# Patient Record
Sex: MALE | Race: WHITE | NOT HISPANIC OR LATINO | Employment: OTHER | URBAN - METROPOLITAN AREA
[De-identification: names, ages, dates, MRNs, and addresses within clinical notes are randomized per-mention and may not be internally consistent; named-entity substitution may affect disease eponyms.]

---

## 2017-01-02 ENCOUNTER — HOSPITAL ENCOUNTER (OUTPATIENT)
Dept: SPEECH THERAPY | Facility: HOSPITAL | Age: 77
Setting detail: THERAPIES SERIES
Discharge: HOME | End: 2017-01-02

## 2017-01-02 DIAGNOSIS — R48.8 OTHER SYMBOLIC DYSFUNCTION: Primary | ICD-10-CM

## 2017-01-02 DIAGNOSIS — G31.84 MCI (MILD COGNITIVE IMPAIRMENT): ICD-10-CM

## 2017-01-02 PROCEDURE — G9168 MEMORY CURRENT STATUS: HCPCS

## 2017-01-02 PROCEDURE — 96125 COGNITIVE TEST BY HC PRO: CPT

## 2017-01-02 PROCEDURE — G9169 MEMORY GOAL STATUS: HCPCS

## 2017-01-02 NOTE — PROGRESS NOTES
Outpatient Speech Language Pathology   Adult Speech Language Cognitive Initial Evaluation  [facility]     Patient Name: Uche Polanco  : 1940  MRN: [id number]  Today's Date: 2017        Visit Date: 2017   Patient Active Problem List   Diagnosis   • Valvular heart disease   • TIA (transient ischemic attack)   • Severe obstructive sleep apnea   • Hypertension   • Hyperlipidemia   • Gout   • CAD (coronary artery disease)   • AV block   • REM sleep behavior disorder   • REM  disorder        Past Medical History   Diagnosis Date   • AV block    • CAD (coronary artery disease)    • Family history of hypertension    • Gout    • Hyperlipidemia    • Hypertension    • TOM (obstructive sleep apnea)    • REM  disorder    • TIA (transient ischemic attack)    • Valvular heart disease         Past Surgical History   Procedure Laterality Date   • Skin surgery  Month ago     Cell removed from head    • Hernia repair     • Tonsillectomy     • Mitral valve repair     • Pacemaker implantation     • Other surgical history  2014     Medtronic link loop recorder   • Hemorrhoidectomy           Visit Dx:    ICD-10-CM ICD-9-CM   1. Other symbolic dysfunction R48.8 784.69   2. MCI (mild cognitive impairment) G31.84 331.83                 Adult Speech Language - 17 1400     Background and History    Reason for Referral Patient is a 76 year old male, diagnosed with MCI, referred per Neurology PA for cognitive-linguistic evaluation and treatment secondary to worsening cognitive impairment.  -JW    Stated Goals Patient's wife reported interest in patient improving his calendar planning and orientation skills.  -JW    Description of Complaint Patient presents with somewhat lack of awareness of specific problems he is having, although he is able to state trouble multitasking. Wife reports patient has difficulty with calendar management, orientation, and planning his day. While patient states he never misses  "appointments, his wife reportedly has to remind him of them and she manages his calendar as well. Patient reportedly asks the same questions more than once and has trouble focusing. Patient reports \"I am not a good listener\".   -JW    Previous Functional Status Functional in all spheres   some college education; prior manager at [employer]  -JW    Current Baseline Abilities Patient is able to manage his own medications and continues to drive. Patient reports no difficulties with ADLs.  -    Pertinent Medications Clonazepam   See chart for additional medications not listed here.  -    Primary Language in the Home English  -    Primary Caregiver Spouse   self  -JW    Informant for the Evaluation Self;Spouse  -    Cognitive Communication and Memory    Attention Other (comment)   Impaired concentration; alternating and divided attention  -    Executive Function --   impaired  -    RBANS- Repeatable Battery for the Assessment of Neuropsychological Status    Immediate Memory Index Score 57  -    Immediate Memory Percentile --   0.2.  -    Immediate Memory Qualitative Description extremely low   2 SD below mean  -    Visuospatial Index Score 96  -    Visuospatial Percentile 39 %  -    Visuospatial Qualitative Description average  -    Language Index Score 82  -    Language Percentile 12 %  -    Language Qualitative Description low average   1 SD below mean  -    Attention Index Score 91  -    Attention Percentile 27 %  -JW    Attention Qualitative Description average  -    Delayed Memory Index Score 78  -    Delayed Memory Percentile 7 %  -    Delayed Memory Qualitative Description borderline   1 SD below mean  -    Total Index Score 404  -    Total Percentile 5 %  -    Total Qualitative Description borderline  -    RBANS Comments Patient performed one SD below the mean. Primary area of deficit noted in this exam includes STM. Patient demonstrated deficits in retrieval, as he " recognized words from recall list on 18/20 opportunities.  -JW      User Key  (r) = Recorded By, (t) = Taken By, (c) = Cosigned By    Initials Name Provider Type    VALERIA Simons MS CCC-SLP Speech and Language Pathologist                               OP SLP Education       01/02/17 1400          Education    Barriers to Learning No barriers identified  -JW      Education Provided Described results of evaluation;Patient expressed understanding of evaluation;Family/caregivers expressed understanding of evaluation;Patient participated in establishing goals and treatment plan;Family/caregivers participated in establishing goals and treatment plan  -JW      Assessed Learning needs;Learning preferences;Learning motivation;Learning readiness  -JW      Learning Motivation Strong  -JW      Learning Method Explanation;Demonstration;Teach back  -JW      Teaching Response Reinforcement needed  -JW        User Key  (r) = Recorded By, (t) = Taken By, (c) = Cosigned By    Initials Name Effective Dates    VALERIA Simons MS CCC-SLP 08/11/15 -                 SLP OP Goals       01/02/17 1500          Other Goals    Other Adult Goal- 1 Patient will verbalize 3 internal and 3 external memory strategies independently across two sessions in order to ensure competency of skilled techniques.  -JW      Status: Other Adult Goal- 1 New  -JW      Other Adult Goal- 2 Patient will perform STM tasks with 80% accuracy across two sessions, 3 trials per session, in order to enhance recall.  -JW      Status: Other Adult Goal- 2 New  -JW      Other Adult Goal- 3 Patient will perform divergent naming tasks, generating 10 items per category within 60 seconds, across two trials in order to improve semantic fluency.  -JW      Status: Other Adult Goal- 3 New  -JW      Other Adult Goal- 4 Patient will perform executive functioning task with 70% accuracy when provided prompts only in order to improve strategic thinking.  -JW       Status: Other Adult Goal- 4 New  -JW      Other Adult Goal- 5 Patient will improve cognition as evidenced by STGs met. (LTG)  -JW      Status: Other Adult Goal- 5 New  -JW      Other Adult Goal- 6 Patient will perform alternating attention task with 80% accuracy across two trials in order to enhance mental flexibility and attention.  -JW      Status: Other Adult Goal- 6 New  -JW      Other Adult Goal- 7 Patient will perform divided attention task with 70% accuracy across two trials independently in order to improve multitasking abilities.  -JW      Status: Other Adult Goal- 7 New  -JW        User Key  (r) = Recorded By, (t) = Taken By, (c) = Cosigned By    Initials Name Provider Type    JW Georgina Simons MS CCC-SLP Speech and Language Pathologist                OP SLP Assessment/Plan - 01/02/17 1400     SLP Assessment    Functional Problems Speech Language- Adult/Cognition  -JW    Impact on Function: Adult Speech Language/Cognition Decreased recall of personal information and medical history;Trouble learning or remembering new information;Poor attention to task  -JW    Clinical Impression: Speech Language-Adult/Congnition Cognitive Communication Impairment  -JW    Clinical Impression Comments Patient presents with impaired cognition, primarily characterized by deficits in STM and attention. Executive functioning and semantic fluency also impaired. Patient requires skilled ST intervention at this time in order to enhance cognitive functioning and promote independence. Without said intervention, patient is at risk for further decline in function, increased dependency on others and decreased QOL.  -JW    Please refer to paper survey for additional self-reported information Yes  -JW    Please refer to items scanned into chart for additional diagnostic informaiton and handouts as provided by clinician Yes  -JW    Prognosis Good (comment)  -JW    Patient/caregiver participated in establishment of treatment plan  and goals Yes  -    Patient would benefit from skilled therapy intervention Yes  -JW    SLP Plan    Frequency twice per week  -JW    Duration 8 weeks  -JW    Planned CPT's? SLP DEVEL COG SKILLS (PER 15 MINUTES): 03830;SLP COG TEST (PER HOUR): 06591  -    Expected Duration Therapy Session (min) 45-60 minutes  -      User Key  (r) = Recorded By, (t) = Taken By, (c) = Cosigned By    Initials Name Provider Type    VALERIA Simons MS CCC-SLP Speech and Language Pathologist                 Time Calculation:   SLP Start Time: 1400 (ttc: 115)    Therapy Charges for Today     Code Description Service Date Service Provider Modifiers Qty    02081110378 HC ST MEMORY CURRENT 1/2/2017 Georgina Simons MS CCC-SLP GN,  1    32657052438 HC ST MEMORY PROJECTED 1/2/2017 Georgina Simons MS CCC-SLP GN, CJ 1    35818875637 HC ST STD COG PERF TEST PER HOUR 1/2/2017 Georgina Simons MS CCC-SLP GN 2          SLP G-Codes  SLP NOMS Used?: Yes  Functional Limitations: Memory  Memory Current Status (): At least 20 percent but less than 40 percent impaired, limited or restricted  Memory Goal Status (): At least 20 percent but less than 40 percent impaired, limited or restricted        Georgina Simons MS CCC-JOSSE  1/2/2017

## 2017-01-06 ENCOUNTER — HOSPITAL ENCOUNTER (OUTPATIENT)
Dept: SPEECH THERAPY | Facility: HOSPITAL | Age: 77
Setting detail: THERAPIES SERIES
Discharge: HOME | End: 2017-01-06

## 2017-01-06 DIAGNOSIS — R48.8 OTHER SYMBOLIC DYSFUNCTION: Primary | ICD-10-CM

## 2017-01-06 DIAGNOSIS — G31.84 MCI (MILD COGNITIVE IMPAIRMENT): ICD-10-CM

## 2017-01-06 DIAGNOSIS — G45.9 TRANSIENT CEREBRAL ISCHEMIA, UNSPECIFIED TYPE: ICD-10-CM

## 2017-01-06 PROCEDURE — 97532: CPT

## 2017-01-06 NOTE — PROGRESS NOTES
"Outpatient Speech Language Pathology   Adult Speech Language Cognitive Treatment Note  [facility]     Patient Name: Uche Polanco  : 1940  MRN: [id number]  Today's Date: 2017         Visit Date: 2017   Patient Active Problem List   Diagnosis   • Valvular heart disease   • TIA (transient ischemic attack)   • Severe obstructive sleep apnea   • Hypertension   • Hyperlipidemia   • Gout   • CAD (coronary artery disease)   • AV block   • REM sleep behavior disorder   • REM  disorder          Visit Dx:    ICD-10-CM ICD-9-CM   1. Other symbolic dysfunction R48.8 784.69   2. MCI (mild cognitive impairment) G31.84 331.83             Adult Speech Language - 17 1400     Background and History    Reason for Referral Patient is a 76 year old male, diagnosed with MCI, referred per Neurology PA for cognitive-linguistic evaluation and treatment secondary to worsening cognitive impairment.  -JW    Stated Goals Patient's wife reported interest in patient improving his calendar planning and orientation skills.  -JW    Description of Complaint Patient presents with somewhat lack of awareness of specific problems he is having, although he is able to state trouble multitasking. Wife reports patient has difficulty with calendar management, orientation, and planning his day. While patient states he never misses appointments, his wife reportedly has to remind him of them and she manages his calendar as well. Patient reportedly asks the same questions more than once and has trouble focusing. Patient reports \"I am not a good listener\".   -JW    Previous Functional Status Functional in all spheres   some college education; prior manager at [employer]  -JW    Current Baseline Abilities Patient is able to manage his own medications and continues to drive. Patient reports no difficulties with ADLs.  -JW    Pertinent Medications Clonazepam   See chart for additional medications not listed here.  -JW    Primary Language in the " Home English  -JW    Primary Caregiver Spouse   self  -JW    Informant for the Evaluation Self;Spouse  -JW    Cognitive Communication and Memory    Attention Other (comment)   Impaired concentration; alternating and divided attention  -JW    Executive Function --   impaired  -JW    RBANS- Repeatable Battery for the Assessment of Neuropsychological Status    Immediate Memory Index Score 57  -JW    Immediate Memory Percentile --   0.2.  -JW    Immediate Memory Qualitative Description extremely low   2 SD below mean  -JW    Visuospatial Index Score 96  -JW    Visuospatial Percentile 39 %  -JW    Visuospatial Qualitative Description average  -JW    Language Index Score 82  -JW    Language Percentile 12 %  -JW    Language Qualitative Description low average   1 SD below mean  -JW    Attention Index Score 91  -JW    Attention Percentile 27 %  -JW    Attention Qualitative Description average  -JW    Delayed Memory Index Score 78  -JW    Delayed Memory Percentile 7 %  -JW    Delayed Memory Qualitative Description borderline   1 SD below mean  -JW    Total Index Score 404  -JW    Total Percentile 5 %  -JW    Total Qualitative Description borderline  -JW    RBANS Comments Patient performed one SD below the mean. Primary area of deficit noted in this exam includes STM. Patient demonstrated deficits in retrieval, as he recognized words from recall list on 18/20 opportunities.  -JW      User Key  (r) = Recorded By, (t) = Taken By, (c) = Cosigned By    Initials Name Provider Type     Georgina Simons MS CCC-SLP Speech and Language Pathologist                              SLP OP Goals       01/06/17 1000 01/02/17 1500       Subjective Comments    Subjective Comments Patient seen for treatment, alert and pleasant. Patient reported concerns about light-headedness.   -JW      Subjective Pain    Able to rate subjective pain? yes  -JW      Pre-Treatment Pain Level 0  -JW      Post-Treatment Pain Level 0  -      Other Goals     Other Adult Goal- 1 Patient will verbalize 3 internal and 3 external memory strategies independently across two sessions in order to ensure competency of skilled techniques.  -JW Patient will verbalize 3 internal and 3 external memory strategies independently across two sessions in order to ensure competency of skilled techniques.  -JW     Status: Other Adult Goal- 1 Progressing as expected  -JW New  -JW     Comments: Other Adult Goal- 1 SLP edu re: memory strategies, gave handout. Patient verbalized understanding. 1/6/17  -JW      Other Adult Goal- 2 Patient will perform STM tasks with 80% accuracy across two sessions, 3 trials per session, in order to enhance recall.  -JW Patient will perform STM tasks with 80% accuracy across two sessions, 3 trials per session, in order to enhance recall.  -JW     Status: Other Adult Goal- 2 New  -JW New  -JW     Other Adult Goal- 3 Patient will perform divergent naming tasks, generating 10 items per category within 60 seconds, across two trials in order to improve semantic fluency.  -JW Patient will perform divergent naming tasks, generating 10 items per category within 60 seconds, across two trials in order to improve semantic fluency.  -JW     Status: Other Adult Goal- 3 New  -JW New  -JW     Other Adult Goal- 4 Patient will perform executive functioning task with 70% accuracy when provided prompts only in order to improve strategic thinking.  -JW Patient will perform executive functioning task with 70% accuracy when provided prompts only in order to improve strategic thinking.  -JW     Status: Other Adult Goal- 4 New  -JW New  -JW     Other Adult Goal- 5 Patient will improve cognition as evidenced by STGs met. (LTG)  -JW Patient will improve cognition as evidenced by STGs met. (LTG)  -JW     Status: Other Adult Goal- 5 New  -JW New  -JW     Other Adult Goal- 6 Patient will perform alternating attention task with 80% accuracy across two trials in order to enhance mental  flexibility and attention.  -JW Patient will perform alternating attention task with 80% accuracy across two trials in order to enhance mental flexibility and attention.  -JW     Status: Other Adult Goal- 6 New  -JW New  -JW     Other Adult Goal- 7 Patient will perform divided attention task with 70% accuracy across two trials independently in order to improve multitasking abilities.  -JW Patient will perform divided attention task with 70% accuracy across two trials independently in order to improve multitasking abilities.  -JW     Status: Other Adult Goal- 7 New  -JW New  -JW       User Key  (r) = Recorded By, (t) = Taken By, (c) = Cosigned By    Initials Name Provider Type    VALERIA Simons MS CCC-SLP Speech and Language Pathologist                    OP SLP Assessment/Plan - 01/06/17 1100     SLP Assessment    Clinical Impression Comments SLP edu re: assessment results and POC. Patient agreed with all. SLP edu re: memory strategies; gave handout for strategies, tips to remember what you read, and note-taking tips. SLP educated pt re: use of voice sherri tramaine.  -JW    SLP Plan    Plan Comments Continue per POC  -JW      User Key  (r) = Recorded By, (t) = Taken By, (c) = Cosigned By    Initials Name Provider Type    VALERIA Simons MS CCC-SLP Speech and Language Pathologist                 Time Calculation:   SLP Start Time: 1100 (tts: 53)    Therapy Charges for Today     Code Description Service Date Service Provider Modifiers Qty    60995800818  ST DEV OF COGN SKILLS EACH 15 MIN 1/6/2017 Georgina Simons MS CCC-SLP GN 4                   Georgina Simons MS CCC-SLP  1/6/2017

## 2017-01-09 ENCOUNTER — HOSPITAL ENCOUNTER (OUTPATIENT)
Dept: SPEECH THERAPY | Facility: HOSPITAL | Age: 77
Setting detail: THERAPIES SERIES
Discharge: HOME | End: 2017-01-09

## 2017-01-09 DIAGNOSIS — G31.84 MCI (MILD COGNITIVE IMPAIRMENT): Primary | ICD-10-CM

## 2017-01-09 PROCEDURE — 92507 TX SP LANG VOICE COMM INDIV: CPT

## 2017-01-09 NOTE — PROGRESS NOTES
"Outpatient Speech Language Pathology   Adult Speech Language Cognitive Treatment Note  [facility]     Patient Name: Uche Polanco  : 1940  MRN: [id number]  Today's Date: 2017         Visit Date: 2017   Patient Active Problem List   Diagnosis   • Valvular heart disease   • TIA (transient ischemic attack)   • Severe obstructive sleep apnea   • Hypertension   • Hyperlipidemia   • Gout   • CAD (coronary artery disease)   • AV block   • REM sleep behavior disorder   • REM  disorder          Visit Dx:    ICD-10-CM ICD-9-CM   1. MCI (mild cognitive impairment) G31.84 331.83             Adult Speech Language - 17 1400     Background and History    Reason for Referral Patient is a 76 year old male, diagnosed with MCI, referred per Neurology PA for cognitive-linguistic evaluation and treatment secondary to worsening cognitive impairment.  -    Stated Goals Patient's wife reported interest in patient improving his calendar planning and orientation skills.  -    Description of Complaint Patient presents with somewhat lack of awareness of specific problems he is having, although he is able to state trouble multitasking. Wife reports patient has difficulty with calendar management, orientation, and planning his day. While patient states he never misses appointments, his wife reportedly has to remind him of them and she manages his calendar as well. Patient reportedly asks the same questions more than once and has trouble focusing. Patient reports \"I am not a good listener\".   -    Previous Functional Status Functional in all spheres   some college education; prior manager at [employer]  -    Current Baseline Abilities Patient is able to manage his own medications and continues to drive. Patient reports no difficulties with ADLs.  -    Pertinent Medications Clonazepam   See chart for additional medications not listed here.  -    Primary Language in the Home English  -    Primary Caregiver " Spouse   self  -JW    Informant for the Evaluation Self;Spouse  -JW    Cognitive Communication and Memory    Attention Other (comment)   Impaired concentration; alternating and divided attention  -    Executive Function --   impaired  -JW    RBANS- Repeatable Battery for the Assessment of Neuropsychological Status    Immediate Memory Index Score 57  -JW    Immediate Memory Percentile --   0.2.  -JW    Immediate Memory Qualitative Description extremely low   2 SD below mean  -JW    Visuospatial Index Score 96  -JW    Visuospatial Percentile 39 %  -JW    Visuospatial Qualitative Description average  -JW    Language Index Score 82  -JW    Language Percentile 12 %  -JW    Language Qualitative Description low average   1 SD below mean  -JW    Attention Index Score 91  -JW    Attention Percentile 27 %  -JW    Attention Qualitative Description average  -JW    Delayed Memory Index Score 78  -JW    Delayed Memory Percentile 7 %  -JW    Delayed Memory Qualitative Description borderline   1 SD below mean  -JW    Total Index Score 404  -JW    Total Percentile 5 %  -JW    Total Qualitative Description borderline  -JW    RBANS Comments Patient performed one SD below the mean. Primary area of deficit noted in this exam includes STM. Patient demonstrated deficits in retrieval, as he recognized words from recall list on 18/20 opportunities.  -      User Key  (r) = Recorded By, (t) = Taken By, (c) = Cosigned By    Initials Name Provider Type     Georgina Simons MS CCC-SLP Speech and Language Pathologist                              SLP OP Goals       01/09/17 1000 01/06/17 1000 01/02/17 1500    Subjective Comments    Subjective Comments Pt alert, cooperative, pleasant  -HG Patient seen for treatment, alert and pleasant. Patient reported concerns about light-headedness.   -     Subjective Pain    Able to rate subjective pain? yes  -HG yes  -JW     Pre-Treatment Pain Level  0  -JW     Post-Treatment Pain Level  0  -JW      Other Goals    Other Adult Goal- 1 Patient will verbalize 3 internal and 3 external memory strategies independently across two sessions in order to ensure competency of skilled techniques.  -HG Patient will verbalize 3 internal and 3 external memory strategies independently across two sessions in order to ensure competency of skilled techniques.  -JW Patient will verbalize 3 internal and 3 external memory strategies independently across two sessions in order to ensure competency of skilled techniques.  -JW    Status: Other Adult Goal- 1 Progressing as expected  -HG Progressing as expected  -JW New  -JW    Comments: Other Adult Goal- 1 Pt was able to recite and explain in detail in a professional manner with use of the handout provided all of the strategies.   -HG SLP edu re: memory strategies, gave handout. Patient verbalized understanding. 1/6/17  -JW     Other Adult Goal- 2 Patient will perform STM tasks with 80% accuracy across two sessions, 3 trials per session, in order to enhance recall.  -HG Patient will perform STM tasks with 80% accuracy across two sessions, 3 trials per session, in order to enhance recall.  -JW Patient will perform STM tasks with 80% accuracy across two sessions, 3 trials per session, in order to enhance recall.  -JW    Status: Other Adult Goal- 2 Progressing as expected  -HG New  -JW New  -JW    Comments: Other Adult Goal- 2 Pt was able to recall 5 un-related items with use of a storytelling strategy along with a written cue and this was over 3 trials.  -HG      Other Adult Goal- 3 Patient will perform divergent naming tasks, generating 10 items per category within 60 seconds, across two trials in order to improve semantic fluency.  -HG Patient will perform divergent naming tasks, generating 10 items per category within 60 seconds, across two trials in order to improve semantic fluency.  -JW Patient will perform divergent naming tasks, generating 10 items per category within 60  "seconds, across two trials in order to improve semantic fluency.  -JW    Status: Other Adult Goal- 3 Progressing as expected  -HG New  -JW New  -JW    Comments: Other Adult Goal- 3 Pt was 7-10 for concrete items given over three trials on 1/9/16.  -HG      Other Adult Goal- 4 Patient will perform executive functioning task with 70% accuracy when provided prompts only in order to improve strategic thinking.  -HG Patient will perform executive functioning task with 70% accuracy when provided prompts only in order to improve strategic thinking.  -JW Patient will perform executive functioning task with 70% accuracy when provided prompts only in order to improve strategic thinking.  -JW    Status: Other Adult Goal- 4 New  -HG New  -JW New  -JW    Other Adult Goal- 5 Patient will improve cognition as evidenced by STGs met. (LTG)  -HG Patient will improve cognition as evidenced by STGs met. (LTG)  -JW Patient will improve cognition as evidenced by STGs met. (LTG)  -JW    Status: Other Adult Goal- 5 Progressing as expected  -HG New  -JW New  -JW    Comments: Other Adult Goal- 5 Pt continuing to work toward improved cogntion and stated, \"If I could flush the stuff out of brain at the bottom and have room for new stuff.\"   -HG      Other Adult Goal- 6 Patient will perform alternating attention task with 80% accuracy across two trials in order to enhance mental flexibility and attention.  -HG Patient will perform alternating attention task with 80% accuracy across two trials in order to enhance mental flexibility and attention.  -JW Patient will perform alternating attention task with 80% accuracy across two trials in order to enhance mental flexibility and attention.  -JW    Status: Other Adult Goal- 6 Progressing as expected  -HG New  -JW New  -JW    Comments: Other Adult Goal- 6 Pt was 100% accurate for marking out designated letters.  To increase difficulty, while marking out targeted letters pt was asked to name specific " items given a category and pt was 80% accurate.   -HG      Other Adult Goal- 7 Patient will perform divided attention task with 70% accuracy across two trials independently in order to improve multitasking abilities.  -HG Patient will perform divided attention task with 70% accuracy across two trials independently in order to improve multitasking abilities.  -JW Patient will perform divided attention task with 70% accuracy across two trials independently in order to improve multitasking abilities.  -JW    Status: Other Adult Goal- 7 Progressing as expected  -HG New  -JW New  -JW    Comments: Other Adult Goal- 7 Pt was 60% accurate initially and improve to 80% accuracy while sorting cards into suits and recognizing words with a key letter in it.    -        User Key  (r) = Recorded By, (t) = Taken By, (c) = Cosigned By    Initials Name Provider Type    VALERIA Simons MS CCC-SLP Speech and Language Pathologist    DALJIT Serrano MS CCC-SLP Speech and Language Pathologist                OP SLP Education       01/09/17 1000          Education    Barriers to Learning No barriers identified  -      Education Provided Patient demonstrated recommended strategies;Patient requires further education on strategies, risks  -      Assessed Learning needs;Learning motivation;Learning preferences;Learning readiness  -      Learning Motivation Strong  -      Learning Method Explanation;Demonstration  -      Teaching Response Reinforcement needed  -        User Key  (r) = Recorded By, (t) = Taken By, (c) = Cosigned By    Initials Name Effective Dates    DALJIT Serrano MS CCC-SLP 06/22/15 -                 OP SLP Assessment/Plan - 01/09/17 1000     SLP Assessment    Functional Problems Speech Language- Adult/Cognition  -    Impact on Function: Adult Speech Language/Cognition Decreased recall of personal information and medical history;Trouble learning or remembering new information;Poor attention  to task  -HG    Clinical Impression: Speech Language-Adult/Congnition Cognitive Communication Impairment  -HG    Prognosis Good (comment)  -HG    Patient/caregiver participated in establishment of treatment plan and goals Yes  -HG    Patient would benefit from skilled therapy intervention Yes  -HG    SLP Plan    Frequency 2x/week  -HG    Duration 8 weeks  -HG    Planned CPT's? SLP INDIVIDUAL SPEECH THERAPY: 09302  -HG    Expected Duration Therapy Session (min) 45-60 minutes  -HG    Plan Comments Continue per POC  -HG      User Key  (r) = Recorded By, (t) = Taken By, (c) = Cosigned By    Initials Name Provider Type     Anjali Serrano MS CCC-SLP Speech and Language Pathologist                 Time Calculation:   SLP Start Time: 1000    Therapy Charges for Today     Code Description Service Date Service Provider Modifiers Qty    91123260590  ST TREATMENT SPEECH 4 1/9/2017 Anjali Serrano MS CCC-SLP GN 1                   Anjali Serrano MS CCC-SLP  1/9/2017

## 2017-01-13 ENCOUNTER — APPOINTMENT (OUTPATIENT)
Dept: SPEECH THERAPY | Facility: HOSPITAL | Age: 77
End: 2017-01-13

## 2017-01-13 ENCOUNTER — HOSPITAL ENCOUNTER (OUTPATIENT)
Dept: SPEECH THERAPY | Facility: HOSPITAL | Age: 77
Setting detail: THERAPIES SERIES
Discharge: HOME | End: 2017-01-13

## 2017-01-13 DIAGNOSIS — R48.8 OTHER SYMBOLIC DYSFUNCTION: Primary | ICD-10-CM

## 2017-01-13 PROCEDURE — 92507 TX SP LANG VOICE COMM INDIV: CPT

## 2017-01-13 NOTE — PROGRESS NOTES
Outpatient Speech Language Pathology   Adult Speech Language Cognitive Treatment Note  [facility]     Patient Name: Uche Polanco  : 1940  MRN: [id number]  Today's Date: 2017         Visit Date: 2017   Patient Active Problem List   Diagnosis   • Valvular heart disease   • TIA (transient ischemic attack)   • Severe obstructive sleep apnea   • Hypertension   • Hyperlipidemia   • Gout   • CAD (coronary artery disease)   • AV block   • REM sleep behavior disorder   • REM  disorder          Visit Dx:    ICD-10-CM ICD-9-CM   1. Other symbolic dysfunction R48.8 784.69                               SLP OP Goals       17 1100 17 1000 17 1000    Subjective Comments    Subjective Comments Pt alert, cooperative, eager to continue with cog tx.   -HG Pt alert, cooperative, pleasant  -HG Patient seen for treatment, alert and pleasant. Patient reported concerns about light-headedness.   -JW    Subjective Pain    Able to rate subjective pain? yes  -HG yes  -HG yes  -JW    Pre-Treatment Pain Level   0  -JW    Post-Treatment Pain Level   0  -JW    Other Goals    Other Adult Goal- 1 Patient will verbalize 3 internal and 3 external memory strategies independently across two sessions in order to ensure competency of skilled techniques.  -HG Patient will verbalize 3 internal and 3 external memory strategies independently across two sessions in order to ensure competency of skilled techniques.  -HG Patient will verbalize 3 internal and 3 external memory strategies independently across two sessions in order to ensure competency of skilled techniques.  -JW    Status: Other Adult Goal- 1 Progressing as expected  -HG Progressing as expected  -HG Progressing as expected  -JW    Comments: Other Adult Goal- 1 With prompting, pt was able to name 3 of each strategy.  -HG Pt was able to recite and explain in detail in a professional manner with use of the handout provided all of the strategies.   -HG SLP edu  re: memory strategies, gave handout. Patient verbalized understanding. 1/6/17  -JW    Other Adult Goal- 2 Patient will perform STM tasks with 80% accuracy across two sessions, 3 trials per session, in order to enhance recall.  -HG Patient will perform STM tasks with 80% accuracy across two sessions, 3 trials per session, in order to enhance recall.  -HG Patient will perform STM tasks with 80% accuracy across two sessions, 3 trials per session, in order to enhance recall.  -JW    Status: Other Adult Goal- 2 Progressing as expected  -HG Progressing as expected  -HG New  -JW    Comments: Other Adult Goal- 2 Pt was able to recall 5 un-related items with use of a storytelling strategy along with a written cue and this was over 3 trials.  -HG Pt was able to recall 5 un-related items with use of a storytelling strategy along with a written cue and this was over 3 trials.  -HG     Other Adult Goal- 3 Patient will perform divergent naming tasks, generating 10 items per category within 60 seconds, across two trials in order to improve semantic fluency.  -HG Patient will perform divergent naming tasks, generating 10 items per category within 60 seconds, across two trials in order to improve semantic fluency.  -HG Patient will perform divergent naming tasks, generating 10 items per category within 60 seconds, across two trials in order to improve semantic fluency.  -JW    Status: Other Adult Goal- 3 Progressing as expected  -HG Progressing as expected  -HG New  -JW    Comments: Other Adult Goal- 3 Pt was 10 items for concrete category, abstract 7-8.  -HG Pt was 7-10 for concrete items given over three trials on 1/9/16.  -HG     Other Adult Goal- 4 Patient will perform executive functioning task with 70% accuracy when provided prompts only in order to improve strategic thinking.  -HG Patient will perform executive functioning task with 70% accuracy when provided prompts only in order to improve strategic thinking.  -HG Patient  "will perform executive functioning task with 70% accuracy when provided prompts only in order to improve strategic thinking.  -JW    Status: Other Adult Goal- 4 Progressing as expected  -HG New  -HG New  -JW    Other Adult Goal- 5 Patient will improve cognition as evidenced by STGs met. (LTG)  -HG Patient will improve cognition as evidenced by STGs met. (LTG)  -HG Patient will improve cognition as evidenced by STGs met. (LTG)  -JW    Status: Other Adult Goal- 5 Progressing as expected  -HG Progressing as expected  -HG New  -JW    Comments: Other Adult Goal- 5 Pt continues to work on strategies to improve working memory such as chaining, listing, list making.  -HG Pt continuing to work toward improved cogntion and stated, \"If I could flush the stuff out of brain at the bottom and have room for new stuff.\"   -HG     Other Adult Goal- 6 Patient will perform alternating attention task with 80% accuracy across two trials in order to enhance mental flexibility and attention.  -HG Patient will perform alternating attention task with 80% accuracy across two trials in order to enhance mental flexibility and attention.  -HG Patient will perform alternating attention task with 80% accuracy across two trials in order to enhance mental flexibility and attention.  -JW    Status: Other Adult Goal- 6 Progressing as expected  -HG Progressing as expected  -HG New  -JW    Comments: Other Adult Goal- 6 Pt was 80% accurate with alphabetizing and reversing the order to words provided with given a series of three words.  -HG Pt was 100% accurate for marking out designated letters.  To increase difficulty, while marking out targeted letters pt was asked to name specific items given a category and pt was 80% accurate.   -HG     Other Adult Goal- 7 Patient will perform divided attention task with 70% accuracy across two trials independently in order to improve multitasking abilities.  -HG Patient will perform divided attention task with 70% " accuracy across two trials independently in order to improve multitasking abilities.  -HG Patient will perform divided attention task with 70% accuracy across two trials independently in order to improve multitasking abilities.  -JW    Status: Other Adult Goal- 7 Progressing as expected  -HG Progressing as expected  -HG New  -JW    Comments: Other Adult Goal- 7 Pt able to complete a visual tasks while naming objects with 75% accuracy.  -HG Pt was 60% accurate initially and improve to 80% accuracy while sorting cards into suits and recognizing words with a key letter in it.    -HG     SLP Time Calculation    SLP Goal Re-Cert Due Date 01/25/17  -HG        01/02/17 1500          Other Goals    Other Adult Goal- 1 Patient will verbalize 3 internal and 3 external memory strategies independently across two sessions in order to ensure competency of skilled techniques.  -JW      Status: Other Adult Goal- 1 New  -JW      Other Adult Goal- 2 Patient will perform STM tasks with 80% accuracy across two sessions, 3 trials per session, in order to enhance recall.  -JW      Status: Other Adult Goal- 2 New  -JW      Other Adult Goal- 3 Patient will perform divergent naming tasks, generating 10 items per category within 60 seconds, across two trials in order to improve semantic fluency.  -JW      Status: Other Adult Goal- 3 New  -JW      Other Adult Goal- 4 Patient will perform executive functioning task with 70% accuracy when provided prompts only in order to improve strategic thinking.  -JW      Status: Other Adult Goal- 4 New  -JW      Other Adult Goal- 5 Patient will improve cognition as evidenced by STGs met. (LTG)  -JW      Status: Other Adult Goal- 5 New  -JW      Other Adult Goal- 6 Patient will perform alternating attention task with 80% accuracy across two trials in order to enhance mental flexibility and attention.  -JW      Status: Other Adult Goal- 6 New  -JW      Other Adult Goal- 7 Patient will perform divided  attention task with 70% accuracy across two trials independently in order to improve multitasking abilities.  -JW      Status: Other Adult Goal- 7 New  -JW        User Key  (r) = Recorded By, (t) = Taken By, (c) = Cosigned By    Initials Name Provider Type    VALERIA Simons, MS CCC-SLP Speech and Language Pathologist     Anjali Serrano, MS [facility]-SLP Speech and Language Pathologist                OP SLP Education       01/13/17 1100          Education    Barriers to Learning No barriers identified  -      Education Provided Patient demonstrated recommended strategies;Patient requires further education on strategies, risks  -      Assessed Learning needs;Learning motivation;Learning preferences;Learning readiness  -      Learning Motivation Strong  -      Learning Method Explanation;Demonstration;Written materials  -      Teaching Response Verbalized understanding;Demonstrated understanding;Reinforcement needed  -        User Key  (r) = Recorded By, (t) = Taken By, (c) = Cosigned By    Initials Name Effective Dates    HG Anjali Serrano, MS [facility]-SLP 06/22/15 -                 OP SLP Assessment/Plan - 01/13/17 1200     SLP Assessment    Functional Problems Speech Language- Adult/Cognition  -    Impact on Function: Adult Speech Language/Cognition Decreased recall of personal information and medical history;Trouble learning or remembering new information  -    Clinical Impression: Speech Language-Adult/Congnition Cognitive Communication Impairment  -    Prognosis Good (comment)  -    Patient/caregiver participated in establishment of treatment plan and goals Yes  -HG    Patient would benefit from skilled therapy intervention Yes  -HG    SLP Plan    Frequency 2x/week  -HG    Duration 8 weeks  -HG    Planned CPT's? SLP INDIVIDUAL SPEECH THERAPY: 24746  -    Expected Duration Therapy Session (min) 45-60 minutes  -HG    Plan Comments Cont per POC  -HG      User Key  (r) = Recorded By, (t) =  Taken By, (c) = Cosigned By    Initials Name Provider Type     Anjali Serrano, MS CCC-SLP Speech and Language Pathologist                 Time Calculation:   SLP Start Time: 1100    Therapy Charges for Today     Code Description Service Date Service Provider Modifiers Qty    86455531330 HC ST TREATMENT SPEECH 5 1/13/2017 Anjali Serrano MS CCC-SLP GN 1    [id number] HC ST TREATMENT SPEECH 6 1/13/2017 Anjali Serrano MS CCC-SLP GN 1                   Anjali Serrano MS CCC-SLP  1/13/2017

## 2017-01-16 ENCOUNTER — OFFICE VISIT (OUTPATIENT)
Dept: CARDIOLOGY | Facility: CLINIC | Age: 77
End: 2017-01-16

## 2017-01-16 VITALS
BODY MASS INDEX: 28.36 KG/M2 | DIASTOLIC BLOOD PRESSURE: 68 MMHG | HEART RATE: 77 BPM | WEIGHT: 214 LBS | SYSTOLIC BLOOD PRESSURE: 116 MMHG | HEIGHT: 73 IN

## 2017-01-16 DIAGNOSIS — I10 ESSENTIAL HYPERTENSION: Primary | ICD-10-CM

## 2017-01-16 DIAGNOSIS — I38 VALVULAR HEART DISEASE: ICD-10-CM

## 2017-01-16 DIAGNOSIS — E78.2 MIXED HYPERLIPIDEMIA: ICD-10-CM

## 2017-01-16 DIAGNOSIS — R42 DIZZY SPELLS: ICD-10-CM

## 2017-01-16 DIAGNOSIS — I44.30 AV BLOCK: ICD-10-CM

## 2017-01-16 PROCEDURE — 93288 INTERROG EVL PM/LDLS PM IP: CPT | Performed by: PHYSICIAN ASSISTANT

## 2017-01-16 PROCEDURE — 99214 OFFICE O/P EST MOD 30 MIN: CPT | Performed by: PHYSICIAN ASSISTANT

## 2017-01-16 NOTE — LETTER
January 16, 2017     Jd Tapia MD  [address]  [address]  [facility] [address]    Patient: Uche Polanco   YOB: 1940   Date of Visit: 1/16/2017       Dear Dr. Willie MD:    Thank you for referring Uche Polanco to me for evaluation. Below are the relevant portions of my assessment and plan of care.    If you have questions, please do not hesitate to call me. I look forward to following Uche along with you.         Sincerely,        JACKIE Park        CC: No Recipients  JACKEI Park  1/16/2017  9:29 AM  Signed       [facility] [place] [facility] - Office Note  Uche Polanco         [address] [place] [address]  1940   [phone] (home) [phone] (work)     LOCATION:  [place] office.  Visit Type: Follow Up.    PCP:  Jd Tapia MD    01/16/17   Uche Polanco is a 76 y.o.  male  retired.      Chief Complaint:  Follow-up on Valvular Heart Disease and Hypertension.  PROBLEM LIST:  1. Valvular heart disease:  a. History of mitral valve repair at [facility] 2005, records pending.  b. Echocardiogram, August 2014: EF normal at 50% to 55%, moderate AR, mild MR, left atrium at 3.8 cm.   2. Recent TIA:  a. Status post Medtronic link loop recorder implantation, November 2014.  b. Recent interrogation revealing AV block, pauses, symptomatic with dizziness and lightheadedness.  3. Obstructive sleep apnea requiring CPAP.   4. Benign hypertension.   5. Gout.   6. Coronary artery disease by report, data insufficient.   7. REM disorder.   8. Dyslipidemia.   9. Family history of hypertension.   10. AV block, symptomatic with lightheadedness, status post pacemaker implantation, 04/17/2015, Dr. Robert Gerber: Medtronic Advisa DR BENNETTA2 DR01.   11. Surgical history:   a. Hemorrhoidectomy.   b. Basal cell carcinoma removed.   c. Valvular repair (mitral valve).          Allergies   Allergen Reactions   • Sulfa Antibiotics          Current Outpatient  "Prescriptions:   •  allopurinol (ZYLOPRIM) 100 MG tablet, Take 100 mg by mouth Daily., Disp: , Rfl:   •  atorvastatin (LIPITOR) 20 MG tablet, Take 1 tablet by mouth Daily., Disp: 90 tablet, Rfl: 3  •  clonazePAM (KlonoPIN) 1 MG tablet, Take 1 tablet by mouth Every Night. (Patient taking differently: Take 1.5 mg by mouth Every Night.), Disp: 90 tablet, Rfl: 0  •  clopidogrel (PLAVIX) 75 MG tablet, Take 1 tablet by mouth daily., Disp: 90 tablet, Rfl: 3    HPI  Mr. Polanco is here for routine follow-up on his hypertension, valvular heart disease, and pacemaker interrogation.  From a cardiac standpoint he's done quite well since his last visit with us.  He denies chest pain or exertional angina, denies dyspnea or weakness.  A few months ago he had a dizzy spell requiring ER evaluation.  He was in the car 10 minutes driving to the gym.  He got up and walked into the gym and when he turned the corner became suddenly lightheaded/dizzy.  There was no syncope, no precursor to this event.  It lasted approximate 10-12 minutes.  He was alert and oriented during this time, no speech deficits, no facial drooping, no visual changes.  Evaluation by the ER physician showed normal EKG, normal blood work.  They felt this was secondary to cerumen impaction.  However, he was evaluated by the ENT who did not feel this caused any problems.  He also follow-up with neurology who felt that he was stable.  He's had no further recurrences and continues to remain active with the gym at least 4-5 times a week.      The following portions of the patient's history were reviewed in the chart and updated as appropriate: allergies, current medications, past family history, past medical history, past social history, past surgical history and problem list.    Review of Systems   Neurological: Positive for dizziness.   All other systems reviewed and are negative.        height is 73\" (185.4 cm) and weight is 214 lb (97.1 kg). His blood pressure is 116/68 " and his pulse is 77.   Physical Exam   Constitutional: Vital signs are normal. He appears well-developed and well-nourished.   Cardiovascular: Normal rate, regular rhythm, S1 normal, S2 normal, normal heart sounds, intact distal pulses and normal pulses.    Pulmonary/Chest: Effort normal and breath sounds normal. He has no wheezes. He has no rhonchi. He has no rales.   Abdominal: Soft. Normal appearance and bowel sounds are normal. There is no hepatosplenomegaly.   Neurological: He is alert.     Procedures   Medtronic pacemaker:  Atrial pacing 47.4%, P-wave 2.3, threshold 0.875 at 0.4 ms, impedance 532 ohms.  Ventricular pacing 99.6%, paced at 50, threshold 0.625 at 0.4 ms, impedance 741 ohms.  Battery voltage 8-1/2 years.  3 nonsustained tachycardic events, longest being 5 seconds.  Assessment/ Plan   Essential hypertension:  Well-controlled.  I'd continue on the current exercise regimen.  I advised him that if this occurs again to check his blood pressure to rule out orthostasis.    Mixed hyperlipidemia:  Continue on the atorvastatin, get annual lipid panel and CMP with primary care.    Valvular heart disease:  Stable.  It has been 2-1/2 years since his last echocardiogram so we'll proceed with echo in the next few weeks.  I will call patient with the results.    AV block:  Normal device interrogation.  Return to clinic 6 months with repeat Medtronic check or sooner when necessary.    Ira Masters PA-C

## 2017-01-16 NOTE — PROGRESS NOTES
[place] [facility] - Office Note  Uche Polanco         [address] [address]  1940   [phone] (home) [phone] (work)     LOCATION:  [place] office.  Visit Type: Follow Up.    PCP:  Jd Tapia MD    01/16/17   Uche Polanco is a 76 y.o.  male  retired.      Chief Complaint:  Follow-up on Valvular Heart Disease and Hypertension.  PROBLEM LIST:  1. Valvular heart disease:  a. History of mitral valve repair at [facility] 2005, records pending.  b. Echocardiogram, August 2014: EF normal at 50% to 55%, moderate AR, mild MR, left atrium at 3.8 cm.   2. Recent TIA:  a. Status post Medtronic link loop recorder implantation, November 2014.  b. Recent interrogation revealing AV block, pauses, symptomatic with dizziness and lightheadedness.  3. Obstructive sleep apnea requiring CPAP.   4. Benign hypertension.   5. Gout.   6. Coronary artery disease by report, data insufficient.   7. REM disorder.   8. Dyslipidemia.   9. Family history of hypertension.   10. AV block, symptomatic with lightheadedness, status post pacemaker implantation, 04/17/2015, Dr. Robert Gerber: Medtronic Advisa DR BENNETTA2 DR01.   11. Surgical history:   a. Hemorrhoidectomy.   b. Basal cell carcinoma removed.   c. Valvular repair (mitral valve).          Allergies   Allergen Reactions   • Sulfa Antibiotics          Current Outpatient Prescriptions:   •  allopurinol (ZYLOPRIM) 100 MG tablet, Take 100 mg by mouth Daily., Disp: , Rfl:   •  atorvastatin (LIPITOR) 20 MG tablet, Take 1 tablet by mouth Daily., Disp: 90 tablet, Rfl: 3  •  clonazePAM (KlonoPIN) 1 MG tablet, Take 1 tablet by mouth Every Night. (Patient taking differently: Take 1.5 mg by mouth Every Night.), Disp: 90 tablet, Rfl: 0  •  clopidogrel (PLAVIX) 75 MG tablet, Take 1 tablet by mouth daily., Disp: 90 tablet, Rfl: 3    HPI  Mr. Polanco is here for routine follow-up on his hypertension, valvular heart disease, and  "pacemaker interrogation.  From a cardiac standpoint he's done quite well since his last visit with us.  He denies chest pain or exertional angina, denies dyspnea or weakness.  A few months ago he had a dizzy spell requiring ER evaluation.  He was in the car 10 minutes driving to the gym.  He got up and walked into the gym and when he turned the corner became suddenly lightheaded/dizzy.  There was no syncope, no precursor to this event.  It lasted approximate 10-12 minutes.  He was alert and oriented during this time, no speech deficits, no facial drooping, no visual changes.  Evaluation by the ER physician showed normal EKG, normal blood work.  They felt this was secondary to cerumen impaction.  However, he was evaluated by the ENT who did not feel this caused any problems.  He also follow-up with neurology who felt that he was stable.  He's had no further recurrences and continues to remain active with the gym at least 4-5 times a week.      The following portions of the patient's history were reviewed in the chart and updated as appropriate: allergies, current medications, past family history, past medical history, past social history, past surgical history and problem list.    Review of Systems   Neurological: Positive for dizziness.   All other systems reviewed and are negative.        height is 73\" (185.4 cm) and weight is 214 lb (97.1 kg). His blood pressure is 116/68 and his pulse is 77.   Physical Exam   Constitutional: Vital signs are normal. He appears well-developed and well-nourished.   Cardiovascular: Normal rate, regular rhythm, S1 normal, S2 normal, normal heart sounds, intact distal pulses and normal pulses.    Pulmonary/Chest: Effort normal and breath sounds normal. He has no wheezes. He has no rhonchi. He has no rales.   Abdominal: Soft. Normal appearance and bowel sounds are normal. There is no hepatosplenomegaly.   Neurological: He is alert.     Procedures   Medtronic pacemaker:  Atrial pacing " 47.4%, P-wave 2.3, threshold 0.875 at 0.4 ms, impedance 532 ohms.  Ventricular pacing 99.6%, paced at 50, threshold 0.625 at 0.4 ms, impedance 741 ohms.  Battery voltage 8-1/2 years.  3 nonsustained tachycardic events, longest being 5 seconds.  Assessment/ Plan   Essential hypertension:  Well-controlled.  I'd continue on the current exercise regimen.  I advised him that if this occurs again to check his blood pressure to rule out orthostasis.    Mixed hyperlipidemia:  Continue on the atorvastatin, get annual lipid panel and CMP with primary care.    Valvular heart disease:  Stable.  It has been 2-1/2 years since his last echocardiogram so we'll proceed with echo in the next few weeks.  I will call patient with the results.    AV block:  Normal device interrogation.  Return to clinic 6 months with repeat Medtronic check or sooner when necessary.    Ira Masters PA-C

## 2017-01-16 NOTE — MR AVS SNAPSHOT
Uche Polanco   1/16/2017 8:45 AM   Office Visit    Dept Phone:  [phone]   Encounter #:  [id number]    Provider:  JACKIE Hannah   Department:  [facility]                Your Full Care Plan              Today's Medication Changes          These changes are accurate as of: 1/16/17  9:26 AM.  If you have any questions, ask your nurse or doctor.               Stop taking medication(s)listed here:     amoxicillin 500 MG capsule   Commonly known as:  AMOXIL   Stopped by:  JACKIE Hannah           meclizine 25 MG tablet   Commonly known as:  ANTIVERT   Stopped by:  JACKIE Hannah                      Your Updated Medication List          This list is accurate as of: 1/16/17  9:26 AM.  Always use your most recent med list.                allopurinol 100 MG tablet   Commonly known as:  ZYLOPRIM       atorvastatin 20 MG tablet   Commonly known as:  LIPITOR   Take 1 tablet by mouth Daily.       clonazePAM 1 MG tablet   Commonly known as:  KlonoPIN   Take 1 tablet by mouth Every Night.       clopidogrel 75 MG tablet   Commonly known as:  PLAVIX   Take 1 tablet by mouth daily.               You Were Diagnosed With        Codes Comments    Essential hypertension    -  Primary ICD-10-CM: I10  ICD-9-CM: 401.9     Mixed hyperlipidemia     ICD-10-CM: E78.2  ICD-9-CM: 272.2     Valvular heart disease     ICD-10-CM: I38  ICD-9-CM: 424.90     AV block     ICD-10-CM: I44.30  ICD-9-CM: 426.10       Instructions     None    Patient Instructions History      Upcoming Appointments     Visit Type Date Time Department    FOLLOW UP 1/16/2017  8:45 AM [facility]    TREATMENT 1/17/2017  9:00 AM  [facility] [address]    FOLLOW UP 6/23/2017  1:30 PM [facility]    FOLLOW UP 7/31/2017  3:30 PM [facility]      MyChart Signup     Our records indicate that you have declined [facility] MyChart signup. If you would like to sign up for MyChart,  "please email [email or web address] or call [phone] to obtain an activation code.             Other Info from Your Visit           Your Appointments     Jan 17, 2017  9:00 AM EST   Therapy Treatment with Anjali Serrano, MS CCC-SLP   [facility] SPEECH LANGUAGE AT [address] [facility] (--)    [address] [place] [id number] [phone]            Jun 23, 2017  1:30 PM EDT   Follow Up with Chiki Newton MD   [facility] NEUROLOGY (--)    [address]  [place] [id number] [phone]           Arrive 15 minutes prior to appointment.            Jul 31, 2017  3:30 PM EDT   Follow Up with Robert Gerber MD   [facility] CARDIOLOGY (--)    [address]  [facility] [id number] [phone]           Arrive 15 minutes prior to appointment.              Allergies     Sulfa Antibiotics        Reason for Visit     Follow-up     Valvular Heart Disease     Hypertension           Vital Signs     Blood Pressure Pulse Height Weight Body Mass Index Smoking Status    116/68 (BP Location: Left arm, Patient Position: Sitting) 77 73\" (185.4 cm) 214 lb (97.1 kg) 28.23 kg/m2 Never Smoker      Problems and Diagnoses Noted     AV block    High cholesterol or triglycerides    High blood pressure    Valvular heart disease        "

## 2017-01-17 ENCOUNTER — HOSPITAL ENCOUNTER (OUTPATIENT)
Dept: SPEECH THERAPY | Facility: HOSPITAL | Age: 77
Setting detail: THERAPIES SERIES
Discharge: HOME | End: 2017-01-17

## 2017-01-17 DIAGNOSIS — G31.84 MCI (MILD COGNITIVE IMPAIRMENT): Primary | ICD-10-CM

## 2017-01-17 PROCEDURE — 92507 TX SP LANG VOICE COMM INDIV: CPT

## 2017-01-17 NOTE — PROGRESS NOTES
Outpatient Speech Language Pathology   Adult Speech Language Cognitive Treatment Note  [facility]     Patient Name: Uche Polanco  : 1940  MRN: [id number]  Today's Date: 2017         Visit Date: 2017   Patient Active Problem List   Diagnosis   • Valvular heart disease   • TIA (transient ischemic attack)   • Severe obstructive sleep apnea   • Hypertension   • Hyperlipidemia   • Gout   • CAD (coronary artery disease)   • AV block   • REM sleep behavior disorder   • REM  disorder          Visit Dx:    ICD-10-CM ICD-9-CM   1. MCI (mild cognitive impairment) G31.84 331.83                               SLP OP Goals       17 0900 17 1100 17 1000    Subjective Comments    Subjective Comments Pt alert, cooperative, homework completed.  -HG Pt alert, cooperative, eager to continue with cog tx.   -HG Pt alert, cooperative, pleasant  -HG    Subjective Pain    Able to rate subjective pain? yes  -HG yes  -HG yes  -HG    Other Goals    Other Adult Goal- 1 Patient will verbalize 3 internal and 3 external memory strategies independently across two sessions in order to ensure competency of skilled techniques.  -HG Patient will verbalize 3 internal and 3 external memory strategies independently across two sessions in order to ensure competency of skilled techniques.  -HG Patient will verbalize 3 internal and 3 external memory strategies independently across two sessions in order to ensure competency of skilled techniques.  -HG    Status: Other Adult Goal- 1 Progressing as expected  -HG Progressing as expected  -HG Progressing as expected  -HG    Comments: Other Adult Goal- 1 Pt able to recall 2/3 external with no cues and for internal strategies, pt was 1/3 independently.  -HG With prompting, pt was able to name 3 of each strategy.  -HG Pt was able to recite and explain in detail in a professional manner with use of the handout provided all of the strategies.   -HG    Other Adult Goal- 2 Patient  will perform STM tasks with 80% accuracy across two sessions, 3 trials per session, in order to enhance recall.  -HG Patient will perform STM tasks with 80% accuracy across two sessions, 3 trials per session, in order to enhance recall.  -HG Patient will perform STM tasks with 80% accuracy across two sessions, 3 trials per session, in order to enhance recall.  -HG    Status: Other Adult Goal- 2 Progressing as expected  -HG Progressing as expected  -HG Progressing as expected  -HG    Comments: Other Adult Goal- 2 Pt able to recall 4 un-related words with compensatory strategies.  -HG Pt was able to recall 5 un-related items with use of a storytelling strategy along with a written cue and this was over 3 trials.  -HG Pt was able to recall 5 un-related items with use of a storytelling strategy along with a written cue and this was over 3 trials.  -HG    Other Adult Goal- 3 Patient will perform divergent naming tasks, generating 10 items per category within 60 seconds, across two trials in order to improve semantic fluency.  -HG Patient will perform divergent naming tasks, generating 10 items per category within 60 seconds, across two trials in order to improve semantic fluency.  -HG Patient will perform divergent naming tasks, generating 10 items per category within 60 seconds, across two trials in order to improve semantic fluency.  -HG    Status: Other Adult Goal- 3 Progressing as expected  -HG Progressing as expected  -HG Progressing as expected  -HG    Comments: Other Adult Goal- 3 Pt was 6-9 in abstract category naming.  -HG Pt was 10 items for concrete category, abstract 7-8.  -HG Pt was 7-10 for concrete items given over three trials on 1/9/16.  -HG    Other Adult Goal- 4 Patient will perform executive functioning task with 70% accuracy when provided prompts only in order to improve strategic thinking.  -HG Patient will perform executive functioning task with 70% accuracy when provided prompts only in order to  "improve strategic thinking.  -HG Patient will perform executive functioning task with 70% accuracy when provided prompts only in order to improve strategic thinking.  -HG    Status: Other Adult Goal- 4 Progressing as expected  -HG Progressing as expected  -HG New  -HG    Comments: Other Adult Goal- 4 Pt was able to complete a card sorting game with music playing and answering questions.  -HG      Other Adult Goal- 5 Patient will improve cognition as evidenced by STGs met. (LTG)  -HG Patient will improve cognition as evidenced by STGs met. (LTG)  -HG Patient will improve cognition as evidenced by STGs met. (LTG)  -HG    Status: Other Adult Goal- 5 Progressing as expected  -HG Progressing as expected  -HG Progressing as expected  -HG    Comments: Other Adult Goal- 5 Pt continues to work on strategies to improve working memory such as chaining, listing, list making.  -HG Pt continues to work on strategies to improve working memory such as chaining, listing, list making.  -HG Pt continuing to work toward improved cogntion and stated, \"If I could flush the stuff out of brain at the bottom and have room for new stuff.\"   -HG    Other Adult Goal- 6 Patient will perform alternating attention task with 80% accuracy across two trials in order to enhance mental flexibility and attention.  -HG Patient will perform alternating attention task with 80% accuracy across two trials in order to enhance mental flexibility and attention.  -HG Patient will perform alternating attention task with 80% accuracy across two trials in order to enhance mental flexibility and attention.  -HG    Status: Other Adult Goal- 6 Progressing as expected  -HG Progressing as expected  -HG Progressing as expected  -HG    Comments: Other Adult Goal- 6 Pt was 90% accurate with completing alternating attn tasks with marking out words and completing alt subtraction and addition math problems.  -HG Pt was 80% accurate with alphabetizing and reversing the order " to words provided with given a series of three words.  -HG Pt was 100% accurate for marking out designated letters.  To increase difficulty, while marking out targeted letters pt was asked to name specific items given a category and pt was 80% accurate.   -HG    Other Adult Goal- 7 Patient will perform divided attention task with 70% accuracy across two trials independently in order to improve multitasking abilities.  -HG Patient will perform divided attention task with 70% accuracy across two trials independently in order to improve multitasking abilities.  -HG Patient will perform divided attention task with 70% accuracy across two trials independently in order to improve multitasking abilities.  -HG    Status: Other Adult Goal- 7 Progressing as expected  -HG Progressing as expected  -HG Progressing as expected  -HG    Comments: Other Adult Goal- 7 Pt was 80% accurate with completing a card sorting tasks given 2 different instructions.   -HG Pt able to complete a visual tasks while naming objects with 75% accuracy.  -HG Pt was 60% accurate initially and improve to 80% accuracy while sorting cards into suits and recognizing words with a key letter in it.    -HG    SLP Time Calculation    SLP Goal Re-Cert Due Date 01/25/17  -HG 01/25/17  -HG       01/06/17 1000          Subjective Comments    Subjective Comments Patient seen for treatment, alert and pleasant. Patient reported concerns about light-headedness.   -JW      Subjective Pain    Able to rate subjective pain? yes  -JW      Pre-Treatment Pain Level 0  -JW      Post-Treatment Pain Level 0  -JW      Other Goals    Other Adult Goal- 1 Patient will verbalize 3 internal and 3 external memory strategies independently across two sessions in order to ensure competency of skilled techniques.  -JW      Status: Other Adult Goal- 1 Progressing as expected  -JW      Comments: Other Adult Goal- 1 SLP edu re: memory strategies, gave handout. Patient verbalized  understanding. 1/6/17  -JW      Other Adult Goal- 2 Patient will perform STM tasks with 80% accuracy across two sessions, 3 trials per session, in order to enhance recall.  -JW      Status: Other Adult Goal- 2 New  -JW      Other Adult Goal- 3 Patient will perform divergent naming tasks, generating 10 items per category within 60 seconds, across two trials in order to improve semantic fluency.  -JW      Status: Other Adult Goal- 3 New  -JW      Other Adult Goal- 4 Patient will perform executive functioning task with 70% accuracy when provided prompts only in order to improve strategic thinking.  -JW      Status: Other Adult Goal- 4 New  -JW      Other Adult Goal- 5 Patient will improve cognition as evidenced by STGs met. (LTG)  -JW      Status: Other Adult Goal- 5 New  -JW      Other Adult Goal- 6 Patient will perform alternating attention task with 80% accuracy across two trials in order to enhance mental flexibility and attention.  -JW      Status: Other Adult Goal- 6 New  -JW      Other Adult Goal- 7 Patient will perform divided attention task with 70% accuracy across two trials independently in order to improve multitasking abilities.  -JW      Status: Other Adult Goal- 7 New  -JW        User Key  (r) = Recorded By, (t) = Taken By, (c) = Cosigned By    Initials Name Provider Type    VALERIA Simons MS CCC-SLP Speech and Language Pathologist     Anjali Serrano, MS [facility]-SLP Speech and Language Pathologist                OP SLP Education       01/17/17 0900          Education    Barriers to Learning No barriers identified  -      Education Provided Patient demonstrated recommended strategies;Patient requires further education on strategies, risks  -      Assessed Learning needs;Learning motivation;Learning preferences;Learning readiness  -      Learning Motivation Strong  -      Learning Method Explanation;Demonstration;Teach back;Written materials  -      Teaching Response Verbalized  understanding;Reinforcement needed  -        User Key  (r) = Recorded By, (t) = Taken By, (c) = Cosigned By    Initials Name Effective Dates    HG Anjali Serrano MS CCC-SLP 06/22/15 -                 OP SLP Assessment/Plan - 01/17/17 0900     SLP Assessment    Functional Problems Speech Language- Adult/Cognition  -    Impact on Function: Adult Speech Language/Cognition Decreased recall of personal information and medical history;Trouble learning or remembering new information  -    Clinical Impression: Speech Language-Adult/Congnition Cognitive Communication Impairment  -HG    Prognosis Good (comment)  -    Patient/caregiver participated in establishment of treatment plan and goals Yes  -HG    Patient would benefit from skilled therapy intervention Yes  -HG    SLP Plan    Frequency 2x/week  -HG    Duration 7 weeks  -HG    Planned CPT's? SLP INDIVIDUAL SPEECH THERAPY: 21327  -    Expected Duration Therapy Session (min) 45-60 minutes  -HG    Plan Comments Cont per POC  -HG      User Key  (r) = Recorded By, (t) = Taken By, (c) = Cosigned By    Initials Name Provider Type     Anjali Serrano MS CCC-SLP Speech and Language Pathologist                 Time Calculation:   SLP Start Time: 0900    Therapy Charges for Today     Code Description Service Date Service Provider Modifiers Qty    [id number] HC ST TREATMENT SPEECH 5 1/17/2017 Anjali Serrano MS CCC-SLP GN 1                   Anjali Serrano MS CCC-SLP  1/17/2017

## 2017-01-18 ENCOUNTER — DOCUMENTATION (OUTPATIENT)
Dept: CARDIOLOGY | Facility: CLINIC | Age: 77
End: 2017-01-18

## 2017-01-18 NOTE — PROGRESS NOTES
Patients wife left a message wanting to ask some questions about his visit on 1/16/17 since she did not get to come with him.  I attempted to call her back and it was Busy x2 at 9:06am

## 2017-01-19 ENCOUNTER — HOSPITAL ENCOUNTER (OUTPATIENT)
Dept: SPEECH THERAPY | Facility: HOSPITAL | Age: 77
Setting detail: THERAPIES SERIES
Discharge: HOME | End: 2017-01-19

## 2017-01-19 DIAGNOSIS — G31.84 MCI (MILD COGNITIVE IMPAIRMENT): Primary | ICD-10-CM

## 2017-01-19 PROCEDURE — 92507 TX SP LANG VOICE COMM INDIV: CPT

## 2017-01-19 NOTE — PROGRESS NOTES
"Outpatient Speech Language Pathology   Adult Speech Language Cognitive Treatment Note  [facility]     Patient Name: Uche Polanco  : 1940  MRN: [id number]  Today's Date: 2017         Visit Date: 2017   Patient Active Problem List   Diagnosis   • Valvular heart disease   • TIA (transient ischemic attack)   • Severe obstructive sleep apnea   • Hypertension   • Hyperlipidemia   • Gout   • CAD (coronary artery disease)   • AV block   • REM sleep behavior disorder   • REM  disorder          Visit Dx:    ICD-10-CM ICD-9-CM   1. MCI (mild cognitive impairment) G31.84 331.83                               SLP OP Goals       17 1000 17 0900 17 1100    Goal Type Needed    Goal Type Needed Memory  -HG      Subjective Comments    Subjective Comments Pt alert, cooperative, \"I really have to work on this.\"   -HG Pt alert, cooperative, homework completed.  -HG Pt alert, cooperative, eager to continue with cog tx.   -HG    Subjective Pain    Able to rate subjective pain? yes  -HG yes  -HG yes  -HG    Other Goals    Other Adult Goal- 1 Patient will verbalize 3 internal and 3 external memory strategies independently across two sessions in order to ensure competency of skilled techniques.  -HG Patient will verbalize 3 internal and 3 external memory strategies independently across two sessions in order to ensure competency of skilled techniques.  -HG Patient will verbalize 3 internal and 3 external memory strategies independently across two sessions in order to ensure competency of skilled techniques.  -HG    Status: Other Adult Goal- 1 Progressing as expected  -HG Progressing as expected  -HG Progressing as expected  -HG    Comments: Other Adult Goal- 1 3/3 with min-mod verbal cues.  -HG Pt able to recall 2/3 external with no cues and for internal strategies, pt was 1/3 independently.  -HG With prompting, pt was able to name 3 of each strategy.  -HG    Other Adult Goal- 2 Patient will perform STM " tasks with 80% accuracy across two sessions, 3 trials per session, in order to enhance recall.  -HG Patient will perform STM tasks with 80% accuracy across two sessions, 3 trials per session, in order to enhance recall.  -HG Patient will perform STM tasks with 80% accuracy across two sessions, 3 trials per session, in order to enhance recall.  -HG    Status: Other Adult Goal- 2 Progressing as expected  -HG Progressing as expected  -HG Progressing as expected  -HG    Comments: Other Adult Goal- 2 Pt struggling this date with chaining 4 un-related words and therefore switched to chunking.  -HG Pt able to recall 4 un-related words with compensatory strategies.  -HG Pt was able to recall 5 un-related items with use of a storytelling strategy along with a written cue and this was over 3 trials.  -HG    Other Adult Goal- 3 Patient will perform divergent naming tasks, generating 10 items per category within 60 seconds, across two trials in order to improve semantic fluency.  -HG Patient will perform divergent naming tasks, generating 10 items per category within 60 seconds, across two trials in order to improve semantic fluency.  -HG Patient will perform divergent naming tasks, generating 10 items per category within 60 seconds, across two trials in order to improve semantic fluency.  -HG    Status: Other Adult Goal- 3 Progressing as expected  -HG Progressing as expected  -HG Progressing as expected  -HG    Comments: Other Adult Goal- 3  Pt was 6-9 in abstract category naming.  -HG Pt was 10 items for concrete category, abstract 7-8.  -HG    Other Adult Goal- 4 Patient will perform executive functioning task with 70% accuracy when provided prompts only in order to improve strategic thinking.  -HG Patient will perform executive functioning task with 70% accuracy when provided prompts only in order to improve strategic thinking.  -HG Patient will perform executive functioning task with 70% accuracy when provided prompts only  in order to improve strategic thinking.  -HG    Status: Other Adult Goal- 4 Progressing as expected  -HG Progressing as expected  -HG Progressing as expected  -HG    Comments: Other Adult Goal- 4  Pt was able to complete a card sorting game with music playing and answering questions.  -HG     Other Adult Goal- 5 Patient will improve cognition as evidenced by STGs met. (LTG)  -HG Patient will improve cognition as evidenced by STGs met. (LTG)  -HG Patient will improve cognition as evidenced by STGs met. (LTG)  -HG    Status: Other Adult Goal- 5 Progressing as expected  -HG Progressing as expected  -HG Progressing as expected  -HG    Comments: Other Adult Goal- 5 Pt given homework in order to improve sustained attention which will result in improved STM.   -HG Pt continues to work on strategies to improve working memory such as chaining, listing, list making.  -HG Pt continues to work on strategies to improve working memory such as chaining, listing, list making.  -HG    Other Adult Goal- 6 Patient will perform alternating attention task with 80% accuracy across two trials in order to enhance mental flexibility and attention.  -HG Patient will perform alternating attention task with 80% accuracy across two trials in order to enhance mental flexibility and attention.  -HG Patient will perform alternating attention task with 80% accuracy across two trials in order to enhance mental flexibility and attention.  -HG    Status: Other Adult Goal- 6 Progressing as expected  -HG Progressing as expected  -HG Progressing as expected  -HG    Comments: Other Adult Goal- 6  Pt was 90% accurate with completing alternating attn tasks with marking out words and completing alt subtraction and addition math problems.  -HG Pt was 80% accurate with alphabetizing and reversing the order to words provided with given a series of three words.  -HG    Other Adult Goal- 7 Patient will perform divided attention task with 70% accuracy across two  trials independently in order to improve multitasking abilities.  -HG Patient will perform divided attention task with 70% accuracy across two trials independently in order to improve multitasking abilities.  -HG Patient will perform divided attention task with 70% accuracy across two trials independently in order to improve multitasking abilities.  -HG    Status: Other Adult Goal- 7 Progressing as expected  -HG Progressing as expected  -HG Progressing as expected  -HG    Comments: Other Adult Goal- 7  Pt was 80% accurate with completing a card sorting tasks given 2 different instructions.   -HG Pt able to complete a visual tasks while naming objects with 75% accuracy.  -HG    SLP Time Calculation    SLP Goal Re-Cert Due Date 01/25/17  -HG 01/25/17  -HG 01/25/17  -HG      01/09/17 1000 01/06/17 1000       Subjective Comments    Subjective Comments Pt alert, cooperative, pleasant  -HG Patient seen for treatment, alert and pleasant. Patient reported concerns about light-headedness.   -JW     Subjective Pain    Able to rate subjective pain? yes  -HG yes  -JW     Pre-Treatment Pain Level  0  -JW     Post-Treatment Pain Level  0  -JW     Other Goals    Other Adult Goal- 1 Patient will verbalize 3 internal and 3 external memory strategies independently across two sessions in order to ensure competency of skilled techniques.  -HG Patient will verbalize 3 internal and 3 external memory strategies independently across two sessions in order to ensure competency of skilled techniques.  -JW     Status: Other Adult Goal- 1 Progressing as expected  -HG Progressing as expected  -JW     Comments: Other Adult Goal- 1 Pt was able to recite and explain in detail in a professional manner with use of the handout provided all of the strategies.   -HG SLP edu re: memory strategies, gave handout. Patient verbalized understanding. 1/6/17  -JW     Other Adult Goal- 2 Patient will perform STM tasks with 80% accuracy across two sessions, 3  "trials per session, in order to enhance recall.  -HG Patient will perform STM tasks with 80% accuracy across two sessions, 3 trials per session, in order to enhance recall.  -JW     Status: Other Adult Goal- 2 Progressing as expected  -HG New  -JW     Comments: Other Adult Goal- 2 Pt was able to recall 5 un-related items with use of a storytelling strategy along with a written cue and this was over 3 trials.  -HG      Other Adult Goal- 3 Patient will perform divergent naming tasks, generating 10 items per category within 60 seconds, across two trials in order to improve semantic fluency.  -HG Patient will perform divergent naming tasks, generating 10 items per category within 60 seconds, across two trials in order to improve semantic fluency.  -JW     Status: Other Adult Goal- 3 Progressing as expected  -HG New  -JW     Comments: Other Adult Goal- 3 Pt was 7-10 for concrete items given over three trials on 1/9/16.  -HG      Other Adult Goal- 4 Patient will perform executive functioning task with 70% accuracy when provided prompts only in order to improve strategic thinking.  -HG Patient will perform executive functioning task with 70% accuracy when provided prompts only in order to improve strategic thinking.  -JW     Status: Other Adult Goal- 4 New  -HG New  -JW     Other Adult Goal- 5 Patient will improve cognition as evidenced by STGs met. (LTG)  -HG Patient will improve cognition as evidenced by STGs met. (LTG)  -JW     Status: Other Adult Goal- 5 Progressing as expected  -HG New  -JW     Comments: Other Adult Goal- 5 Pt continuing to work toward improved cogntion and stated, \"If I could flush the stuff out of brain at the bottom and have room for new stuff.\"   -HG      Other Adult Goal- 6 Patient will perform alternating attention task with 80% accuracy across two trials in order to enhance mental flexibility and attention.  -HG Patient will perform alternating attention task with 80% accuracy across two trials " in order to enhance mental flexibility and attention.  -JW     Status: Other Adult Goal- 6 Progressing as expected  -HG New  -JW     Comments: Other Adult Goal- 6 Pt was 100% accurate for marking out designated letters.  To increase difficulty, while marking out targeted letters pt was asked to name specific items given a category and pt was 80% accurate.   -HG      Other Adult Goal- 7 Patient will perform divided attention task with 70% accuracy across two trials independently in order to improve multitasking abilities.  -HG Patient will perform divided attention task with 70% accuracy across two trials independently in order to improve multitasking abilities.  -JW     Status: Other Adult Goal- 7 Progressing as expected  -HG New  -JW     Comments: Other Adult Goal- 7 Pt was 60% accurate initially and improve to 80% accuracy while sorting cards into suits and recognizing words with a key letter in it.    -HG        User Key  (r) = Recorded By, (t) = Taken By, (c) = Cosigned By    Initials Name Provider Type     Georgina Simons MS CCC-SLP Speech and Language Pathologist     Anjali Serrano MS [facility]-SLP Speech and Language Pathologist                OP SLP Education       01/19/17 1000 01/17/17 0900       Education    Barriers to Learning No barriers identified  -HG No barriers identified  -     Education Provided Patient demonstrated recommended strategies;Patient requires further education on strategies, risks  - Patient demonstrated recommended strategies;Patient requires further education on strategies, risks  -     Assessed Learning needs;Learning motivation;Learning preferences;Learning readiness  - Learning needs;Learning motivation;Learning preferences;Learning readiness  -     Learning Motivation Strong  -HG Strong  -HG     Learning Method Explanation;Demonstration;Teach back;Written materials  - Explanation;Demonstration;Teach back;Written materials  -     Teaching Response  Verbalized understanding;Demonstrated understanding;Reinforcement needed  -HG Verbalized understanding;Reinforcement needed  -HG       User Key  (r) = Recorded By, (t) = Taken By, (c) = Cosigned By    Initials Name Effective Dates    HG Anjali Serrano MS CCC-SLP 06/22/15 -                 OP SLP Assessment/Plan - 01/19/17 1000     SLP Assessment    Functional Problems Speech Language- Adult/Cognition  -HG    Impact on Function: Adult Speech Language/Cognition Decreased recall of personal information and medical history;Trouble learning or remembering new information;Poor attention to task  -HG    Clinical Impression: Speech Language-Adult/Congnition Cognitive Communication Impairment  -HG    Clinical Impression Comments Pt struggles with immediate and delayed recall to a level of not being able to concentrate of tasks at hand- attn. to be addressed.  -HG    Prognosis Good (comment)  -    Patient/caregiver participated in establishment of treatment plan and goals Yes  -HG    Patient would benefit from skilled therapy intervention Yes  -HG    SLP Plan    Frequency 2x/week  -HG    Duration 7 weeks  -HG    Planned CPT's? SLP INDIVIDUAL SPEECH THERAPY: 57137  -    Expected Duration Therapy Session (min) 45-60 minutes  -HG    Plan Comments Cont per POC with emphasis on attn.  -HG      User Key  (r) = Recorded By, (t) = Taken By, (c) = Cosigned By    Initials Name Provider Type     Anjali Serrano MS CCC-SLP Speech and Language Pathologist                 Time Calculation:   SLP Start Time: 1000    Therapy Charges for Today     Code Description Service Date Service Provider Modifiers Qty    [id number]  ST TREATMENT SPEECH 6 1/19/2017 Anjali Serrano MS CCC-SLP GN 1                   Anjali Serrano MS CCC-SLP  1/19/2017

## 2017-01-23 ENCOUNTER — APPOINTMENT (OUTPATIENT)
Dept: SPEECH THERAPY | Facility: HOSPITAL | Age: 77
End: 2017-01-23

## 2017-01-23 ENCOUNTER — HOSPITAL ENCOUNTER (OUTPATIENT)
Dept: SPEECH THERAPY | Facility: HOSPITAL | Age: 77
Setting detail: THERAPIES SERIES
Discharge: HOME | End: 2017-01-23

## 2017-01-23 DIAGNOSIS — R48.8 OTHER SYMBOLIC DYSFUNCTION: Primary | ICD-10-CM

## 2017-01-23 DIAGNOSIS — G31.84 MCI (MILD COGNITIVE IMPAIRMENT): ICD-10-CM

## 2017-01-23 PROCEDURE — 92507 TX SP LANG VOICE COMM INDIV: CPT

## 2017-01-23 NOTE — PROGRESS NOTES
"Outpatient Speech Language Pathology   Adult Speech Language Cognitive Treatment Note  [facility]     Patient Name: Uche Polanco  : 1940  MRN: [id number]  Today's Date: 2017         Visit Date: 2017   Patient Active Problem List   Diagnosis   • Valvular heart disease   • TIA (transient ischemic attack)   • Severe obstructive sleep apnea   • Hypertension   • Hyperlipidemia   • Gout   • CAD (coronary artery disease)   • AV block   • REM sleep behavior disorder   • REM  disorder          Visit Dx:    ICD-10-CM ICD-9-CM   1. Other symbolic dysfunction R48.8 784.69   2. MCI (mild cognitive impairment) G31.84 331.83                               SLP OP Goals       17 1100 17 1000 17 0900    Goal Type Needed    Goal Type Needed Memory  -HG Memory  -HG     Subjective Comments    Subjective Comments Pt alert, cooperative, \"I think this is really helping.\"  -HG Pt alert, cooperative, \"I really have to work on this.\"   -HG Pt alert, cooperative, homework completed.  -HG    Subjective Pain    Able to rate subjective pain? yes  -HG yes  -HG yes  -HG    Other Goals    Other Adult Goal- 1 Patient will verbalize 3 internal and 3 external memory strategies independently across two sessions in order to ensure competency of skilled techniques.  -HG Patient will verbalize 3 internal and 3 external memory strategies independently across two sessions in order to ensure competency of skilled techniques.  -HG Patient will verbalize 3 internal and 3 external memory strategies independently across two sessions in order to ensure competency of skilled techniques.  -HG    Status: Other Adult Goal- 1 Progressing as expected  -HG Progressing as expected  -HG Progressing as expected  -HG    Comments: Other Adult Goal- 1  3/3 with min-mod verbal cues.  -HG Pt able to recall 2/3 external with no cues and for internal strategies, pt was 1/3 independently.  -HG    Other Adult Goal- 2 Patient will perform STM " tasks with 80% accuracy across two sessions, 3 trials per session, in order to enhance recall.  -HG Patient will perform STM tasks with 80% accuracy across two sessions, 3 trials per session, in order to enhance recall.  -HG Patient will perform STM tasks with 80% accuracy across two sessions, 3 trials per session, in order to enhance recall.  -HG    Status: Other Adult Goal- 2 Progressing as expected  -HG Progressing as expected  -HG Progressing as expected  -HG    Comments: Other Adult Goal- 2 Pt using chunking vs chaining and pt able to recall up to 6 words at a time.  -HG Pt struggling this date with chaining 4 un-related words and therefore switched to chunking.  -HG Pt able to recall 4 un-related words with compensatory strategies.  -HG    Other Adult Goal- 3 Patient will perform divergent naming tasks, generating 10 items per category within 60 seconds, across two trials in order to improve semantic fluency.  -HG Patient will perform divergent naming tasks, generating 10 items per category within 60 seconds, across two trials in order to improve semantic fluency.  -HG Patient will perform divergent naming tasks, generating 10 items per category within 60 seconds, across two trials in order to improve semantic fluency.  -HG    Status: Other Adult Goal- 3 Progressing as expected  -HG Progressing as expected  -HG Progressing as expected  -HG    Comments: Other Adult Goal- 3 Pt able to name 10+ items in concrete category and 8-9 items in abstract category in a one minute time span.  -HG  Pt was 6-9 in abstract category naming.  -HG    Other Adult Goal- 4 Patient will perform executive functioning task with 70% accuracy when provided prompts only in order to improve strategic thinking.  -HG Patient will perform executive functioning task with 70% accuracy when provided prompts only in order to improve strategic thinking.  -HG Patient will perform executive functioning task with 70% accuracy when provided prompts  "only in order to improve strategic thinking.  -HG    Status: Other Adult Goal- 4 Progressing as expected  -HG Progressing as expected  -HG Progressing as expected  -HG    Comments: Other Adult Goal- 4 Pt able to complete an attention and recall task with 70% accuracy.  -HG  Pt was able to complete a card sorting game with music playing and answering questions.  -HG    Other Adult Goal- 5 Patient will improve cognition as evidenced by STGs met. (LTG)  -HG Patient will improve cognition as evidenced by STGs met. (LTG)  -HG Patient will improve cognition as evidenced by STGs met. (LTG)  -HG    Status: Other Adult Goal- 5 Progressing as expected  -HG Progressing as expected  -HG Progressing as expected  -HG    Comments: Other Adult Goal- 5 Pt is completing homework with less ease and stated, \"I feel that there is less pressure in my head and I can focus.\"  -HG Pt given homework in order to improve sustained attention which will result in improved STM.   -HG Pt continues to work on strategies to improve working memory such as chaining, listing, list making.  -HG    Other Adult Goal- 6 Patient will perform alternating attention task with 80% accuracy across two trials in order to enhance mental flexibility and attention.  -HG Patient will perform alternating attention task with 80% accuracy across two trials in order to enhance mental flexibility and attention.  -HG Patient will perform alternating attention task with 80% accuracy across two trials in order to enhance mental flexibility and attention.  -HG    Status: Other Adult Goal- 6 Progressing as expected  -HG Progressing as expected  -HG Progressing as expected  -HG    Comments: Other Adult Goal- 6   Pt was 90% accurate with completing alternating attn tasks with marking out words and completing alt subtraction and addition math problems.  -HG    Other Adult Goal- 7 Patient will perform divided attention task with 70% accuracy across two trials independently in " order to improve multitasking abilities.  -HG Patient will perform divided attention task with 70% accuracy across two trials independently in order to improve multitasking abilities.  -HG Patient will perform divided attention task with 70% accuracy across two trials independently in order to improve multitasking abilities.  -HG    Status: Other Adult Goal- 7 Progressing as expected  -HG Progressing as expected  -HG Progressing as expected  -HG    Comments: Other Adult Goal- 7 Pt completed a divided attn computer game task and was 70% accurate with min cues.   -HG  Pt was 80% accurate with completing a card sorting tasks given 2 different instructions.   -HG    SLP Time Calculation    SLP Goal Re-Cert Due Date 01/25/17  -HG 01/25/17  -HG 01/25/17  -HG      01/13/17 1100          Subjective Comments    Subjective Comments Pt alert, cooperative, eager to continue with cog tx.   -HG      Subjective Pain    Able to rate subjective pain? yes  -HG      Other Goals    Other Adult Goal- 1 Patient will verbalize 3 internal and 3 external memory strategies independently across two sessions in order to ensure competency of skilled techniques.  -HG      Status: Other Adult Goal- 1 Progressing as expected  -HG      Comments: Other Adult Goal- 1 With prompting, pt was able to name 3 of each strategy.  -HG      Other Adult Goal- 2 Patient will perform STM tasks with 80% accuracy across two sessions, 3 trials per session, in order to enhance recall.  -HG      Status: Other Adult Goal- 2 Progressing as expected  -HG      Comments: Other Adult Goal- 2 Pt was able to recall 5 un-related items with use of a storytelling strategy along with a written cue and this was over 3 trials.  -HG      Other Adult Goal- 3 Patient will perform divergent naming tasks, generating 10 items per category within 60 seconds, across two trials in order to improve semantic fluency.  -HG      Status: Other Adult Goal- 3 Progressing as expected  -HG       Comments: Other Adult Goal- 3 Pt was 10 items for concrete category, abstract 7-8.  -HG      Other Adult Goal- 4 Patient will perform executive functioning task with 70% accuracy when provided prompts only in order to improve strategic thinking.  -HG      Status: Other Adult Goal- 4 Progressing as expected  -HG      Other Adult Goal- 5 Patient will improve cognition as evidenced by STGs met. (LTG)  -HG      Status: Other Adult Goal- 5 Progressing as expected  -HG      Comments: Other Adult Goal- 5 Pt continues to work on strategies to improve working memory such as chaining, listing, list making.  -HG      Other Adult Goal- 6 Patient will perform alternating attention task with 80% accuracy across two trials in order to enhance mental flexibility and attention.  -HG      Status: Other Adult Goal- 6 Progressing as expected  -HG      Comments: Other Adult Goal- 6 Pt was 80% accurate with alphabetizing and reversing the order to words provided with given a series of three words.  -HG      Other Adult Goal- 7 Patient will perform divided attention task with 70% accuracy across two trials independently in order to improve multitasking abilities.  -HG      Status: Other Adult Goal- 7 Progressing as expected  -HG      Comments: Other Adult Goal- 7 Pt able to complete a visual tasks while naming objects with 75% accuracy.  -HG      SLP Time Calculation    SLP Goal Re-Cert Due Date 01/25/17  -HG        User Key  (r) = Recorded By, (t) = Taken By, (c) = Cosigned By    Initials Name Provider Type    DALJIT Serrano MS [facility]-SLP Speech and Language Pathologist                OP SLP Education       01/23/17 1100          Education    Barriers to Learning No barriers identified  -HG      Education Provided Patient participated in establishing goals and treatment plan;Patient demonstrated recommended strategies;Patient requires further education on strategies, risks  -      Assessed Learning needs;Learning motivation;Learning  preferences;Learning readiness  -HG      Learning Motivation Strong  -HG      Learning Method Explanation;Demonstration;Teach back;Written materials  -HG      Teaching Response Verbalized understanding  -HG        User Key  (r) = Recorded By, (t) = Taken By, (c) = Cosigned By    Initials Name Effective Dates    HG Anjali Serrano MS CCC-SLP 06/22/15 -                 OP SLP Assessment/Plan - 01/23/17 1100     SLP Assessment    Functional Problems Speech Language- Adult/Cognition  -HG    Impact on Function: Adult Speech Language/Cognition Decreased recall of personal information and medical history;Trouble learning or remembering new information;Poor attention to task  -HG    Clinical Impression: Speech Language-Adult/Congnition Cognitive Communication Impairment  -HG    Clinical Impression Comments Pt improving with use of strategies at home and building confidence and decreasing anxiety.  -HG    Please refer to paper survey for additional self-reported information Yes  -HG    Please refer to items scanned into chart for additional diagnostic informaiton and handouts as provided by clinician Yes  -HG    Prognosis Good (comment)  -HG    Patient/caregiver participated in establishment of treatment plan and goals Yes  -HG    Patient would benefit from skilled therapy intervention Yes  -HG    SLP Plan    Frequency 2x/week  -HG    Duration 6 weeks  -HG    Planned CPT's? SLP INDIVIDUAL SPEECH THERAPY: 00196  -HG    Expected Duration Therapy Session (min) 45-60 minutes  -HG    Plan Comments Cont per POC  -HG      User Key  (r) = Recorded By, (t) = Taken By, (c) = Cosigned By    Initials Name Provider Type    HG Anjali Serrano MS CCC-SLP Speech and Language Pathologist                 Time Calculation:   SLP Start Time: 1100    Therapy Charges for Today     Code Description Service Date Service Provider Modifiers Qty    [id number] [facility] TREATMENT SPEECH 5 1/23/2017 Anjali Serrano MS CCC-SLP GN 1                    Anjali Serrano, MS CCC-SLP  1/23/2017

## 2017-01-26 ENCOUNTER — HOSPITAL ENCOUNTER (OUTPATIENT)
Dept: SPEECH THERAPY | Facility: HOSPITAL | Age: 77
Setting detail: THERAPIES SERIES
Discharge: HOME | End: 2017-01-26

## 2017-01-26 DIAGNOSIS — R48.8 OTHER SYMBOLIC DYSFUNCTION: Primary | ICD-10-CM

## 2017-01-26 PROCEDURE — 92507 TX SP LANG VOICE COMM INDIV: CPT

## 2017-01-26 NOTE — PROGRESS NOTES
"Outpatient Speech Language Pathology   Adult Speech Language Cognitive Treatment Note  [facility]     Patient Name: Uche Polanco  : 1940  MRN: [id number]  Today's Date: 2017         Visit Date: 2017   Patient Active Problem List   Diagnosis   • Valvular heart disease   • TIA (transient ischemic attack)   • Severe obstructive sleep apnea   • Hypertension   • Hyperlipidemia   • Gout   • CAD (coronary artery disease)   • AV block   • REM sleep behavior disorder   • REM  disorder          Visit Dx:    ICD-10-CM ICD-9-CM   1. Other symbolic dysfunction R48.8 784.69                               SLP OP Goals       17 1100 17 1100 17 1000    Goal Type Needed    Goal Type Needed Memory  -HG Memory  -HG Memory  -HG    Subjective Comments    Subjective Comments Pt alert, cooperative, eagerness for improvement and pt continues to practice at homework.  -HG Pt alert, cooperative, \"I think this is really helping.\"  -HG Pt alert, cooperative, \"I really have to work on this.\"   -HG    Subjective Pain    Able to rate subjective pain? yes  -HG yes  -HG yes  -HG    Other Goals    Other Adult Goal- 1 Patient will verbalize 3 internal and 3 external memory strategies independently across two sessions in order to ensure competency of skilled techniques.  -HG Patient will verbalize 3 internal and 3 external memory strategies independently across two sessions in order to ensure competency of skilled techniques.  -HG Patient will verbalize 3 internal and 3 external memory strategies independently across two sessions in order to ensure competency of skilled techniques.  -HG    Status: Other Adult Goal- 1 Progressing as expected  -HG Progressing as expected  -HG Progressing as expected  -HG    Comments: Other Adult Goal- 1 Pt able to name 3/3 with min cues.  -HG  3/3 with min-mod verbal cues.  -HG    Other Adult Goal- 2 Patient will perform STM tasks with 80% accuracy across two sessions, 3 trials per " session, in order to enhance recall.  -HG Patient will perform STM tasks with 80% accuracy across two sessions, 3 trials per session, in order to enhance recall.  -HG Patient will perform STM tasks with 80% accuracy across two sessions, 3 trials per session, in order to enhance recall.  -HG    Status: Other Adult Goal- 2 Progressing as expected  -HG Progressing as expected  -HG Progressing as expected  -HG    Comments: Other Adult Goal- 2 Pt able to recall up to 4 messages with mod cues- 70-80% accuracy.  -HG Pt using chunking vs chaining and pt able to recall up to 6 words at a time.  -HG Pt struggling this date with chaining 4 un-related words and therefore switched to chunking.  -HG    Other Adult Goal- 3 Patient will perform divergent naming tasks, generating 10 items per category within 60 seconds, across two trials in order to improve semantic fluency.  -HG Patient will perform divergent naming tasks, generating 10 items per category within 60 seconds, across two trials in order to improve semantic fluency.  -HG Patient will perform divergent naming tasks, generating 10 items per category within 60 seconds, across two trials in order to improve semantic fluency.  -HG    Status: Other Adult Goal- 3 Progressing as expected  -HG Progressing as expected  -HG Progressing as expected  -HG    Comments: Other Adult Goal- 3 Pt able to name up to 10 items in concrete and abstract categories.  -HG Pt able to name 10+ items in concrete category and 8-9 items in abstract category in a one minute time span.  -HG     Other Adult Goal- 4 Patient will perform executive functioning task with 70% accuracy when provided prompts only in order to improve strategic thinking.  -HG Patient will perform executive functioning task with 70% accuracy when provided prompts only in order to improve strategic thinking.  -HG Patient will perform executive functioning task with 70% accuracy when provided prompts only in order to improve  "strategic thinking.  -HG    Status: Other Adult Goal- 4 Progressing as expected  -HG Progressing as expected  -HG Progressing as expected  -HG    Comments: Other Adult Goal- 4  Pt able to complete an attention and recall task with 70% accuracy.  -HG     Other Adult Goal- 5 Patient will improve cognition as evidenced by STGs met. (LTG)  -HG Patient will improve cognition as evidenced by STGs met. (LTG)  -HG Patient will improve cognition as evidenced by STGs met. (LTG)  -HG    Status: Other Adult Goal- 5 Progressing as expected  -HG Progressing as expected  -HG Progressing as expected  -HG    Comments: Other Adult Goal- 5 Pt continues to complete homework with ease and more challenging homework provided.  -HG Pt is completing homework with less ease and stated, \"I feel that there is less pressure in my head and I can focus.\"  -HG Pt given homework in order to improve sustained attention which will result in improved STM.   -HG    Other Adult Goal- 6 Patient will perform alternating attention task with 80% accuracy across two trials in order to enhance mental flexibility and attention.  -HG Patient will perform alternating attention task with 80% accuracy across two trials in order to enhance mental flexibility and attention.  -HG Patient will perform alternating attention task with 80% accuracy across two trials in order to enhance mental flexibility and attention.  -HG    Status: Other Adult Goal- 6 Progressing as expected  -HG Progressing as expected  -HG Progressing as expected  -HG    Other Adult Goal- 7 Patient will perform divided attention task with 70% accuracy across two trials independently in order to improve multitasking abilities.  -HG Patient will perform divided attention task with 70% accuracy across two trials independently in order to improve multitasking abilities.  -HG Patient will perform divided attention task with 70% accuracy across two trials independently in order to improve multitasking " abilities.  -HG    Status: Other Adult Goal- 7 Progressing as expected  -HG Progressing as expected  -HG Progressing as expected  -HG    Comments: Other Adult Goal- 7 Pt having to recall un-related messages along with a naming task and was 70% accurate independently.   -HG Pt completed a divided attn computer game task and was 70% accurate with min cues.   -HG     SLP Time Calculation    SLP Goal Re-Cert Due Date 02/24/17  -HG 01/25/17  -HG 01/25/17  -HG      01/17/17 0900 01/13/17 1100       Subjective Comments    Subjective Comments Pt alert, cooperative, homework completed.  -HG Pt alert, cooperative, eager to continue with cog tx.   -HG     Subjective Pain    Able to rate subjective pain? yes  -HG yes  -HG     Other Goals    Other Adult Goal- 1 Patient will verbalize 3 internal and 3 external memory strategies independently across two sessions in order to ensure competency of skilled techniques.  -HG Patient will verbalize 3 internal and 3 external memory strategies independently across two sessions in order to ensure competency of skilled techniques.  -HG     Status: Other Adult Goal- 1 Progressing as expected  -HG Progressing as expected  -HG     Comments: Other Adult Goal- 1 Pt able to recall 2/3 external with no cues and for internal strategies, pt was 1/3 independently.  -HG With prompting, pt was able to name 3 of each strategy.  -HG     Other Adult Goal- 2 Patient will perform STM tasks with 80% accuracy across two sessions, 3 trials per session, in order to enhance recall.  -HG Patient will perform STM tasks with 80% accuracy across two sessions, 3 trials per session, in order to enhance recall.  -HG     Status: Other Adult Goal- 2 Progressing as expected  -HG Progressing as expected  -HG     Comments: Other Adult Goal- 2 Pt able to recall 4 un-related words with compensatory strategies.  -HG Pt was able to recall 5 un-related items with use of a storytelling strategy along with a written cue and this  was over 3 trials.  -HG     Other Adult Goal- 3 Patient will perform divergent naming tasks, generating 10 items per category within 60 seconds, across two trials in order to improve semantic fluency.  -HG Patient will perform divergent naming tasks, generating 10 items per category within 60 seconds, across two trials in order to improve semantic fluency.  -HG     Status: Other Adult Goal- 3 Progressing as expected  -HG Progressing as expected  -HG     Comments: Other Adult Goal- 3 Pt was 6-9 in abstract category naming.  -HG Pt was 10 items for concrete category, abstract 7-8.  -HG     Other Adult Goal- 4 Patient will perform executive functioning task with 70% accuracy when provided prompts only in order to improve strategic thinking.  -HG Patient will perform executive functioning task with 70% accuracy when provided prompts only in order to improve strategic thinking.  -HG     Status: Other Adult Goal- 4 Progressing as expected  -HG Progressing as expected  -HG     Comments: Other Adult Goal- 4 Pt was able to complete a card sorting game with music playing and answering questions.  -HG      Other Adult Goal- 5 Patient will improve cognition as evidenced by STGs met. (LTG)  -HG Patient will improve cognition as evidenced by STGs met. (LTG)  -HG     Status: Other Adult Goal- 5 Progressing as expected  -HG Progressing as expected  -HG     Comments: Other Adult Goal- 5 Pt continues to work on strategies to improve working memory such as chaining, listing, list making.  -HG Pt continues to work on strategies to improve working memory such as chaining, listing, list making.  -HG     Other Adult Goal- 6 Patient will perform alternating attention task with 80% accuracy across two trials in order to enhance mental flexibility and attention.  -HG Patient will perform alternating attention task with 80% accuracy across two trials in order to enhance mental flexibility and attention.  -HG     Status: Other Adult Goal- 6  Progressing as expected  -HG Progressing as expected  -HG     Comments: Other Adult Goal- 6 Pt was 90% accurate with completing alternating attn tasks with marking out words and completing alt subtraction and addition math problems.  -HG Pt was 80% accurate with alphabetizing and reversing the order to words provided with given a series of three words.  -HG     Other Adult Goal- 7 Patient will perform divided attention task with 70% accuracy across two trials independently in order to improve multitasking abilities.  -HG Patient will perform divided attention task with 70% accuracy across two trials independently in order to improve multitasking abilities.  -HG     Status: Other Adult Goal- 7 Progressing as expected  -HG Progressing as expected  -HG     Comments: Other Adult Goal- 7 Pt was 80% accurate with completing a card sorting tasks given 2 different instructions.   -HG Pt able to complete a visual tasks while naming objects with 75% accuracy.  -     SLP Time Calculation    SLP Goal Re-Cert Due Date 01/25/17  -HG 01/25/17  -HG       User Key  (r) = Recorded By, (t) = Taken By, (c) = Cosigned By    Initials Name Provider Type     Anjali Serrano MS [facility]-SLP Speech and Language Pathologist                OP SLP Education       01/26/17 1100          Education    Barriers to Learning No barriers identified  -      Education Provided Patient demonstrated recommended strategies;Patient requires further education on strategies, risks  -      Assessed Learning needs;Learning motivation;Learning preferences;Learning readiness  -      Learning Motivation Strong  -      Learning Method Explanation;Written materials  -      Teaching Response Verbalized understanding;Demonstrated understanding;Reinforcement needed  -        User Key  (r) = Recorded By, (t) = Taken By, (c) = Cosigned By    Initials Name Effective Dates     Anjali Serrano MS CCC-SLP 06/22/15 -                 OP SLP Assessment/Plan -  01/26/17 1100     SLP Assessment    Functional Problems Speech Language- Adult/Cognition  -HG    Impact on Function: Adult Speech Language/Cognition Decreased recall of personal information and medical history;Trouble learning or remembering new information  -HG    Clinical Impression: Speech Language-Adult/Congnition Cognitive Communication Impairment  -HG    Please refer to paper survey for additional self-reported information Yes  -HG    Please refer to items scanned into chart for additional diagnostic informaiton and handouts as provided by clinician Yes  -HG    Prognosis Good (comment)  -HG    Patient/caregiver participated in establishment of treatment plan and goals Yes  -HG    Patient would benefit from skilled therapy intervention Yes  -HG    SLP Plan    Frequency 2x/week  -HG    Duration 6 weeks  -HG    Planned CPT's? SLP INDIVIDUAL SPEECH THERAPY: 91630  -    Expected Duration Therapy Session (min) 45-60 minutes  -HG    Plan Comments Cont per POC  -HG      User Key  (r) = Recorded By, (t) = Taken By, (c) = Cosigned By    Initials Name Provider Type     Anjali Serrano MS CCC-SLP Speech and Language Pathologist                 Time Calculation:   SLP Start Time: 1100    Therapy Charges for Today     Code Description Service Date Service Provider Modifiers Qty    [id number]  ST TREATMENT SPEECH 4 1/26/2017 Anjali Serrano MS CCC-SLP GN 1                   Anjali Serrano MS MARGO-SLP  1/26/2017

## 2017-01-27 ENCOUNTER — APPOINTMENT (OUTPATIENT)
Dept: SPEECH THERAPY | Facility: HOSPITAL | Age: 77
End: 2017-01-27

## 2017-01-30 ENCOUNTER — APPOINTMENT (OUTPATIENT)
Dept: SPEECH THERAPY | Facility: HOSPITAL | Age: 77
End: 2017-01-30

## 2017-01-30 ENCOUNTER — HOSPITAL ENCOUNTER (OUTPATIENT)
Dept: SPEECH THERAPY | Facility: HOSPITAL | Age: 77
Setting detail: THERAPIES SERIES
Discharge: HOME | End: 2017-01-30

## 2017-01-30 DIAGNOSIS — G31.84 MCI (MILD COGNITIVE IMPAIRMENT): Primary | ICD-10-CM

## 2017-01-30 PROCEDURE — 92507 TX SP LANG VOICE COMM INDIV: CPT

## 2017-01-30 PROCEDURE — G9169 MEMORY GOAL STATUS: HCPCS

## 2017-01-30 PROCEDURE — G9168 MEMORY CURRENT STATUS: HCPCS

## 2017-01-30 NOTE — PROGRESS NOTES
"Outpatient Speech Language Pathology   Adult Speech Language Cognitive Re-Evaluation  [facility]     Patient Name: Uche Polanco  : 1940  MRN: [id number]  Today's Date: 2017        Visit Date: 2017   Patient Active Problem List   Diagnosis   • Valvular heart disease   • TIA (transient ischemic attack)   • Severe obstructive sleep apnea   • Hypertension   • Hyperlipidemia   • Gout   • CAD (coronary artery disease)   • AV block   • REM sleep behavior disorder   • REM  disorder        Past Medical History   Diagnosis Date   • AV block    • CAD (coronary artery disease)    • Family history of hypertension    • Gout    • Hyperlipidemia    • Hypertension    • TOM (obstructive sleep apnea)    • REM  disorder    • TIA (transient ischemic attack)    • Valvular heart disease         Past Surgical History   Procedure Laterality Date   • Skin surgery  Month ago     Cell removed from head    • Hernia repair     • Tonsillectomy     • Mitral valve repair     • Pacemaker implantation     • Other surgical history  2014     [employer] link loop recorder   • Hemorrhoidectomy           Visit Dx:    ICD-10-CM ICD-9-CM   1. MCI (mild cognitive impairment) G31.84 331.83                 Adult Speech Language - 17 1100     Background and History    Reason for Referral Pt is a 76 year old male who has been receiving skilled SLP services since 17 and is showing progress across STM and thought organization.  -HG    Stated Goals Pt wishes to not get \"locked up\" in the middle of a sentence, conversation or thought. \"Would love to be a much better listener and do a lot less talking\"  -HG    Description of Complaint Name finding, word finding, \"I lose information at times\"   -HG    Previous Functional Status Functional in all spheres  -HG    Current Baseline Abilities Pt now able to complete activities/errands without forgetting details.   -HG    Pertinent Medications Clonazepam  -HG    Primary Language in the " "Home English  -HG    Primary Caregiver Spouse  -HG    Informant for the Evaluation Self  -HG    RBANS- Repeatable Battery for the Assessment of Neuropsychological Status    Immediate Memory Index Score --   2 portions of exam yet to be completed- overall improvement  -HG      User Key  (r) = Recorded By, (t) = Taken By, (c) = Cosigned By    Initials Name Provider Type    HG Anjali Serrano MS [facility]-SLP Speech and Language Pathologist                               OP SLP Education       01/30/17 1100          Education    Barriers to Learning No barriers identified  -HG      Education Provided Described results of evaluation;Patient expressed understanding of evaluation;Patient participated in establishing goals and treatment plan;Patient demonstrated recommended strategies;Patient requires further education on strategies, risks  -      Assessed Learning needs;Learning motivation;Learning preferences;Learning readiness  -      Learning Motivation Strong  -      Learning Method Explanation;Demonstration;Teach back;Written materials  -      Teaching Response Verbalized understanding;Demonstrated understanding;Reinforcement needed  -HG        User Key  (r) = Recorded By, (t) = Taken By, (c) = Cosigned By    Initials Name Effective Dates    HG Anjali Serrano MS [facility]-SLP 06/22/15 -                 SLP OP Goals       01/30/17 1100 01/26/17 1100 01/23/17 1100    Goal Type Needed    Goal Type Needed Memory  -HG Memory  -HG Memory  -HG    Subjective Comments    Subjective Comments Pt alert, cooperative, eager to continue to make improvement.  -HG Pt alert, cooperative, eagerness for improvement and pt continues to practice at homework.  -HG Pt alert, cooperative, \"I think this is really helping.\"  -HG    Subjective Pain    Able to rate subjective pain? yes  -HG yes  -HG yes  -HG    Other Goals    Other Adult Goal- 1 Patient will verbalize 3 internal and 3 external memory strategies independently across two sessions " in order to ensure competency of skilled techniques.  -HG Patient will verbalize 3 internal and 3 external memory strategies independently across two sessions in order to ensure competency of skilled techniques.  -HG Patient will verbalize 3 internal and 3 external memory strategies independently across two sessions in order to ensure competency of skilled techniques.  -HG    Status: Other Adult Goal- 1 Achieved  -HG Progressing as expected  -HG Progressing as expected  -HG    Comments: Other Adult Goal- 1 Pt able to state 3 + internal and external memory strategies independently.  -HG Pt able to name 3/3 with min cues.  -HG     Other Adult Goal- 2 Patient will perform STM tasks with 80% accuracy across two sessions, 3 trials per session, in order to enhance recall.  -HG Patient will perform STM tasks with 80% accuracy across two sessions, 3 trials per session, in order to enhance recall.  -HG Patient will perform STM tasks with 80% accuracy across two sessions, 3 trials per session, in order to enhance recall.  -HG    Status: Other Adult Goal- 2 Progressing as expected  -HG Progressing as expected  -HG Progressing as expected  -HG    Comments: Other Adult Goal- 2 Pt continues to recall up to 4 messages independently with use of writtent compensatory strategy.  -HG Pt able to recall up to 4 messages with mod cues- 70-80% accuracy.  -HG Pt using chunking vs chaining and pt able to recall up to 6 words at a time.  -HG    Other Adult Goal- 3 Patient will perform divergent naming tasks, generating 15 items per category within 60 seconds, across two trials in order to improve semantic fluency.  -HG Patient will perform divergent naming tasks, generating 10 items per category within 60 seconds, across two trials in order to improve semantic fluency.  -HG Patient will perform divergent naming tasks, generating 10 items per category within 60 seconds, across two trials in order to improve semantic fluency.  -HG    Status:  "Other Adult Goal- 3 Progressing as expected;Revised  -HG Progressing as expected  -HG Progressing as expected  -HG    Comments: Other Adult Goal- 3 Progressed goal to 15 items from 10 due to improved status.  -HG Pt able to name up to 10 items in concrete and abstract categories.  -HG Pt able to name 10+ items in concrete category and 8-9 items in abstract category in a one minute time span.  -HG    Other Adult Goal- 4 Patient will perform executive functioning task with 70% accuracy when provided prompts only in order to improve strategic thinking.  -HG Patient will perform executive functioning task with 70% accuracy when provided prompts only in order to improve strategic thinking.  -HG Patient will perform executive functioning task with 70% accuracy when provided prompts only in order to improve strategic thinking.  -HG    Status: Other Adult Goal- 4 Progressing as expected  -HG Progressing as expected  -HG Progressing as expected  -HG    Comments: Other Adult Goal- 4 Pt asked to balance checkbook while completing a verbal sequncing task and pt was 80% accurate.  -HG  Pt able to complete an attention and recall task with 70% accuracy.  -HG    Other Adult Goal- 5 Patient will improve cognition as evidenced by STGs met. (LTG)  -HG Patient will improve cognition as evidenced by STGs met. (LTG)  -HG Patient will improve cognition as evidenced by STGs met. (LTG)  -HG    Status: Other Adult Goal- 5 Progressing as expected  -HG Progressing as expected  -HG Progressing as expected  -HG    Comments: Other Adult Goal- 5 Pt continues to complete homework with ease and more challenging homework provided.  -HG Pt continues to complete homework with ease and more challenging homework provided.  -HG Pt is completing homework with less ease and stated, \"I feel that there is less pressure in my head and I can focus.\"  -HG    Other Adult Goal- 6 Patient will perform alternating attention task with 80% accuracy across two trials " "in order to enhance mental flexibility and attention.  -HG Patient will perform alternating attention task with 80% accuracy across two trials in order to enhance mental flexibility and attention.  -HG Patient will perform alternating attention task with 80% accuracy across two trials in order to enhance mental flexibility and attention.  -HG    Status: Other Adult Goal- 6 Progressing as expected  -HG Progressing as expected  -HG Progressing as expected  -HG    Other Adult Goal- 7 Patient will perform divided attention task with 70% accuracy across two trials independently in order to improve multitasking abilities.  -HG Patient will perform divided attention task with 70% accuracy across two trials independently in order to improve multitasking abilities.  -HG Patient will perform divided attention task with 70% accuracy across two trials independently in order to improve multitasking abilities.  -HG    Status: Other Adult Goal- 7 Progressing as expected  -HG Progressing as expected  -HG Progressing as expected  -HG    Comments: Other Adult Goal- 7 Balancing checkbook while sequencing with 80% accuracy.  -HG Pt having to recall un-related messages along with a naming task and was 70% accurate independently.   -HG Pt completed a divided attn computer game task and was 70% accurate with min cues.   -HG    SLP Time Calculation    SLP Goal Re-Cert Due Date 03/01/17  -HG 02/24/17  -HG 01/25/17  -HG      01/19/17 1000 01/17/17 0900       Goal Type Needed    Goal Type Needed Memory  -HG      Subjective Comments    Subjective Comments Pt alert, cooperative, \"I really have to work on this.\"   -HG Pt alert, cooperative, homework completed.  -HG     Subjective Pain    Able to rate subjective pain? yes  -HG yes  -HG     Other Goals    Other Adult Goal- 1 Patient will verbalize 3 internal and 3 external memory strategies independently across two sessions in order to ensure competency of skilled techniques.  -HG Patient will " verbalize 3 internal and 3 external memory strategies independently across two sessions in order to ensure competency of skilled techniques.  -HG     Status: Other Adult Goal- 1 Progressing as expected  -HG Progressing as expected  -HG     Comments: Other Adult Goal- 1 3/3 with min-mod verbal cues.  -HG Pt able to recall 2/3 external with no cues and for internal strategies, pt was 1/3 independently.  -HG     Other Adult Goal- 2 Patient will perform STM tasks with 80% accuracy across two sessions, 3 trials per session, in order to enhance recall.  -HG Patient will perform STM tasks with 80% accuracy across two sessions, 3 trials per session, in order to enhance recall.  -HG     Status: Other Adult Goal- 2 Progressing as expected  -HG Progressing as expected  -HG     Comments: Other Adult Goal- 2 Pt struggling this date with chaining 4 un-related words and therefore switched to chunking.  -HG Pt able to recall 4 un-related words with compensatory strategies.  -HG     Other Adult Goal- 3 Patient will perform divergent naming tasks, generating 10 items per category within 60 seconds, across two trials in order to improve semantic fluency.  -HG Patient will perform divergent naming tasks, generating 10 items per category within 60 seconds, across two trials in order to improve semantic fluency.  -HG     Status: Other Adult Goal- 3 Progressing as expected  -HG Progressing as expected  -HG     Comments: Other Adult Goal- 3  Pt was 6-9 in abstract category naming.  -HG     Other Adult Goal- 4 Patient will perform executive functioning task with 70% accuracy when provided prompts only in order to improve strategic thinking.  -HG Patient will perform executive functioning task with 70% accuracy when provided prompts only in order to improve strategic thinking.  -HG     Status: Other Adult Goal- 4 Progressing as expected  -HG Progressing as expected  -HG     Comments: Other Adult Goal- 4  Pt was able to complete a card  sorting game with music playing and answering questions.  -HG     Other Adult Goal- 5 Patient will improve cognition as evidenced by STGs met. (LTG)  -HG Patient will improve cognition as evidenced by STGs met. (LTG)  -HG     Status: Other Adult Goal- 5 Progressing as expected  -HG Progressing as expected  -HG     Comments: Other Adult Goal- 5 Pt given homework in order to improve sustained attention which will result in improved STM.   -HG Pt continues to work on strategies to improve working memory such as chaining, listing, list making.  -HG     Other Adult Goal- 6 Patient will perform alternating attention task with 80% accuracy across two trials in order to enhance mental flexibility and attention.  -HG Patient will perform alternating attention task with 80% accuracy across two trials in order to enhance mental flexibility and attention.  -HG     Status: Other Adult Goal- 6 Progressing as expected  -HG Progressing as expected  -HG     Comments: Other Adult Goal- 6  Pt was 90% accurate with completing alternating attn tasks with marking out words and completing alt subtraction and addition math problems.  -HG     Other Adult Goal- 7 Patient will perform divided attention task with 70% accuracy across two trials independently in order to improve multitasking abilities.  -HG Patient will perform divided attention task with 70% accuracy across two trials independently in order to improve multitasking abilities.  -HG     Status: Other Adult Goal- 7 Progressing as expected  -HG Progressing as expected  -HG     Comments: Other Adult Goal- 7  Pt was 80% accurate with completing a card sorting tasks given 2 different instructions.   -HG     SLP Time Calculation    SLP Goal Re-Cert Due Date 01/25/17  -HG 01/25/17  -HG       User Key  (r) = Recorded By, (t) = Taken By, (c) = Cosigned By    Initials Name Provider Type    DALJIT Serrano MS [facility]-SLP Speech and Language Pathologist                OP SLP Assessment/Plan  - 01/30/17 1100     SLP Assessment    Functional Problems Speech Language- Adult/Cognition  -    Impact on Function: Adult Speech Language/Cognition Decreased recall of personal information and medical history;Trouble learning or remembering new information  -    Clinical Impression: Speech Language-Adult/Congnition Cognitive Communication Impairment  -HG    Clinical Impression Comments Pt improved to a level of not forgetting details when he runs errands with use of reminders on his cell phone.  -HG    Please refer to paper survey for additional self-reported information Yes  -HG    Please refer to items scanned into chart for additional diagnostic informaiton and handouts as provided by clinician Yes  -HG    Prognosis Excellent (comment)  -HG    Patient/caregiver participated in establishment of treatment plan and goals Yes  -HG    Patient would benefit from skilled therapy intervention Yes  -HG    SLP Plan    Frequency 2x/week  -HG    Duration 5 weeks  -HG    Planned CPT's? SLP INDIVIDUAL SPEECH THERAPY: 74584  -    Expected Duration Therapy Session (min) 45-60 minutes  -HG    Plan Comments Cont per POC  -      User Key  (r) = Recorded By, (t) = Taken By, (c) = Cosigned By    Initials Name Provider Type     Anjali Serrano MS CCC-SLP Speech and Language Pathologist                 Time Calculation:   SLP Start Time: 1100    Therapy Charges for Today     Code Description Service Date Service Provider Modifiers Qty    86291513602 HC ST MEMORY CURRENT 1/30/2017 Anjali Serrano MS CCC-SLP MELANI, CJ 1    68736368151 HC ST MEMORY PROJECTED 1/30/2017 Anjali Serrano MS CCC-SLP MELANI, CJ 1    [id number] HC ST TREATMENT SPEECH 5 1/30/2017 Anjali Serrano MS CCC-SLP GN 1          SLP G-Codes  Functional Limitations: Memory  Memory Current Status (): At least 20 percent but less than 40 percent impaired, limited or restricted  Memory Goal Status (): At least 20 percent but less than 40 percent  impaired, limited or restricted        Anjali Serrano, MS CCC-SLP  1/30/2017

## 2017-01-31 ENCOUNTER — APPOINTMENT (OUTPATIENT)
Dept: CARDIOLOGY | Facility: HOSPITAL | Age: 77
End: 2017-01-31

## 2017-01-31 ENCOUNTER — HOSPITAL ENCOUNTER (OUTPATIENT)
Dept: CARDIOLOGY | Facility: HOSPITAL | Age: 77
Discharge: HOME | End: 2017-01-31
Admitting: PHYSICIAN ASSISTANT

## 2017-01-31 VITALS
SYSTOLIC BLOOD PRESSURE: 133 MMHG | HEIGHT: 73 IN | WEIGHT: 214 LBS | BODY MASS INDEX: 28.36 KG/M2 | DIASTOLIC BLOOD PRESSURE: 78 MMHG

## 2017-01-31 DIAGNOSIS — R42 DIZZY SPELLS: ICD-10-CM

## 2017-01-31 DIAGNOSIS — I38 VALVULAR HEART DISEASE: ICD-10-CM

## 2017-01-31 LAB
BH CV ECHO MEAS - AI DEC SLOPE: 221.5 CM/SEC^2
BH CV ECHO MEAS - AI MAX PG: 62.8 MMHG
BH CV ECHO MEAS - AI MAX VEL: 395.3 CM/SEC
BH CV ECHO MEAS - AI P1/2T: 522.7 MSEC
BH CV ECHO MEAS - AO MAX PG (FULL): 1.2 MMHG
BH CV ECHO MEAS - AO MAX PG: 5.2 MMHG
BH CV ECHO MEAS - AO MEAN PG (FULL): 0.95 MMHG
BH CV ECHO MEAS - AO MEAN PG: 3.1 MMHG
BH CV ECHO MEAS - AO ROOT AREA (BSA CORRECTED): 1.7
BH CV ECHO MEAS - AO ROOT AREA: 11.7 CM^2
BH CV ECHO MEAS - AO ROOT DIAM: 3.9 CM
BH CV ECHO MEAS - AO V2 MAX: 114.2 CM/SEC
BH CV ECHO MEAS - AO V2 MEAN: 81.5 CM/SEC
BH CV ECHO MEAS - AO V2 VTI: 28 CM
BH CV ECHO MEAS - AVA(I,A): 3.7 CM^2
BH CV ECHO MEAS - AVA(I,D): 3.7 CM^2
BH CV ECHO MEAS - AVA(V,A): 4.1 CM^2
BH CV ECHO MEAS - AVA(V,D): 4.1 CM^2
BH CV ECHO MEAS - BSA(HAYCOCK): 2.3 M^2
BH CV ECHO MEAS - BSA: 2.2 M^2
BH CV ECHO MEAS - BZI_BMI: 28.2 KILOGRAMS/M^2
BH CV ECHO MEAS - BZI_METRIC_HEIGHT: 185.4 CM
BH CV ECHO MEAS - BZI_METRIC_WEIGHT: 97.1 KG
BH CV ECHO MEAS - CONTRAST EF (2CH): 58.2 ML/M^2
BH CV ECHO MEAS - CONTRAST EF 4CH: 48.6 ML/M^2
BH CV ECHO MEAS - EDV(CUBED): 185.4 ML
BH CV ECHO MEAS - EDV(MOD-SP2): 79 ML
BH CV ECHO MEAS - EDV(MOD-SP4): 142 ML
BH CV ECHO MEAS - EDV(TEICH): 160.2 ML
BH CV ECHO MEAS - EF(CUBED): 68.1 %
BH CV ECHO MEAS - EF(MOD-SP2): 58.2 %
BH CV ECHO MEAS - EF(MOD-SP4): 48.6 %
BH CV ECHO MEAS - EF(TEICH): 59 %
BH CV ECHO MEAS - ESV(CUBED): 59.1 ML
BH CV ECHO MEAS - ESV(MOD-SP2): 33 ML
BH CV ECHO MEAS - ESV(MOD-SP4): 73 ML
BH CV ECHO MEAS - ESV(TEICH): 65.7 ML
BH CV ECHO MEAS - FS: 31.7 %
BH CV ECHO MEAS - IVS/LVPW: 1
BH CV ECHO MEAS - IVSD: 1 CM
BH CV ECHO MEAS - LA DIMENSION: 4.5 CM
BH CV ECHO MEAS - LA/AO: 1.2
BH CV ECHO MEAS - LAT PEAK E' VEL: 5.8 CM/SEC
BH CV ECHO MEAS - LV DIASTOLIC VOL/BSA (35-75): 64.1 ML/M^2
BH CV ECHO MEAS - LV MASS(C)D: 231.2 GRAMS
BH CV ECHO MEAS - LV MASS(C)DI: 104.4 GRAMS/M^2
BH CV ECHO MEAS - LV MAX PG: 4 MMHG
BH CV ECHO MEAS - LV MEAN PG: 2.1 MMHG
BH CV ECHO MEAS - LV SYSTOLIC VOL/BSA (12-30): 33 ML/M^2
BH CV ECHO MEAS - LV V1 MAX: 100 CM/SEC
BH CV ECHO MEAS - LV V1 MEAN: 67.1 CM/SEC
BH CV ECHO MEAS - LV V1 VTI: 22.2 CM
BH CV ECHO MEAS - LVIDD: 5.7 CM
BH CV ECHO MEAS - LVIDS: 3.9 CM
BH CV ECHO MEAS - LVLD AP2: 8.8 CM
BH CV ECHO MEAS - LVLD AP4: 9.1 CM
BH CV ECHO MEAS - LVLS AP2: 7.5 CM
BH CV ECHO MEAS - LVLS AP4: 7.9 CM
BH CV ECHO MEAS - LVOT AREA (M): 4.5 CM^2
BH CV ECHO MEAS - LVOT AREA: 4.7 CM^2
BH CV ECHO MEAS - LVOT DIAM: 2.4 CM
BH CV ECHO MEAS - LVPWD: 0.99 CM
BH CV ECHO MEAS - MED PEAK E' VEL: 5.01 CM/SEC
BH CV ECHO MEAS - MV A MAX VEL: 114.3 CM/SEC
BH CV ECHO MEAS - MV DEC SLOPE: 240.5 CM/SEC^2
BH CV ECHO MEAS - MV DEC TIME: 0.46 SEC
BH CV ECHO MEAS - MV E MAX VEL: 99.5 CM/SEC
BH CV ECHO MEAS - MV E/A: 0.87
BH CV ECHO MEAS - MV MAX PG: 7.3 MMHG
BH CV ECHO MEAS - MV MEAN PG: 3 MMHG
BH CV ECHO MEAS - MV P1/2T MAX VEL: 114.2 CM/SEC
BH CV ECHO MEAS - MV P1/2T: 139.1 MSEC
BH CV ECHO MEAS - MV V2 MAX: 135.1 CM/SEC
BH CV ECHO MEAS - MV V2 MEAN: 83.4 CM/SEC
BH CV ECHO MEAS - MV V2 VTI: 57.7 CM
BH CV ECHO MEAS - MVA P1/2T LCG: 1.9 CM^2
BH CV ECHO MEAS - MVA(P1/2T): 1.6 CM^2
BH CV ECHO MEAS - MVA(VTI): 1.8 CM^2
BH CV ECHO MEAS - PA ACC SLOPE: 372.1 CM/SEC^2
BH CV ECHO MEAS - PA ACC TIME: 0.13 SEC
BH CV ECHO MEAS - PA MAX PG: 0.82 MMHG
BH CV ECHO MEAS - PA PR(ACCEL): 22 MMHG
BH CV ECHO MEAS - PA V2 MAX: 45.2 CM/SEC
BH CV ECHO MEAS - PULM DIAS VEL: 38.2 CM/SEC
BH CV ECHO MEAS - PULM S/D: 1.6
BH CV ECHO MEAS - PULM SYS VEL: 60.5 CM/SEC
BH CV ECHO MEAS - RVDD: 3.4 CM
BH CV ECHO MEAS - SI(AO): 148.2 ML/M^2
BH CV ECHO MEAS - SI(CUBED): 57 ML/M^2
BH CV ECHO MEAS - SI(LVOT): 47.2 ML/M^2
BH CV ECHO MEAS - SI(MOD-SP2): 20.8 ML/M^2
BH CV ECHO MEAS - SI(MOD-SP4): 31.2 ML/M^2
BH CV ECHO MEAS - SI(TEICH): 42.7 ML/M^2
BH CV ECHO MEAS - SV(AO): 328.3 ML
BH CV ECHO MEAS - SV(CUBED): 126.3 ML
BH CV ECHO MEAS - SV(LVOT): 104.4 ML
BH CV ECHO MEAS - SV(MOD-SP2): 46 ML
BH CV ECHO MEAS - SV(MOD-SP4): 69 ML
BH CV ECHO MEAS - SV(TEICH): 94.5 ML
BH CV ECHO MEAS - TAPSE (>1.6): 1.8 CM2
BH CV ECHO MEAS - TR MAX VEL: 174.8 CM/SEC
BH CV XLRA - RV BASE: 3.6 CM
BH CV XLRA - RV LENGTH: 7.9 CM
BH CV XLRA - RV MID: 3.2 CM
BH CV XLRA - TDI S': 10.6 CM/SEC
E/E' RATIO: 19.4
LEFT ATRIUM VOLUME INDEX: 40.7 ML/M2
LV EF 2D ECHO EST: 50 %

## 2017-01-31 PROCEDURE — 93306 TTE W/DOPPLER COMPLETE: CPT

## 2017-01-31 PROCEDURE — 93306 TTE W/DOPPLER COMPLETE: CPT | Performed by: INTERNAL MEDICINE

## 2017-02-02 ENCOUNTER — HOSPITAL ENCOUNTER (OUTPATIENT)
Dept: SPEECH THERAPY | Facility: HOSPITAL | Age: 77
Discharge: HOME | End: 2017-02-02
Admitting: COUNSELOR

## 2017-02-02 DIAGNOSIS — G31.84 MCI (MILD COGNITIVE IMPAIRMENT): Primary | ICD-10-CM

## 2017-02-02 PROCEDURE — 92507 TX SP LANG VOICE COMM INDIV: CPT

## 2017-02-02 NOTE — PROGRESS NOTES
Outpatient Speech Language Pathology   Adult Speech Language Cognitive Treatment Note  [facility]     Patient Name: Uche Polanco  : 1940  MRN: [id number]  Today's Date: 2017         Visit Date: 2017   Patient Active Problem List   Diagnosis   • Valvular heart disease   • TIA (transient ischemic attack)   • Severe obstructive sleep apnea   • Hypertension   • Hyperlipidemia   • Gout   • CAD (coronary artery disease)   • AV block   • REM sleep behavior disorder   • REM  disorder          Visit Dx:    ICD-10-CM ICD-9-CM   1. MCI (mild cognitive impairment) G31.84 331.83             Adult Speech Language - 17 1100     RBANS- Repeatable Battery for the Assessment of Neuropsychological Status    Immediate Memory Index Score 78  -HG    Immediate Memory Percentile 7 %  -HG    Immediate Memory Qualitative Description borderline  -HG    Visuospatial Index Score 126  -HG    Visuospatial Percentile 96 %  -HG    Visuospatial Qualitative Description superior  -HG    Language Index Score 96  -HG    Language Percentile 39 %  -HG    Language Qualitative Description average  -HG    Attention Index Score 103  -HG    Attention Percentile 58 %  -HG    Attention Qualitative Description average  -HG    Delayed Memory Index Score 110  -HG    Delayed Memory Percentile 75 %  -HG    Delayed Memory Qualitative Description high average  -HG    Total Index Score 513  -HG    Total Percentile 55 %  -HG    Total Qualitative Description average  -HG    RBANS Comments Pt performed at the mean which is a range from   -HG      User Key  (r) = Recorded By, (t) = Taken By, (c) = Cosigned By    Initials Name Provider Type    DALJIT Serrano MS [facility]-SLP Speech and Language Pathologist                              SLP OP Goals       17 1100 17 1100 17 1100    Goal Type Needed    Goal Type Needed Memory  -HG Memory  -HG Memory  -HG    Subjective Comments    Subjective Comments Pt alert, cooperative,  concerned for his grandson being deployed.   -HG Pt alert, cooperative, eager to continue to make improvement.  -HG Pt alert, cooperative, eagerness for improvement and pt continues to practice at homework.  -HG    Subjective Pain    Able to rate subjective pain? yes  -HG yes  -HG yes  -HG    Other Goals    Other Adult Goal- 1 Patient will verbalize 3 internal and 3 external memory strategies independently across two sessions in order to ensure competency of skilled techniques.  -HG Patient will verbalize 3 internal and 3 external memory strategies independently across two sessions in order to ensure competency of skilled techniques.  -HG Patient will verbalize 3 internal and 3 external memory strategies independently across two sessions in order to ensure competency of skilled techniques.  -HG    Status: Other Adult Goal- 1 Achieved  -HG Achieved  -HG Progressing as expected  -HG    Comments: Other Adult Goal- 1 Pt able to state 3 + internal and external memory strategies independently.  -HG Pt able to state 3 + internal and external memory strategies independently.  -HG Pt able to name 3/3 with min cues.  -HG    Other Adult Goal- 2 Patient will perform STM tasks with 80% accuracy across two sessions, 3 trials per session, in order to enhance recall.  -HG Patient will perform STM tasks with 80% accuracy across two sessions, 3 trials per session, in order to enhance recall.  -HG Patient will perform STM tasks with 80% accuracy across two sessions, 3 trials per session, in order to enhance recall.  -HG    Status: Other Adult Goal- 2 Progressing as expected  -HG Progressing as expected  -HG Progressing as expected  -HG    Comments: Other Adult Goal- 2 Pt able to recall 4 messages with min cues after a week delay, 5 messages to be targeted next session.   -HG Pt continues to recall up to 4 messages independently with use of writtent compensatory strategy.  -HG Pt able to recall up to 4 messages with mod cues- 70-80%  accuracy.  -HG    Other Adult Goal- 3 Patient will perform divergent naming tasks, generating 15 items per category within 60 seconds, across two trials in order to improve semantic fluency.  -HG Patient will perform divergent naming tasks, generating 15 items per category within 60 seconds, across two trials in order to improve semantic fluency.  -HG Patient will perform divergent naming tasks, generating 10 items per category within 60 seconds, across two trials in order to improve semantic fluency.  -HG    Status: Other Adult Goal- 3 Progressing as expected  -HG Progressing as expected;Revised  -HG Progressing as expected  -HG    Comments: Other Adult Goal- 3 Pt was 7-8 items this date.   -HG Progressed goal to 15 items from 10 due to improved status.  -HG Pt able to name up to 10 items in concrete and abstract categories.  -HG    Other Adult Goal- 4 Patient will perform executive functioning task with 70% accuracy when provided prompts only in order to improve strategic thinking.  -HG Patient will perform executive functioning task with 70% accuracy when provided prompts only in order to improve strategic thinking.  -HG Patient will perform executive functioning task with 70% accuracy when provided prompts only in order to improve strategic thinking.  -HG    Status: Other Adult Goal- 4 Progressing as expected  -HG Progressing as expected  -HG Progressing as expected  -HG    Comments: Other Adult Goal- 4  Pt asked to balance checkbook while completing a verbal sequncing task and pt was 80% accurate.  -HG     Other Adult Goal- 5 Patient will improve cognition as evidenced by STGs met. (LTG)  -HG Patient will improve cognition as evidenced by STGs met. (LTG)  -HG Patient will improve cognition as evidenced by STGs met. (LTG)  -HG    Status: Other Adult Goal- 5 Progressing as expected  -HG Progressing as expected  -HG Progressing as expected  -HG    Comments: Other Adult Goal- 5 Pt purchased a program for brain  "games and is contiously challenging himself.  -HG Pt continues to complete homework with ease and more challenging homework provided.  -HG Pt continues to complete homework with ease and more challenging homework provided.  -HG    Other Adult Goal- 6 Patient will perform alternating attention task with 80% accuracy across two trials in order to enhance mental flexibility and attention.  -HG Patient will perform alternating attention task with 80% accuracy across two trials in order to enhance mental flexibility and attention.  -HG Patient will perform alternating attention task with 80% accuracy across two trials in order to enhance mental flexibility and attention.  -HG    Status: Other Adult Goal- 6 Progressing as expected  -HG Progressing as expected  -HG Progressing as expected  -HG    Other Adult Goal- 7 Patient will perform divided attention task with 70% accuracy across two trials independently in order to improve multitasking abilities.  -HG Patient will perform divided attention task with 70% accuracy across two trials independently in order to improve multitasking abilities.  -HG Patient will perform divided attention task with 70% accuracy across two trials independently in order to improve multitasking abilities.  -HG    Status: Other Adult Goal- 7 Progressing as expected  -HG Progressing as expected  -HG Progressing as expected  -HG    Comments: Other Adult Goal- 7  Balancing checkbook while sequencing with 80% accuracy.  -HG Pt having to recall un-related messages along with a naming task and was 70% accurate independently.   -HG    SLP Time Calculation    SLP Goal Re-Cert Due Date 03/01/17  -HG 03/01/17  -HG 02/24/17  -HG      01/23/17 1100          Goal Type Needed    Goal Type Needed Memory  -HG      Subjective Comments    Subjective Comments Pt alert, cooperative, \"I think this is really helping.\"  -HG      Subjective Pain    Able to rate subjective pain? yes  -HG      Other Goals    Other Adult " "Goal- 1 Patient will verbalize 3 internal and 3 external memory strategies independently across two sessions in order to ensure competency of skilled techniques.  -HG      Status: Other Adult Goal- 1 Progressing as expected  -HG      Other Adult Goal- 2 Patient will perform STM tasks with 80% accuracy across two sessions, 3 trials per session, in order to enhance recall.  -HG      Status: Other Adult Goal- 2 Progressing as expected  -HG      Comments: Other Adult Goal- 2 Pt using chunking vs chaining and pt able to recall up to 6 words at a time.  -HG      Other Adult Goal- 3 Patient will perform divergent naming tasks, generating 10 items per category within 60 seconds, across two trials in order to improve semantic fluency.  -HG      Status: Other Adult Goal- 3 Progressing as expected  -HG      Comments: Other Adult Goal- 3 Pt able to name 10+ items in concrete category and 8-9 items in abstract category in a one minute time span.  -HG      Other Adult Goal- 4 Patient will perform executive functioning task with 70% accuracy when provided prompts only in order to improve strategic thinking.  -HG      Status: Other Adult Goal- 4 Progressing as expected  -HG      Comments: Other Adult Goal- 4 Pt able to complete an attention and recall task with 70% accuracy.  -HG      Other Adult Goal- 5 Patient will improve cognition as evidenced by STGs met. (LTG)  -HG      Status: Other Adult Goal- 5 Progressing as expected  -HG      Comments: Other Adult Goal- 5 Pt is completing homework with less ease and stated, \"I feel that there is less pressure in my head and I can focus.\"  -HG      Other Adult Goal- 6 Patient will perform alternating attention task with 80% accuracy across two trials in order to enhance mental flexibility and attention.  -HG      Status: Other Adult Goal- 6 Progressing as expected  -HG      Other Adult Goal- 7 Patient will perform divided attention task with 70% accuracy across two trials independently in " order to improve multitasking abilities.  -HG      Status: Other Adult Goal- 7 Progressing as expected  -HG      Comments: Other Adult Goal- 7 Pt completed a divided attn computer game task and was 70% accurate with min cues.   -HG      SLP Time Calculation    SLP Goal Re-Cert Due Date 01/25/17  -HG        User Key  (r) = Recorded By, (t) = Taken By, (c) = Cosigned By    Initials Name Provider Type     Anjali Serrano MS [facility]-SLP Speech and Language Pathologist                OP SLP Education       02/02/17 1100          Education    Barriers to Learning No barriers identified  -HG      Education Provided Described results of evaluation;Patient expressed understanding of evaluation;Patient demonstrated recommended strategies;Patient requires further education on strategies, risks  -      Assessed Learning needs;Learning motivation;Learning preferences;Learning readiness  -      Learning Motivation Strong  -      Learning Method Explanation;Demonstration;Teach back  -      Teaching Response Verbalized understanding;Demonstrated understanding  -HG        User Key  (r) = Recorded By, (t) = Taken By, (c) = Cosigned By    Initials Name Effective Dates     Anjali Serrano MS CCC-SLP 06/22/15 -                 OP SLP Assessment/Plan - 02/02/17 1200     SLP Assessment    Functional Problems Speech Language- Adult/Cognition  -HG    Impact on Function: Adult Speech Language/Cognition Decreased recall of personal information and medical history;Trouble learning or remembering new information  -    Clinical Impression: Speech Language-Adult/Congnition Cognitive Communication Impairment  -HG    Please refer to paper survey for additional self-reported information Yes  -HG    Please refer to items scanned into chart for additional diagnostic informaiton and handouts as provided by clinician Yes  -HG    Prognosis Excellent (comment)  -HG    Patient/caregiver participated in establishment of treatment plan and  goals Yes  -HG    Patient would benefit from skilled therapy intervention Yes  -HG    SLP Plan    Frequency 2x/week  -HG    Duration 5 weeks  -HG    Planned CPT's? SLP INDIVIDUAL SPEECH THERAPY: 45799  -HG    Expected Duration Therapy Session (min) 45-60 minutes  -HG    Plan Comments Cont per POC  -HG      User Key  (r) = Recorded By, (t) = Taken By, (c) = Cosigned By    Initials Name Provider Type     Anjali Serrano MS CCC-SLP Speech and Language Pathologist                 Time Calculation:   SLP Start Time: 1100    Therapy Charges for Today     Code Description Service Date Service Provider Modifiers Qty    [id number]  ST TREATMENT SPEECH 6 2/2/2017 Anjali Serrano MS CCC-SLP GN 1                   Anjali Serrano MS CCC-SLP  2/2/2017

## 2017-02-03 ENCOUNTER — APPOINTMENT (OUTPATIENT)
Dept: SPEECH THERAPY | Facility: HOSPITAL | Age: 77
End: 2017-02-03

## 2017-02-06 ENCOUNTER — HOSPITAL ENCOUNTER (OUTPATIENT)
Dept: SPEECH THERAPY | Facility: HOSPITAL | Age: 77
Setting detail: THERAPIES SERIES
Discharge: HOME | End: 2017-02-06

## 2017-02-06 DIAGNOSIS — G31.84 MCI (MILD COGNITIVE IMPAIRMENT): Primary | ICD-10-CM

## 2017-02-06 PROCEDURE — 92507 TX SP LANG VOICE COMM INDIV: CPT

## 2017-02-06 NOTE — PROGRESS NOTES
Outpatient Speech Language Pathology   Adult Speech Language Cognitive Treatment Note  [facility]     Patient Name: Uche Polanco  : 1940  MRN: [id number]  Today's Date: 2017         Visit Date: 2017   Patient Active Problem List   Diagnosis   • Valvular heart disease   • TIA (transient ischemic attack)   • Severe obstructive sleep apnea   • Hypertension   • Hyperlipidemia   • Gout   • CAD (coronary artery disease)   • AV block   • REM sleep behavior disorder   • REM  disorder          Visit Dx:    ICD-10-CM ICD-9-CM   1. MCI (mild cognitive impairment) G31.84 331.83             Adult Speech Language - 17 1100     RBANS- Repeatable Battery for the Assessment of Neuropsychological Status    Immediate Memory Index Score 78  -HG    Immediate Memory Percentile 7 %  -HG    Immediate Memory Qualitative Description borderline  -HG    Visuospatial Index Score 126  -HG    Visuospatial Percentile 96 %  -HG    Visuospatial Qualitative Description superior  -HG    Language Index Score 96  -HG    Language Percentile 39 %  -HG    Language Qualitative Description average  -HG    Attention Index Score 103  -HG    Attention Percentile 58 %  -HG    Attention Qualitative Description average  -HG    Delayed Memory Index Score 110  -HG    Delayed Memory Percentile 75 %  -HG    Delayed Memory Qualitative Description high average  -HG    Total Index Score 513  -HG    Total Percentile 55 %  -HG    Total Qualitative Description average  -HG    RBANS Comments Pt performed at the mean which is a range from   -HG      User Key  (r) = Recorded By, (t) = Taken By, (c) = Cosigned By    Initials Name Provider Type    DALJIT Serrano MS CCC-SLP Speech and Language Pathologist                              SLP OP Goals       17 1100 17 1100 17 1100    Goal Type Needed    Goal Type Needed Memory  -HG Memory  -HG Memory  -HG    Subjective Comments    Subjective Comments Pt alert, cooperative,  appeared happy with assessment results but doesn't to stay average.  -HG Pt alert, cooperative, concerned for his grandson being deployed.   -HG Pt alert, cooperative, eager to continue to make improvement.  -HG    Subjective Pain    Able to rate subjective pain? yes  -HG yes  -HG yes  -HG    Other Goals    Other Adult Goal- 1 Patient will verbalize 3 internal and 3 external memory strategies independently across two sessions in order to ensure competency of skilled techniques.  -HG Patient will verbalize 3 internal and 3 external memory strategies independently across two sessions in order to ensure competency of skilled techniques.  -HG Patient will verbalize 3 internal and 3 external memory strategies independently across two sessions in order to ensure competency of skilled techniques.  -HG    Status: Other Adult Goal- 1 Achieved  -HG Achieved  -HG Achieved  -HG    Comments: Other Adult Goal- 1 Pt able to state 3 + internal and external memory strategies independently.  -HG Pt able to state 3 + internal and external memory strategies independently.  -HG Pt able to state 3 + internal and external memory strategies independently.  -HG    Other Adult Goal- 2 Patient will perform STM tasks with 80% accuracy across two sessions, 3 trials per session, in order to enhance recall.  -HG Patient will perform STM tasks with 80% accuracy across two sessions, 3 trials per session, in order to enhance recall.  -HG Patient will perform STM tasks with 80% accuracy across two sessions, 3 trials per session, in order to enhance recall.  -HG    Status: Other Adult Goal- 2 Progressing as expected  -HG Progressing as expected  -HG Progressing as expected  -HG    Comments: Other Adult Goal- 2 Pt able to recall 5 delayed recall messages with 90% accuracy.  -HG Pt able to recall 4 messages with min cues after a week delay, 5 messages to be targeted next session.   -HG Pt continues to recall up to 4 messages independently with use of  writtent compensatory strategy.  -HG    Other Adult Goal- 3 Patient will perform divergent naming tasks, generating 15 items per category within 60 seconds, across two trials in order to improve semantic fluency.  -HG Patient will perform divergent naming tasks, generating 15 items per category within 60 seconds, across two trials in order to improve semantic fluency.  -HG Patient will perform divergent naming tasks, generating 15 items per category within 60 seconds, across two trials in order to improve semantic fluency.  -HG    Status: Other Adult Goal- 3 Progressing as expected  -HG Progressing as expected  -HG Progressing as expected;Revised  -HG    Comments: Other Adult Goal- 3 Pt named 10-12 in abstract categories.  -HG Pt was 7-8 items this date.   -HG Progressed goal to 15 items from 10 due to improved status.  -HG    Other Adult Goal- 4 Patient will perform executive functioning task with 70% accuracy when provided prompts only in order to improve strategic thinking.  -HG Patient will perform executive functioning task with 70% accuracy when provided prompts only in order to improve strategic thinking.  -HG Patient will perform executive functioning task with 70% accuracy when provided prompts only in order to improve strategic thinking.  -HG    Status: Other Adult Goal- 4 Progressing as expected  -HG Progressing as expected  -HG Progressing as expected  -HG    Comments: Other Adult Goal- 4   Pt asked to balance checkbook while completing a verbal sequncing task and pt was 80% accurate.  -HG    Other Adult Goal- 5 Patient will improve cognition as evidenced by STGs met. (LTG)  -HG Patient will improve cognition as evidenced by STGs met. (LTG)  -HG Patient will improve cognition as evidenced by STGs met. (LTG)  -HG    Status: Other Adult Goal- 5 Progressing as expected  -HG Progressing as expected  -HG Progressing as expected  -HG    Comments: Other Adult Goal- 5 Pt continues to engage in brain games at  home.  -HG Pt purchased a program for brain games and is contiously challenging himself.  -HG Pt continues to complete homework with ease and more challenging homework provided.  -HG    Other Adult Goal- 6 Patient will perform alternating attention task with 80% accuracy across two trials in order to enhance mental flexibility and attention.  -HG Patient will perform alternating attention task with 80% accuracy across two trials in order to enhance mental flexibility and attention.  -HG Patient will perform alternating attention task with 80% accuracy across two trials in order to enhance mental flexibility and attention.  -HG    Status: Other Adult Goal- 6 Progressing as expected  -HG Progressing as expected  -HG Progressing as expected  -HG    Comments: Other Adult Goal- 6 Pt taught and played x 2 a card game of Speed and pt was 70% accurate.   -HG      Other Adult Goal- 7 Patient will perform divided attention task with 70% accuracy across two trials independently in order to improve multitasking abilities.  -HG Patient will perform divided attention task with 70% accuracy across two trials independently in order to improve multitasking abilities.  -HG Patient will perform divided attention task with 70% accuracy across two trials independently in order to improve multitasking abilities.  -HG    Status: Other Adult Goal- 7 Progressing as expected  -HG Progressing as expected  -HG Progressing as expected  -HG    Comments: Other Adult Goal- 7   Balancing checkbook while sequencing with 80% accuracy.  -HG    SLP Time Calculation    SLP Goal Re-Cert Due Date 03/01/17  -HG 03/01/17  -HG 03/01/17  -HG      01/26/17 1100          Goal Type Needed    Goal Type Needed Memory  -HG      Subjective Comments    Subjective Comments Pt alert, cooperative, eagerness for improvement and pt continues to practice at homework.  -HG      Subjective Pain    Able to rate subjective pain? yes  -HG      Other Goals    Other Adult Goal- 1  Patient will verbalize 3 internal and 3 external memory strategies independently across two sessions in order to ensure competency of skilled techniques.  -HG      Status: Other Adult Goal- 1 Progressing as expected  -HG      Comments: Other Adult Goal- 1 Pt able to name 3/3 with min cues.  -HG      Other Adult Goal- 2 Patient will perform STM tasks with 80% accuracy across two sessions, 3 trials per session, in order to enhance recall.  -HG      Status: Other Adult Goal- 2 Progressing as expected  -HG      Comments: Other Adult Goal- 2 Pt able to recall up to 4 messages with mod cues- 70-80% accuracy.  -HG      Other Adult Goal- 3 Patient will perform divergent naming tasks, generating 10 items per category within 60 seconds, across two trials in order to improve semantic fluency.  -HG      Status: Other Adult Goal- 3 Progressing as expected  -HG      Comments: Other Adult Goal- 3 Pt able to name up to 10 items in concrete and abstract categories.  -HG      Other Adult Goal- 4 Patient will perform executive functioning task with 70% accuracy when provided prompts only in order to improve strategic thinking.  -HG      Status: Other Adult Goal- 4 Progressing as expected  -HG      Other Adult Goal- 5 Patient will improve cognition as evidenced by STGs met. (LTG)  -HG      Status: Other Adult Goal- 5 Progressing as expected  -HG      Comments: Other Adult Goal- 5 Pt continues to complete homework with ease and more challenging homework provided.  -HG      Other Adult Goal- 6 Patient will perform alternating attention task with 80% accuracy across two trials in order to enhance mental flexibility and attention.  -HG      Status: Other Adult Goal- 6 Progressing as expected  -HG      Other Adult Goal- 7 Patient will perform divided attention task with 70% accuracy across two trials independently in order to improve multitasking abilities.  -HG      Status: Other Adult Goal- 7 Progressing as expected  -HG      Comments:  Other Adult Goal- 7 Pt having to recall un-related messages along with a naming task and was 70% accurate independently.   -      SLP Time Calculation    SLP Goal Re-Cert Due Date 02/24/17  -        User Key  (r) = Recorded By, (t) = Taken By, (c) = Cosigned By    Initials Name Provider Type     Anjali GALLARDO Zach MS CCC-SLP Speech and Language Pathologist                OP SLP Education       02/06/17 1100          Education    Barriers to Learning No barriers identified  -HG      Education Provided Described results of evaluation;Patient expressed understanding of evaluation;Patient participated in establishing goals and treatment plan;Patient demonstrated recommended strategies;Patient requires further education on strategies, risks  -      Assessed Learning needs;Learning motivation;Learning preferences;Learning readiness  -      Learning Motivation Strong  -      Learning Method Explanation;Demonstration;Teach back;Written materials  -      Teaching Response Verbalized understanding;Demonstrated understanding;Reinforcement needed  -        User Key  (r) = Recorded By, (t) = Taken By, (c) = Cosigned By    Initials Name Effective Dates     Anjali Serrano MS CCC-SLP 06/22/15 -                 OP SLP Assessment/Plan - 02/06/17 1100     SLP Assessment    Functional Problems Speech Language- Adult/Cognition  -    Impact on Function: Adult Speech Language/Cognition Decreased recall of personal information and medical history;Trouble learning or remembering new information  -    Clinical Impression: Speech Language-Adult/Congnition Cognitive Communication Impairment  -    Clinical Impression Comments Pt is improving evidenced by RBANS score of 513 falling in the overall average category.   -HG    Please refer to paper survey for additional self-reported information Yes  -HG    Please refer to items scanned into chart for additional diagnostic informaiton and handouts as provided by clinician  Yes  -HG    Prognosis Excellent (comment)  -    Patient/caregiver participated in establishment of treatment plan and goals Yes  -HG    Patient would benefit from skilled therapy intervention Yes  -HG    SLP Plan    Frequency 2x/week  -HG    Duration 4 weeks  -HG    Planned CPT's? SLP INDIVIDUAL SPEECH THERAPY: 80918  -HG    Expected Duration Therapy Session (min) 45-60 minutes  -HG    Plan Comments Cont per POC  -HG      User Key  (r) = Recorded By, (t) = Taken By, (c) = Cosigned By    Initials Name Provider Type     Anjali Serrano MS CCC-SLP Speech and Language Pathologist                 Time Calculation:   SLP Start Time: 1100    Therapy Charges for Today     Code Description Service Date Service Provider Modifiers Qty    [id number]  ST TREATMENT SPEECH 4 2/6/2017 Anjali Serrano MS CCC-SLP GN 1                   Anjali Serrano MS CCC-SLP  2/6/2017

## 2017-02-09 ENCOUNTER — HOSPITAL ENCOUNTER (OUTPATIENT)
Dept: SPEECH THERAPY | Facility: HOSPITAL | Age: 77
Setting detail: THERAPIES SERIES
Discharge: HOME | End: 2017-02-09

## 2017-02-09 DIAGNOSIS — G31.84 MCI (MILD COGNITIVE IMPAIRMENT): Primary | ICD-10-CM

## 2017-02-09 PROCEDURE — 92507 TX SP LANG VOICE COMM INDIV: CPT

## 2017-02-09 NOTE — PROGRESS NOTES
Outpatient Speech Language Pathology   Adult Speech Language Cognitive Treatment Note  [facility]     Patient Name: Uche Polanco  : 1940  MRN: [id number]  Today's Date: 2017         Visit Date: 2017   Patient Active Problem List   Diagnosis   • Valvular heart disease   • TIA (transient ischemic attack)   • Severe obstructive sleep apnea   • Hypertension   • Hyperlipidemia   • Gout   • CAD (coronary artery disease)   • AV block   • REM sleep behavior disorder   • REM  disorder          Visit Dx:    ICD-10-CM ICD-9-CM   1. MCI (mild cognitive impairment) G31.84 331.83                               SLP OP Goals       17 1100 17 1100 17 1100    Goal Type Needed    Goal Type Needed Memory  -HG Memory  -HG Memory  -HG    Subjective Comments    Subjective Comments Pt alert, cooperative, stated that he had too much coffee this morning and feels gittery.  -HG Pt alert, cooperative, appeared happy with assessment results but doesn't to stay average.  -HG Pt alert, cooperative, concerned for his grandson being deployed.   -HG    Subjective Pain    Able to rate subjective pain? yes  -HG yes  -HG yes  -HG    Other Goals    Other Adult Goal- 1 Patient will verbalize 3 internal and 3 external memory strategies independently across two sessions in order to ensure competency of skilled techniques.  -HG Patient will verbalize 3 internal and 3 external memory strategies independently across two sessions in order to ensure competency of skilled techniques.  -HG Patient will verbalize 3 internal and 3 external memory strategies independently across two sessions in order to ensure competency of skilled techniques.  -HG    Status: Other Adult Goal- 1 Achieved  -HG Achieved  -HG Achieved  -HG    Comments: Other Adult Goal- 1 Pt able to state 3 + internal and external memory strategies independently.  -HG Pt able to state 3 + internal and external memory strategies independently.  -HG Pt able to state  3 + internal and external memory strategies independently.  -HG    Other Adult Goal- 2 Patient will perform STM tasks with 80% accuracy across two sessions, 3 trials per session, in order to enhance recall.  -HG Patient will perform STM tasks with 80% accuracy across two sessions, 3 trials per session, in order to enhance recall.  -HG Patient will perform STM tasks with 80% accuracy across two sessions, 3 trials per session, in order to enhance recall.  -HG    Status: Other Adult Goal- 2 Progressing as expected  -HG Progressing as expected  -HG Progressing as expected  -HG    Comments: Other Adult Goal- 2 Pt was able to recall 5 messages over 2 sessions with no verbal cues required.   -HG Pt able to recall 5 delayed recall messages with 90% accuracy.  -HG Pt able to recall 4 messages with min cues after a week delay, 5 messages to be targeted next session.   -HG    Other Adult Goal- 3 Patient will perform divergent naming tasks, generating 15 items per category within 60 seconds, across two trials in order to improve semantic fluency.  -HG Patient will perform divergent naming tasks, generating 15 items per category within 60 seconds, across two trials in order to improve semantic fluency.  -HG Patient will perform divergent naming tasks, generating 15 items per category within 60 seconds, across two trials in order to improve semantic fluency.  -HG    Status: Other Adult Goal- 3 Progressing as expected  -HG Progressing as expected  -HG Progressing as expected  -HG    Comments: Other Adult Goal- 3 Pt was able to name 10-13 items given a minute per abstract category.  -HG Pt named 10-12 in abstract categories.  -HG Pt was 7-8 items this date.   -HG    Other Adult Goal- 4 Patient will perform executive functioning task with 70% accuracy when provided prompts only in order to improve strategic thinking.  -HG Patient will perform executive functioning task with 70% accuracy when provided prompts only in order to improve  strategic thinking.  -HG Patient will perform executive functioning task with 70% accuracy when provided prompts only in order to improve strategic thinking.  -HG    Status: Other Adult Goal- 4 Progressing as expected  -HG Progressing as expected  -HG Progressing as expected  -HG    Other Adult Goal- 5 Patient will improve cognition as evidenced by STGs met. (LTG)  -HG Patient will improve cognition as evidenced by STGs met. (LTG)  -HG Patient will improve cognition as evidenced by STGs met. (LTG)  -HG    Status: Other Adult Goal- 5 Progressing as expected  -HG Progressing as expected  -HG Progressing as expected  -HG    Comments: Other Adult Goal- 5 Pt is reading newspaper articles at home and presenting information during sessions with 80% accuracy.   -HG Pt continues to engage in brain games at home.  -HG Pt purchased a program for brain [employer] and is contiously challenging himself.  -HG    Other Adult Goal- 6 Patient will perform alternating attention task with 80% accuracy across two trials in order to enhance mental flexibility and attention.  -HG Patient will perform alternating attention task with 80% accuracy across two trials in order to enhance mental flexibility and attention.  -HG Patient will perform alternating attention task with 80% accuracy across two trials in order to enhance mental flexibility and attention.  -HG    Status: Other Adult Goal- 6 Progressing as expected  -HG Progressing as expected  -HG Progressing as expected  -HG    Comments: Other Adult Goal- 6  Pt taught and played x 2 a card game of Speed and pt was 70% accurate.   -HG     Other Adult Goal- 7 Patient will perform divided attention task with 70% accuracy across two trials independently in order to improve multitasking abilities.  -HG Patient will perform divided attention task with 70% accuracy across two trials independently in order to improve multitasking abilities.  -HG Patient will perform divided attention task with 70%  accuracy across two trials independently in order to improve multitasking abilities.  -HG    Status: Other Adult Goal- 7 Progressing as expected  -HG Progressing as expected  -HG Progressing as expected  -HG    Comments: Other Adult Goal- 7 Pt had to completed an alternating letter and number activity while listenting to music and was 50% accurate on the task,   -HG      SLP Time Calculation    SLP Goal Re-Cert Due Date 03/01/17  -HG 03/01/17  -HG 03/01/17  -HG      01/30/17 1100          Goal Type Needed    Goal Type Needed Memory  -HG      Subjective Comments    Subjective Comments Pt alert, cooperative, eager to continue to make improvement.  -HG      Subjective Pain    Able to rate subjective pain? yes  -HG      Other Goals    Other Adult Goal- 1 Patient will verbalize 3 internal and 3 external memory strategies independently across two sessions in order to ensure competency of skilled techniques.  -HG      Status: Other Adult Goal- 1 Achieved  -HG      Comments: Other Adult Goal- 1 Pt able to state 3 + internal and external memory strategies independently.  -HG      Other Adult Goal- 2 Patient will perform STM tasks with 80% accuracy across two sessions, 3 trials per session, in order to enhance recall.  -HG      Status: Other Adult Goal- 2 Progressing as expected  -HG      Comments: Other Adult Goal- 2 Pt continues to recall up to 4 messages independently with use of writtent compensatory strategy.  -HG      Other Adult Goal- 3 Patient will perform divergent naming tasks, generating 15 items per category within 60 seconds, across two trials in order to improve semantic fluency.  -HG      Status: Other Adult Goal- 3 Progressing as expected;Revised  -HG      Comments: Other Adult Goal- 3 Progressed goal to 15 items from 10 due to improved status.  -HG      Other Adult Goal- 4 Patient will perform executive functioning task with 70% accuracy when provided prompts only in order to improve strategic thinking.  -HG       Status: Other Adult Goal- 4 Progressing as expected  -HG      Comments: Other Adult Goal- 4 Pt asked to balance checkbook while completing a verbal sequncing task and pt was 80% accurate.  -HG      Other Adult Goal- 5 Patient will improve cognition as evidenced by STGs met. (LTG)  -HG      Status: Other Adult Goal- 5 Progressing as expected  -HG      Comments: Other Adult Goal- 5 Pt continues to complete homework with ease and more challenging homework provided.  -HG      Other Adult Goal- 6 Patient will perform alternating attention task with 80% accuracy across two trials in order to enhance mental flexibility and attention.  -HG      Status: Other Adult Goal- 6 Progressing as expected  -HG      Other Adult Goal- 7 Patient will perform divided attention task with 70% accuracy across two trials independently in order to improve multitasking abilities.  -HG      Status: Other Adult Goal- 7 Progressing as expected  -HG      Comments: Other Adult Goal- 7 Balancing checkbook while sequencing with 80% accuracy.  -      SLP Time Calculation    SLP Goal Re-Cert Due Date 03/01/17  -HG        User Key  (r) = Recorded By, (t) = Taken By, (c) = Cosigned By    Initials Name Provider Type     Anjali Serrano MS [facility]3]-SLP Speech and Language Pathologist                OP SLP Education       02/09/17 1100          Education    Barriers to Learning No barriers identified  -      Education Provided Patient demonstrated recommended strategies;Patient requires further education on strategies, risks  -      Assessed Learning needs;Learning motivation;Learning preferences;Learning readiness  -      Learning Motivation Strong  -      Learning Method Explanation;Demonstration;Teach back  -      Teaching Response Verbalized understanding;Demonstrated understanding;Reinforcement needed  -        User Key  (r) = Recorded By, (t) = Taken By, (c) = Cosigned By    Initials Name Effective Dates     Anjali Serrano MS  CCC-SLP 06/22/15 -                 OP SLP Assessment/Plan - 02/09/17 1100     SLP Assessment    Functional Problems Speech Language- Adult/Cognition  -HG    Impact on Function: Adult Speech Language/Cognition Decreased recall of personal information and medical history;Trouble learning or remembering new information;Poor attention to task  -HG    Clinical Impression: Speech Language-Adult/Congnition Cognitive Communication Impairment  -HG    Clinical Impression Comments Pt recalling information independently once rehearsed over several sessions.  -HG    Please refer to paper survey for additional self-reported information Yes  -HG    Please refer to items scanned into chart for additional diagnostic informaiton and handouts as provided by clinician Yes  -HG    Prognosis Excellent (comment)  -HG    Patient/caregiver participated in establishment of treatment plan and goals Yes  -HG    Patient would benefit from skilled therapy intervention Yes  -HG    SLP Plan    Frequency 2x/week  -HG    Duration 4 weeks  -HG    Planned CPT's? SLP INDIVIDUAL SPEECH THERAPY: 16055  -HG    Expected Duration Therapy Session (min) 45-60 minutes  -HG    Plan Comments Cont per POC  -HG      User Key  (r) = Recorded By, (t) = Taken By, (c) = Cosigned By    Initials Name Provider Type    HG Anjali Serrano MS CCC-SLP Speech and Language Pathologist                 Time Calculation:   SLP Start Time: 1100    Therapy Charges for Today     Code Description Service Date Service Provider Modifiers Qty    [id number]  ST TREATMENT SPEECH 4 2/9/2017 Anjali Serrano MS CCC-SLP GN 1                   Anjali Serrano MS CCC-SLP  2/9/2017

## 2017-02-13 ENCOUNTER — HOSPITAL ENCOUNTER (OUTPATIENT)
Dept: SPEECH THERAPY | Facility: HOSPITAL | Age: 77
Setting detail: THERAPIES SERIES
Discharge: HOME | End: 2017-02-13

## 2017-02-13 DIAGNOSIS — G31.84 MCI (MILD COGNITIVE IMPAIRMENT): Primary | ICD-10-CM

## 2017-02-13 PROCEDURE — 92507 TX SP LANG VOICE COMM INDIV: CPT

## 2017-02-13 NOTE — PROGRESS NOTES
Outpatient Speech Language Pathology   Adult Speech Language Cognitive Treatment Note  [facility]     Patient Name: Uche Polanco  : 1940  MRN: [id number]  Today's Date: 2017         Visit Date: 2017   Patient Active Problem List   Diagnosis   • Valvular heart disease   • TIA (transient ischemic attack)   • Severe obstructive sleep apnea   • Hypertension   • Hyperlipidemia   • Gout   • CAD (coronary artery disease)   • AV block   • REM sleep behavior disorder   • REM  disorder          Visit Dx:    ICD-10-CM ICD-9-CM   1. MCI (mild cognitive impairment) G31.84 331.83                               SLP OP Goals       17 1100 17 1100 17 1100    Goal Type Needed    Goal Type Needed Memory  -HG Memory  -HG Memory  -HG    Subjective Comments    Subjective Comments Pt alert, cooperative and has been challenged and requires more abstract, complex homework and tasks.  -HG Pt alert, cooperative, stated that he had too much coffee this morning and feels gittery.  -HG Pt alert, cooperative, appeared happy with assessment results but doesn't to stay average.  -HG    Subjective Pain    Able to rate subjective pain? yes  -HG yes  -HG yes  -HG    Other Goals    Other Adult Goal- 1 Patient will verbalize 3 internal and 3 external memory strategies independently across two sessions in order to ensure competency of skilled techniques.  -HG Patient will verbalize 3 internal and 3 external memory strategies independently across two sessions in order to ensure competency of skilled techniques.  -HG Patient will verbalize 3 internal and 3 external memory strategies independently across two sessions in order to ensure competency of skilled techniques.  -HG    Status: Other Adult Goal- 1 Achieved  -HG Achieved  -HG Achieved  -HG    Comments: Other Adult Goal- 1 Pt able to state 3 + internal and external memory strategies independently.  -HG Pt able to state 3 + internal and external memory strategies  independently.  -HG Pt able to state 3 + internal and external memory strategies independently.  -HG    Other Adult Goal- 2 Patient will perform STM tasks with 90% accuracy across two sessions, 3 trials per session, in order to enhance recall.  -HG Patient will perform STM tasks with 80% accuracy across two sessions, 3 trials per session, in order to enhance recall.  -HG Patient will perform STM tasks with 80% accuracy across two sessions, 3 trials per session, in order to enhance recall.  -HG    Status: Other Adult Goal- 2 Revised  -HG Progressing as expected  -HG Progressing as expected  -HG    Comments: Other Adult Goal- 2 Pt was 5/5 for functional sentences messages.  -HG Pt was able to recall 5 messages over 2 sessions with no verbal cues required.   -HG Pt able to recall 5 delayed recall messages with 90% accuracy.  -HG    Other Adult Goal- 3 Patient will perform divergent naming tasks, generating 15 items per category within 60 seconds, across two trials in order to improve semantic fluency.  -HG Patient will perform divergent naming tasks, generating 15 items per category within 60 seconds, across two trials in order to improve semantic fluency.  -HG Patient will perform divergent naming tasks, generating 15 items per category within 60 seconds, across two trials in order to improve semantic fluency.  -HG    Status: Other Adult Goal- 3 Progressing as expected  -HG Progressing as expected  -HG Progressing as expected  -HG    Comments: Other Adult Goal- 3 Pt able to recall 13-15 abstract items given one minute.  -HG Pt was able to name 10-13 items given a minute per abstract category.  -HG Pt named 10-12 in abstract categories.  -HG    Other Adult Goal- 4 Patient will perform executive functioning task with 70% accuracy when provided prompts only in order to improve strategic thinking.  -HG Patient will perform executive functioning task with 70% accuracy when provided prompts only in order to improve  strategic thinking.  -HG Patient will perform executive functioning task with 70% accuracy when provided prompts only in order to improve strategic thinking.  -HG    Status: Other Adult Goal- 4 Progressing as expected  -HG Progressing as expected  -HG Progressing as expected  -HG    Comments: Other Adult Goal- 4 Divided atten task while asked to perform a prospective memory task. Pt required mod cues this date.  -HG      Other Adult Goal- 5 Patient will improve cognition as evidenced by STGs met. (LTG)  -HG Patient will improve cognition as evidenced by STGs met. (LTG)  -HG Patient will improve cognition as evidenced by STGs met. (LTG)  -HG    Status: Other Adult Goal- 5 Progressing as expected  -HG Progressing as expected  -HG Progressing as expected  -HG    Comments: Other Adult Goal- 5 Pt using calendar, visualizations, rehearsal, repetitions, chunking  -HG Pt is reading newspaper articles at home and presenting information during sessions with 80% accuracy.   -HG Pt continues to engage in brain games at home.  -HG    Other Adult Goal- 6 Patient will perform alternating attention task with 80% accuracy across two trials in order to enhance mental flexibility and attention.  -HG Patient will perform alternating attention task with 80% accuracy across two trials in order to enhance mental flexibility and attention.  -HG Patient will perform alternating attention task with 80% accuracy across two trials in order to enhance mental flexibility and attention.  -HG    Status: Other Adult Goal- 6 Progressing as expected  -HG Progressing as expected  -HG Progressing as expected  -HG    Comments: Other Adult Goal- 6 Pt completed a card game for alternating attention and was 50% accurate this date.   -HG  Pt taught and played x 2 a card game of Speed and pt was 70% accurate.   -HG    Other Adult Goal- 7 Patient will perform divided attention task with 70% accuracy across two trials independently in order to improve  multitasking abilities.  -HG Patient will perform divided attention task with 70% accuracy across two trials independently in order to improve multitasking abilities.  -HG Patient will perform divided attention task with 70% accuracy across two trials independently in order to improve multitasking abilities.  -HG    Status: Other Adult Goal- 7 Progressing as expected  -HG Progressing as expected  -HG Progressing as expected  -HG    Comments: Other Adult Goal- 7 Pt to complete a maze while listening to music and pt was 505 accurate this date.   -HG Pt had to completed an alternating letter and number activity while listenting to music and was 50% accurate on the task,   -HG     SLP Time Calculation    SLP Goal Re-Cert Due Date 03/01/17  -HG 03/01/17  -HG 03/01/17  -HG      02/02/17 1100          Goal Type Needed    Goal Type Needed Memory  -HG      Subjective Comments    Subjective Comments Pt alert, cooperative, concerned for his grandson being deployed.   -HG      Subjective Pain    Able to rate subjective pain? yes  -HG      Other Goals    Other Adult Goal- 1 Patient will verbalize 3 internal and 3 external memory strategies independently across two sessions in order to ensure competency of skilled techniques.  -HG      Status: Other Adult Goal- 1 Achieved  -HG      Comments: Other Adult Goal- 1 Pt able to state 3 + internal and external memory strategies independently.  -HG      Other Adult Goal- 2 Patient will perform STM tasks with 80% accuracy across two sessions, 3 trials per session, in order to enhance recall.  -HG      Status: Other Adult Goal- 2 Progressing as expected  -HG      Comments: Other Adult Goal- 2 Pt able to recall 4 messages with min cues after a week delay, 5 messages to be targeted next session.   -HG      Other Adult Goal- 3 Patient will perform divergent naming tasks, generating 15 items per category within 60 seconds, across two trials in order to improve semantic fluency.  -HG       Status: Other Adult Goal- 3 Progressing as expected  -HG      Comments: Other Adult Goal- 3 Pt was 7-8 items this date.   -HG      Other Adult Goal- 4 Patient will perform executive functioning task with 70% accuracy when provided prompts only in order to improve strategic thinking.  -HG      Status: Other Adult Goal- 4 Progressing as expected  -HG      Other Adult Goal- 5 Patient will improve cognition as evidenced by STGs met. (LTG)  -HG      Status: Other Adult Goal- 5 Progressing as expected  -HG      Comments: Other Adult Goal- 5 Pt purchased a program for brain games and is contiously challenging himself.  -HG      Other Adult Goal- 6 Patient will perform alternating attention task with 80% accuracy across two trials in order to enhance mental flexibility and attention.  -HG      Status: Other Adult Goal- 6 Progressing as expected  -HG      Other Adult Goal- 7 Patient will perform divided attention task with 70% accuracy across two trials independently in order to improve multitasking abilities.  -HG      Status: Other Adult Goal- 7 Progressing as expected  -HG      SLP Time Calculation    SLP Goal Re-Cert Due Date 03/01/17  -HG        User Key  (r) = Recorded By, (t) = Taken By, (c) = Cosigned By    Initials Name Provider Type    HG Anjali Serrano MS [facility]SLP Speech and Language Pathologist                OP SLP Education       02/13/17 1100          Education    Barriers to Learning No barriers identified  -      Education Provided Patient demonstrated recommended strategies;Patient requires further education on strategies, risks  -      Assessed Learning needs;Learning motivation;Learning preferences;Learning readiness  -      Learning Motivation Strong  -      Learning Method Explanation;Demonstration;Teach back;Written materials  -      Teaching Response Verbalized understanding;Demonstrated understanding  -        User Key  (r) = Recorded By, (t) = Taken By, (c) = Cosigned By    Initials  Name Effective Dates    HG Anjali Serrano MS CCC-SLP 06/22/15 -                 OP SLP Assessment/Plan - 02/13/17 1100     SLP Assessment    Functional Problems Speech Language- Adult/Cognition  -HG    Impact on Function: Adult Speech Language/Cognition Decreased recall of personal information and medical history;Trouble learning or remembering new information  -HG    Clinical Impression: Speech Language-Adult/Congnition Cognitive Communication Impairment  -HG    Clinical Impression Comments Pt requires more complex, abstract material.  -HG    Please refer to paper survey for additional self-reported information Yes  -HG    Please refer to items scanned into chart for additional diagnostic informaiton and handouts as provided by clinician Yes  -HG    Prognosis Excellent (comment)  -HG    Patient/caregiver participated in establishment of treatment plan and goals Yes  -HG    Patient would benefit from skilled therapy intervention Yes  -HG    SLP Plan    Frequency 2x/week  -HG    Duration 3 weeks  -HG    Planned CPT's? SLP INDIVIDUAL SPEECH THERAPY: 60216  -HG    Expected Duration Therapy Session (min) 45-60 minutes  -HG    Plan Comments Cont per POC  -HG      User Key  (r) = Recorded By, (t) = Taken By, (c) = Cosigned By    Initials Name Provider Type     Anjali Serrano MS CCC-SLP Speech and Language Pathologist                 Time Calculation:   SLP Start Time: 1100    Therapy Charges for Today     Code Description Service Date Service Provider Modifiers Qty    90088581002 HC ST TREATMENT SPEECH 4 2/13/2017 Anjali Serrano MS CCC-SLP GN 1                   Anjali Serrano MS CCC-SLP  2/13/2017

## 2017-02-16 ENCOUNTER — HOSPITAL ENCOUNTER (OUTPATIENT)
Dept: SPEECH THERAPY | Facility: HOSPITAL | Age: 77
Setting detail: THERAPIES SERIES
Discharge: HOME | End: 2017-02-16

## 2017-02-16 DIAGNOSIS — G31.84 MCI (MILD COGNITIVE IMPAIRMENT): Primary | ICD-10-CM

## 2017-02-16 PROCEDURE — 92507 TX SP LANG VOICE COMM INDIV: CPT

## 2017-02-16 NOTE — PROGRESS NOTES
Outpatient Speech Language Pathology   Adult Speech Language Cognitive Treatment Note  [facility]     Patient Name: Uche Polanco  : 1940  MRN: [id number]  Today's Date: 2017         Visit Date: 2017   Patient Active Problem List   Diagnosis   • Valvular heart disease   • TIA (transient ischemic attack)   • Severe obstructive sleep apnea   • Hypertension   • Hyperlipidemia   • Gout   • CAD (coronary artery disease)   • AV block   • REM sleep behavior disorder   • REM  disorder          Visit Dx:    ICD-10-CM ICD-9-CM   1. MCI (mild cognitive impairment) G31.84 331.83                               SLP OP Goals       17 1200 17 1100 17 1100    Goal Type Needed    Goal Type Needed --  -HG Memory  -HG Memory  -HG    Subjective Comments    Subjective Comments --  -HG Pt alert, cooperative, eager to try harder tasks.  -HG Pt alert, cooperative and has been challenged and requires more abstract, complex homework and tasks.  -HG    Subjective Pain    Able to rate subjective pain?  yes  -HG yes  -HG    Other Goals    Other Adult Goal- 1  Patient will verbalize 3 internal and 3 external memory strategies independently across two sessions in order to ensure competency of skilled techniques.  -HG Patient will verbalize 3 internal and 3 external memory strategies independently across two sessions in order to ensure competency of skilled techniques.  -HG    Status: Other Adult Goal- 1  Achieved  -HG Achieved  -HG    Comments: Other Adult Goal- 1  Pt able to state 3 + internal and external memory strategies independently.  -HG Pt able to state 3 + internal and external memory strategies independently.  -HG    Other Adult Goal- 2  Patient will perform STM tasks with 90% accuracy across two sessions, 3 trials per session, in order to enhance recall.  -HG Patient will perform STM tasks with 90% accuracy across two sessions, 3 trials per session, in order to enhance recall.  -HG    Status: Other  Adult Goal- 2  Revised  -HG Revised  -HG    Comments: Other Adult Goal- 2  Pt was 5/5 for functional sentences messages.  -HG Pt was 5/5 for functional sentences messages.  -HG    Other Adult Goal- 3  Patient will perform divergent naming tasks, generating 15 items per category within 60 seconds, across two trials in order to improve semantic fluency.  -HG Patient will perform divergent naming tasks, generating 15 items per category within 60 seconds, across two trials in order to improve semantic fluency.  -HG    Status: Other Adult Goal- 3  Progressing as expected  -HG Progressing as expected  -HG    Comments: Other Adult Goal- 3  Pt able to recall 13-15 abstract items given one minute. 2/16: Pt given convergent abstract items and had to ID category and pt was 70% accurate.  -HG Pt able to recall 13-15 abstract items given one minute.  -HG    Other Adult Goal- 4  Patient will perform executive functioning task with 70% accuracy when provided prompts only in order to improve strategic thinking.  -HG Patient will perform executive functioning task with 70% accuracy when provided prompts only in order to improve strategic thinking.  -HG    Status: Other Adult Goal- 4  Progressing as expected  -HG Progressing as expected  -HG    Comments: Other Adult Goal- 4  Divided atten task while asked to perform a prospective memory task. Pt required mod cues this date.  -HG Divided atten task while asked to perform a prospective memory task. Pt required mod cues this date.  -HG    Other Adult Goal- 5  Patient will improve cognition as evidenced by STGs met. (LTG)  -HG Patient will improve cognition as evidenced by STGs met. (LTG)  -HG    Status: Other Adult Goal- 5  Progressing as expected  -HG Progressing as expected  -HG    Comments: Other Adult Goal- 5  Pt using calendar, visualizations, rehearsal, repetitions, chunking 2/16: Pt to answer questions from 2-3 sentence paragraphs with 60% accuracy.  -HG Pt using calendar,  visualizations, rehearsal, repetitions, chunking  -HG    Other Adult Goal- 6  Patient will perform alternating attention task with 80% accuracy across two trials in order to enhance mental flexibility and attention.  -HG Patient will perform alternating attention task with 80% accuracy across two trials in order to enhance mental flexibility and attention.  -HG    Status: Other Adult Goal- 6  Progressing as expected  -HG Progressing as expected  -HG    Comments: Other Adult Goal- 6  Pt completed a card game for alternating attention and was 50% accurate this date. 2/16: For word list retention and have to ID different words based on order, pt was ~70% accurate with repetition needed.   -HG Pt completed a card game for alternating attention and was 50% accurate this date.   -HG    Other Adult Goal- 7  Patient will perform divided attention task with 70% accuracy across two trials independently in order to improve multitasking abilities.  -HG Patient will perform divided attention task with 70% accuracy across two trials independently in order to improve multitasking abilities.  -HG    Status: Other Adult Goal- 7  Progressing as expected  -HG Progressing as expected  -HG    Comments: Other Adult Goal- 7  Pt to complete a maze while listening to music and pt was 50% accurate this date. 2/16: More of a prospective memory task thrown in the midst of a cognitive task and pt was 90% accurate.    -HG Pt to complete a maze while listening to music and pt was 505 accurate this date.   -HG    SLP Time Calculation    SLP Goal Re-Cert Due Date --  -HG 03/01/17  -HG 03/01/17  -HG      02/09/17 1100 02/06/17 1100       Goal Type Needed    Goal Type Needed Memory  - Memory  -HG     Subjective Comments    Subjective Comments Pt alert, cooperative, stated that he had too much coffee this morning and feels gittery.  -HG Pt alert, cooperative, appeared happy with assessment results but doesn't to stay average.  -HG     Subjective  Pain    Able to rate subjective pain? yes  -HG yes  -HG     Other Goals    Other Adult Goal- 1 Patient will verbalize 3 internal and 3 external memory strategies independently across two sessions in order to ensure competency of skilled techniques.  -HG Patient will verbalize 3 internal and 3 external memory strategies independently across two sessions in order to ensure competency of skilled techniques.  -HG     Status: Other Adult Goal- 1 Achieved  -HG Achieved  -HG     Comments: Other Adult Goal- 1 Pt able to state 3 + internal and external memory strategies independently.  -HG Pt able to state 3 + internal and external memory strategies independently.  -HG     Other Adult Goal- 2 Patient will perform STM tasks with 80% accuracy across two sessions, 3 trials per session, in order to enhance recall.  -HG Patient will perform STM tasks with 80% accuracy across two sessions, 3 trials per session, in order to enhance recall.  -HG     Status: Other Adult Goal- 2 Progressing as expected  -HG Progressing as expected  -HG     Comments: Other Adult Goal- 2 Pt was able to recall 5 messages over 2 sessions with no verbal cues required.   -HG Pt able to recall 5 delayed recall messages with 90% accuracy.  -HG     Other Adult Goal- 3 Patient will perform divergent naming tasks, generating 15 items per category within 60 seconds, across two trials in order to improve semantic fluency.  -HG Patient will perform divergent naming tasks, generating 15 items per category within 60 seconds, across two trials in order to improve semantic fluency.  -HG     Status: Other Adult Goal- 3 Progressing as expected  -HG Progressing as expected  -HG     Comments: Other Adult Goal- 3 Pt was able to name 10-13 items given a minute per abstract category.  -HG Pt named 10-12 in abstract categories.  -HG     Other Adult Goal- 4 Patient will perform executive functioning task with 70% accuracy when provided prompts only in order to improve strategic  thinking.  -HG Patient will perform executive functioning task with 70% accuracy when provided prompts only in order to improve strategic thinking.  -HG     Status: Other Adult Goal- 4 Progressing as expected  -HG Progressing as expected  -HG     Other Adult Goal- 5 Patient will improve cognition as evidenced by STGs met. (LTG)  -HG Patient will improve cognition as evidenced by STGs met. (LTG)  -HG     Status: Other Adult Goal- 5 Progressing as expected  -HG Progressing as expected  -HG     Comments: Other Adult Goal- 5 Pt is reading newspaper articles at home and presenting information during sessions with 80% accuracy.   -HG Pt continues to engage in brain games at home.  -HG     Other Adult Goal- 6 Patient will perform alternating attention task with 80% accuracy across two trials in order to enhance mental flexibility and attention.  -HG Patient will perform alternating attention task with 80% accuracy across two trials in order to enhance mental flexibility and attention.  -HG     Status: Other Adult Goal- 6 Progressing as expected  -HG Progressing as expected  -HG     Comments: Other Adult Goal- 6  Pt taught and played x 2 a card game of Speed and pt was 70% accurate.   -HG     Other Adult Goal- 7 Patient will perform divided attention task with 70% accuracy across two trials independently in order to improve multitasking abilities.  -HG Patient will perform divided attention task with 70% accuracy across two trials independently in order to improve multitasking abilities.  -HG     Status: Other Adult Goal- 7 Progressing as expected  -HG Progressing as expected  -HG     Comments: Other Adult Goal- 7 Pt had to completed an alternating letter and number activity while listenting to music and was 50% accurate on the task,   -HG      SLP Time Calculation    SLP Goal Re-Cert Due Date 03/01/17  -HG 03/01/17  -HG       User Key  (r) = Recorded By, (t) = Taken By, (c) = Cosigned By    Initials Name Provider Type     DALJIT Serrano MS CCC-SLP Speech and Language Pathologist                    OP SLP Assessment/Plan - 02/16/17 1100     SLP Assessment    Clinical Impression Comments Pt provided with more difficult tasks this date for immediate recall.   -    SLP Plan    Plan Comments Cont per POC  -      User Key  (r) = Recorded By, (t) = Taken By, (c) = Cosigned By    Initials Name Provider Type    DALJIT Serrano MS CCC-SLP Speech and Language Pathologist                 Time Calculation:   SLP Start Time: 1100    Therapy Charges for Today     Code Description Service Date Service Provider Modifiers Qty    31732806495  ST TREATMENT SPEECH 5 2/16/2017 Anjali Serrano MS CCC-SLP GN 1                   Anjali Serrano MS CCC-SLP  2/16/2017

## 2017-02-20 ENCOUNTER — HOSPITAL ENCOUNTER (OUTPATIENT)
Dept: SPEECH THERAPY | Facility: HOSPITAL | Age: 77
Setting detail: THERAPIES SERIES
Discharge: HOME | End: 2017-02-20

## 2017-02-20 DIAGNOSIS — G31.84 MCI (MILD COGNITIVE IMPAIRMENT): Primary | ICD-10-CM

## 2017-02-20 PROCEDURE — 92507 TX SP LANG VOICE COMM INDIV: CPT

## 2017-02-20 NOTE — PROGRESS NOTES
Outpatient Speech Language Pathology   Adult Speech Language Cognitive Treatment Note  [facility]     Patient Name: Uche Polanco  : 1940  MRN: [id number]  Today's Date: 2017         Visit Date: 2017   Patient Active Problem List   Diagnosis   • Valvular heart disease   • TIA (transient ischemic attack)   • Severe obstructive sleep apnea   • Hypertension   • Hyperlipidemia   • Gout   • CAD (coronary artery disease)   • AV block   • REM sleep behavior disorder   • REM  disorder          Visit Dx:    ICD-10-CM ICD-9-CM   1. MCI (mild cognitive impairment) G31.84 331.83                               SLP OP Goals       17 0800 17 1200 17 1100    Goal Type Needed    Goal Type Needed Memory  -HG --  -HG Memory  -HG    Subjective Comments    Subjective Comments Pt alert, cooperative, continues to complete homework  -HG --  -HG Pt alert, cooperative, eager to try harder tasks.  -HG    Subjective Pain    Able to rate subjective pain? yes  -HG  yes  -HG    Other Goals    Other Adult Goal- 1 Patient will verbalize 3 internal and 3 external memory strategies independently across two sessions in order to ensure competency of skilled techniques.  -HG  Patient will verbalize 3 internal and 3 external memory strategies independently across two sessions in order to ensure competency of skilled techniques.  -HG    Status: Other Adult Goal- 1 Achieved  -HG  Achieved  -HG    Comments: Other Adult Goal- 1 Pt able to state 3 + internal and external memory strategies independently.  -HG  Pt able to state 3 + internal and external memory strategies independently.  -HG    Other Adult Goal- 2 Patient will perform STM tasks with 90% accuracy across two sessions, 3 trials per session, in order to enhance recall.  -HG  Patient will perform STM tasks with 90% accuracy across two sessions, 3 trials per session, in order to enhance recall.  -HG    Status: Other Adult Goal- 2 Achieved  -HG  Revised  -HG     Comments: Other Adult Goal- 2 Pt was able to recall 5/5 messages with no cues required  -HG  Pt was 5/5 for functional sentences messages.  -HG    Other Adult Goal- 3 Patient will perform divergent naming tasks, generating 15 items per category within 60 seconds, across two trials in order to improve semantic fluency.  -HG  Patient will perform divergent naming tasks, generating 15 items per category within 60 seconds, across two trials in order to improve semantic fluency.  -HG    Status: Other Adult Goal- 3 Progressing as expected  -HG  Progressing as expected  -HG    Comments: Other Adult Goal- 3 Pt was 10/15 for abstract items.  -HG  Pt able to recall 13-15 abstract items given one minute. 2/16: Pt given convergent abstract items and had to ID category and pt was 70% accurate.  -HG    Other Adult Goal- 4 Patient will perform executive functioning task with 70% accuracy when provided prompts only in order to improve strategic thinking.  -HG  Patient will perform executive functioning task with 70% accuracy when provided prompts only in order to improve strategic thinking.  -HG    Status: Other Adult Goal- 4 Progressing as expected  -HG  Progressing as expected  -HG    Comments: Other Adult Goal- 4   Divided atten task while asked to perform a prospective memory task. Pt required mod cues this date.  -HG    Other Adult Goal- 5 Patient will improve cognition as evidenced by STGs met. (LTG)  -HG  Patient will improve cognition as evidenced by STGs met. (LTG)  -HG    Status: Other Adult Goal- 5 Progressing as expected  -HG  Progressing as expected  -HG    Comments: Other Adult Goal- 5 Pt using visualization and grouping in order to recall verbally presented information.  -HG  Pt using calendar, visualizations, rehearsal, repetitions, chunking 2/16: Pt to answer questions from 2-3 sentence paragraphs with 60% accuracy.  -HG    Other Adult Goal- 6 Patient will perform alternating attention task with 80% accuracy across  two trials in order to enhance mental flexibility and attention.  -HG  Patient will perform alternating attention task with 80% accuracy across two trials in order to enhance mental flexibility and attention.  -HG    Status: Other Adult Goal- 6 Progressing as expected  -HG  Progressing as expected  -HG    Comments: Other Adult Goal- 6 Card game and pt was 80% accurate.  -HG  Pt completed a card game for alternating attention and was 50% accurate this date. 2/16: For word list retention and have to ID different words based on order, pt was ~70% accurate with repetition needed.   -HG    Other Adult Goal- 7 Patient will perform divided attention task with 70% accuracy across two trials independently in order to improve multitasking abilities.  -HG  Patient will perform divided attention task with 70% accuracy across two trials independently in order to improve multitasking abilities.  -HG    Status: Other Adult Goal- 7 Progressing as expected  -HG  Progressing as expected  -HG    Comments: Other Adult Goal- 7   Pt to complete a maze while listening to music and pt was 50% accurate this date. 2/16: More of a prospective memory task thrown in the midst of a cognitive task and pt was 90% accurate.    -HG    SLP Time Calculation    SLP Goal Re-Cert Due Date 02/01/17  -HG --  -HG 03/01/17  -HG      02/13/17 1100 02/09/17 1100       Goal Type Needed    Goal Type Needed Memory  - Memory  -     Subjective Comments    Subjective Comments Pt alert, cooperative and has been challenged and requires more abstract, complex homework and tasks.  -HG Pt alert, cooperative, stated that he had too much coffee this morning and feels gittery.  -HG     Subjective Pain    Able to rate subjective pain? yes  -HG yes  -HG     Other Goals    Other Adult Goal- 1 Patient will verbalize 3 internal and 3 external memory strategies independently across two sessions in order to ensure competency of skilled techniques.  -HG Patient will verbalize 3  internal and 3 external memory strategies independently across two sessions in order to ensure competency of skilled techniques.  -HG     Status: Other Adult Goal- 1 Achieved  -HG Achieved  -HG     Comments: Other Adult Goal- 1 Pt able to state 3 + internal and external memory strategies independently.  -HG Pt able to state 3 + internal and external memory strategies independently.  -HG     Other Adult Goal- 2 Patient will perform STM tasks with 90% accuracy across two sessions, 3 trials per session, in order to enhance recall.  -HG Patient will perform STM tasks with 80% accuracy across two sessions, 3 trials per session, in order to enhance recall.  -HG     Status: Other Adult Goal- 2 Revised  -HG Progressing as expected  -HG     Comments: Other Adult Goal- 2 Pt was 5/5 for functional sentences messages.  -HG Pt was able to recall 5 messages over 2 sessions with no verbal cues required.   -HG     Other Adult Goal- 3 Patient will perform divergent naming tasks, generating 15 items per category within 60 seconds, across two trials in order to improve semantic fluency.  -HG Patient will perform divergent naming tasks, generating 15 items per category within 60 seconds, across two trials in order to improve semantic fluency.  -HG     Status: Other Adult Goal- 3 Progressing as expected  -HG Progressing as expected  -HG     Comments: Other Adult Goal- 3 Pt able to recall 13-15 abstract items given one minute.  -HG Pt was able to name 10-13 items given a minute per abstract category.  -HG     Other Adult Goal- 4 Patient will perform executive functioning task with 70% accuracy when provided prompts only in order to improve strategic thinking.  -HG Patient will perform executive functioning task with 70% accuracy when provided prompts only in order to improve strategic thinking.  -HG     Status: Other Adult Goal- 4 Progressing as expected  -HG Progressing as expected  -HG     Comments: Other Adult Goal- 4 Divided atten  task while asked to perform a prospective memory task. Pt required mod cues this date.  -HG      Other Adult Goal- 5 Patient will improve cognition as evidenced by STGs met. (LTG)  -HG Patient will improve cognition as evidenced by STGs met. (LTG)  -HG     Status: Other Adult Goal- 5 Progressing as expected  -HG Progressing as expected  -HG     Comments: Other Adult Goal- 5 Pt using calendar, visualizations, rehearsal, repetitions, chunking  -HG Pt is reading newspaper articles at home and presenting information during sessions with 80% accuracy.   -HG     Other Adult Goal- 6 Patient will perform alternating attention task with 80% accuracy across two trials in order to enhance mental flexibility and attention.  -HG Patient will perform alternating attention task with 80% accuracy across two trials in order to enhance mental flexibility and attention.  -HG     Status: Other Adult Goal- 6 Progressing as expected  -HG Progressing as expected  -HG     Comments: Other Adult Goal- 6 Pt completed a card game for alternating attention and was 50% accurate this date.   -HG      Other Adult Goal- 7 Patient will perform divided attention task with 70% accuracy across two trials independently in order to improve multitasking abilities.  -HG Patient will perform divided attention task with 70% accuracy across two trials independently in order to improve multitasking abilities.  -HG     Status: Other Adult Goal- 7 Progressing as expected  -HG Progressing as expected  -HG     Comments: Other Adult Goal- 7 Pt to complete a maze while listening to music and pt was 505 accurate this date.   -HG Pt had to completed an alternating letter and number activity while listenting to music and was 50% accurate on the task,   -HG     SLP Time Calculation    SLP Goal Re-Cert Due Date 03/01/17  -HG 03/01/17  -HG       User Key  (r) = Recorded By, (t) = Taken By, (c) = Cosigned By    Initials Name Provider Type    HG Anjali Serrano MS CCC-SLP  Speech and Language Pathologist                OP SLP Education       02/20/17 0900          Education    Barriers to Learning No barriers identified  -      Education Provided Patient demonstrated recommended strategies;Patient requires further education on strategies, risks  -HG      Assessed Learning needs;Learning motivation;Learning preferences;Learning readiness  -      Learning Motivation Strong  -      Learning Method Explanation;Demonstration;Written materials  -HG      Teaching Response Verbalized understanding;Demonstrated understanding;Reinforcement needed  -        User Key  (r) = Recorded By, (t) = Taken By, (c) = Cosigned By    Initials Name Effective Dates    HG Anjali Serrano MS CCC-SLP 06/22/15 -                 OP SLP Assessment/Plan - 02/20/17 0800     SLP Assessment    Clinical Impression Comments Pt provided harder level homework  -HG    Please refer to paper survey for additional self-reported information Yes  -HG    Please refer to items scanned into chart for additional diagnostic informaiton and handouts as provided by clinician Yes  -HG    SLP Plan    Plan Comments Cont per POC  -HG      User Key  (r) = Recorded By, (t) = Taken By, (c) = Cosigned By    Initials Name Provider Type     Anjali Serrano MS CCC-SLP Speech and Language Pathologist                 Time Calculation:   SLP Start Time: 0800    Therapy Charges for Today     Code Description Service Date Service Provider Modifiers Qty    02952004554 HC ST TREATMENT SPEECH 4 2/20/2017 Anjali Serrano MS CCC-SLP GN 1                   Anjali Serrano MS CCC-SLP  2/20/2017

## 2017-02-23 ENCOUNTER — HOSPITAL ENCOUNTER (OUTPATIENT)
Dept: SPEECH THERAPY | Facility: HOSPITAL | Age: 77
Setting detail: THERAPIES SERIES
Discharge: HOME | End: 2017-02-23

## 2017-02-23 DIAGNOSIS — G31.84 MCI (MILD COGNITIVE IMPAIRMENT): Primary | ICD-10-CM

## 2017-02-23 PROCEDURE — 92507 TX SP LANG VOICE COMM INDIV: CPT

## 2017-02-23 NOTE — PROGRESS NOTES
Outpatient Speech Language Pathology   Adult Speech Language Cognitive Treatment Note  [facility]     Patient Name: Uche Polanco  : 1940  MRN: [id number]  Today's Date: 2017         Visit Date: 2017   Patient Active Problem List   Diagnosis   • Valvular heart disease   • TIA (transient ischemic attack)   • Severe obstructive sleep apnea   • Hypertension   • Hyperlipidemia   • Gout   • CAD (coronary artery disease)   • AV block   • REM sleep behavior disorder   • REM  disorder          Visit Dx:    ICD-10-CM ICD-9-CM   1. MCI (mild cognitive impairment) G31.84 331.83                               SLP OP Goals       17 1100       Goal Type Needed    Goal Type Needed Memory  -HG     Subjective Comments    Subjective Comments Pt alert, cooperative, becoming very productive with home tasks and managing the closing and selling of his house.   -HG     Subjective Pain    Able to rate subjective pain? yes  -HG     Other Goals    Other Adult Goal- 1 Patient will verbalize 3 internal and 3 external memory strategies independently across two sessions in order to ensure competency of skilled techniques.  -HG     Status: Other Adult Goal- 1 Achieved  -HG     Comments: Other Adult Goal- 1 Pt able to state 3 + internal and external memory strategies independently.  -HG     Other Adult Goal- 2 Patient will perform STM tasks with 90% accuracy across two sessions, 3 trials per session, in order to enhance recall.  -HG     Status: Other Adult Goal- 2 Achieved  -HG     Comments: Other Adult Goal- 2 Pt was able to recall 5/5 messages with no cues required  -HG     Other Adult Goal- 3 Patient will perform divergent naming tasks, generating 15 items per category within 60 seconds, across two trials in order to improve semantic fluency.  -HG     Status: Other Adult Goal- 3 Progressing as expected  -HG     Comments: Other Adult Goal- 3 Pt was 10/15 for abstract items. : Pt was on average 10-13/15.  -HG      Other Adult Goal- 4 Patient will perform executive functioning task with 70% accuracy when provided prompts only in order to improve strategic thinking.  -HG     Status: Other Adult Goal- 4 Progressing as expected  -HG     Comments: Other Adult Goal- 4 Divided atten task while asked to perform a prospective memory task. Pt required mod cues this date. 2/23: Pt was 50-75% accurate this date with a mental flexibilty task and this was given a homework.  -HG     Other Adult Goal- 5 Patient will improve cognition as evidenced by STGs met. (LTG)  -HG     Status: Other Adult Goal- 5 Progressing as expected  -HG     Comments: Other Adult Goal- 5 Pt using visualization and grouping in order to recall verbally presented information.  -HG     Other Adult Goal- 6 Patient will perform alternating attention task with 80% accuracy across two trials in order to enhance mental flexibility and attention.  -HG     Status: Other Adult Goal- 6 Progressing as expected  -HG     Comments: Other Adult Goal- 6 Card game and pt was 80% accurate. 2/23: For answering questions given a list of four words, pt was 80% accurate.   -HG     Other Adult Goal- 7 Patient will perform divided attention task with 70% accuracy across two trials independently in order to improve multitasking abilities.  -HG     Status: Other Adult Goal- 7 Progressing as expected  -HG     Comments: Other Adult Goal- 7 Pt to complete a maze while listening to music and pt was 50% accurate this date. 2/16: More of a prospective memory task thrown in the midst of a cognitive task and pt was 90% accurate.  2/23: For divided attention, pt was 70% accurate with min verbal cues.   -HG     SLP Time Calculation    SLP Goal Re-Cert Due Date 03/01/17  -HG       User Key  (r) = Recorded By, (t) = Taken By, (c) = Cosigned By    Initials Name Provider Type    DALJIT Serrano MS [facility]-SLP Speech and Language Pathologist                    OP SLP Assessment/Plan - 02/23/17 [facility]     SLP  Assessment    Clinical Impression Comments Pt becomes stressed when he can't recall something on first trial and this impedes his performance. Cont to use strategies to aide in this deficit.  -    SLP Plan    Plan Comments Cont with POC.  -      User Key  (r) = Recorded By, (t) = Taken By, (c) = Cosigned By    Initials Name Provider Type     Anjali Serrano MS CCC-SLP Speech and Language Pathologist                 Time Calculation:   SLP Start Time: 1100    Therapy Charges for Today     Code Description Service Date Service Provider Modifiers Qty    39783444998  ST TREATMENT SPEECH 4 2/23/2017 Anjali Serrano MS CCC-SLP GN 1                   Anjali Serrano MS CCC-SLP  2/23/2017

## 2017-02-27 ENCOUNTER — HOSPITAL ENCOUNTER (OUTPATIENT)
Dept: SPEECH THERAPY | Facility: HOSPITAL | Age: 77
Setting detail: THERAPIES SERIES
Discharge: HOME | End: 2017-02-27

## 2017-02-27 DIAGNOSIS — G31.84 MCI (MILD COGNITIVE IMPAIRMENT): Primary | ICD-10-CM

## 2017-02-27 PROCEDURE — G9169 MEMORY GOAL STATUS: HCPCS

## 2017-02-27 PROCEDURE — G9168 MEMORY CURRENT STATUS: HCPCS

## 2017-02-27 PROCEDURE — 92507 TX SP LANG VOICE COMM INDIV: CPT

## 2017-02-27 NOTE — PROGRESS NOTES
Outpatient Speech Language Pathology   Adult Speech Language Cognitive Treatment Note  [facility]     Patient Name: Uche Polanco  : 1940  MRN: [id number]  Today's Date: 2017         Visit Date: 2017   Patient Active Problem List   Diagnosis   • Valvular heart disease   • TIA (transient ischemic attack)   • Severe obstructive sleep apnea   • Hypertension   • Hyperlipidemia   • Gout   • CAD (coronary artery disease)   • AV block   • REM sleep behavior disorder   • REM  disorder          Visit Dx:    ICD-10-CM ICD-9-CM   1. MCI (mild cognitive impairment) G31.84 331.83                               SLP OP Goals       17 1000       Goal Type Needed    Goal Type Needed Memory  -HG     Subjective Comments    Subjective Comments Pt alert, cooperative, struggled with mental manipulation of opposites even with visual and written strategies used.  -HG     Subjective Pain    Able to rate subjective pain? yes  -HG     Other Goals    Other Adult Goal- 1 Patient will verbalize 3 internal and 3 external memory strategies independently across two sessions in order to ensure competency of skilled techniques.  -HG     Status: Other Adult Goal- 1 Achieved  -HG     Comments: Other Adult Goal- 1 Pt able to state 3 + internal and external memory strategies independently.  -HG     Other Adult Goal- 2 Patient will perform STM tasks with 90% accuracy across two sessions, 3 trials per session, in order to enhance recall.  -HG     Status: Other Adult Goal- 2 Achieved  -HG     Comments: Other Adult Goal- 2 Pt was able to recall 5/5 messages with no cues required  -HG     Other Adult Goal- 3 Patient will perform divergent naming tasks, generating 10 items per abstract category within 60 seconds, across two trials in order to improve semantic fluency.  -HG     Status: Other Adult Goal- 3 Revised  -HG     Comments: Other Adult Goal- 3 Pt was 10/15 for abstract items. : Pt was on average 10-/15. : Abstract  items: pt was at least 10 items with 2 minute time frame allowed.   -HG     Other Adult Goal- 4 Patient will perform executive functioning task with 70% accuracy when provided prompts only in order to improve strategic thinking.  -HG     Status: Other Adult Goal- 4 Progressing as expected  -HG     Comments: Other Adult Goal- 4 Divided atten task while asked to perform a prospective memory task. Pt required mod cues this date. 2/23: Pt was 50-75% accurate this date with a mental flexibilty task and this was given a homework.  -HG     Other Adult Goal- 5 Patient will improve cognition as evidenced by STGs met. (LTG)  -HG     Status: Other Adult Goal- 5 Progressing as expected  -HG     Comments: Other Adult Goal- 5 Pt using visualization and grouping in order to recall verbally presented information. 2/27: For visual recall of pictures, pt was 80% accurate.   -HG     Other Adult Goal- 6 Patient will perform alternating attention task with 80% accuracy across two trials in order to enhance mental flexibility and attention.  -HG     Status: Other Adult Goal- 6 Progressing as expected  -HG     Comments: Other Adult Goal- 6 Card game and pt was 80% accurate. 2/23: For answering questions given a list of four words, pt was 80% accurate. 2/27: For mental manipulation with numbers, pt was 100% accurate.   -HG     Other Adult Goal- 7 Patient will perform divided attention task with 70% accuracy across two trials independently in order to improve multitasking abilities.  -HG     Status: Other Adult Goal- 7 Progressing as expected  -HG     Comments: Other Adult Goal- 7 Pt to complete a maze while listening to music and pt was 50% accurate this date. 2/16: More of a prospective memory task thrown in the midst of a cognitive task and pt was 90% accurate.  2/23: For divided attention, pt was 70% accurate with min verbal cues.   -HG     SLP Time Calculation    SLP Goal Re-Cert Due Date 03/29/17  -HG       User Key  (r) = Recorded  "By, (t) = Taken By, (c) = Cosigned By    Initials Name Provider Type     Anjali Serrano MS [facility]-SLP Speech and Language Pathologist                OP SLP Education       02/27/17 1000    Education    Barriers to Learning No barriers identified  -    Education Provided Patient participated in establishing goals and treatment plan;Patient demonstrated recommended strategies;Patient requires further education on strategies, risks  -    Assessed Learning needs;Learning motivation;Learning preferences;Learning readiness  -    Learning Motivation Strong  -    Learning Method Explanation;Demonstration;Teach back;Written materials  -    Teaching Response Verbalized understanding;Demonstrated understanding;Reinforcement needed  -      User Key  (r) = Recorded By, (t) = Taken By, (c) = Cosigned By    Initials Name Effective Dates     Anjali Serrano MS [facility]-SLP 06/22/15 -                 OP SLP Assessment/Plan - 02/27/17 1000     SLP Assessment    Functional Problems Speech Language- Adult/Cognition  -    Impact on Function: Adult Speech Language/Cognition Decreased recall of personal information and medical history;Trouble learning or remembering new information  -    Clinical Impression: Speech Language-Adult/Congnition Cognitive Communication Impairment  -    Functional Problems Comment \"STM in general, I am reliant on my wife to finish my thoughts when I don't think I could finish them otherwise. It is aggrevating and demeaning.\"   -    Clinical Impression Comments Pt is using strategies in his daily life in order to counteract his deficits of delayed and prospective recall.   -HG    Prognosis Excellent (comment)  -    Patient/caregiver participated in establishment of treatment plan and goals Yes  -HG    Patient would benefit from skilled therapy intervention Yes  -HG    SLP Plan    Frequency 2x/week  -HG    Duration 4 weeks  -HG    Planned CPT's? SLP INDIVIDUAL SPEECH THERAPY: 55003  -HG    " Expected Duration Therapy Session (min) 45-60 minutes  -HG    Plan Comments Cont with POC  -HG      User Key  (r) = Recorded By, (t) = Taken By, (c) = Cosigned By    Initials Name Provider Type     Anjali Serrano MS CCC-SLP Speech and Language Pathologist                 Time Calculation:   SLP Start Time: 1000    Therapy Charges for Today     Code Description Service Date Service Provider Modifiers Qty    18821787420 HC ST MEMORY CURRENT 2/27/2017 Anjali Serrano MS CCC-SLP GN, CJ 1    09034830988 HC ST MEMORY PROJECTED 2/27/2017 Anjali Serrano MS CCC-SLP GN, CJ 1    58250332415 HC ST TREATMENT SPEECH 4 2/27/2017 Anjali Serrano MS CCC-SLP GN 1          SLP G-Codes  Functional Limitations: Memory  Memory Current Status (): At least 20 percent but less than 40 percent impaired, limited or restricted  Memory Goal Status (): At least 20 percent but less than 40 percent impaired, limited or restricted        Anjali Serrano MS CCC-SLP  2/27/2017

## 2017-03-03 ENCOUNTER — HOSPITAL ENCOUNTER (OUTPATIENT)
Dept: SPEECH THERAPY | Facility: HOSPITAL | Age: 77
Setting detail: THERAPIES SERIES
Discharge: HOME | End: 2017-03-03

## 2017-03-03 DIAGNOSIS — G31.84 MCI (MILD COGNITIVE IMPAIRMENT): Primary | ICD-10-CM

## 2017-03-03 PROCEDURE — 92507 TX SP LANG VOICE COMM INDIV: CPT

## 2017-03-03 NOTE — PROGRESS NOTES
"Outpatient Speech Language Pathology   Adult Speech Language Cognitive Treatment Note  [facility]     Patient Name: Uche Polanco  : 1940  MRN: [id number]  Today's Date: 3/3/2017         Visit Date: 2017   Patient Active Problem List   Diagnosis   • Valvular heart disease   • TIA (transient ischemic attack)   • Severe obstructive sleep apnea   • Hypertension   • Hyperlipidemia   • Gout   • CAD (coronary artery disease)   • AV block   • REM sleep behavior disorder   • REM  disorder          Visit Dx:    ICD-10-CM ICD-9-CM   1. MCI (mild cognitive impairment) G31.84 331.83                               SLP OP Goals       17 0900       Goal Type Needed    Goal Type Needed Memory  -HG     Subjective Comments    Subjective Comments Pt alert, cooperative and stated, \"I am feeling better than I did when I started.\"  -HG     Subjective Pain    Able to rate subjective pain? yes  -HG     Other Goals    Other Adult Goal- 1 Patient will verbalize 3 internal and 3 external memory strategies independently across two sessions in order to ensure competency of skilled techniques.  -HG     Status: Other Adult Goal- 1 Achieved  -HG     Comments: Other Adult Goal- 1 Pt able to state 3 + internal and external memory strategies independently.  -HG     Other Adult Goal- 2 Patient will perform STM tasks with 90% accuracy across two sessions, 3 trials per session, in order to enhance recall.  -HG     Status: Other Adult Goal- 2 Achieved  -HG     Comments: Other Adult Goal- 2 Pt was able to recall 5/5 messages with no cues required  -HG     Other Adult Goal- 3 Patient will perform divergent naming tasks, generating 10 items per abstract category within 60 seconds, across two trials in order to improve semantic fluency.  -HG     Status: Other Adult Goal- 3 Revised  -HG     Comments: Other Adult Goal- 3 Pt was 10/15 for abstract items. : Pt was on average 10-13/15. : Abstract items: pt was at least 10 items with " 2 minute time frame allowed. 3/3: Pt was 15/15 for abstract categories  -HG     Other Adult Goal- 4 Patient will perform executive functioning task with 70% accuracy when provided prompts only in order to improve strategic thinking.  -HG     Status: Other Adult Goal- 4 Progressing as expected  -HG     Comments: Other Adult Goal- 4 Divided atten task while asked to perform a prospective memory task. Pt required mod cues this date. 2/23: Pt was 50-75% accurate this date with a mental flexibilty task and this was given a homework. 3/3: Card game played that was taught several weeks ago and pt was 70% accurate.  -HG     Other Adult Goal- 5 Patient will improve cognition as evidenced by STGs met. (LTG)  -HG     Status: Other Adult Goal- 5 Progressing as expected  -HG     Comments: Other Adult Goal- 5 Pt using visualization and grouping in order to recall verbally presented information. 2/27: For visual recall of pictures, pt was 80% accurate.   -HG     Other Adult Goal- 6 Patient will perform alternating attention task with 80% accuracy across two trials in order to enhance mental flexibility and attention.  -HG     Status: Other Adult Goal- 6 Progressing as expected  -HG     Comments: Other Adult Goal- 6 Card game and pt was 80% accurate. 2/23: For answering questions given a list of four words, pt was 80% accurate. 2/27: For mental manipulation with numbers, pt was 100% accurate. 3/3: While playing cards and naming items from a delayed recall list activity, pt was 75% accurate.   -HG     Other Adult Goal- 7 Patient will perform divided attention task with 70% accuracy across two trials independently in order to improve multitasking abilities.  -HG     Status: Other Adult Goal- 7 Progressing as expected  -HG     Comments: Other Adult Goal- 7 Pt to complete a maze while listening to music and pt was 50% accurate this date. 2/16: More of a prospective memory task thrown in the midst of a cognitive task and pt was 90%  accurate.  2/23: For divided attention, pt was 70% accurate with min verbal cues. 3/3:While listening to music and recalling a word list, pt was 70% accurate.   -     SLP Time Calculation    SLP Goal Re-Cert Due Date 03/29/17  -       User Key  (r) = Recorded By, (t) = Taken By, (c) = Cosigned By    Initials Name Provider Type    DALJIT Anjali Serrano MS CCC-SLP Speech and Language Pathologist                OP SLP Education       03/03/17 0900    Education    Barriers to Learning No barriers identified  -    Education Provided Patient requires further education on strategies, risks  -    Assessed Learning needs;Learning motivation;Learning preferences;Learning readiness  -    Learning Motivation Strong  -    Learning Method Explanation;Demonstration;Teach back;Written materials  -    Teaching Response Verbalized understanding;Demonstrated understanding;Reinforcement needed  -      User Key  (r) = Recorded By, (t) = Taken By, (c) = Cosigned By    Initials Name Effective Dates     Anjali Serrano MS CCC-SLP 06/22/15 -                 OP SLP Assessment/Plan - 03/03/17 0900     SLP Assessment    Clinical Impression Comments Pt is making gains and more complex, abstract tasks are presented to pt and he is challenged.  -HG    Prognosis Excellent (comment)  -    SLP Plan    Plan Comments Cont with POC  -      User Key  (r) = Recorded By, (t) = Taken By, (c) = Cosigned By    Initials Name Provider Type    DALJIT Serrano MS CCC-SLP Speech and Language Pathologist                 Time Calculation:   SLP Start Time: 0900    Therapy Charges for Today     Code Description Service Date Service Provider Modifiers Qty    [id number]  ST TREATMENT SPEECH 4 3/3/2017 Anjali Serrano MS CCC-SLP GN 1                   Anjali Serrano MS CCC-SLP  3/3/2017

## 2017-03-06 ENCOUNTER — HOSPITAL ENCOUNTER (OUTPATIENT)
Dept: SPEECH THERAPY | Facility: HOSPITAL | Age: 77
Setting detail: THERAPIES SERIES
Discharge: HOME | End: 2017-03-06

## 2017-03-06 DIAGNOSIS — G31.84 MCI (MILD COGNITIVE IMPAIRMENT): Primary | ICD-10-CM

## 2017-03-06 PROCEDURE — 92507 TX SP LANG VOICE COMM INDIV: CPT

## 2017-03-06 NOTE — PROGRESS NOTES
Outpatient Speech Language Pathology   Adult Speech Language Cognitive Treatment Note  [facility]     Patient Name: Uche Polanco  : 1940  MRN: [id number]  Today's Date: 3/6/2017         Visit Date: 2017   Patient Active Problem List   Diagnosis   • Valvular heart disease   • TIA (transient ischemic attack)   • Severe obstructive sleep apnea   • Hypertension   • Hyperlipidemia   • Gout   • CAD (coronary artery disease)   • AV block   • REM sleep behavior disorder   • REM  disorder          Visit Dx:    ICD-10-CM ICD-9-CM   1. MCI (mild cognitive impairment) G31.84 331.83                               SLP OP Goals       17 1000       Goal Type Needed    Goal Type Needed Memory  -HG     Subjective Comments    Subjective Comments Pt alert, cooperative, reminded therapist of being out town for the next appt.  -HG     Subjective Pain    Able to rate subjective pain? yes  -HG     Other Goals    Other Adult Goal- 1 Patient will verbalize 3 internal and 3 external memory strategies independently across two sessions in order to ensure competency of skilled techniques.  -HG     Status: Other Adult Goal- 1 Achieved  -HG     Comments: Other Adult Goal- 1 Pt able to state 3 + internal and external memory strategies independently.  -HG     Other Adult Goal- 2 Patient will perform STM tasks with 90% accuracy across two sessions, 3 trials per session, in order to enhance recall.  -HG     Status: Other Adult Goal- 2 Achieved  -HG     Comments: Other Adult Goal- 2 Pt was able to recall 5/5 messages with no cues required  -HG     Other Adult Goal- 3 Patient will perform divergent naming tasks, generating 10 items per abstract category within 60 seconds, across two trials in order to improve semantic fluency.  -HG     Status: Other Adult Goal- 3 Progressing as expected  -HG     Comments: Other Adult Goal- 3 Pt was 10/15 for abstract items. : Pt was on average 10-13/15. : Abstract items: pt was at least 10  items with 2 minute time frame allowed. 3/3: Pt was 15/15 for abstract categories  3/6/17: For abstract naming in one minute: T1:10/10 T2:8/10  -HG     Other Adult Goal- 4 Patient will perform executive functioning task with 70% accuracy when provided prompts only in order to improve strategic thinking.  -HG     Status: Other Adult Goal- 4 Progressing as expected  -HG     Comments: Other Adult Goal- 4 Divided atten task while asked to perform a prospective memory task. Pt required mod cues this date. 2/23: Pt was 50-75% accurate this date with a mental flexibilty task and this was given a homework. 3/3: Card game played that was taught several weeks ago and pt was 70% accurate.  -HG     Other Adult Goal- 5 Patient will improve cognition as evidenced by STGs met. (LTG)  -HG     Status: Other Adult Goal- 5 Progressing as expected  -HG     Comments: Other Adult Goal- 5 Pt using visualization and grouping in order to recall verbally presented information. 2/27: For visual recall of pictures, pt was 80% accurate.   -HG     Other Adult Goal- 6 Patient will perform alternating attention task with 80% accuracy across two trials in order to enhance mental flexibility and attention.  -HG     Status: Other Adult Goal- 6 Progressing as expected  -HG     Comments: Other Adult Goal- 6 Card game and pt was 80% accurate. 2/23: For answering questions given a list of four words, pt was 80% accurate. 2/27: For mental manipulation with numbers, pt was 100% accurate. 3/3: While playing cards and naming items from a delayed recall list activity, pt was 75% accurate. 3/6/17: While corssing out numbers, pt had to switch with background music provided: pt was 90% accurate.   -HG     Other Adult Goal- 7 Patient will perform divided attention task with 70% accuracy across two trials independently in order to improve multitasking abilities.  -HG     Status: Other Adult Goal- 7 Progressing as expected  -HG     Comments: Other Adult Goal- 7 Pt  to complete a maze while listening to music and pt was 50% accurate this date. 2/16: More of a prospective memory task thrown in the midst of a cognitive task and pt was 90% accurate.  2/23: For divided attention, pt was 70% accurate with min verbal cues. 3/3:While listening to music and recalling a word list, pt was 70% accurate.   -     Other Adult Goal- 8 Pt will recall short paragraph material presentally orally using strategies with 90% accuracy in order to improve immediate recall skills.  -HG     Status: Other Adult Goal- 8 New  -HG     Comments: Other Adult Goal- 8 3/6/17: Using a who what when where why strategy, pt was 80% accurate; without, pt was 60% accurate.   -     SLP Time Calculation    SLP Goal Re-Cert Due Date 04/05/17  -       User Key  (r) = Recorded By, (t) = Taken By, (c) = Cosigned By    Initials Name Provider Type     Anjali Serrano MS [facility]-SLP Speech and Language Pathologist                OP SLP Education       03/06/17 1100    Education    Barriers to Learning No barriers identified  -    Education Provided Patient demonstrated recommended strategies;Patient requires further education on strategies, risks  -    Assessed Learning needs;Learning motivation;Learning preferences;Learning readiness  -    Learning Motivation Strong  -    Learning Method Explanation;Demonstration;Teach back;Written materials  -    Teaching Response Verbalized understanding;Demonstrated understanding;Reinforcement needed  -      User Key  (r) = Recorded By, (t) = Taken By, (c) = Cosigned By    Initials Name Effective Dates     Anjali Serrano MS [facility]-SLP 06/22/15 -                 OP SLP Assessment/Plan - 03/06/17 1100     SLP Assessment    Clinical Impression Comments Goals being met, advanced/revised and more to be advanced.  -    SLP Plan    Plan Comments Cont with POC  -      User Key  (r) = Recorded By, (t) = Taken By, (c) = Cosigned By    Initials Name Provider Type      Anjali Serrano MS CCC-SLP Speech and Language Pathologist                 Time Calculation:   SLP Start Time: 1000    Therapy Charges for Today     Code Description Service Date Service Provider Modifiers Qty    [id number]  ST TREATMENT SPEECH 4 3/6/2017 Anjali Serrano MS CCC-SLP GN 1                   Anjali Serrano MS CCC-SLP  3/6/2017

## 2017-03-17 ENCOUNTER — HOSPITAL ENCOUNTER (OUTPATIENT)
Dept: SPEECH THERAPY | Facility: HOSPITAL | Age: 77
Setting detail: THERAPIES SERIES
Discharge: HOME | End: 2017-03-17

## 2017-03-17 DIAGNOSIS — G31.84 MCI (MILD COGNITIVE IMPAIRMENT): Primary | ICD-10-CM

## 2017-03-17 PROCEDURE — 92507 TX SP LANG VOICE COMM INDIV: CPT

## 2017-03-17 NOTE — PROGRESS NOTES
Outpatient Speech Language Pathology   Adult Speech Language Cognitive Treatment Note  [facility]     Patient Name: Uche Polanco  : 1940  MRN: [id number]  Today's Date: 3/17/2017         Visit Date: 2017   Patient Active Problem List   Diagnosis   • Valvular heart disease   • TIA (transient ischemic attack)   • Severe obstructive sleep apnea   • Hypertension   • Hyperlipidemia   • Gout   • CAD (coronary artery disease)   • AV block   • REM sleep behavior disorder   • REM  disorder          Visit Dx:    ICD-10-CM ICD-9-CM   1. MCI (mild cognitive impairment) G31.84 331.83                               SLP OP Goals       17 0900       Goal Type Needed    Goal Type Needed Memory  -HG     Subjective Comments    Subjective Comments Pt alert, cooperative, excited about his recent fishing trip to [place] and able to recall all details.   -HG     Subjective Pain    Able to rate subjective pain? yes  -HG     Other Goals    Other Adult Goal- 1 Patient will verbalize 3 internal and 3 external memory strategies independently across two sessions in order to ensure competency of skilled techniques.  -HG     Status: Other Adult Goal- 1 Achieved  -HG     Comments: Other Adult Goal- 1 Pt able to state 3 + internal and external memory strategies independently.  -HG     Other Adult Goal- 2 Patient will perform STM tasks with 90% accuracy across two sessions, 3 trials per session, in order to enhance recall.  -HG     Status: Other Adult Goal- 2 Achieved  -HG     Comments: Other Adult Goal- 2 Pt was able to recall 5/5 messages with no cues required  -HG     Other Adult Goal- 3 Patient will perform divergent naming tasks, generating 10 items per abstract category within 60 seconds, across two trials in order to improve semantic fluency.  -HG     Status: Other Adult Goal- 3 Progressing as expected  -HG     Comments: Other Adult Goal- 3 3/17/17: For abstract category naming, pt was 100% accurate. Pt was 10/15 for  abstract items. 2/23: Pt was on average 10-13/15. 2/27: Abstract items: pt was at least 10 items with 2 minute time frame allowed. 3/3: Pt was 15/15 for abstract categories  3/6/17: For abstract naming in one minute: T1:10/10 T2:8/10  -HG     Other Adult Goal- 4 Patient will perform executive functioning task with 70% accuracy when provided prompts only in order to improve strategic thinking.  -HG     Status: Other Adult Goal- 4 Progressing as expected  -HG     Comments: Other Adult Goal- 4 3/17/17: Pt given a task that involved visual recall as well as reasoning and problem solving and pt was ?Divided atten task while asked to perform a prospective memory task. Pt required mod cues this date. 2/23: Pt was 50-75% accurate this date with a mental flexibilty task and this was given a homework. 3/3: Card game played that was taught several weeks ago and pt was 70% accurate.  -HG     Other Adult Goal- 5 Patient will improve cognition as evidenced by STGs met. (LTG)  -HG     Status: Other Adult Goal- 5 Progressing as expected  -HG     Comments: Other Adult Goal- 5 3/17/17: Pt had to recall 16 items with no review from almost 2 weeks ago and pt was 80% accurate. Pt using visualization and grouping in order to recall verbally presented information. 2/27: For visual recall of pictures, pt was 80% accurate.   -HG     Other Adult Goal- 6 Patient will perform alternating attention task with 80% accuracy across two trials in order to enhance mental flexibility and attention.  -HG     Status: Other Adult Goal- 6 Progressing as expected  -HG     Comments: Other Adult Goal- 6 Card game and pt was 80% accurate. 2/23: For answering questions given a list of four words, pt was 80% accurate. 2/27: For mental manipulation with numbers, pt was 100% accurate. 3/3: While playing cards and naming items from a delayed recall list activity, pt was 75% accurate. 3/6/17: While corssing out numbers, pt had to switch with background music  provided: pt was 90% accurate.   -HG     Other Adult Goal- 7 Patient will perform divided attention task with 70% accuracy across two trials independently in order to improve multitasking abilities.  -HG     Status: Other Adult Goal- 7 Progressing as expected  -     Comments: Other Adult Goal- 7 3/17/17: Pt required min cues for completing exec fxn task in the correct order. Pt to complete a maze while listening to music and pt was 50% accurate this date. 2/16: More of a prospective memory task thrown in the midst of a cognitive task and pt was 90% accurate.  2/23: For divided attention, pt was 70% accurate with min verbal cues. 3/3:While listening to music and recalling a word list, pt was 70% accurate.   -HG     Other Adult Goal- 8 Pt will recall short paragraph material presentally orally using strategies with 90% accuracy in order to improve immediate recall skills.  -HG     Status: Other Adult Goal- 8 New  -HG     Comments: Other Adult Goal- 8  pt was 3/6/17: Using a who what when where why strategy, pt was 80% accurate; without, pt was 60% accurate.   -     SLP Time Calculation    SLP Goal Re-Cert Due Date 04/05/17  -       User Key  (r) = Recorded By, (t) = Taken By, (c) = Cosigned By    Initials Name Provider Type    DALJIT Serrano MS [facility]-SLP Speech and Language Pathologist                OP SLP Education       03/17/17 0900    Education    Barriers to Learning No barriers identified  -    Education Provided Patient demonstrated recommended strategies;Patient requires further education on strategies, risks  -    Assessed Learning needs;Learning motivation;Learning preferences;Learning readiness  -    Learning Motivation Strong  -    Learning Method Explanation;Demonstration;Written materials  -    Teaching Response Verbalized understanding;Demonstrated understanding  -      User Key  (r) = Recorded By, (t) = Taken By, (c) = Cosigned By    Initials Name Effective Dates    DALJIT GALLARDO  MS [facility] Serrano-SLP 06/22/15 -                 OP SLP Assessment/Plan - 03/17/17 0900     SLP Assessment    Functional Problems Comment Pt making gains and using strategies at home, while traveling, making plans, etc.  -HG    SLP Plan    Plan Comments Cont with POC  -      User Key  (r) = Recorded By, (t) = Taken By, (c) = Cosigned By    Initials Name Provider Type     Anjali Serrano MS [facility]-SLP Speech and Language Pathologist                 Time Calculation:   SLP Start Time: 0900    Therapy Charges for Today     Code Description Service Date Service Provider Modifiers Qty    [id number]  ST TREATMENT SPEECH 4 3/17/2017 Anjali Serrano MS CCC-SLP GN 1                   Anjali Srerano MS CCC-SLP  3/17/2017

## 2017-03-24 ENCOUNTER — HOSPITAL ENCOUNTER (OUTPATIENT)
Dept: SPEECH THERAPY | Facility: HOSPITAL | Age: 77
Setting detail: THERAPIES SERIES
Discharge: HOME | End: 2017-03-24

## 2017-03-24 DIAGNOSIS — G31.84 MCI (MILD COGNITIVE IMPAIRMENT): Primary | ICD-10-CM

## 2017-03-24 PROCEDURE — G9169 MEMORY GOAL STATUS: HCPCS

## 2017-03-24 PROCEDURE — 92507 TX SP LANG VOICE COMM INDIV: CPT

## 2017-03-24 PROCEDURE — G9168 MEMORY CURRENT STATUS: HCPCS

## 2017-03-24 NOTE — PROGRESS NOTES
Outpatient Speech Language Pathology   Adult Speech Language Cognitive Progress Note  [facility]     Patient Name: Uche Polanco  : 1940  MRN: [id number]  Today's Date: 3/24/2017         Visit Date: 2017   Patient Active Problem List   Diagnosis   • Valvular heart disease   • TIA (transient ischemic attack)   • Severe obstructive sleep apnea   • Hypertension   • Hyperlipidemia   • Gout   • CAD (coronary artery disease)   • AV block   • REM sleep behavior disorder   • REM  disorder          Visit Dx:    ICD-10-CM ICD-9-CM   1. MCI (mild cognitive impairment) G31.84 331.83             Adult Speech Language - 17 0900     RBANS- Repeatable Battery for the Assessment of Neuropsychological Status    Immediate Memory Index Score 73  -HG    Immediate Memory Percentile 3 %  -HG    Immediate Memory Qualitative Description borderline  -HG    Visuospatial Index Score 121  -HG    Visuospatial Percentile 92 %  -HG    Visuospatial Qualitative Description average  -HG    Language Index Score 92  -HG    Language Percentile 30 %  -HG    Language Qualitative Description average  -HG    Attention Index Score 97  -HG    Attention Percentile 42 %  -HG    Attention Qualitative Description average  -HG    Delayed Memory Index Score 98  -HG    Delayed Memory Percentile 45 %  -HG    Delayed Memory Qualitative Description average  -HG    Total Index Score 481  -HG    Total Percentile 34 %  -HG    Total Qualitative Description low average  -HG    RBANS Comments Pt felt anxious this date but still performed in the average range other than immediate memory.   -HG      User Key  (r) = Recorded By, (t) = Taken By, (c) = Cosigned By    Initials Name Provider Type     Anjali Serrano MS CCC-SLP Speech and Language Pathologist                              SLP OP Goals       17 0900       Goal Type Needed    Goal Type Needed Memory  -HG     Subjective Comments    Subjective Comments Pt alert, cooperative, pt states  "that his brother noticed  difference and that he seemed like his \"old self\"  -HG     Subjective Pain    Able to rate subjective pain? yes  -HG     Other Goals    Other Adult Goal- 1 Patient will verbalize 3 internal and 3 external memory strategies independently across two sessions in order to ensure competency of skilled techniques.  -HG     Status: Other Adult Goal- 1 Achieved  -HG     Comments: Other Adult Goal- 1 Pt able to state 3 + internal and external memory strategies independently.  -HG     Other Adult Goal- 2 Patient will perform STM tasks with 90% accuracy across two sessions, 3 trials per session, in order to enhance recall.  -HG     Status: Other Adult Goal- 2 Achieved  -HG     Comments: Other Adult Goal- 2 Pt was able to recall 5/5 messages with no cues required  -HG     Other Adult Goal- 3 Patient will perform divergent naming tasks, generating 10 items per abstract category within 60 seconds, across two trials in order to improve semantic fluency.  -HG     Status: Other Adult Goal- 3 Progressing as expected  -HG     Comments: Other Adult Goal- 3 3/24/17: Pt able to name 16 items in a sixty second period of time on the RBANS. 3/17/17: For abstract category naming, pt was 100% accurate. Pt was 10/15 for abstract items. 2/23: Pt was on average 10-13/15. 2/27: Abstract items: pt was at least 10 items with 2 minute time frame allowed. 3/3: Pt was 15/15 for abstract categories  3/6/17: For abstract naming in one minute: T1:10/10 T2:8/10  -HG     Other Adult Goal- 4 Patient will perform executive functioning task with 70% accuracy when provided prompts only in order to improve strategic thinking.  -HG     Status: Other Adult Goal- 4 Progressing as expected  -HG     Comments: Other Adult Goal- 4 3/24/17: visuospatial recall, pt performed in the average range on the RBANS. 3/17/17: Pt given a task that involved visual recall as well as reasoning and problem solving and pt was ?Divided atten task while asked " to perform a prospective memory task. Pt required mod cues this date. 2/23: Pt was 50-75% accurate this date with a mental flexibilty task and this was given a homework. 3/3: Card game played that was taught several weeks ago and pt was 70% accurate.  -HG     Other Adult Goal- 5 Patient will improve cognition as evidenced by STGs met. (LTG)  -HG     Status: Other Adult Goal- 5 Progressing as expected  -HG     Comments: Other Adult Goal- 5  3/24/17: For story re-telling, immediate and delayed, pt was borderline. 3/17/17: Pt had to recall 16 items with no review from almost 2 weeks ago and pt was 80% accurate. Pt using visualization and grouping in order to recall verbally presented information. 2/27: For visual recall of pictures, pt was 80% accurate.   -HG     Other Adult Goal- 6 Patient will perform alternating attention task with 80% accuracy across two trials in order to enhance mental flexibility and attention.  -HG     Status: Other Adult Goal- 6 Progressing as expected  -HG     Comments: Other Adult Goal- 6 3/24/17: For attention on the RBANS, pt was in the average range. Card game and pt was 80% accurate. 2/23: For answering questions given a list of four words, pt was 80% accurate. 2/27: For mental manipulation with numbers, pt was 100% accurate. 3/3: While playing cards and naming items from a delayed recall list activity, pt was 75% accurate. 3/6/17: While corssing out numbers, pt had to switch with background music provided: pt was 90% accurate.   -HG     Other Adult Goal- 7 Patient will perform divided attention task with 70% accuracy across two trials independently in order to improve multitasking abilities.  -HG     Status: Other Adult Goal- 7 Progressing as expected  -HG     Comments: Other Adult Goal- 7 3/17/17: Pt required min cues for completing exec fxn task in the correct order. Pt to complete a maze while listening to music and pt was 50% accurate this date. 2/16: More of a prospective memory  task thrown in the midst of a cognitive task and pt was 90% accurate.  2/23: For divided attention, pt was 70% accurate with min verbal cues. 3/3:While listening to music and recalling a word list, pt was 70% accurate.   -HG     Other Adult Goal- 8 Pt will recall short paragraph material presentally orally using strategies with 90% accuracy in order to improve immediate recall skills.  -HG     Status: Other Adult Goal- 8 New  -HG     Comments: Other Adult Goal- 8  pt was 3/6/17: Using a who what when where why strategy, pt was 80% accurate; without, pt was 60% accurate.   -     SLP Time Calculation    SLP Goal Re-Cert Due Date 04/23/17  -       User Key  (r) = Recorded By, (t) = Taken By, (c) = Cosigned By    Initials Name Provider Type    DALJIT Anjali Serrano MS CCC-SLP Speech and Language Pathologist                OP SLP Education       03/24/17 0900    Education    Education Comments Encouraged pt to use strategies without the pressure of an assignment in a session and t/o daily activites such a using his phone in order to recall an activity.   -      User Key  (r) = Recorded By, (t) = Taken By, (c) = Cosigned By    Initials Name Effective Dates     Anjali Serrano MS CCC-SLP 06/22/15 -                 OP SLP Assessment/Plan - 03/24/17 0800     SLP Assessment    Functional Problems Comment Pt continues to use compensatory strategies however pt does exhibit anxiety for confrontational naming which impacts his fluency of naming and recall.   -    SLP Plan    Plan Comments Cont with POC for 2-3 more sessions.   -      User Key  (r) = Recorded By, (t) = Taken By, (c) = Cosigned By    Initials Name Provider Type    DALJIT Serrano MS CCC-SLP Speech and Language Pathologist                 Time Calculation:   SLP Start Time: 0900    Therapy Charges for Today     Code Description Service Date Service Provider Modifiers Qty    30923936073  ST TREATMENT SPEECH 4 3/24/2017 Anjali Serrano MS CCC-SLP  GN 1                   Anjali Serrano, MS CCC-SLP  3/24/2017

## 2017-03-27 ENCOUNTER — HOSPITAL ENCOUNTER (OUTPATIENT)
Dept: SPEECH THERAPY | Facility: HOSPITAL | Age: 77
Setting detail: THERAPIES SERIES
Discharge: HOME | End: 2017-03-27

## 2017-03-27 DIAGNOSIS — G31.84 MCI (MILD COGNITIVE IMPAIRMENT): Primary | ICD-10-CM

## 2017-03-27 PROCEDURE — 92507 TX SP LANG VOICE COMM INDIV: CPT

## 2017-03-27 NOTE — PROGRESS NOTES
Outpatient Speech Language Pathology   Adult Speech Language Cognitive Treatment Note  [facility]     Patient Name: Uche Polanco  : 1940  MRN: [id number]  Today's Date: 3/27/2017         Visit Date: 2017   Patient Active Problem List   Diagnosis   • Valvular heart disease   • TIA (transient ischemic attack)   • Severe obstructive sleep apnea   • Hypertension   • Hyperlipidemia   • Gout   • CAD (coronary artery disease)   • AV block   • REM sleep behavior disorder   • REM  disorder          Visit Dx:    ICD-10-CM ICD-9-CM   1. MCI (mild cognitive impairment) G31.84 331.83             Adult Speech Language - 17 0900     RBANS- Repeatable Battery for the Assessment of Neuropsychological Status    Immediate Memory Index Score 73  -HG    Immediate Memory Percentile 3 %  -HG    Immediate Memory Qualitative Description borderline  -HG    Visuospatial Index Score 121  -HG    Visuospatial Percentile 92 %  -HG    Visuospatial Qualitative Description average  -HG    Language Index Score 92  -HG    Language Percentile 30 %  -HG    Language Qualitative Description average  -HG    Attention Index Score 97  -HG    Attention Percentile 42 %  -HG    Attention Qualitative Description average  -HG    Delayed Memory Index Score 98  -HG    Delayed Memory Percentile 45 %  -HG    Delayed Memory Qualitative Description average  -HG    Total Index Score 481  -HG    Total Percentile 34 %  -HG    Total Qualitative Description low average  -HG    RBANS Comments Pt felt anxious this date but still performed in the average range other than immediate memory.   -HG      User Key  (r) = Recorded By, (t) = Taken By, (c) = Cosigned By    Initials Name Provider Type     Anjali Serrano MS [facility]-SLP Speech and Language Pathologist                              SLP OP Goals       17 1100       Goal Type Needed    Goal Type Needed Memory  -HG     Subjective Comments    Subjective Comments Pt alert, cooperative, well into  selling his house and renovating his new home, using strategies for recall.   -HG     Subjective Pain    Able to rate subjective pain? yes  -HG     Other Goals    Other Adult Goal- 1 Patient will verbalize 3 internal and 3 external memory strategies independently across two sessions in order to ensure competency of skilled techniques.  -HG     Status: Other Adult Goal- 1 Achieved  -HG     Comments: Other Adult Goal- 1 Pt able to state 3 + internal and external memory strategies independently.  -HG     Other Adult Goal- 2 Patient will perform STM tasks with 90% accuracy across two sessions, 3 trials per session, in order to enhance recall.  -HG     Status: Other Adult Goal- 2 Achieved  -HG     Comments: Other Adult Goal- 2 Pt was able to recall 5/5 messages with no cues required  -HG     Other Adult Goal- 3 Patient will perform divergent naming tasks, generating 10 items per abstract category within 60 seconds, across two trials in order to improve semantic fluency.  -HG     Status: Other Adult Goal- 3 Progressing as expected  -HG     Comments: Other Adult Goal- 3 3/24/17: 11/10 for concrete categories.   Pt able to name 16 items in a sixty second period of time on the RBANS. 3/17/17: For abstract category naming, pt was 100% accurate. Pt was 10/15 for abstract items. 2/23: Pt was on average 10-13/15. 2/27: Abstract items: pt was at least 10 items with 2 minute time frame allowed. 3/3: Pt was 15/15 for abstract categories  3/6/17: For abstract naming in one minute: T1:10/10 T2:8/10  -HG     Other Adult Goal- 4 Patient will perform executive functioning task with 70% accuracy when provided prompts only in order to improve strategic thinking.  -HG     Status: Other Adult Goal- 4 Progressing as expected  -HG     Comments: Other Adult Goal- 4 3/27/17: For recall of 16 objects and w/o review, pt was 50% accurate and with review ? 3/24/17: visuospatial recall, pt performed in the average range on the RBANS. 3/17/17: Pt  given a task that involved visual recall as well as reasoning and problem solving and pt was ?Divided atten task while asked to perform a prospective memory task. Pt required mod cues this date. 2/23: Pt was 50-75% accurate this date with a mental flexibilty task and this was given a homework. 3/3: Card game played that was taught several weeks ago and pt was 70% accurate.  -HG     Other Adult Goal- 5 Patient will improve cognition as evidenced by STGs met. (LTG)  -HG     Status: Other Adult Goal- 5 Progressing as expected  -HG     Comments: Other Adult Goal- 5  3/27/17: For delayed recall of names and pictures, pt was 80% accuracy.  3/24/17: For story re-telling, immediate and delayed, pt was borderline. 3/17/17: Pt had to recall 16 items with no review from almost 2 weeks ago and pt was 80% accurate. Pt using visualization and grouping in order to recall verbally presented information. 2/27: For visual recall of pictures, pt was 80% accurate.   -HG     Other Adult Goal- 6 Patient will perform alternating attention task with 80% accuracy across two trials in order to enhance mental flexibility and attention.  -HG     Status: Other Adult Goal- 6 Progressing as expected  -HG     Comments: Other Adult Goal- 6 3/27/17: For mental manipulation of three words forward and three words backward: pt was 100% accurate with 3 words and 80% accurate with four words. 3/24/17: For attention on the RBANS, pt was in the average range. Card game and pt was 80% accurate. 2/23: For answering questions given a list of four words, pt was 80% accurate. 2/27: For mental manipulation with numbers, pt was 100% accurate. 3/3: While playing cards and naming items from a delayed recall list activity, pt was 75% accurate. 3/6/17: While corssing out numbers, pt had to switch with background music provided: pt was 90% accurate.   -HG     Other Adult Goal- 7 Patient will perform divided attention task with 70% accuracy across two trials  independently in order to improve multitasking abilities.  -HG     Status: Other Adult Goal- 7 Progressing as expected  -HG     Comments: Other Adult Goal- 7 3/17/17: Pt required min cues for completing exec fxn task in the correct order. Pt to complete a maze while listening to music and pt was 50% accurate this date. 2/16: More of a prospective memory task thrown in the midst of a cognitive task and pt was 90% accurate.  2/23: For divided attention, pt was 70% accurate with min verbal cues. 3/3:While listening to music and recalling a word list, pt was 70% accurate.   -HG     Other Adult Goal- 8 Pt will recall short paragraph material presentally orally using strategies with 90% accuracy in order to improve immediate recall skills.  -HG     Status: Other Adult Goal- 8 New  -HG     Comments: Other Adult Goal- 8  3/27/17: For 22-36 word paragraphs, 85% accurate. For 36-50 words, 90% accurate.  pt was 3/6/17: Using a who what when where why strategy, pt was 80% accurate; without, pt was 60% accurate.   -HG     SLP Time Calculation    SLP Goal Re-Cert Due Date 04/23/17  -HG       User Key  (r) = Recorded By, (t) = Taken By, (c) = Cosigned By    Initials Name Provider Type    DALJIT Serrano MS CCC-SLP Speech and Language Pathologist                    OP SLP Assessment/Plan - 03/27/17 1200     SLP Plan    Plan Comments Cont with POC  -HG      User Key  (r) = Recorded By, (t) = Taken By, (c) = Cosigned By    Initials Name Provider Type    DALJIT Serrano MS CCC-SLP Speech and Language Pathologist                 Time Calculation:   SLP Start Time: 1100    Therapy Charges for Today     Code Description Service Date Service Provider Modifiers Qty    28038197143  ST TREATMENT SPEECH 4 3/27/2017 Anjali Serrano MS CCC-SLP GN 1                   Anjali Serrano MS CCC-JOSSE  3/27/2017

## 2017-03-29 ENCOUNTER — APPOINTMENT (OUTPATIENT)
Dept: SPEECH THERAPY | Facility: HOSPITAL | Age: 77
End: 2017-03-29

## 2017-04-14 ENCOUNTER — HOSPITAL ENCOUNTER (OUTPATIENT)
Dept: SPEECH THERAPY | Facility: HOSPITAL | Age: 77
Setting detail: THERAPIES SERIES
Discharge: HOME | End: 2017-04-14

## 2017-04-14 DIAGNOSIS — G31.84 MCI (MILD COGNITIVE IMPAIRMENT): Primary | ICD-10-CM

## 2017-04-14 PROCEDURE — 92507 TX SP LANG VOICE COMM INDIV: CPT

## 2017-04-14 PROCEDURE — G9169 MEMORY GOAL STATUS: HCPCS

## 2017-04-14 PROCEDURE — G9168 MEMORY CURRENT STATUS: HCPCS

## 2017-04-14 NOTE — THERAPY DISCHARGE NOTE
Outpatient Speech Language Pathology   Adult Speech Language Cognitive Treatment Note/Discharge Summary  [facility]     Patient Name: Uche Polanco  : 1940  MRN: [id number]  Today's Date: 2017         Visit Date: 2017   Patient Active Problem List   Diagnosis   • Valvular heart disease   • TIA (transient ischemic attack)   • Severe obstructive sleep apnea   • Hypertension   • Hyperlipidemia   • Gout   • CAD (coronary artery disease)   • AV block   • REM sleep behavior disorder   • REM  disorder          Visit Dx:    ICD-10-CM ICD-9-CM   1. MCI (mild cognitive impairment) G31.84 331.83                             SLP OP Goals       17 1400       Goal Type Needed    Goal Type Needed Memory  -HG     Subjective Comments    Subjective Comments Pt alert, cooperative, feels good about his cognition.  -HG     Other Goals    Other Adult Goal- 1 Patient will verbalize 3 internal and 3 external memory strategies independently across two sessions in order to ensure competency of skilled techniques.  -HG     Status: Other Adult Goal- 1 Achieved  -HG     Comments: Other Adult Goal- 1 Pt able to state 3 + internal and external memory strategies independently.  -HG     Other Adult Goal- 2 Patient will perform STM tasks with 90% accuracy across two sessions, 3 trials per session, in order to enhance recall.  -HG     Status: Other Adult Goal- 2 Achieved  -HG     Comments: Other Adult Goal- 2 Pt was able to recall 5/5 messages with no cues required  -HG     Other Adult Goal- 3 Patient will perform divergent naming tasks, generating 10 items per abstract category within 60 seconds, across two trials in order to improve semantic fluency.  -HG     Status: Other Adult Goal- 3 Progressing as expected  -HG     Comments: Other Adult Goal- 3 17: 10/10 for abstract categories. 3/24/17: 11/10 for concrete categories.   Pt able to name 16 items in a sixty second period of time on the RBANS. 3/17/17: For abstract  category naming, pt was 100% accurate. Pt was 10/15 for abstract items. 2/23: Pt was on average 10-13/15. 2/27: Abstract items: pt was at least 10 items with 2 minute time frame allowed. 3/3: Pt was 15/15 for abstract categories  3/6/17: For abstract naming in one minute: T1:10/10 T2:8/10  -HG     Other Adult Goal- 4 Patient will perform executive functioning task with 70% accuracy when provided prompts only in order to improve strategic thinking.  -HG     Status: Other Adult Goal- 4 Progressing as expected  -HG     Comments: Other Adult Goal- 4 4/14/17: Pt currently selling a house, managing finances, moving arrangments, traveling out of town, booking airfares, etc. 3/27/17: For recall of 16 objects and w/o review, pt was 50% accurate and with review ? 3/24/17: visuospatial recall, pt performed in the average range on the RBANS. 3/17/17: Pt given a task that involved visual recall as well as reasoning and problem solving and pt was ?Divided atten task while asked to perform a prospective memory task. Pt required mod cues this date. 2/23: Pt was 50-75% accurate this date with a mental flexibilty task and this was given a homework. 3/3: Card game played that was taught several weeks ago and pt was 70% accurate.  -HG     Other Adult Goal- 5 Patient will improve cognition as evidenced by STGs met. (LTG)  -HG     Status: Other Adult Goal- 5 Progressing as expected  -HG     Comments: Other Adult Goal- 5  4/14/17: For delayed storytelling, pt was 80% accuracy with no review strategies. 3/27/17: For delayed recall of names and pictures, pt was 80% accuracy.  3/24/17: For story re-telling, immediate and delayed, pt was borderline. 3/17/17: Pt had to recall 16 items with no review from almost 2 weeks ago and pt was 80% accurate. Pt using visualization and grouping in order to recall verbally presented information. 2/27: For visual recall of pictures, pt was 80% accurate.   -HG     Other Adult Goal- 6 Patient will perform  alternating attention task with 80% accuracy across two trials in order to enhance mental flexibility and attention.  -HG     Status: Other Adult Goal- 6 Progressing as expected  -HG     Comments: Other Adult Goal- 6 3/27/17: For mental manipulation of three words forward and three words backward: pt was 100% accurate with 3 words and 80% accurate with four words. 3/24/17: For attention on the RBANS, pt was in the average range. Card game and pt was 80% accurate. 2/23: For answering questions given a list of four words, pt was 80% accurate. 2/27: For mental manipulation with numbers, pt was 100% accurate. 3/3: While playing cards and naming items from a delayed recall list activity, pt was 75% accurate. 3/6/17: While corssing out numbers, pt had to switch with background music provided: pt was 90% accurate.   -HG     Other Adult Goal- 7 Patient will perform divided attention task with 70% accuracy across two trials independently in order to improve multitasking abilities.  -HG     Status: Other Adult Goal- 7 Progressing as expected  -HG     Comments: Other Adult Goal- 7 4/14/17: For high level mult-tasking, pt was 80-90% accurate independently. 3/17/17: Pt required min cues for completing exec fxn task in the correct order. Pt to complete a maze while listening to music and pt was 50% accurate this date. 2/16: More of a prospective memory task thrown in the midst of a cognitive task and pt was 90% accurate.  2/23: For divided attention, pt was 70% accurate with min verbal cues. 3/3:While listening to music and recalling a word list, pt was 70% accurate.   -HG     Other Adult Goal- 8 Pt will recall short paragraph material presentally orally using strategies with 90% accuracy in order to improve immediate recall skills.  -HG     Status: Other Adult Goal- 8 New  -HG     Comments: Other Adult Goal- 8  3/27/17: For 22-36 word paragraphs, 85% accurate. For 36-50 words, 90% accurate.  pt was 3/6/17: Using a who what when  where why strategy, pt was 80% accurate; without, pt was 60% accurate.   -     SLP Time Calculation    SLP Goal Re-Cert Due Date 04/23/17  -HG       User Key  (r) = Recorded By, (t) = Taken By, (c) = Cosigned By    Initials Name Provider Type    DALJIT Serrano MS CCC-SLP Speech and Language Pathologist                OP SLP Education       04/14/17 1400    Education    Education Comments Compensatory strategies reviewed with pt and how to apply  and focus on what he reads and when he hears a story.   -HG      User Key  (r) = Recorded By, (t) = Taken By, (c) = Cosigned By    Initials Name Effective Dates    DALJIT Serrano MS CCC-SLP 06/22/15 -                 OP SLP Assessment/Plan - 04/14/17 1400     SLP Assessment    Functional Problems Comment Pt is functioning independently in his environment completing high level, daily cognitive tasks.   -    SLP Plan    Plan Comments Discharge from skilled services.  -HG      User Key  (r) = Recorded By, (t) = Taken By, (c) = Cosigned By    Initials Name Provider Type    DALJIT GALLARDO Zach, MS CCC-SLP Speech and Language Pathologist                   Time Calculation:   SLP Start Time: 1400    Therapy Charges for Today     Code Description Service Date Service Provider Modifiers Qty    45509408997 HC ST MEMORY CURRENT 4/14/2017 Anjali Serrano, MS CCC-SLP GN, CI 1    [id number] HC ST MEMORY PROJECTED 4/14/2017 Anjali Serrano, MS CCC-SLP GN, CI 1    [id number] HC ST TREATMENT SPEECH 4 4/14/2017 Anjali SerranoMS ARAGON-SLP GN 1          SLP G-Codes  Functional Limitations: Memory  Memory Current Status (): At least 1 percent but less than 20 percent impaired, limited or restricted  Memory Goal Status (): At least 1 percent but less than 20 percent impaired, limited or restricted             Anjaliher DIONY Serrano MS CCC-SLP  4/14/2017

## 2017-04-25 PROCEDURE — 93294 REM INTERROG EVL PM/LDLS PM: CPT | Performed by: PHYSICIAN ASSISTANT

## 2017-04-25 PROCEDURE — 93296 REM INTERROG EVL PM/IDS: CPT | Performed by: PHYSICIAN ASSISTANT

## 2017-04-26 ENCOUNTER — CLINICAL SUPPORT NO REQUIREMENTS (OUTPATIENT)
Dept: CARDIOLOGY | Facility: CLINIC | Age: 77
End: 2017-04-26

## 2017-04-26 DIAGNOSIS — I38 VALVULAR HEART DISEASE: ICD-10-CM

## 2017-04-26 DIAGNOSIS — I44.30 AV BLOCK: Primary | ICD-10-CM

## 2017-05-31 ENCOUNTER — TELEPHONE (OUTPATIENT)
Dept: SLEEP MEDICINE | Facility: HOSPITAL | Age: 77
End: 2017-05-31

## 2017-06-12 ENCOUNTER — OFFICE VISIT (OUTPATIENT)
Dept: SLEEP MEDICINE | Facility: HOSPITAL | Age: 77
End: 2017-06-12

## 2017-06-12 ENCOUNTER — TELEPHONE (OUTPATIENT)
Dept: SLEEP MEDICINE | Facility: HOSPITAL | Age: 77
End: 2017-06-12

## 2017-06-12 VITALS
HEIGHT: 73 IN | SYSTOLIC BLOOD PRESSURE: 132 MMHG | BODY MASS INDEX: 28.23 KG/M2 | HEART RATE: 58 BPM | DIASTOLIC BLOOD PRESSURE: 68 MMHG | WEIGHT: 213 LBS | OXYGEN SATURATION: 98 %

## 2017-06-12 DIAGNOSIS — G47.33 SEVERE OBSTRUCTIVE SLEEP APNEA: Primary | ICD-10-CM

## 2017-06-12 DIAGNOSIS — G47.33 OBSTRUCTIVE SLEEP APNEA, ADULT: ICD-10-CM

## 2017-06-12 DIAGNOSIS — G47.52 REM SLEEP BEHAVIOR DISORDER: ICD-10-CM

## 2017-06-12 PROCEDURE — 99214 OFFICE O/P EST MOD 30 MIN: CPT | Performed by: INTERNAL MEDICINE

## 2017-06-12 RX ORDER — CLONAZEPAM 1 MG/1
0.5 TABLET ORAL NIGHTLY
Qty: 90 TABLET | Refills: 0 | OUTPATIENT
Start: 2017-06-12 | End: 2018-01-23 | Stop reason: SDUPTHER

## 2017-06-12 NOTE — TELEPHONE ENCOUNTER
Patient was in office and Dr. Hansen prescribed Clonazepam 1mg. Spoke to patient and called in prescription to june.

## 2017-06-12 NOTE — PROGRESS NOTES
Subjective:     Chief Complaint:   Chief Complaint   Patient presents with   • Follow-up       HPI:    Uche Polanco is a 77 y.o. male here for follow-up of obstructive sleep apnea and REM behavior disorder.    He ran short on his clonazepam and found that taking 0.5 mg seemed to have the same beneficial effect.  This is based on his observations and his wife did not notice any additional activity at night.    His only complaint about his CPAP is that he has some red areas where he wears his glasses during the day but he thinks this is more related to his glasses than the CPAP mask.    Since starting PAP sleep problem has: remained the same  Currently using PAP: yes Hours of usage during the night: 6    Amount of sleep per night : 7 hours  Average length of time it takes to fall asleep : 5 minutes  Number of awakenings per night : 2     He feels fatigue (tiredness, exhaustion, lethargy) in the daytime even when not sleepy? Infrequently  He feels sleepy (or struggles to stay awake) in the daytime? No    [place] Scale scored as 3/24.    Type of mask: full face mask    He (since starting PAP or since last visit) has problems with the following:   Pressure from the mask 4 - Moderate Problems  Skin irritation from the mask 7 - Moderate Problems  Mask coming off face 2 - Mild Problems  Air leaks from the mask 4 - Moderate Problems  Dry mouth or throat 7 - Moderate Problems  Nasal congestion 3 - Mild Problems    I reviewed his PAP download:  Average pressure: 13  Average AHI:  4  Average minutes in large leak per night: 0      Current medications are:   Current Outpatient Prescriptions:   •  allopurinol (ZYLOPRIM) 100 MG tablet, Take 100 mg by mouth Daily., Disp: , Rfl:   •  atorvastatin (LIPITOR) 20 MG tablet, Take 1 tablet by mouth Daily., Disp: 90 tablet, Rfl: 3  •  clonazePAM (KlonoPIN) 1 MG tablet, Take 1 tablet by mouth Every Night. (Patient taking differently: Take 1.5 mg by mouth Every Night.), Disp: 90  tablet, Rfl: 0  •  clopidogrel (PLAVIX) 75 MG tablet, Take 1 tablet by mouth daily., Disp: 90 tablet, Rfl: 3.      The patient's relevant past medical, surgical, family and social history were reviewed and updated in Epic as appropriate.     ROS:    Review of Systems   HENT: Positive for congestion.    Respiratory: Negative for apnea.    Skin:        Nasal rash   Psychiatric/Behavioral: Positive for sleep disturbance.         Objective:    Physical Exam   Constitutional: He is oriented to person, place, and time. He appears well-developed and well-nourished.   HENT:   Head: Normocephalic and atraumatic.   Mouth/Throat: Oropharynx is clear and moist.   Class I airway   Neck: Neck supple. No thyromegaly present.   Cardiovascular: Normal rate and regular rhythm.  Exam reveals no gallop and no friction rub.    No murmur heard.  Pulmonary/Chest: Effort normal. No respiratory distress. He has no wheezes. He has no rales.   Abdominal: Soft. Bowel sounds are normal.   Musculoskeletal: He exhibits no edema.   Neurological: He is alert and oriented to person, place, and time.   Skin: Skin is warm and dry.   Psychiatric: He has a normal mood and affect. His behavior is normal.   Vitals reviewed.      Data:    Patient's PAP download was personally reviewed including raw data and results.    Assessment:    Problem List Items Addressed This Visit        Pulmonary Problems    Severe obstructive sleep apnea - Primary    Overview     Auto CPAP 12-20            Other    REM sleep behavior disorder              Plan:     No change in PAP settings.  No change in his mask size or type.  I will reduce his Klonopin to 0.5 mg daily at bedtime as that dose seems to be effective for him as well.  I urged 7-8 hours of sleep per night on average.  I renewed supplies for the next year.  Could consider a mask liner again if that helps with his skin irritation but he thinks that is more related to his glasses than his mask.  Follow-up scheduled.   If problems in the interim he knows how to contact us or his DME company.  Discussed the above in detail with the patient.        Signed by  Reed Hansen MD

## 2017-06-23 ENCOUNTER — OFFICE VISIT (OUTPATIENT)
Dept: NEUROLOGY | Facility: CLINIC | Age: 77
End: 2017-06-23

## 2017-06-23 VITALS
BODY MASS INDEX: 27.83 KG/M2 | WEIGHT: 210 LBS | HEIGHT: 73 IN | SYSTOLIC BLOOD PRESSURE: 125 MMHG | DIASTOLIC BLOOD PRESSURE: 84 MMHG

## 2017-06-23 DIAGNOSIS — R41.3 MEMORY LOSS: Primary | ICD-10-CM

## 2017-06-23 PROCEDURE — 99214 OFFICE O/P EST MOD 30 MIN: CPT | Performed by: PSYCHIATRY & NEUROLOGY

## 2017-06-23 RX ORDER — DONEPEZIL HYDROCHLORIDE 5 MG/1
5 TABLET, FILM COATED ORAL DAILY
Qty: 30 TABLET | Refills: 11 | Status: SHIPPED | OUTPATIENT
Start: 2017-06-23 | End: 2017-12-11

## 2017-06-23 NOTE — PROGRESS NOTES
"Uche Polanco    Subjective     CC: memory loss    History of Present Illness   Uche Polanco returns to clinic today with a history of possible MCI. He has noted symptoms for several years marked by forgetfulness and word-finding difficulties . This has gradually worsened  over time. There have been no associated symptoms. He denies  impairments in ADL's.     He has a history of dizziness in the past, which has been well managed most recently ENT.    Neuroimaging in the past has been notable only for an old left cerebellar infarct. Screening bloodwork has been normal in the past as well.    Since his last visit on 12/22/16, he  has noted improvement in cognition since undergoing cognitive rehabilitation.        The following portions of the patient's history were reviewed and updated as appropriate: allergies, current medications, past family history, past medical history, past social history, past surgical history and problem list.    Review of Systems   Constitutional: Negative.        Objective     /84  Ht 73\" (185.4 cm)  Wt 210 lb (95.3 kg)  BMI 27.71 kg/m2     Neurologic Exam     Mental Status   Oriented to person.   Oriented to place.   Oriented to time.   Registration: recalls 3 of 3 objects. Recall at 5 minutes: recalls 1 of 3 objects.   Attention: normal.   Speech: speech is normal   Level of consciousness: alert  Knowledge: good.   Able to name object. Able to read. Able to repeat. Able to write. Normal comprehension.     Cranial Nerves   Cranial nerves II through XII intact.     Motor Exam   Muscle bulk: normal  Overall muscle tone: normal    Strength   Strength 5/5 throughout.     Sensory Exam   Light touch normal.       MMSE=27 (1/3 recall)      Assessment/Plan   Uche was seen today for memory loss.    Diagnoses and all orders for this visit:    Memory loss    Other orders  -     donepezil (ARICEPT) 5 MG tablet; Take 1 tablet by mouth Daily.          Uche Polanco returns to clinic today " with a history of possible Mild Cognitive Impairment (MCI) . I reviewed his current status, diagnosis and treatment options. After discussing potential treatment options, it was elected to add  donepezil. He has previously undergone cognitive rehabilitation, which he thought was helpful. He will then follow up in 6 months , or sooner if needed.       I spent 25 minutes face to face with the patient. I   spent 15 minutes of this time counseling and discussing diagnosis, prognosis, evaluation, treatment options and management.    As part of this visit I reviewed prior lab results and reviewed radiology results.    Chiki Newton MD

## 2017-07-31 ENCOUNTER — OFFICE VISIT (OUTPATIENT)
Dept: CARDIOLOGY | Facility: CLINIC | Age: 77
End: 2017-07-31

## 2017-07-31 VITALS
HEIGHT: 73 IN | HEART RATE: 64 BPM | WEIGHT: 212.8 LBS | BODY MASS INDEX: 28.2 KG/M2 | SYSTOLIC BLOOD PRESSURE: 126 MMHG | DIASTOLIC BLOOD PRESSURE: 72 MMHG

## 2017-07-31 DIAGNOSIS — E78.2 MIXED HYPERLIPIDEMIA: ICD-10-CM

## 2017-07-31 DIAGNOSIS — I10 ESSENTIAL HYPERTENSION: Primary | ICD-10-CM

## 2017-07-31 DIAGNOSIS — I44.30 AV BLOCK: ICD-10-CM

## 2017-07-31 PROCEDURE — 99214 OFFICE O/P EST MOD 30 MIN: CPT | Performed by: PHYSICIAN ASSISTANT

## 2017-07-31 PROCEDURE — 93288 INTERROG EVL PM/LDLS PM IP: CPT | Performed by: PHYSICIAN ASSISTANT

## 2017-07-31 NOTE — PROGRESS NOTES
[facility] at [facility] - Office Note  Uche Polanco         [address] [facility] [address]  1940   [phone] (home) [phone] (work)     LOCATION:  [place] [facility].  Visit Type: Follow Up.    PCP:  Jd Tapia MD    07/31/17   Uche Polanco is a 77 y.o.  male  retired.      Chief Complaint: FU on HTN, HLP, AVB with PPM.  PROBLEM LIST:  1. Valvular heart disease:  a. History of mitral valve repair at [facility] 2005, records pending.  b. Echocardiogram, August 2014: EF normal at 50% to 55%, moderate AR, mild MR, left atrium at 3.8 cm.   2. Recent TIA:  a. Status post Medtronic link loop recorder implantation, November 2014.  b. Recent interrogation revealing AV block, pauses, symptomatic with dizziness and lightheadedness.  3. Obstructive sleep apnea requiring CPAP.   4. Benign hypertension.   5. Gout.   6. Coronary artery disease by report, data insufficient.   7. REM disorder.   8. Dyslipidemia.   9. Family history of hypertension.   10. AV block, symptomatic with lightheadedness, status post pacemaker implantation, 04/17/2015, Dr. Robert Gerber: Medtronic Advisa  MRIA2 DR01.   11. Surgical history:   a. Hemorrhoidectomy.   b. Basal cell carcinoma removed.   c. Valvular repair (mitral valve).       Allergies   Allergen Reactions   • Sulfa Antibiotics          Current Outpatient Prescriptions:   •  allopurinol (ZYLOPRIM) 100 MG tablet, Take 100 mg by mouth Daily., Disp: , Rfl:   •  atorvastatin (LIPITOR) 20 MG tablet, Take 1 tablet by mouth Daily., Disp: 90 tablet, Rfl: 3  •  clonazePAM (KlonoPIN) 1 MG tablet, Take 0.5 tablets by mouth Every Night., Disp: 90 tablet, Rfl: 0  •  clopidogrel (PLAVIX) 75 MG tablet, Take 1 tablet by mouth daily., Disp: 90 tablet, Rfl: 3  •  donepezil (ARICEPT) 5 MG tablet, Take 1 tablet by mouth Daily., Disp: 30 tablet, Rfl: 11    HPI  Mr. Polanco is here for routine follow-up on long-standing controlled hypertension and  "hyperlipidemia with known AV block with pacemaker.  He is doing well from a cardiac standpoint complaints of exertional angina or dyspnea, no weakness, no syncope.  He gets routine bloodwork with his primary care and follows routinely.  He is asking if he could start to jog and sit up walking some.  He does exercise at least 4-5 times a week for aerobic activity.  He does not need refills today.          The following portions of the patient's history were reviewed in the chart and updated as appropriate: allergies, current medications, past family history, past medical history, past social history, past surgical history and problem list.    Review of Systems   Constitution: Negative for weakness and malaise/fatigue.   Cardiovascular: Negative for chest pain, dyspnea on exertion, irregular heartbeat and leg swelling.   Musculoskeletal: Negative for muscle cramps, muscle weakness and myalgias.             height is 73\" (185.4 cm) and weight is 212 lb 12.8 oz (96.5 kg). His blood pressure is 126/72 and his pulse is 64.   Physical Exam   Constitutional: Vital signs are normal. He appears well-developed and well-nourished.   Cardiovascular: Normal rate, regular rhythm, S1 normal, S2 normal, normal heart sounds, intact distal pulses and normal pulses.    Pulmonary/Chest: Effort normal and breath sounds normal. He has no wheezes. He has no rhonchi. He has no rales.   Abdominal: Soft. Normal appearance and bowel sounds are normal. There is no hepatosplenomegaly.   Neurological: He is alert.         Procedures   Medtronic pacemaker:  Atrial pacing 44%, P-wave 4.5, threshold 0.8, impedance 551 ohms.  Truro ventricular pacing 100%, threshold 0.4, impedance 817 ohms.  Battery voltage is at 7-1/2 years.  Assessment/ Plan   Essential hypertension:  Well-controlled.  Continue current medical regimen.    Mixed hyperlipidemia:  Continue Lipitor and appropriate diet.  Get routine bloodwork with PCP.    AV block:  Normal device " check.  Return to clinic 6 months with repeat Medtronic interrogation or sooner when necessary.        Ira Masters PA-C  7/31/2017 3:24 PM      EMR Dragon/Transcription disclaimer:   Much of this encounter note is an electronic transcription/translation of spoken language to printed text. The electronic translation of spoken language may permit erroneous, or at times, nonsensical words or phrases to be inadvertently transcribed; Although I have reviewed the note for such errors, some may still exist.

## 2017-08-10 RX ORDER — CLOPIDOGREL BISULFATE 75 MG/1
TABLET ORAL
Qty: 90 TABLET | Refills: 2 | Status: SHIPPED | OUTPATIENT
Start: 2017-08-10 | End: 2018-01-02 | Stop reason: SDUPTHER

## 2017-08-10 RX ORDER — ATORVASTATIN CALCIUM 20 MG/1
TABLET, FILM COATED ORAL
Qty: 90 TABLET | Refills: 2 | Status: SHIPPED | OUTPATIENT
Start: 2017-08-10 | End: 2020-07-22

## 2017-10-03 ENCOUNTER — TELEPHONE (OUTPATIENT)
Dept: CARDIOLOGY | Facility: CLINIC | Age: 77
End: 2017-10-03

## 2017-12-11 ENCOUNTER — OFFICE VISIT (OUTPATIENT)
Dept: NEUROLOGY | Facility: CLINIC | Age: 77
End: 2017-12-11

## 2017-12-11 VITALS — HEIGHT: 73 IN | WEIGHT: 212 LBS | BODY MASS INDEX: 28.1 KG/M2

## 2017-12-11 DIAGNOSIS — R41.3 MEMORY LOSS: Primary | ICD-10-CM

## 2017-12-11 PROCEDURE — 99214 OFFICE O/P EST MOD 30 MIN: CPT | Performed by: PHYSICIAN ASSISTANT

## 2017-12-11 RX ORDER — ALLOPURINOL 300 MG/1
TABLET ORAL
Refills: 5 | COMMUNITY
Start: 2017-11-14 | End: 2018-12-12

## 2017-12-11 RX ORDER — DONEPEZIL HYDROCHLORIDE 10 MG/1
10 TABLET, FILM COATED ORAL DAILY
Qty: 30 TABLET | Refills: 11 | Status: SHIPPED | OUTPATIENT
Start: 2017-12-11 | End: 2018-08-20

## 2017-12-11 NOTE — PROGRESS NOTES
"Subjective     Chief Complaint: memory loss      History of Present Illness   Uche Polanco is a 77 y.o. male who returns to clinic today with a history of possible MCI. He has noted symptoms for several years marked by forgetfulness and word-finding difficulties . This has gradually worsened  over time. There have been no associated symptoms. He denies  impairments in ADL's.      He has a history of dizziness in the past, which has been well managed most recently ENT.     Neuroimaging in the past has been notable only for an old left cerebellar infarct. Screening bloodwork has been normal in the past as well. He is currently taking donepezil 5mg daily. He has also tried cognitive rehabilitation in the past, which was quite beneficial.     Since his last visit in 6/17, he feels essentially unchanged cognitively and his family agrees. It is noted that he was hospitalized at [facility] in 11/17 after falling and hitting his head. He denies any associated loss of consciousness. He states that neuroimaging was unremarkable at this time.        I have reviewed and confirmed the past family, social and medical history as accurate on 12/11/17.     Review of Systems   Constitutional: Negative.    HENT: Negative.    Eyes: Negative.    Respiratory: Negative.    Cardiovascular: Negative.    Gastrointestinal: Negative.    Endocrine: Negative.    Genitourinary: Negative.    Musculoskeletal: Negative.    Skin: Negative.    Allergic/Immunologic: Negative.    Hematological: Negative.    Psychiatric/Behavioral: Negative.        Objective     Ht 185.4 cm (73\")  Wt 96.2 kg (212 lb)  BMI 27.97 kg/m2    General appearance today is normal.     Physical Exam   Constitutional: He is oriented to person, place, and time.   Neurological: He is oriented to person, place, and time. He has normal strength. He has a normal Finger-Nose-Finger Test. Gait normal.   Psychiatric: His speech is normal.        Neurologic Exam     Mental Status   Oriented to " person, place, and time.   Registration: recalls 3 of 3 objects. Recall at 5 minutes: recalls 3 of 3 objects. Follows 3 step commands.   Attention: normal.   Speech: speech is normal   Level of consciousness: alert  Able to name object. Able to read. Able to repeat. Able to write. Normal comprehension.     Cranial Nerves   Cranial nerves II through XII intact.     Motor Exam   Muscle bulk: normal  Overall muscle tone: normal    Strength   Strength 5/5 throughout.     Sensory Exam   Light touch normal.     Gait, Coordination, and Reflexes     Gait  Gait: normal    Coordination   Finger to nose coordination: normal    Tremor   Resting tremor: absent        Results  MMSE= 30 (27 in 6/17)       Assessment/Plan   Uche was seen today for memory loss.    Diagnoses and all orders for this visit:    Memory loss    Other orders  -     donepezil (ARICEPT) 10 MG tablet; Take 1 tablet by mouth Daily.          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 I again reviewed his current status and treatment options. After discussing potential treatment options, it was elected to increase  donepezil to 10mg daily. We also discussed potentially adding Namenda, though this was reasonably declined for now. He will then follow up in 6 months , or sooner if needed.       I spent 25 minutes out of 30  minutes face to face with the patient and family and discussing diagnosis, prognosis, diagnostic testing, evaluation, current status, treatment options and management.    As part of this visit I obtained additional history from the family which is incorporated in the HPI.      Georgina Vargas PA-C

## 2018-01-02 RX ORDER — CLOPIDOGREL BISULFATE 75 MG/1
75 TABLET ORAL DAILY
Qty: 90 TABLET | Refills: 1 | Status: SHIPPED | OUTPATIENT
Start: 2018-01-02 | End: 2021-02-02 | Stop reason: SDUPTHER

## 2018-01-23 DIAGNOSIS — G47.33 OBSTRUCTIVE SLEEP APNEA, ADULT: ICD-10-CM

## 2018-01-23 RX ORDER — CLONAZEPAM 1 MG/1
0.5 TABLET ORAL NIGHTLY
Qty: 90 TABLET | Refills: 0 | OUTPATIENT
Start: 2018-01-23 | End: 2018-04-17

## 2018-02-05 ENCOUNTER — OFFICE VISIT (OUTPATIENT)
Dept: CARDIOLOGY | Facility: CLINIC | Age: 78
End: 2018-02-05

## 2018-02-05 VITALS
HEART RATE: 69 BPM | HEIGHT: 73 IN | BODY MASS INDEX: 28.1 KG/M2 | DIASTOLIC BLOOD PRESSURE: 80 MMHG | SYSTOLIC BLOOD PRESSURE: 150 MMHG | WEIGHT: 212 LBS

## 2018-02-05 DIAGNOSIS — I25.10 CORONARY ARTERY DISEASE INVOLVING NATIVE CORONARY ARTERY OF NATIVE HEART WITHOUT ANGINA PECTORIS: ICD-10-CM

## 2018-02-05 DIAGNOSIS — I10 ESSENTIAL HYPERTENSION: Primary | ICD-10-CM

## 2018-02-05 DIAGNOSIS — E78.2 MIXED HYPERLIPIDEMIA: ICD-10-CM

## 2018-02-05 DIAGNOSIS — I44.30 AV BLOCK: ICD-10-CM

## 2018-02-05 PROCEDURE — 99214 OFFICE O/P EST MOD 30 MIN: CPT | Performed by: INTERNAL MEDICINE

## 2018-02-05 PROCEDURE — 93280 PM DEVICE PROGR EVAL DUAL: CPT | Performed by: INTERNAL MEDICINE

## 2018-02-05 RX ORDER — COLCHICINE 0.6 MG/1
0.6 TABLET ORAL AS NEEDED
COMMUNITY
End: 2018-12-12

## 2018-02-05 NOTE — PROGRESS NOTES
[facility] at [facility]  Office visit  Uche Polanco  1940  [phone]  VISIT DATE:  02/05/2018    PCP: Jd Tapia MD  [id number] [facility] [address]  [facility] [address]    CC:  Chief Complaint   Patient presents with   • Coronary Artery Disease   • av block       PROBLEM LIST:  1. Valvular heart disease:  a. History of mitral valve repair at [facility] 2005, records pending.  b. Echocardiogram, August 2014: EF normal at 50% to 55%, moderate AR, mild MR, left atrium at 3.8 cm.   c. Echo, January 2017: EF 50%, moderate aortic insufficiency  2. Recent TIA:  a. Status post Medtronic link loop recorder implantation, November 2014.  b. Recent interrogation revealing AV block, pauses, symptomatic with dizziness and lightheadedness.  3. Obstructive sleep apnea requiring CPAP.   4. Benign hypertension.   5. Gout.   6. Coronary artery disease by report, data insufficient.   7. REM disorder.   8. Dyslipidemia.   9. Family history of hypertension.   10. AV block, symptomatic with lightheadedness, status post pacemaker implantation, 04/17/2015, Dr. Robert Gerber: Medtronic Advisa  MRIA2 DR01.   11. Surgical history:   a. Hemorrhoidectomy.   b. Basal cell carcinoma removed.   c. Valvular repair (mitral valve).      ASSESSMENT:   Diagnosis Plan   1. Essential hypertension     2. Mixed hyperlipidemia     3. Coronary artery disease involving native coronary artery of native heart without angina pectoris     4. AV block       Device interrogation:  Medtronic dual-chamber pacemaker  43% atrial paced, sensed P-wave 3.9 mV, threshold 0.5 V at 0.4 ms, impedance 532 ohms  100% RV pacing, No escape rhythm at 40 bpm, threshold 0.5 V at 0.4 ms, impedance 779 ohms  1 episode of nonsustained VT lasting 6 beats  Estimated battery life 7 years    PLAN:  Mitral valve prolapse status post mitral valve repair: Stable on exam today.  Continue routine clinical follow-up and intermittent echocardiographic  "surveillance    Aortic insufficiency: Moderate and stable.    History of TIA: No evidence of atrial arrhythmia.  Continue Plavix for stroke prophylaxis.  Continue statin.    Hypertension: Goal less than 130/80 mmHg.  Continue current medical therapy.    Subjective  Reports stable functional capacity.  Denies chest pain, palpitations or dyspnea on exertion.  Blood pressures running less than 130/80 mmHg.  Compliant with medical therapy.  Experienced a concussion after a mechanical fall while wrestling with his arm which can at the street.  No long-term deficits.  Reviewed device interrogation today.    PHYSICAL EXAMINATION:  Vitals:    02/05/18 1532   BP: 150/80   BP Location: Left arm   Patient Position: Sitting   Pulse: 69   Weight: 96.2 kg (212 lb)   Height: 185.4 cm (73\")     General Appearance:    Alert, cooperative, no distress, appears stated age   Head:    Normocephalic, without obvious abnormality, atraumatic   Eyes:    conjunctiva/corneas clear   Nose:   Nares normal, septum midline, mucosa normal, no drainage   Throat:   Lips, teeth and gums normal   Neck:   Supple, symmetrical, trachea midline, no carotid    bruit or JVD   Lungs:     Clear to auscultation bilaterally, respirations unlabored   Chest Wall:    No tenderness or deformity    Heart:    Regular rate and rhythm, S1 and S2 normal, no murmur, rub   or gallop, normal carotid impulse bilaterally without bruit.   Abdomen:     Soft, non-tender   Extremities:   Extremities normal, atraumatic, no cyanosis or edema   Pulses:   2+ and symmetric all extremities   Skin:   Skin color, texture, turgor normal, no rashes or lesions       Diagnostic Data:    ECG 12 Lead  Date/Time: 2/5/2018 3:38 PM  Performed by: LEON CASON III  Authorized by: LEON CASON III   Comparison: compared with previous ECG   Similar to previous ECG  Rhythm: sinus rhythm  Rhythm comments: A sensed, ventricular paced  Clinical impression: abnormal ECG          Lab Results   Component " Value Date    CHLPL 190 08/22/2014    TRIG 189 (H) 08/22/2014    HDL 31 (L) 08/22/2014    LDLDIRECT 127 08/22/2014     Lab Results   Component Value Date    GLUCOSE 79 12/12/2016    BUN 16 12/12/2016    CREATININE 1.40 (H) 12/12/2016     12/12/2016    K 4.2 12/12/2016     12/12/2016    CO2 29.0 12/12/2016     Lab Results   Component Value Date    HGBA1C 5.6 04/08/2015     Lab Results   Component Value Date    WBC 7.58 12/12/2016    HGB 14.0 12/12/2016    HCT 42.6 12/12/2016     12/12/2016       Allergies  Allergies   Allergen Reactions   • Sulfa Antibiotics        Current Medications    Current Outpatient Prescriptions:   •  allopurinol (ZYLOPRIM) 300 MG tablet, TK 1 T PO D, Disp: , Rfl: 5  •  atorvastatin (LIPITOR) 20 MG tablet, TAKE 1 TABLET BY MOUTH DAILY, Disp: 90 tablet, Rfl: 2  •  clonazePAM (KlonoPIN) 1 MG tablet, Take 0.5 tablets by mouth Every Night., Disp: 90 tablet, Rfl: 0  •  clopidogrel (PLAVIX) 75 MG tablet, Take 1 tablet by mouth Daily., Disp: 90 tablet, Rfl: 1  •  colchicine 0.6 MG tablet, Take 0.6 mg by mouth As Needed for Muscle / Joint Pain., Disp: , Rfl:   •  donepezil (ARICEPT) 10 MG tablet, Take 1 tablet by mouth Daily. (Patient taking differently: Take 5 mg by mouth Daily.), Disp: 30 tablet, Rfl: 11          ROS  Review of Systems   Cardiovascular: Negative for chest pain, dyspnea on exertion, irregular heartbeat and palpitations.   Respiratory: Negative for cough and shortness of breath.          SOCIAL HX  Social History     Social History   • Marital status:      Spouse name: N/A   • Number of children: N/A   • Years of education: N/A     Occupational History   • Not on file.     Social History Main Topics   • Smoking status: Never Smoker   • Smokeless tobacco: Never Used   • Alcohol use 1.8 - 2.4 oz/week     3 - 4 Glasses of wine per week      Comment: week with dinner   • Drug use: No   • Sexual activity: Defer     Other Topics Concern   • Not on file     Social  History Narrative       FAMILY HX  Family History   Problem Relation Age of Onset   • Lymphoma Father    • Cancer Father    • Hypertension Other              Jd Limon III, MD, FACC

## 2018-04-17 ENCOUNTER — OFFICE VISIT (OUTPATIENT)
Dept: SLEEP MEDICINE | Facility: HOSPITAL | Age: 78
End: 2018-04-17

## 2018-04-17 VITALS
WEIGHT: 209 LBS | DIASTOLIC BLOOD PRESSURE: 64 MMHG | HEART RATE: 60 BPM | SYSTOLIC BLOOD PRESSURE: 156 MMHG | OXYGEN SATURATION: 96 % | HEIGHT: 73 IN | BODY MASS INDEX: 27.7 KG/M2

## 2018-04-17 DIAGNOSIS — G47.33 SEVERE OBSTRUCTIVE SLEEP APNEA: ICD-10-CM

## 2018-04-17 DIAGNOSIS — G47.52 REM SLEEP BEHAVIOR DISORDER: ICD-10-CM

## 2018-04-17 PROCEDURE — 99214 OFFICE O/P EST MOD 30 MIN: CPT | Performed by: INTERNAL MEDICINE

## 2018-04-17 RX ORDER — CLONAZEPAM 0.5 MG/1
0.5 TABLET ORAL
Qty: 30 TABLET | Refills: 2 | Status: SHIPPED | OUTPATIENT
Start: 2018-04-17 | End: 2018-11-05 | Stop reason: SDUPTHER

## 2018-04-17 NOTE — PROGRESS NOTES
Subjective:     Chief Complaint:   Chief Complaint   Patient presents with   • Follow-up       HPI:    Uche Polanco is a 77 y.o. male here for follow-up of obstructive sleep apnea.    He remains on auto CPAP therapy.  His range is 12-20.  He has mild elevation of his AHI at 8.  His wife has not noted any unusual behavior at night.      Further details are as follows:    Since last visit sleep problem has: remained the same  Currently using PAP: yes Hours of usage during the night: 6    Amount of sleep per night : 7 hours  Average length of time it takes to fall asleep : 5 minutes  Number of awakenings per night : 1     He feels fatigue (tiredness, exhaustion, lethargy) in the daytime even when not sleepy? Occasionally   He feels sleepy (or struggles to stay awake) in the daytime? Infrequently    Portland Scale scored as 4/24.    Type of mask: full face mask    He (since starting PAP or since last visit) has problems with the following:   Pressure from the mask 0 - No Problem  Skin irritation from the mask 1 - Mild Problems  Mask coming off face 0 - No Problem  Air leaks from the mask 5 - Moderate Problems  Dry mouth or throat 5 - Moderate Problems  Nasal congestion 0 - No Problem    I reviewed his PAP download:  Average pressure: 14  Average AHI:  8  Average minutes in large leak per night: 10      Current medications are:   Current Outpatient Prescriptions:   •  allopurinol (ZYLOPRIM) 300 MG tablet, TK 1 T PO D, Disp: , Rfl: 5  •  atorvastatin (LIPITOR) 20 MG tablet, TAKE 1 TABLET BY MOUTH DAILY, Disp: 90 tablet, Rfl: 2  •  clopidogrel (PLAVIX) 75 MG tablet, Take 1 tablet by mouth Daily., Disp: 90 tablet, Rfl: 1  •  colchicine 0.6 MG tablet, Take 0.6 mg by mouth As Needed for Muscle / Joint Pain., Disp: , Rfl:   •  donepezil (ARICEPT) 10 MG tablet, Take 1 tablet by mouth Daily. (Patient taking differently: Take 5 mg by mouth Daily.), Disp: 30 tablet, Rfl: 11  •  clonazePAM (KlonoPIN) 0.5 MG tablet, Take 1  tablet by mouth every night at bedtime., Disp: 30 tablet, Rfl: 2.      The patient's relevant past medical, surgical, family and social history were reviewed and updated in Epic as appropriate.     ROS:    Review of Systems   Constitutional: Negative for fatigue.   Respiratory: Positive for apnea.    Psychiatric/Behavioral: Negative for sleep disturbance.         Objective:    Physical Exam   Constitutional: He is oriented to person, place, and time. He appears well-developed and well-nourished.   HENT:   Head: Normocephalic and atraumatic.   Mouth/Throat: Oropharynx is clear and moist.   Class I airway   Neck: Neck supple. No thyromegaly present.   Cardiovascular: Normal rate and regular rhythm.  Exam reveals no gallop and no friction rub.    No murmur heard.  Pulmonary/Chest: Effort normal. No respiratory distress. He has no wheezes. He has no rales.   Musculoskeletal: He exhibits no edema.   Neurological: He is alert and oriented to person, place, and time.   Skin: Skin is warm and dry.   Psychiatric: He has a normal mood and affect. His behavior is normal.   Vitals reviewed.      Data:    Patient's PAP download was personally reviewed including raw data and results.    Assessment:    Problem List Items Addressed This Visit        Pulmonary Problems    Severe obstructive sleep apnea    Overview     Auto CPAP 12-20         Relevant Orders    PAP Therapy       Other    REM sleep behavior disorder      Other Visit Diagnoses    None.         His obstructive sleep apnea is fairly well controlled though he has mild elevation of his AHI on his current download.  This has been lower in the past.  I am hesitant to increase his CPAP pressure as he RD has some complaints of air leak and mask dislodgment and I think this will just make it worse.  The AHI elevation is intermittent and I think we can just leave his pressure settings as is for now.    In regards to his parasomnias, the diagnosis of a REM behavior disorder was  presumptive as we did not see clear evidence of it on his polysomnogram in the past.  He certainly has not developed any Parkinson's symptoms and we have discussed the association between the two though he understands that at this point he is really being treated for an unspecified parasomnia with low-dose Klonopin at 0.5 mg daily at bedtime.      Plan:     1. I renewed his prescription for clonazepam at 0.5 mg daily at bedtime.  I reviewed his eKasper.  He did recently receive prescriptions from Dr Tapia though I told him that I would prescribe his clonazepam for now.  2.  No change in CPAP settings.  See above.  3. I renewed supplies for the next year  4. 6 month follow-up though he will call for a renewal of his clonazepam in 3 months      Discussed in detail with the patient.  He will call prior to his follow up visit for any new problems.    Signed by  Reed Hansen MD

## 2018-05-16 ENCOUNTER — CLINICAL SUPPORT NO REQUIREMENTS (OUTPATIENT)
Dept: CARDIOLOGY | Facility: CLINIC | Age: 78
End: 2018-05-16

## 2018-06-13 ENCOUNTER — OFFICE VISIT (OUTPATIENT)
Dept: NEUROLOGY | Facility: CLINIC | Age: 78
End: 2018-06-13

## 2018-06-13 VITALS
SYSTOLIC BLOOD PRESSURE: 130 MMHG | BODY MASS INDEX: 27.83 KG/M2 | HEIGHT: 73 IN | WEIGHT: 210 LBS | DIASTOLIC BLOOD PRESSURE: 72 MMHG

## 2018-06-13 DIAGNOSIS — R41.3 MEMORY LOSS: Primary | ICD-10-CM

## 2018-06-13 PROCEDURE — 99213 OFFICE O/P EST LOW 20 MIN: CPT | Performed by: PSYCHIATRY & NEUROLOGY

## 2018-06-13 NOTE — PROGRESS NOTES
"Subjective     Chief Complaint: memory loss      History of Present Illness   Uche Polanco is a 78 y.o. male who returns to clinic today with a history of possible MCI. He has noted symptoms for several years marked by forgetfulness and word-finding difficulties . This has gradually worsened  over time. There have been no associated symptoms. He denies  impairments in ADL's.      He has a history of dizziness in the past, which has been well managed most recently ENT.    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     Neuroimaging in the past has been notable only for an old left cerebellar infarct. Screening bloodwork has been normal in the past as well. He is currently taking donepezil. He has also tried cognitive rehabilitation in the past, which was quite beneficial.     Since his last visit on 12/11/17 he feels that he has done well, denying any changes in his interval history.      I have reviewed and confirmed the past family, social and medical history as accurate on 6/13/17.     Review of Systems    Objective     /72   Ht 185.4 cm (73\")   Wt 95.3 kg (210 lb)   BMI 27.71 kg/m²     General appearance today is normal.     Physical Exam   Constitutional: He is oriented to person, place, and time.   Neurological: He is oriented to person, place, and time. He has normal strength.   Psychiatric: His speech is normal.        Neurologic Exam     Mental Status   Oriented to person, place, and time.   Registration: recalls 3 of 3 objects. Recall at 5 minutes: recalls 3 of 3 objects. Follows 3 step commands.   Attention: normal.   Speech: speech is normal   Level of consciousness: alert  Able to name object. Able to read. Able to repeat. Able to write. Normal comprehension.     Cranial Nerves   Cranial nerves II through XII intact.     Motor Exam   Muscle bulk: normal  Overall muscle tone: normal    Strength   Strength 5/5 throughout.         Results  MMSE= 30      Assessment/Plan   Uche was seen today " for memory loss.    Diagnoses and all orders for this visit:    Memory loss        Discussion/Summary   Uche Polanco returns to clinic today with a history of possible Mild Cognitive Impairment (MCI). I again reviewed his current status and treatment options. After discussing potential treatment options, it was elected to taper off donepezil and monitor his course for now. We have previously discussed the addition of Namenda, which he has reasonably declined. He will then follow up in 6 months, or sooner if needed.       I spent 10 minutes out of 15 minutes face to face with the patient and discussing diagnosis, evaluation, current status and management.      Chiki Newton MD

## 2018-08-20 ENCOUNTER — OFFICE VISIT (OUTPATIENT)
Dept: CARDIOLOGY | Facility: CLINIC | Age: 78
End: 2018-08-20

## 2018-08-20 VITALS
HEIGHT: 73 IN | WEIGHT: 215 LBS | BODY MASS INDEX: 28.49 KG/M2 | HEART RATE: 64 BPM | SYSTOLIC BLOOD PRESSURE: 128 MMHG | DIASTOLIC BLOOD PRESSURE: 54 MMHG

## 2018-08-20 DIAGNOSIS — I10 ESSENTIAL HYPERTENSION: ICD-10-CM

## 2018-08-20 DIAGNOSIS — I38 VALVULAR HEART DISEASE: Primary | ICD-10-CM

## 2018-08-20 DIAGNOSIS — I44.30 AV BLOCK: ICD-10-CM

## 2018-08-20 DIAGNOSIS — E78.2 MIXED HYPERLIPIDEMIA: ICD-10-CM

## 2018-08-20 DIAGNOSIS — I35.1 NONRHEUMATIC AORTIC VALVE INSUFFICIENCY: ICD-10-CM

## 2018-08-20 PROCEDURE — 93280 PM DEVICE PROGR EVAL DUAL: CPT | Performed by: INTERNAL MEDICINE

## 2018-08-20 PROCEDURE — 99214 OFFICE O/P EST MOD 30 MIN: CPT | Performed by: INTERNAL MEDICINE

## 2018-08-20 NOTE — PROGRESS NOTES
[facility] at [facility]  Office visit  Uche Polanco  1940  [phone]  VISIT DATE:  02/05/2018    PCP: Jd Tapia MD  [id number]  [facility] [address]    CC:  No chief complaint on file.      PROBLEM LIST:  1. Valvular heart disease:  a. History of mitral valve repair at [facility] 2005, records pending.  b. Echocardiogram, August 2014: EF normal at 50% to 55%, moderate AR, mild MR, left atrium at 3.8 cm.   c. Echo, January 2017: EF 50%, moderate aortic insufficiency  2. Recent TIA:  a. Status post Medtronic link loop recorder implantation, November 2014.  b. Recent interrogation revealing AV block, pauses, symptomatic with dizziness and lightheadedness.  3. Obstructive sleep apnea requiring CPAP.   4. Benign hypertension.   5. Gout.   6. Coronary artery disease by report, data insufficient.   7. REM disorder.   8. Dyslipidemia.   9. Family history of hypertension.   10. AV block, symptomatic with lightheadedness, status post pacemaker implantation, 04/17/2015, Dr. Robert Gerber: Medtronic Advisa  [id number] DR01.   11. Surgical history:   a. Hemorrhoidectomy.   b. Basal cell carcinoma removed.   c. Valvular repair (mitral valve).      ASSESSMENT:   Diagnosis Plan   1. Valvular heart disease     2. Essential hypertension     3. Mixed hyperlipidemia     4. AV block       Device interrogation:  Medtronic dual-chamber pacemaker  40% atrial paced, sensed P-wave 4.1 mV, threshold 0.75 V at 0.4 ms, impedance 551 ohms  100% RV pacing, No escape rhythm at 40 bpm, threshold 0.75 V at 0.4 ms, impedance 722 ohms  No arrhythmia  Estimated battery life 6.5 years    PLAN:  Mitral valve prolapse status post mitral valve repair: Stable on exam today.  Continue routine clinical follow-up and intermittent echocardiographic surveillance    Aortic insufficiency: Moderate and stable.  Echocardiographic evaluation every 2-3 years, repeat pending prior to follow-up in 6  months.    History of TIA: No evidence of atrial arrhythmia.  Continue Plavix for stroke prophylaxis.  Continue statin.  Afterload controlled, goal less than 130/80 mmHg.    Hypertension: Goal less than 130/80 mmHg.  Continue current medical therapy.    Symptomatic high-grade AV block: Device dependent.  Currently asymptomatic, continue routine follow-up in device clinic.    Hyperlipidemia: Goal LDL less than 70, continue statin.  Excellent control based on most recent fasting lipid panel.    Subjective  Reports stable functional capacity.  Has intermittent episodes when he is walking in which he feels a little unsteady, and appears to be improving.  He experienced a concussion last year with some transient vertigo after that.  Encouraged him to check his blood pressures at home will frequently if he has further episodes to make sure that this is not symptomatic hypotension.  Denies chest pain, palpitations or dyspnea on exertion.  compliant with medical therapy.      PHYSICAL EXAMINATION:  There were no vitals filed for this visit.  General Appearance:    Alert, cooperative, no distress, appears stated age   Head:    Normocephalic, without obvious abnormality, atraumatic   Eyes:    conjunctiva/corneas clear   Nose:   Nares normal, septum midline, mucosa normal, no drainage   Throat:   Lips, teeth and gums normal   Neck:   Supple, symmetrical, trachea midline, no carotid    bruit or JVD   Lungs:     Clear to auscultation bilaterally, respirations unlabored   Chest Wall:    No tenderness or deformity    Heart:    Regular rate and rhythm, S1 and S2 normal, no murmur, rub   or gallop, normal carotid impulse bilaterally without bruit.   Abdomen:     Soft, non-tender   Extremities:   Extremities normal, atraumatic, no cyanosis or edema   Pulses:   2+ and symmetric all extremities   Skin:   Skin color, texture, turgor normal, no rashes or lesions       Diagnostic Data:  Procedures  Lab Results   Component Value Date     CHLPL 190 08/22/2014    TRIG 189 (H) 08/22/2014    HDL 31 (L) 08/22/2014     Lab Results   Component Value Date    GLUCOSE 79 12/12/2016    BUN 16 12/12/2016    CREATININE 1.40 (H) 12/12/2016     12/12/2016    K 4.2 12/12/2016     12/12/2016    CO2 29.0 12/12/2016     Lab Results   Component Value Date    HGBA1C 5.6 04/08/2015     Lab Results   Component Value Date    WBC 7.58 12/12/2016    HGB 14.0 12/12/2016    HCT 42.6 12/12/2016     12/12/2016       Allergies  Allergies   Allergen Reactions   • Sulfa Antibiotics        Current Medications    Current Outpatient Prescriptions:   •  allopurinol (ZYLOPRIM) 300 MG tablet, TK 1 T PO D, Disp: , Rfl: 5  •  atorvastatin (LIPITOR) 20 MG tablet, TAKE 1 TABLET BY MOUTH DAILY, Disp: 90 tablet, Rfl: 2  •  clonazePAM (KlonoPIN) 0.5 MG tablet, Take 1 tablet by mouth every night at bedtime., Disp: 30 tablet, Rfl: 2  •  clopidogrel (PLAVIX) 75 MG tablet, Take 1 tablet by mouth Daily., Disp: 90 tablet, Rfl: 1  •  colchicine 0.6 MG tablet, Take 0.6 mg by mouth As Needed for Muscle / Joint Pain., Disp: , Rfl:   •  donepezil (ARICEPT) 10 MG tablet, Take 1 tablet by mouth Daily. (Patient taking differently: Take 5 mg by mouth Daily.), Disp: 30 tablet, Rfl: 11          ROS  Review of Systems   Cardiovascular: Negative for chest pain, dyspnea on exertion, irregular heartbeat and palpitations.   Respiratory: Negative for cough and shortness of breath.          SOCIAL HX  Social History     Social History   • Marital status:      Spouse name: N/A   • Number of children: N/A   • Years of education: N/A     Occupational History   • Not on file.     Social History Main Topics   • Smoking status: Never Smoker   • Smokeless tobacco: Never Used   • Alcohol use 1.8 - 2.4 oz/week     3 - 4 Glasses of wine per week      Comment: week with dinner   • Drug use: No   • Sexual activity: Defer     Other Topics Concern   • Not on file     Social History Narrative   • No  narrative on file       FAMILY HX  Family History   Problem Relation Age of Onset   • Lymphoma Father    • Cancer Father    • Hypertension Other              Jd Limon III, MD, FACC

## 2018-09-10 ENCOUNTER — OFFICE VISIT (OUTPATIENT)
Dept: SLEEP MEDICINE | Facility: HOSPITAL | Age: 78
End: 2018-09-10

## 2018-09-10 VITALS
HEART RATE: 79 BPM | SYSTOLIC BLOOD PRESSURE: 129 MMHG | HEIGHT: 73 IN | WEIGHT: 210 LBS | OXYGEN SATURATION: 95 % | DIASTOLIC BLOOD PRESSURE: 65 MMHG | BODY MASS INDEX: 27.83 KG/M2

## 2018-09-10 DIAGNOSIS — G47.33 OSA (OBSTRUCTIVE SLEEP APNEA): Primary | ICD-10-CM

## 2018-09-10 PROCEDURE — 99213 OFFICE O/P EST LOW 20 MIN: CPT | Performed by: NURSE PRACTITIONER

## 2018-09-10 NOTE — PATIENT INSTRUCTIONS

## 2018-09-10 NOTE — PROGRESS NOTES
Subjective: ,ccn      Chief Complaint:   Chief Complaint   Patient presents with   • Follow-up       HPI:    Uche Polanco is a 78 y.o. male here for follow-up of edy.  Patient is wearing his CPAP therapy 86.1% of the time.  He is intermittently sleepy throughout the day but does not nap.  He does awaken feeling refreshed.  He complains he is not getting a good mask fit and having some leakage when this does cause some restless sleep.  His humidifier is also broken and he has been able to get the humidified air this is causing him to have a dry mouth and stopped up.  Langley score 7/24.        Current medications are:   Current Outpatient Prescriptions:   •  allopurinol (ZYLOPRIM) 300 MG tablet, TK 1 T PO D as needed, Disp: , Rfl: 5  •  atorvastatin (LIPITOR) 20 MG tablet, TAKE 1 TABLET BY MOUTH DAILY, Disp: 90 tablet, Rfl: 2  •  clonazePAM (KlonoPIN) 0.5 MG tablet, Take 1 tablet by mouth every night at bedtime., Disp: 30 tablet, Rfl: 2  •  clopidogrel (PLAVIX) 75 MG tablet, Take 1 tablet by mouth Daily., Disp: 90 tablet, Rfl: 1  •  colchicine 0.6 MG tablet, Take 0.6 mg by mouth As Needed for Muscle / Joint Pain., Disp: , Rfl: .      The patient's relevant past medical, surgical, family and social history were reviewed and updated in Epic as appropriate.       Review of Systems   Constitutional: Negative for fatigue.   Eyes: Positive for visual disturbance.   Musculoskeletal: Positive for arthralgias and joint swelling.   Allergic/Immunologic: Positive for environmental allergies.   Psychiatric/Behavioral: Positive for confusion and sleep disturbance.   All other systems reviewed and are negative.        Objective:    Physical Exam   Constitutional: He is oriented to person, place, and time. He appears well-developed and well-nourished.   HENT:   Head: Normocephalic and atraumatic.   Mouth/Throat: Oropharynx is clear and moist.   Mallampati 1 anatomy   Eyes: Conjunctivae are normal.   Neck: Neck supple. No  thyromegaly present.   Cardiovascular: Normal rate and regular rhythm.    Pulmonary/Chest: Effort normal and breath sounds normal.   Lymphadenopathy:     He has no cervical adenopathy.   Neurological: He is alert and oriented to person, place, and time.   Skin: Skin is warm and dry.   Psychiatric: He has a normal mood and affect. His behavior is normal. Judgment and thought content normal.   Nursing note and vitals reviewed.  Downloading compliance has been reviewed with patient.  Usage greater than 4 hours at 86.1.  AHI is decreased to 7.9 from previous visit.  90% pressure 15.5.      ASSESSMENT/PLAN    Uche was seen today for follow-up.    Diagnoses and all orders for this visit:    TOM (obstructive sleep apnea)  -     CPAP Therapy            1. Counseled patient regarding multimodal approach with healthy nutrition, healthy sleep, regular physical activity, social activities, counseling, and medications. Encouraged to practice lateral sleep position. Avoid alcohol and sedatives close to bedtime.  2. Refill supplies ×1 year also given patient an order to repair or replace machine he will call me for any problems or concerns.  Hopefully with replacement mask and also new machine we can continue to lower his AHI.  3. Return to clinic 1 year    I have reviewed the results of my evaluation and impression and discussed my recommendations in detail with the patient.      Signed by  JONATHAN Preciado    September 10, 2018      CC: Jd Tapia MD          No ref. provider found

## 2018-10-17 ENCOUNTER — CLINICAL SUPPORT NO REQUIREMENTS (OUTPATIENT)
Dept: CARDIOLOGY | Facility: CLINIC | Age: 78
End: 2018-10-17

## 2018-10-17 DIAGNOSIS — I44.30 AV BLOCK: Primary | ICD-10-CM

## 2018-10-17 DIAGNOSIS — I38 VALVULAR HEART DISEASE: ICD-10-CM

## 2018-10-17 PROCEDURE — 93294 REM INTERROG EVL PM/LDLS PM: CPT | Performed by: INTERNAL MEDICINE

## 2018-10-17 PROCEDURE — 93296 REM INTERROG EVL PM/IDS: CPT | Performed by: INTERNAL MEDICINE

## 2018-11-05 ENCOUNTER — OFFICE VISIT (OUTPATIENT)
Dept: SLEEP MEDICINE | Facility: HOSPITAL | Age: 78
End: 2018-11-05

## 2018-11-05 VITALS
WEIGHT: 212.8 LBS | BODY MASS INDEX: 28.2 KG/M2 | HEIGHT: 73 IN | HEART RATE: 66 BPM | OXYGEN SATURATION: 96 % | DIASTOLIC BLOOD PRESSURE: 65 MMHG | SYSTOLIC BLOOD PRESSURE: 146 MMHG

## 2018-11-05 DIAGNOSIS — G47.33 OSA (OBSTRUCTIVE SLEEP APNEA): Primary | ICD-10-CM

## 2018-11-05 PROCEDURE — 99213 OFFICE O/P EST LOW 20 MIN: CPT | Performed by: NURSE PRACTITIONER

## 2018-11-05 RX ORDER — DONEPEZIL HYDROCHLORIDE 10 MG/1
10 TABLET, FILM COATED ORAL NIGHTLY
COMMUNITY
End: 2018-12-28 | Stop reason: SDUPTHER

## 2018-11-05 NOTE — PROGRESS NOTES
Subjective: Follow-up        Chief Complaint:   Chief Complaint   Patient presents with   • Follow-up       HPI:    Uche Polanco is a 78 y.o. male here for follow-up of edy, patient was last seen on 9:15 and 18 at that time needed a new machine and a new mask.  He has went back to his full facemask again and states he is doing very well with this.  We do have his AHI under control at this time at 1.9.  He feels he is doing very well with current settings and wishes to continue CPAP therapy.  He sleeping 6-7 hours nightly and his Morgan score is 2/24.        Current medications are:   Current Outpatient Prescriptions:   •  atorvastatin (LIPITOR) 20 MG tablet, TAKE 1 TABLET BY MOUTH DAILY, Disp: 90 tablet, Rfl: 2  •  clonazePAM (KlonoPIN) 0.5 MG tablet, Take 1 tablet by mouth every night at bedtime., Disp: 30 tablet, Rfl: 2  •  clopidogrel (PLAVIX) 75 MG tablet, Take 1 tablet by mouth Daily., Disp: 90 tablet, Rfl: 1  •  colchicine 0.6 MG tablet, Take 0.6 mg by mouth As Needed for Muscle / Joint Pain., Disp: , Rfl:   •  donepezil (ARICEPT) 10 MG tablet, Take 10 mg by mouth Every Night., Disp: , Rfl:   •  allopurinol (ZYLOPRIM) 300 MG tablet, TK 1 T PO D as needed, Disp: , Rfl: 5.      The patient's relevant past medical, surgical, family and social history were reviewed and updated in Epic as appropriate.       Review of Systems   Eyes: Positive for visual disturbance.   Respiratory: Positive for apnea.    Musculoskeletal: Positive for arthralgias and joint swelling.   Allergic/Immunologic: Positive for environmental allergies.   Psychiatric/Behavioral: Positive for sleep disturbance.   All other systems reviewed and are negative.        Objective:    Physical Exam   Constitutional: He appears well-developed and well-nourished.   HENT:   Head: Normocephalic and atraumatic.   Mouth/Throat: Oropharynx is clear and moist.   Mallampati 1 anatomy   Eyes: Conjunctivae are normal.   Neck: Neck supple. No thyromegaly  present.   Cardiovascular: Normal rate and regular rhythm.    Pulmonary/Chest: Effort normal and breath sounds normal.   Lymphadenopathy:     He has no cervical adenopathy.   Nursing note and vitals reviewed.    Download a compliance has been reviewed with patient.  His greater than 4 uses at 67%.  AHI controlled at 1.9.  95th percentile pressure 13.2.    ASSESSMENT/PLAN    Uche was seen today for follow-up.    Diagnoses and all orders for this visit:    TOM (obstructive sleep apnea)  -     CPAP Therapy            1. Counseled patient regarding multimodal approach with healthy nutrition, healthy sleep, regular physical activity, social activities, counseling, and medications. Encouraged to practice lateral sleep position. Avoid alcohol and sedatives close to bedtime.  2. Patient encouraged to increase his time greater than 4 hours now that.he is happier with his fullface mask.  He will return to clinic in 1 year or sooner if symptoms warrant.    I have reviewed the results of my evaluation and impression and discussed my recommendations in detail with the patient.      Signed by  Letty Linder, APRN    November 5, 2018      CC: Jd Tapia MD          No ref. provider found

## 2018-11-05 NOTE — PATIENT INSTRUCTIONS

## 2018-11-12 RX ORDER — CLONAZEPAM 0.5 MG/1
0.5 TABLET ORAL
Qty: 30 TABLET | Refills: 2 | Status: SHIPPED | OUTPATIENT
Start: 2018-11-12 | End: 2018-12-12 | Stop reason: DRUGHIGH

## 2018-11-26 ENCOUNTER — TELEPHONE (OUTPATIENT)
Dept: SLEEP MEDICINE | Facility: HOSPITAL | Age: 78
End: 2018-11-26

## 2018-11-26 RX ORDER — CLONAZEPAM 0.5 MG/1
0.5 TABLET ORAL
Qty: 30 TABLET | Refills: 0 | OUTPATIENT
Start: 2018-11-26

## 2018-11-26 NOTE — TELEPHONE ENCOUNTER
SPOKE WITH PATIENT AND ADVISED HIM THAT HIS MEDICATION WAS CALLED IN ON 11/12/18. PATIENT VERBALIZED UNDERSTANDING

## 2018-11-27 ENCOUNTER — TELEPHONE (OUTPATIENT)
Dept: SLEEP MEDICINE | Facility: HOSPITAL | Age: 78
End: 2018-11-27

## 2018-11-27 NOTE — TELEPHONE ENCOUNTER
PATIENT STOPPED BY THE OFFICE STATING THAT HE PICKED UP HIS KLONOPIN 0.5 MG ON 11/26/18 FROM THE PHARMACY AND WANTED TO LET OLVIN KNOW THAT HE NO LONGER WANTED HER TO PRESCRIBE THE MEDICATION. PATIENT STATED THAT HE WAS GOING TO CONTINUE TO SEE DR SAEED AND GET MEDICATION FILLED THROUGH HIM.

## 2018-11-29 ENCOUNTER — TELEPHONE (OUTPATIENT)
Dept: SLEEP MEDICINE | Facility: HOSPITAL | Age: 78
End: 2018-11-29

## 2018-11-29 NOTE — TELEPHONE ENCOUNTER
CLONAZEPAM 1 MG CALLED INTO PHARMACY. PATIENT PUT ON RECALL UNTIL DR. EAGLE'S SCHEDULE OPENS IN MARCH/APRIL.

## 2018-11-29 NOTE — TELEPHONE ENCOUNTER
I have spoken to DR Montoya, he may have clonazepam 1mg one po Qhs #30, 3 rf , he needs appts with MD

## 2018-12-12 ENCOUNTER — OFFICE VISIT (OUTPATIENT)
Dept: NEUROLOGY | Facility: CLINIC | Age: 78
End: 2018-12-12

## 2018-12-12 VITALS
WEIGHT: 212.8 LBS | DIASTOLIC BLOOD PRESSURE: 78 MMHG | BODY MASS INDEX: 28.2 KG/M2 | SYSTOLIC BLOOD PRESSURE: 148 MMHG | HEIGHT: 73 IN

## 2018-12-12 DIAGNOSIS — R41.3 MEMORY LOSS: Primary | ICD-10-CM

## 2018-12-12 PROCEDURE — 99213 OFFICE O/P EST LOW 20 MIN: CPT | Performed by: PSYCHIATRY & NEUROLOGY

## 2018-12-12 RX ORDER — CLONAZEPAM 1 MG/1
TABLET ORAL
Refills: 3 | COMMUNITY
Start: 2018-11-29 | End: 2019-04-04 | Stop reason: SDUPTHER

## 2018-12-12 RX ORDER — HEPATITIS A VACCINE 1440 [IU]/ML
INJECTION, SUSPENSION INTRAMUSCULAR
Refills: 0 | COMMUNITY
Start: 2018-10-16 | End: 2019-03-26

## 2018-12-12 NOTE — PROGRESS NOTES
"Subjective     Chief Complaint: memory loss      Memory Loss        Uche Polanco is a 78 y.o. male who returns to clinic today with a history of possible MCI. He has noted symptoms for several years marked by forgetfulness and word-finding difficulties . This has gradually worsened  over time. There have been no associated symptoms. He denies  impairments in ADL's.      He has a history of dizziness in the past, which has been well managed most recently ENT.    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     Neuroimaging in the past has been notable only for an old left cerebellar infarct. Screening bloodwork has been normal in the past as well. He is currently taking donepezil. He has also tried cognitive rehabilitation in the past, which was quite beneficial.     Since his last visit on 6/13/18 he feels that he has done well, denying any changes in his interval history. He remains very active, including with volunteering.      I have reviewed and confirmed the past family, social and medical history as accurate on 6/13/17.     Review of Systems   Constitutional: Negative.        Objective     /78   Ht 185.4 cm (72.99\")   Wt 96.5 kg (212 lb 12.8 oz)   BMI 28.08 kg/m²     General appearance today is normal.     Physical Exam   Constitutional: He is oriented to person, place, and time.   Neurological: He is oriented to person, place, and time. He has normal strength. He has a normal Finger-Nose-Finger Test.   Psychiatric: His speech is normal.        Neurologic Exam     Mental Status   Oriented to person, place, and time.   Registration: recalls 3 of 3 objects. Recall at 5 minutes: recalls 3 of 3 objects. Follows 3 step commands.   Attention: normal.   Speech: speech is normal   Level of consciousness: alert  Able to name object. Able to read. Able to repeat. Able to write. Normal comprehension.     Cranial Nerves   Cranial nerves II through XII intact.     Motor Exam   Muscle bulk: normal  Overall " muscle tone: normal    Strength   Strength 5/5 throughout.     Gait, Coordination, and Reflexes     Coordination   Finger to nose coordination: normal        Results  MMSE=29      Assessment/Plan   Uche was seen today for memory loss.    Diagnoses and all orders for this visit:    Memory loss        Discussion/Summary   Uche Polanco returns to clinic today with a history of possible Mild Cognitive Impairment (MCI). I again reviewed his current status and treatment options. He would like to again try tapering donepezil. We have previously discussed the addition of Namenda, which he has reasonably declined. He will then follow up in 6 months, or sooner if needed.       I spent 10 minutes out of 15 minutes face to face with the patient and discussing diagnosis, evaluation, current status, treatment options and management.      Chiki Newton MD

## 2018-12-28 ENCOUNTER — TELEPHONE (OUTPATIENT)
Dept: NEUROLOGY | Facility: CLINIC | Age: 78
End: 2018-12-28

## 2018-12-28 RX ORDER — DONEPEZIL HYDROCHLORIDE 5 MG/1
5 TABLET, FILM COATED ORAL NIGHTLY
Qty: 30 TABLET | Refills: 11 | Status: SHIPPED | OUTPATIENT
Start: 2018-12-28 | End: 2019-06-12 | Stop reason: SDUPTHER

## 2018-12-28 NOTE — TELEPHONE ENCOUNTER
----- Message from Marline Schulte sent at 12/28/2018 11:17 AM EST -----  Contact: pt  NATALIA:    PT WAS ON donepezil (ARICEPT) 10 MG tablet, DR TOLD HIM IF HE WAS COMFORTABLE WITH IT HE CAN COME OFF OF IT, BUT PT DECIDED HE WOULD LIKE TO STAY ON IT BUT DO THE 5 MG.     PHARMACY: [facility]

## 2019-01-09 ENCOUNTER — CLINICAL SUPPORT NO REQUIREMENTS (OUTPATIENT)
Dept: CARDIOLOGY | Facility: CLINIC | Age: 79
End: 2019-01-09

## 2019-01-09 DIAGNOSIS — I44.30 AV BLOCK: ICD-10-CM

## 2019-02-13 ENCOUNTER — HOSPITAL ENCOUNTER (OUTPATIENT)
Dept: CARDIOLOGY | Facility: HOSPITAL | Age: 79
Discharge: HOME | End: 2019-02-13
Attending: INTERNAL MEDICINE | Admitting: INTERNAL MEDICINE

## 2019-02-13 VITALS
BODY MASS INDEX: 28.71 KG/M2 | DIASTOLIC BLOOD PRESSURE: 80 MMHG | SYSTOLIC BLOOD PRESSURE: 140 MMHG | WEIGHT: 212 LBS | HEIGHT: 72 IN

## 2019-02-13 DIAGNOSIS — I35.1 NONRHEUMATIC AORTIC VALVE INSUFFICIENCY: ICD-10-CM

## 2019-02-13 DIAGNOSIS — I38 VALVULAR HEART DISEASE: ICD-10-CM

## 2019-02-13 LAB
BH CV ECHO MEAS - AI DEC SLOPE: 313 CM/SEC^2
BH CV ECHO MEAS - AI MAX PG: 72.3 MMHG
BH CV ECHO MEAS - AI MAX VEL: 425 CM/SEC
BH CV ECHO MEAS - AI P1/2T: 397.7 MSEC
BH CV ECHO MEAS - AO MAX PG (FULL): 1.3 MMHG
BH CV ECHO MEAS - AO MAX PG: 5 MMHG
BH CV ECHO MEAS - AO MEAN PG (FULL): 1 MMHG
BH CV ECHO MEAS - AO MEAN PG: 3 MMHG
BH CV ECHO MEAS - AO ROOT AREA (BSA CORRECTED): 1.9
BH CV ECHO MEAS - AO ROOT AREA: 13.9 CM^2
BH CV ECHO MEAS - AO ROOT DIAM: 4.2 CM
BH CV ECHO MEAS - AO V2 MAX: 107 CM/SEC
BH CV ECHO MEAS - AO V2 MEAN: 78.3 CM/SEC
BH CV ECHO MEAS - AO V2 VTI: 24.7 CM
BH CV ECHO MEAS - AVA(I,A): 4.2 CM^2
BH CV ECHO MEAS - AVA(I,D): 4.2 CM^2
BH CV ECHO MEAS - AVA(V,A): 4.1 CM^2
BH CV ECHO MEAS - AVA(V,D): 4.1 CM^2
BH CV ECHO MEAS - BSA(HAYCOCK): 2.2 M^2
BH CV ECHO MEAS - BSA: 2.2 M^2
BH CV ECHO MEAS - BZI_BMI: 28.8 KILOGRAMS/M^2
BH CV ECHO MEAS - BZI_METRIC_HEIGHT: 182.9 CM
BH CV ECHO MEAS - BZI_METRIC_WEIGHT: 96.2 KG
BH CV ECHO MEAS - EDV(CUBED): 120.6 ML
BH CV ECHO MEAS - EDV(MOD-SP2): 146 ML
BH CV ECHO MEAS - EDV(MOD-SP4): 181 ML
BH CV ECHO MEAS - EDV(TEICH): 115 ML
BH CV ECHO MEAS - EF(CUBED): 72.6 %
BH CV ECHO MEAS - EF(MOD-SP2): 58.2 %
BH CV ECHO MEAS - EF(MOD-SP4): 41.4 %
BH CV ECHO MEAS - EF(TEICH): 64.1 %
BH CV ECHO MEAS - ESV(CUBED): 33.1 ML
BH CV ECHO MEAS - ESV(MOD-SP2): 61 ML
BH CV ECHO MEAS - ESV(MOD-SP4): 106 ML
BH CV ECHO MEAS - ESV(TEICH): 41.3 ML
BH CV ECHO MEAS - FS: 35 %
BH CV ECHO MEAS - IVS/LVPW: 1
BH CV ECHO MEAS - IVSD: 1.2 CM
BH CV ECHO MEAS - LA DIMENSION: 4.5 CM
BH CV ECHO MEAS - LA/AO: 1.1
BH CV ECHO MEAS - LAD MAJOR: 5.4 CM
BH CV ECHO MEAS - LAT PEAK E' VEL: 9 CM/SEC
BH CV ECHO MEAS - LATERAL E/E' RATIO: 12
BH CV ECHO MEAS - LV DIASTOLIC VOL/BSA (35-75): 82.9 ML/M^2
BH CV ECHO MEAS - LV MASS(C)D: 213.4 GRAMS
BH CV ECHO MEAS - LV MASS(C)DI: 97.7 GRAMS/M^2
BH CV ECHO MEAS - LV MAX PG: 3.7 MMHG
BH CV ECHO MEAS - LV MEAN PG: 2 MMHG
BH CV ECHO MEAS - LV SYSTOLIC VOL/BSA (12-30): 48.5 ML/M^2
BH CV ECHO MEAS - LV V1 MAX: 96.8 CM/SEC
BH CV ECHO MEAS - LV V1 MEAN: 63.6 CM/SEC
BH CV ECHO MEAS - LV V1 VTI: 22.9 CM
BH CV ECHO MEAS - LVIDD: 4.9 CM
BH CV ECHO MEAS - LVIDS: 3.2 CM
BH CV ECHO MEAS - LVLD AP2: 8.8 CM
BH CV ECHO MEAS - LVLD AP4: 10.4 CM
BH CV ECHO MEAS - LVLS AP2: 7.7 CM
BH CV ECHO MEAS - LVLS AP4: 9.2 CM
BH CV ECHO MEAS - LVOT AREA (M): 4.5 CM^2
BH CV ECHO MEAS - LVOT AREA: 4.5 CM^2
BH CV ECHO MEAS - LVOT DIAM: 2.4 CM
BH CV ECHO MEAS - LVPWD: 1.1 CM
BH CV ECHO MEAS - MED PEAK E' VEL: 5.4 CM/SEC
BH CV ECHO MEAS - MEDIAL E/E' RATIO: 19.7
BH CV ECHO MEAS - MV A MAX VEL: 114 CM/SEC
BH CV ECHO MEAS - MV E MAX VEL: 107 CM/SEC
BH CV ECHO MEAS - MV E/A: 0.94
BH CV ECHO MEAS - MV MAX PG: 13.5 MMHG
BH CV ECHO MEAS - MV MEAN PG: 6.5 MMHG
BH CV ECHO MEAS - MV P1/2T MAX VEL: 143 CM/SEC
BH CV ECHO MEAS - MV P1/2T: 149 MSEC
BH CV ECHO MEAS - MV V2 MAX: 183.5 CM/SEC
BH CV ECHO MEAS - MV V2 MEAN: 122 CM/SEC
BH CV ECHO MEAS - MV V2 VTI: 47.3 CM
BH CV ECHO MEAS - MVA P1/2T LCG: 1.5 CM^2
BH CV ECHO MEAS - MVA(P1/2T): 1.5 CM^2
BH CV ECHO MEAS - PA ACC SLOPE: 510 CM/SEC^2
BH CV ECHO MEAS - PA ACC TIME: 0.12 SEC
BH CV ECHO MEAS - PA MAX PG: 3.1 MMHG
BH CV ECHO MEAS - PA PR(ACCEL): 26.8 MMHG
BH CV ECHO MEAS - PA V2 MAX: 88.2 CM/SEC
BH CV ECHO MEAS - RAP SYSTOLE: 3 MMHG
BH CV ECHO MEAS - RVSP: 24 MMHG
BH CV ECHO MEAS - SI(AO): 156.7 ML/M^2
BH CV ECHO MEAS - SI(CUBED): 40.1 ML/M^2
BH CV ECHO MEAS - SI(LVOT): 47.4 ML/M^2
BH CV ECHO MEAS - SI(MOD-SP2): 38.9 ML/M^2
BH CV ECHO MEAS - SI(MOD-SP4): 34.3 ML/M^2
BH CV ECHO MEAS - SI(TEICH): 33.7 ML/M^2
BH CV ECHO MEAS - SV(AO): 342.2 ML
BH CV ECHO MEAS - SV(CUBED): 87.5 ML
BH CV ECHO MEAS - SV(LVOT): 103.6 ML
BH CV ECHO MEAS - SV(MOD-SP2): 85 ML
BH CV ECHO MEAS - SV(MOD-SP4): 75 ML
BH CV ECHO MEAS - SV(TEICH): 73.7 ML
BH CV ECHO MEAS - TAPSE (>1.6): 2.9 CM2
BH CV ECHO MEAS - TR MAX PG: 21 MMHG
BH CV ECHO MEAS - TR MAX VEL: 227 CM/SEC
BH CV ECHO MEASUREMENTS AVERAGE E/E' RATIO: 14.86
BH CV VAS BP RIGHT ARM: NORMAL MMHG
BH CV XLRA - RV BASE: 3.7 CM
BH CV XLRA - RV LENGTH: 7 CM
BH CV XLRA - RV MID: 3.6 CM
BH CV XLRA - TDI S': 9.83 CM/SEC
LEFT ATRIUM VOLUME INDEX: 31.1 ML/M^2
LEFT ATRIUM VOLUME: 68 ML
MAXIMAL PREDICTED HEART RATE: 142 BPM
STRESS TARGET HR: 121 BPM

## 2019-02-13 PROCEDURE — 93306 TTE W/DOPPLER COMPLETE: CPT | Performed by: INTERNAL MEDICINE

## 2019-02-13 PROCEDURE — 93306 TTE W/DOPPLER COMPLETE: CPT

## 2019-02-15 ENCOUNTER — TELEPHONE (OUTPATIENT)
Dept: CARDIOLOGY | Facility: CLINIC | Age: 79
End: 2019-02-15

## 2019-02-15 DIAGNOSIS — I50.20 SYSTOLIC HEART FAILURE, UNSPECIFIED HF CHRONICITY (HCC): ICD-10-CM

## 2019-02-15 DIAGNOSIS — R93.1 ABNORMAL ECHOCARDIOGRAM: Primary | ICD-10-CM

## 2019-02-15 DIAGNOSIS — I50.21 ACUTE SYSTOLIC HEART FAILURE (HCC): ICD-10-CM

## 2019-02-15 NOTE — TELEPHONE ENCOUNTER
----- Message from Jd Limon III, MD sent at 2/15/2019  8:42 AM EST -----  There has been a change in your heart function potentially concerning for a blockage. We need to do a stress test. (Lexiscan)

## 2019-02-15 NOTE — TELEPHONE ENCOUNTER
Patient notified of there has been a change in your heart function potentially concerning for a blockage. We need to do a stress test. (Lexiscan) . He verbalized understanding. Order placed in Epic.

## 2019-03-15 ENCOUNTER — HOSPITAL ENCOUNTER (OUTPATIENT)
Dept: CARDIOLOGY | Facility: HOSPITAL | Age: 79
Discharge: HOME | End: 2019-03-15

## 2019-03-15 DIAGNOSIS — I50.21 ACUTE SYSTOLIC HEART FAILURE (HCC): ICD-10-CM

## 2019-03-15 DIAGNOSIS — I50.20 SYSTOLIC HEART FAILURE, UNSPECIFIED HF CHRONICITY (HCC): ICD-10-CM

## 2019-03-15 DIAGNOSIS — R93.1 ABNORMAL ECHOCARDIOGRAM: ICD-10-CM

## 2019-03-15 LAB
BH CV STRESS BP STAGE 2: NORMAL
BH CV STRESS BP STAGE 4: NORMAL
BH CV STRESS COMMENTS STAGE 1: NORMAL
BH CV STRESS DOSE REGADENOSON STAGE 1: 0.4
BH CV STRESS DURATION MIN STAGE 1: 1
BH CV STRESS DURATION MIN STAGE 2: 1
BH CV STRESS DURATION MIN STAGE 3: 1
BH CV STRESS DURATION MIN STAGE 4: 1
BH CV STRESS DURATION SEC STAGE 1: 0
BH CV STRESS DURATION SEC STAGE 2: 0
BH CV STRESS DURATION SEC STAGE 3: 0
BH CV STRESS DURATION SEC STAGE 4: 0
BH CV STRESS HR STAGE 1: 67
BH CV STRESS HR STAGE 2: 82
BH CV STRESS HR STAGE 3: 82
BH CV STRESS HR STAGE 4: 81
BH CV STRESS PROTOCOL 1: NORMAL
BH CV STRESS RECOVERY BP: NORMAL MMHG
BH CV STRESS RECOVERY HR: 69 BPM
BH CV STRESS STAGE 1: 1
BH CV STRESS STAGE 2: 2
BH CV STRESS STAGE 3: 3
BH CV STRESS STAGE 4: 4
LV EF NUC BP: 54 %
MAXIMAL PREDICTED HEART RATE: 142 BPM
PERCENT MAX PREDICTED HR: 59.15 %
STRESS BASELINE BP: NORMAL MMHG
STRESS BASELINE HR: 72 BPM
STRESS PERCENT HR: 70 %
STRESS POST PEAK BP: NORMAL MMHG
STRESS POST PEAK HR: 84 BPM
STRESS TARGET HR: 121 BPM

## 2019-03-15 PROCEDURE — 93018 CV STRESS TEST I&R ONLY: CPT | Performed by: INTERNAL MEDICINE

## 2019-03-15 PROCEDURE — 0 TECHNETIUM SESTAMIBI: Performed by: INTERNAL MEDICINE

## 2019-03-15 PROCEDURE — 78452 HT MUSCLE IMAGE SPECT MULT: CPT

## 2019-03-15 PROCEDURE — 78452 HT MUSCLE IMAGE SPECT MULT: CPT | Performed by: INTERNAL MEDICINE

## 2019-03-15 PROCEDURE — 93017 CV STRESS TEST TRACING ONLY: CPT

## 2019-03-15 PROCEDURE — 25010000002 REGADENOSON 0.4 MG/5ML SOLUTION: Performed by: INTERNAL MEDICINE

## 2019-03-15 PROCEDURE — A9500 TC99M SESTAMIBI: HCPCS | Performed by: INTERNAL MEDICINE

## 2019-03-15 RX ADMIN — TECHNETIUM TC 99M SESTAMIBI 1 DOSE: 1 INJECTION INTRAVENOUS at 09:25

## 2019-03-15 RX ADMIN — REGADENOSON 0.4 MG: 0.08 INJECTION, SOLUTION INTRAVENOUS at 09:20

## 2019-03-15 RX ADMIN — TECHNETIUM TC 99M SESTAMIBI 1 DOSE: 1 INJECTION INTRAVENOUS at 07:40

## 2019-03-26 ENCOUNTER — OFFICE VISIT (OUTPATIENT)
Dept: CARDIOLOGY | Facility: CLINIC | Age: 79
End: 2019-03-26

## 2019-03-26 VITALS
BODY MASS INDEX: 27.83 KG/M2 | HEIGHT: 73 IN | WEIGHT: 210 LBS | HEART RATE: 74 BPM | OXYGEN SATURATION: 96 % | DIASTOLIC BLOOD PRESSURE: 76 MMHG | SYSTOLIC BLOOD PRESSURE: 138 MMHG

## 2019-03-26 DIAGNOSIS — I10 ESSENTIAL HYPERTENSION: ICD-10-CM

## 2019-03-26 DIAGNOSIS — E78.2 MIXED HYPERLIPIDEMIA: ICD-10-CM

## 2019-03-26 DIAGNOSIS — I44.30 AV BLOCK: ICD-10-CM

## 2019-03-26 DIAGNOSIS — I38 VALVULAR HEART DISEASE: ICD-10-CM

## 2019-03-26 DIAGNOSIS — I25.10 CORONARY ARTERY DISEASE INVOLVING NATIVE CORONARY ARTERY OF NATIVE HEART WITHOUT ANGINA PECTORIS: Primary | ICD-10-CM

## 2019-03-26 PROCEDURE — 99214 OFFICE O/P EST MOD 30 MIN: CPT | Performed by: INTERNAL MEDICINE

## 2019-03-26 PROCEDURE — 93280 PM DEVICE PROGR EVAL DUAL: CPT | Performed by: INTERNAL MEDICINE

## 2019-03-26 NOTE — PROGRESS NOTES
[facility] at [facility]  Office visit  Uche Polanco  1940  [phone]  VISIT DATE:  02/05/2018    PCP: Jd Tapia MD  [id number] [facility] [address]  [facility] [address]    CC:  Chief Complaint   Patient presents with   • Coronary Artery Disease   • Valvular heart disease   • Hypertension       PROBLEM LIST:  1. Valvular heart disease:  a. History of mitral valve repair at [facility] 2005, records pending.  b. Echocardiogram, August 2014: EF normal at 50% to 55%, moderate AR, mild MR, left atrium at 3.8 cm.   c. Echo, January 2017: EF 50%, moderate aortic insufficiency  2. Recent TIA:  a. Status post Medtronic link loop recorder implantation, November 2014.  b. Recent interrogation revealing AV block, pauses, symptomatic with dizziness and lightheadedness.  3. Obstructive sleep apnea requiring CPAP.   4. Benign hypertension.   5. Gout.   6. Coronary artery disease by report, data insufficient.   7. REM disorder.   8. Dyslipidemia.   9. Family history of hypertension.   10. AV block, symptomatic with lightheadedness, status post pacemaker implantation, 04/17/2015, Dr. Robert Gerber: Medtronic Advisa  [id number] [id number].   11. Surgical history:   a. Hemorrhoidectomy.   b. Basal cell carcinoma removed.   c. Valvular repair (mitral valve).      Cardiac studies and procedures:  February 2019 echo  · Left ventricular systolic function is moderately decreased.  · Estimated EF appears to be in the range of 36 - 40%.  · The following left ventricular wall segments are dyskinetic: apical lateral, apical inferior, apical septal and apex. The following left ventricular wall segments are akinetic: apical anterior.  · Left ventricular wall thickness is consistent with mild concentric hypertrophy.  · Mild-to-moderate mitral valve stenosis is present  · Mild to moderate aortic valve regurgitation is present.    March 2019 myocardial perfusion imaging  · Left ventricular ejection fraction is  normal (Calculated EF = 54%).  · Fixed defect consistent with moderate size apical infarct with extension into the inferior wall, associated moderate hypokinesis.  · No ischemia visualized  · Impressions are consistent with an intermediate risk study.    ASSESSMENT:   Diagnosis Plan   1. Coronary artery disease involving native coronary artery of native heart without angina pectoris     2. AV block     3. Mixed hyperlipidemia     4. Essential hypertension     5. Valvular heart disease       Device interrogation:  Medtronic dual-chamber pacemaker  28% atrial paced, sensed P-wave 2.6 mV, threshold 0.75 V at 0.4 ms, impedance 532 ohms  100% RV pacing, No escape rhythm at 40 bpm, threshold 0.5 V at 0.4 ms, impedance 703 ohms  2 mode switch events lasting less than 1 minute.  Estimated battery life 5.5 years    PLAN:  Cardiomyopathy: Unclear whether interval development of apical regional wall motion abnormality and perfusion defect is due to chronic RV apical pacing or interval development of obstructive coronary disease.  He is currently asymptomatic and stable, [place] heart class I.  Afterload is well controlled.  Cardiovascular risk factors are well controlled.  Will trend symptoms and LVEF with transthoracic echocardiogram in 6-12 months.  If he has worsening regional wall motion abnormalities overall EGFR declining functional capacity will proceed with definitive evaluation of coronary anatomy with left heart catheterization.  Would then need to consider device upgrade to by V pacing.    Mitral valve prolapse status post mitral valve repair: Stable on exam today.  Continue routine clinical follow-up and intermittent echocardiographic surveillance    Aortic insufficiency: Mild-Moderate and stable.  Continue clinical follow-up and surveillance echocardiographic assessment.    History of TIA: No evidence of atrial arrhythmia.  Continue Plavix for stroke prophylaxis.  Continue statin.  Afterload controlled, goal less  "than 130/80 mmHg.    Hypertension: Goal less than 130/80 mmHg.  Continue current medical therapy.    Symptomatic high-grade AV block: Device dependent.  Currently asymptomatic, continue routine follow-up in device clinic.    Hyperlipidemia: Goal LDL less than 70, continue statin.      Subjective  Reports stable functional capacity.  Exercising at the [facility] 3 days a week.  Does elliptical  and light weights without difficulty.  Blood pressures running less than 140/90 mmHg.  Tolerating statin without myalgias.  Denies bleeding complications on Plavix, mild intermittent bruising.  Denies chest pain, palpitations or dyspnea on exertion.  compliant with medical therapy.  December 2018 creatinine 1.4, GFR 49    PHYSICAL EXAMINATION:  Vitals:    03/26/19 1350   BP: 138/76   BP Location: Left arm   Patient Position: Sitting   Pulse: 74   SpO2: 96%   Weight: 95.3 kg (210 lb)   Height: 185.4 cm (73\")     General Appearance:    Alert, cooperative, no distress, appears stated age   Head:    Normocephalic, without obvious abnormality, atraumatic   Eyes:    conjunctiva/corneas clear   Nose:   Nares normal, septum midline, mucosa normal, no drainage   Throat:   Lips, teeth and gums normal   Neck:   Supple, symmetrical, trachea midline, no carotid    bruit or JVD   Lungs:     Clear to auscultation bilaterally, respirations unlabored   Chest Wall:    No tenderness or deformity    Heart:    Regular rate and rhythm, S1 and S2 normal, no murmur, rub   or gallop, normal carotid impulse bilaterally without bruit.   Abdomen:     Soft, non-tender   Extremities:   Extremities normal, atraumatic, no cyanosis or edema   Pulses:   2+ and symmetric all extremities   Skin:   Skin color, texture, turgor normal, no rashes or lesions       Diagnostic Data:  Procedures  Lab Results   Component Value Date    CHLPL 190 08/22/2014    TRIG 189 (H) 08/22/2014    HDL 31 (L) 08/22/2014     Lab Results   Component Value Date    GLUCOSE 79 12/12/2016 "    BUN 16 12/12/2016    CREATININE 1.40 (H) 12/12/2016     12/12/2016    K 4.2 12/12/2016     12/12/2016    CO2 29.0 12/12/2016     Lab Results   Component Value Date    HGBA1C 5.6 04/08/2015     Lab Results   Component Value Date    WBC 7.58 12/12/2016    HGB 14.0 12/12/2016    HCT 42.6 12/12/2016     12/12/2016       Allergies  Allergies   Allergen Reactions   • Sulfa Antibiotics        Current Medications    Current Outpatient Medications:   •  atorvastatin (LIPITOR) 20 MG tablet, TAKE 1 TABLET BY MOUTH DAILY, Disp: 90 tablet, Rfl: 2  •  clonazePAM (KlonoPIN) 1 MG tablet, TK 1 T PO AT NIGHT, Disp: , Rfl: 3  •  clopidogrel (PLAVIX) 75 MG tablet, Take 1 tablet by mouth Daily., Disp: 90 tablet, Rfl: 1  •  donepezil (ARICEPT) 5 MG tablet, Take 1 tablet by mouth Every Night., Disp: 30 tablet, Rfl: 11  •  Multiple Vitamins-Minerals (PRESERVISION AREDS 2 PO), Take 1 tablet by mouth 2 (Two) Times a Day., Disp: , Rfl:           ROS  Review of Systems   Cardiovascular: Negative for chest pain, dyspnea on exertion, irregular heartbeat and palpitations.   Respiratory: Positive for sleep disturbances due to breathing. Negative for cough and shortness of breath.          SOCIAL HX  Social History     Socioeconomic History   • Marital status:      Spouse name: Not on file   • Number of children: Not on file   • Years of education: Not on file   • Highest education level: Not on file   Tobacco Use   • Smoking status: Never Smoker   • Smokeless tobacco: Never Used   Substance and Sexual Activity   • Alcohol use: Yes     Alcohol/week: 0.6 - 2.4 oz     Types: 1 - 4 Glasses of wine per week     Comment: week with dinner   • Drug use: No   • Sexual activity: Defer       FAMILY HX  Family History   Problem Relation Age of Onset   • Ovarian cancer Mother    • Lymphoma Father    • Cancer Father    • Hypertension Other              Jd Limon III, MD, [facility]

## 2019-04-04 RX ORDER — CLONAZEPAM 1 MG/1
TABLET ORAL
Qty: 30 TABLET | Refills: 0 | OUTPATIENT
Start: 2019-04-04 | End: 2019-04-11 | Stop reason: SDUPTHER

## 2019-04-05 NOTE — TELEPHONE ENCOUNTER
CALLED SCRIPT IN TO PHARMACY. CALLED PATIENT AND ADVISED. PATIENT VERBALIZED UNDERSTANDING  040/05/19 TRC

## 2019-04-11 ENCOUNTER — TELEPHONE (OUTPATIENT)
Dept: PULMONOLOGY | Facility: CLINIC | Age: 79
End: 2019-04-11

## 2019-04-11 NOTE — TELEPHONE ENCOUNTER
PT NEEDS REFILL ON CLONAZEPAM 1MG AT [facility]. PLEASE CALL IF ANY QUESTIONS  [phone]

## 2019-04-11 NOTE — TELEPHONE ENCOUNTER
Spoke with pt and advised him to contact Sleep Center. Office # given. Pt verbalized understanding.

## 2019-04-12 RX ORDER — CLONAZEPAM 1 MG/1
TABLET ORAL
Qty: 30 TABLET | Refills: 0 | OUTPATIENT
Start: 2019-04-12 | End: 2019-05-06 | Stop reason: SDUPTHER

## 2019-04-12 NOTE — TELEPHONE ENCOUNTER
CALLED SCRIPT IN PHARMACY; SPOKE WITH LUISITO    KLONIPIN 1 MG    1 TAB AT NIGHT PRN  # 30 NO REFILLS    CALLED PATIENT TO ADVISE; LEFT    [id number]

## 2019-05-02 ENCOUNTER — CLINICAL SUPPORT NO REQUIREMENTS (OUTPATIENT)
Dept: CARDIOLOGY | Facility: CLINIC | Age: 79
End: 2019-05-02

## 2019-05-02 DIAGNOSIS — I44.30 AV BLOCK: ICD-10-CM

## 2019-05-02 PROCEDURE — 93294 REM INTERROG EVL PM/LDLS PM: CPT | Performed by: INTERNAL MEDICINE

## 2019-05-02 PROCEDURE — 93296 REM INTERROG EVL PM/IDS: CPT | Performed by: INTERNAL MEDICINE

## 2019-05-06 RX ORDER — CLONAZEPAM 1 MG/1
TABLET ORAL
Qty: 30 TABLET | Refills: 0 | OUTPATIENT
Start: 2019-05-06 | End: 2019-06-13 | Stop reason: SDUPTHER

## 2019-06-12 ENCOUNTER — OFFICE VISIT (OUTPATIENT)
Dept: NEUROLOGY | Facility: CLINIC | Age: 79
End: 2019-06-12

## 2019-06-12 VITALS
HEART RATE: 68 BPM | SYSTOLIC BLOOD PRESSURE: 132 MMHG | WEIGHT: 210 LBS | HEIGHT: 73 IN | BODY MASS INDEX: 27.83 KG/M2 | RESPIRATION RATE: 18 BRPM | DIASTOLIC BLOOD PRESSURE: 78 MMHG

## 2019-06-12 DIAGNOSIS — R41.3 MEMORY LOSS: Primary | ICD-10-CM

## 2019-06-12 PROCEDURE — 99214 OFFICE O/P EST MOD 30 MIN: CPT | Performed by: PSYCHIATRY & NEUROLOGY

## 2019-06-12 RX ORDER — DONEPEZIL HYDROCHLORIDE 10 MG/1
10 TABLET, FILM COATED ORAL DAILY
Qty: 30 TABLET | Refills: 6 | Status: SHIPPED | OUTPATIENT
Start: 2019-06-12 | End: 2019-06-13

## 2019-06-12 NOTE — PROGRESS NOTES
"Subjective     Chief Complaint: memory loss         Uche Polanco is a 79 y.o. male who returns to clinic today with a history of possible MCI. He has noted symptoms for several years marked by forgetfulness and word-finding difficulties . This has gradually worsened  over time. There have been no associated symptoms. He denies  impairments in ADL's.      He has a history of dizziness in the past, which has been well managed most recently ENT.    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 This has been treated by Dr. Hansen with Klonopin.     Neuroimaging in the past has been notable only for an old left cerebellar infarct. Screening bloodwork has been normal in the past as well. He is currently taking donepezil. He has also tried cognitive rehabilitation in the past, which was quite beneficial.     Since his last visit on 12/12/18 his wife has noted worsening of his memory.      I have reviewed and confirmed the past family, social and medical history as accurate on 6/12/19.     Review of Systems   Constitutional: Negative.        Objective     /78 (BP Location: Left arm, Patient Position: Sitting, Cuff Size: Adult)   Pulse 68   Resp 18   Ht 185.4 cm (73\")   Wt 95.3 kg (210 lb)   BMI 27.71 kg/m²     General appearance today is normal.     Physical Exam   Constitutional: He is oriented to person, place, and time.   Neurological: He is oriented to person, place, and time.   Psychiatric: His speech is normal.        Neurologic Exam     Mental Status   Oriented to person, place, and time.   Registration: recalls 3 of 3 objects. Recall at 5 minutes: recalls 2 of 3 objects. Follows 3 step commands.   Attention: normal.   Speech: speech is normal   Level of consciousness: alert  Able to name object. Able to read. Able to repeat. Able to write. Normal comprehension.     Cranial Nerves   Cranial nerves II through XII intact.         Results  MMSE=29      Assessment/Plan   Uche was seen today for " memory loss.    Diagnoses and all orders for this visit:    Memory loss  -     Ambulatory Referral to Speech Therapy    Other orders  -     donepezil (ARICEPT) 10 MG tablet; Take 1 tablet by mouth Daily.        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 I again reviewed his current status and treatment options. First we will increase his donepezil to 10 mg daily. I again discussed the potential addition of Namenda. I will also refer him to SLP for cognitive rehabilitation He will then follow up in 6 months, or sooner if needed.     I spent 25 minutes face to face with the patient and family. I spent 15 minutes counseling and discussing current status, treatment options and management.      Chiki Newton MD

## 2019-06-13 ENCOUNTER — OFFICE VISIT (OUTPATIENT)
Dept: SLEEP MEDICINE | Facility: HOSPITAL | Age: 79
End: 2019-06-13

## 2019-06-13 VITALS
HEIGHT: 73 IN | OXYGEN SATURATION: 96 % | SYSTOLIC BLOOD PRESSURE: 154 MMHG | BODY MASS INDEX: 27.38 KG/M2 | DIASTOLIC BLOOD PRESSURE: 71 MMHG | HEART RATE: 70 BPM | WEIGHT: 206.6 LBS

## 2019-06-13 DIAGNOSIS — R41.3 MEMORY LOSS: ICD-10-CM

## 2019-06-13 DIAGNOSIS — G47.33 SEVERE OBSTRUCTIVE SLEEP APNEA: ICD-10-CM

## 2019-06-13 DIAGNOSIS — G47.52 REM SLEEP BEHAVIOR DISORDER: ICD-10-CM

## 2019-06-13 DIAGNOSIS — G47.33 OSA (OBSTRUCTIVE SLEEP APNEA): Primary | ICD-10-CM

## 2019-06-13 PROCEDURE — 99214 OFFICE O/P EST MOD 30 MIN: CPT | Performed by: INTERNAL MEDICINE

## 2019-06-13 RX ORDER — CLONAZEPAM 1 MG/1
1 TABLET ORAL
Qty: 30 TABLET | Refills: 2 | Status: SHIPPED | OUTPATIENT
Start: 2019-06-13 | End: 2019-08-08 | Stop reason: SDUPTHER

## 2019-06-13 NOTE — PROGRESS NOTES
Subjective:     Chief Complaint:   Chief Complaint   Patient presents with   • Follow-up       HPI:    Uche Polanco is a 79 y.o. male here for follow-up of obstructive sleep apnea.    He has been followed here long-term for REM behavior disorder and severe obstructive sleep apnea.  He remains on auto CPAP therapy but has always tolerated this marginally though review of his download indicates that he is using it nearly 5 hours per night and uses it on a nightly basis.    He continues on Klonopin for REM behavior disorder.  His wife has become increasingly concerned about his memory loss.  She was concerned about his long-term use of Klonopin and this has been weaned down to as low as 0.5 mg nightly.  At this level he began having more violent behavior at night and currently he is using 1 mg at night as a compromise dose.    Further details are as follows:    Since last visit sleep problem has: remained the same  Currently using PAP: yes Hours of usage during the night: 7    Amount of sleep per night : 7 hours  Average length of time it takes to fall asleep : 5 minutes  Number of awakenings per night : 1     He feels fatigue (tiredness, exhaustion, lethargy) in the daytime even when not sleepy? No  He feels sleepy (or struggles to stay awake) in the daytime? No    [place] Scale scored as 8/24.    Type of mask: full face mask    I reviewed his PAP download:  Average pressure: 13  Average AHI:  3  Average minutes in large leak per night: Acceptable    Overall, he is benefiting from PAP therapy.    Current medications are:   Current Outpatient Medications:   •  atorvastatin (LIPITOR) 20 MG tablet, TAKE 1 TABLET BY MOUTH DAILY, Disp: 90 tablet, Rfl: 2  •  clonazePAM (KlonoPIN) 1 MG tablet, One po qhs prn #30, nr, Disp: 30 tablet, Rfl: 0  •  clopidogrel (PLAVIX) 75 MG tablet, Take 1 tablet by mouth Daily., Disp: 90 tablet, Rfl: 1  •  Multiple Vitamins-Minerals (PRESERVISION AREDS 2 PO), Take 1 tablet by mouth 2  (Two) Times a Day., Disp: , Rfl: .    The patient's relevant past medical, surgical, family and social history were reviewed and updated in Epic as appropriate.     ROS:    Review of Systems   Respiratory: Positive for apnea.    Neurological:        Memory difficulties   Psychiatric/Behavioral: Positive for sleep disturbance.         Objective:    Physical Exam   Constitutional: He is oriented to person, place, and time. He appears well-developed and well-nourished.   HENT:   Head: Normocephalic and atraumatic.   Mouth/Throat: Oropharynx is clear and moist.   Class III airway   Neck: Neck supple. No thyromegaly present.   Cardiovascular: Normal rate and regular rhythm. Exam reveals no gallop and no friction rub.   No murmur heard.  Pulmonary/Chest: Effort normal. No respiratory distress. He has no wheezes. He has no rales.   Musculoskeletal: He exhibits no edema.   Neurological: He is alert and oriented to person, place, and time.   Skin: Skin is warm and dry.   Psychiatric: He has a normal mood and affect. His behavior is normal.   Vitals reviewed.      Data:    Patient's PAP download was personally reviewed including raw data and results.    Assessment:    Problem List Items Addressed This Visit        Pulmonary Problems    Severe obstructive sleep apnea    Overview     Auto CPAP 12-20            Other    REM sleep behavior disorder      Other Visit Diagnoses     TOM (obstructive sleep apnea)    -  Primary          79-year-old male with known REM behavior disorder treated with Klonopin as well as severe obstructive sleep apnea treated with auto CPAP.    He is having increasing problems with memory and certainly REM behavior disorder is associated with Lewy body dementia and we may be certainly seeing manifestations of that.  The patient and his wife are well aware of this possibility.    I think it is reasonable, to conserve or discontinue Klonopin, to try melatonin in potentially high doses for REM behavior  disorder.  Start with    Plan:     1. Melatonin at 2 mg nightly and they can increase this dose on a weekly basis up to his many as 7 tablets at night (or 14 mg)  2. I will renew his Klonopin but this can be reduced to half a milligram again or even discontinued as long as his nocturnal behavior is controlled  3. We will see if the above has any effect on his cognitive impairment.  4. Continue current auto CPAP settings as download is acceptable.  5. Marcio compliant  6. Close sleep center follow-up      Discussed in detail with the patient and his wife.  He will call prior to his follow up visit for any new problems.    Level of Risk Moderate due to: mild exacerbation of one chronic illness and prescription drug management    Signed by  Reed Hansen MD

## 2019-06-18 ENCOUNTER — HOSPITAL ENCOUNTER (OUTPATIENT)
Dept: SPEECH THERAPY | Facility: HOSPITAL | Age: 79
Setting detail: THERAPIES SERIES
Discharge: HOME | End: 2019-06-18

## 2019-07-22 ENCOUNTER — HOSPITAL ENCOUNTER (OUTPATIENT)
Dept: SPEECH THERAPY | Facility: HOSPITAL | Age: 79
Setting detail: THERAPIES SERIES
Discharge: HOME | End: 2019-07-22

## 2019-07-22 DIAGNOSIS — R41.3 MEMORY LOSS: Primary | ICD-10-CM

## 2019-07-22 PROCEDURE — 92523 SPEECH SOUND LANG COMPREHEN: CPT

## 2019-07-22 NOTE — THERAPY EVALUATION
Outpatient Speech Language Pathology   Adult Speech Language Cognitive Initial Evaluation  [facility]     Patient Name: Uche Polanco  : 1940  MRN: [id number]  Today's Date: 2019        Visit Date: 2019   Patient Active Problem List   Diagnosis   • Valvular heart disease   • TIA (transient ischemic attack)   • Severe obstructive sleep apnea   • Hypertension   • Hyperlipidemia   • Gout   • CAD (coronary artery disease)   • AV block   • REM sleep behavior disorder   • Memory loss        Past Medical History:   Diagnosis Date   • AV block    • CAD (coronary artery disease)    • Cognitive impairment, mild, so stated     Assessed By: Chiki Newton (Neurology); Last Assessed: 11 Sep 2014   • Family history of hypertension    • Gout    • Hyperlipidemia    • Hypertension    • TOM (obstructive sleep apnea)    • REM  disorder    • TIA (transient ischemic attack)    • Valvular heart disease         Past Surgical History:   Procedure Laterality Date   • HEMORRHOIDECTOMY     • HERNIA REPAIR     • MITRAL VALVE REPAIR/REPLACEMENT     • OTHER SURGICAL HISTORY  2014    Medtronic link loop recorder   • PACEMAKER IMPLANTATION     • SKIN SURGERY  Month ago    Cell removed from head    • TONSILLECTOMY           Visit Dx:    ICD-10-CM ICD-9-CM   1. Memory loss R41.3 780.93           SLP [facility] Evaluation - 19 1100        Communication Assessment/Intervention    Document Type  evaluation   -HG    Subjective Information  no complaints   -HG    Patient Observations  alert;cooperative;agree to therapy   -HG    Patient/Family Observations  Pt notes increased word finding, difficulty with word finding.    -HG    Patient Effort  excellent   -HG    Symptoms Noted During/After Treatment  none   -HG       General Information    Patient Profile Reviewed  yes   -HG    Pertinent History Of Current Problem  Pt is a 79 year old male with hx of memory loss. Recent increase of Aricept up to 10 mg. Pt reports a concussion ~2  years ago.   -HG    Precautions/Limitations, Vision  WFL with corrective lenses   -HG    Precautions/Limitations, Hearing  hearing aid, bilaterally   -HG    Patient Level of Education  3 years of college   -HG    Prior Level of Function-Communication  WFL   -HG    Plans/Goals Discussed with  patient   -HG    Barriers to Rehab  none identified   -HG    Patient's Goals for Discharge  functional cognition   -HG    Standardized Assessment Used  RBANS   -HG       Cognitive Assessment Intervention- SLP    Cognitive Function (Cognition)  moderate impairment   -HG    Orientation Status (Cognition)  WFL   -HG    Memory (Cognitive)  short-term;unrelated;severe impairment   -HG    Attention (Cognitive)  mild impairment   -HG    Thought Organization (Cognitive)  mild impairment   -HG    Reasoning (Cognitive)  mild impairment   -HG       RBANS- Repeatable Battery for the Assessment of Neuropsychological Status    Immediate Memory Index Score  49   -HG    Immediate Memory Percentile  0 %   -HG    Immediate Memory Qualitative Description  extremely low   -HG    Visuospatial Index Score  109   -HG    Visuospatial Percentile  73 %   -HG    Visuospatial Qualitative Description  average   -HG    Language Index Score  88   -HG    Language Percentile  21 %   -HG    Language Qualitative Description  low average   -HG    Attention Index Score  85   -HG    Attention Percentile  16 %   -HG    Attention Qualitative Description  low average   -HG    Delayed Memory Index Score  87   -HG    Delayed Memory Percentile  19 %   -HG    Delayed Memory Qualitative Description  low average   -HG    Total Index Score  418   -HG    Total Percentile  8 %   -HG    Total Qualitative Description  borderline   -HG       SLP Clinical Impressions    SLP Diagnosis  Mild to Moderate cognitive impairment   -HG    Rehab Potential/Prognosis  excellent   -HG    SLC Criteria for Skilled Therapy Interventions Met  yes   -HG    Functional Impact  functional impact in  ADLs   -HG      User Key  (r) = Recorded By, (t) = Taken By, (c) = Cosigned By    Initials Name Provider Type    DALJIT Anjali Serrano MS SAMI CCC-SLP Speech and Language Pathologist                         OP SLP Education     Row Name 07/22/19 1542       Education    Barriers to Learning  No barriers identified  -HG    Education Provided  Patient demonstrated recommended strategies;Patient requires further education on strategies, risks  -HG    Assessed  Learning needs;Learning motivation;Learning preferences;Learning readiness  -    Learning Motivation  Strong  -    Learning Method  Explanation;Demonstration;Teach back  -HG    Teaching Response  Verbalized understanding;Demonstrated understanding;Reinforcement needed  -HG    Education Comments  Reviewed strategy of journaling/keeping a planner.   -HG      User Key  (r) = Recorded By, (t) = Taken By, (c) = Cosigned By    Initials Name Effective Dates    DALJIT SifuentesAnjali cowan MS SAMI CCC-SLP 06/22/15 -           SLP OP Goals     Row Name 07/22/19 1100          Goal Type Needed    Goal Type Needed  Memory;Attention/Orientation;Other Adult Goals;Auditory Comprehension  -        Subjective Comments    Subjective Comments  Pt alert, cooperative, concerned about his decline.  -        Auditory Comprehension Goals    Patient will comprehend abstract and complex information presented in all social, avocational, or work settings  90%:;with cues  -HG     Status: Patient will comprehend abstract and complex information presented in all social, avocational, or work settings  New  -HG     Patient will improve comprehension of spoken language by following Multi-step directions  80%:;with cues  -HG     Status: Patient will improve comprehension of spoken language by following Multi-step directions  New  -HG     Patient will demonstrate comprehension of spoken language by answering questions about a multi paragraph length content  80%:;with cues  -HG     Status: Patient will  demonstrate comprehension of spoken language by answering questions about a multi paragraph length content  New  -HG        Memory Goals    Patient and family will implement compensatory strategies to maximize patient’s Memory function so patient can continue to participate in daily activities  90%:;with cues  -HG     Status: Patient and family will implement compensatory strategies to maximize patient’s Memory function so patient can continue to participate in daily activities  New  -HG     Patient will demonstrate improved ability to recall information by immediately recalling a series of words  80%:;related;after delay;with cues  -HG     Status: Patient will demonstrate improved ability to recall information by immediately recalling a series of words  New  -HG     Patient’s memory skills will be enhanced as reported by patient by utilizing internal memory strategies to recall up to 3 pieces of information after a 5- minute delay  80%:;with cues  -HG     Status: Patient’s memory skills will be enhanced as reported by patient by utilizing internal memory strategies to recall up to 3 pieces of information after a 5- minute delay  New  -HG     Patient’s memory skills will be enhanced as reported by patient by using external memory aides  80%:;with cues  -HG     Status: Patient’s memory skills will be enhanced as reported by patient by using external memory aides  New  -HG        Attention/Orientation Goals    Patient will be able to execute all activities necessary to manage a home  Independently  -HG     Status: Patient will be able to execute all activities necessary to manage a home  New  -HG     Patient will improve attention skills by sustaining focus in order to actively hold and manipulate information provided (e.g., sequencing auditorily presented number series in ascending or descending order)  90%:;with cues  -HG     Status: Patient will improve attention skills by sustaining focus in order to actively hold  and manipulate information provided (e.g., sequencing auditorily presented number series in ascending or descending order)  New  -HG     Patient will improve attention skills by alternating or shifting focus between two different tasks in order to complete both tasks  80%:;with cues  -HG     Status: Patient will improve attention skills by alternating or shifting focus between two different tasks in order to complete both tasks  New  -HG     Patient will improve attention skills by sustaining focus to high-level cognitive tasks in order to complete task  80%:;with cues  -HG     Status: Patient will improve attention skills by sustaining focus to high-level cognitive tasks in order to complete task  New  -HG        Other Goals    Other Adult Goal- 1  Pt will complete thought organization tasks with 80% accuracy.  -HG     Status: Other Adult Goal- 1  New  -HG     Other Adult Goal- 2  Pt will improve RBANS Total Score to at least 90 placing pt in the Average range.   -        SLP Time Calculation    SLP Goal Re-Cert Due Date  08/21/19  -       User Key  (r) = Recorded By, (t) = Taken By, (c) = Cosigned By    Initials Name Provider Type     Anjali Serrano MS CCC-SLP Speech and Language Pathologist          OP SLP Assessment/Plan - 07/22/19 1539        SLP Assessment    Functional Problems  Speech Language- Adult/Cognition   -    Impact on Function: Adult Speech Language/Cognition  Trouble learning or remembering new information;Restrictions in personal and social life   -    Clinical Impression: Speech Language-Adult/Congnition  Mild-Moderate:;Cognitive Communication WFL   -    Functional Problems Comment  Pt states that he struggles with multi-tasking and word finding at times. His wife notes a decline in his abilities to keep up with his schedule.   -    Clinical Impression Comments  RBANS Total Score of 79 places pt in the Borderline Range.    -    Please refer to paper survey for additional  self-reported information  Yes   -HG    Please refer to items scanned into chart for additional diagnostic informaiton and handouts as provided by clinician  Yes   -HG    SLP Diagnosis  Mild to Moderate Cognitive Impairment   -HG    Prognosis  Excellent (comment)   -HG    Patient/caregiver participated in establishment of treatment plan and goals  Yes   -HG    Patient would benefit from skilled therapy intervention  Yes   -HG       SLP Plan    Frequency  1-2x/week   -HG    Duration  12 weeks   -HG    Planned CPT's?  SLP SPEECH & LANGUAGE EVAL: 66775;SLP INDIVIDUAL SPEECH THERAPY: 83309   -    Expected Duration Therapy Session - minutes  45-60 minutes   -HG    Plan Comments  Initiate Cog tx.    -HG      User Key  (r) = Recorded By, (t) = Taken By, (c) = Cosigned By    Initials Name Provider Type     Anjali Serrano, MS CCC-SLP Speech and Language Pathologist                 Time Calculation:   SLP Start Time: 1100    Therapy Charges for Today     Code Description Service Date Service Provider Modifiers Qty    [id number] [facility] EVAL SPEECH AND PROD W LANG  4 7/22/2019 Anjali Serrano, MS CCC-SLP GN 1                   Anjali Serrano MS CCC-SLP  7/22/2019

## 2019-07-26 ENCOUNTER — HOSPITAL ENCOUNTER (OUTPATIENT)
Dept: SPEECH THERAPY | Facility: HOSPITAL | Age: 79
Setting detail: THERAPIES SERIES
Discharge: HOME | End: 2019-07-26

## 2019-07-26 DIAGNOSIS — R41.3 MEMORY LOSS: Primary | ICD-10-CM

## 2019-07-26 PROCEDURE — 92507 TX SP LANG VOICE COMM INDIV: CPT

## 2019-07-26 NOTE — THERAPY TREATMENT NOTE
Outpatient Speech Language Pathology   Adult Speech Language Cognitive Treatment Note  [facility]     Patient Name: Uche Polanco  : 1940  MRN: [id number]  Today's Date: 2019         Visit Date: 2019   Patient Active Problem List   Diagnosis   • Valvular heart disease   • TIA (transient ischemic attack)   • Severe obstructive sleep apnea   • Hypertension   • Hyperlipidemia   • Gout   • CAD (coronary artery disease)   • AV block   • REM sleep behavior disorder   • Memory loss          Visit Dx:    ICD-10-CM ICD-9-CM   1. Memory loss R41.3 780.93       SLP [facility] [place] - 19 1100        Communication Assessment/Intervention    Document Type  evaluation   -HG    Subjective Information  no complaints   -HG    Patient Observations  alert;cooperative;agree to therapy   -HG    Patient/Family Observations  Pt notes increased word finding, difficulty with word finding.    -HG    Patient Effort  excellent   -HG    Symptoms Noted During/After Treatment  none   -HG       General Information    Patient Profile Reviewed  yes   -HG    Pertinent History Of Current Problem  Pt is a 79 year old male with hx of memory loss. Recent increase of Aricept up to 10 mg. Pt reports a concussion ~2 years ago.   -HG    Precautions/Limitations, Vision  WFL with corrective lenses   -HG    Precautions/Limitations, Hearing  hearing aid, bilaterally   -HG    Patient Level of Education  3 years of college   -HG    Prior Level of Function-Communication  WFL   -HG    Plans/Goals Discussed with  patient   -HG    Barriers to Rehab  none identified   -HG    Patient's Goals for Discharge  functional cognition   -HG    Standardized Assessment Used  RBANS   -       Cognitive Assessment Intervention- SLP    Cognitive Function (Cognition)  moderate impairment   -HG    Orientation Status (Cognition)  WFL   -HG    Memory (Cognitive)  short-term;unrelated;severe impairment   -HG    Attention (Cognitive)  mild impairment   -HG     Thought Organization (Cognitive)  mild impairment   -HG    Reasoning (Cognitive)  mild impairment   -HG       RBANS- Repeatable Battery for the Assessment of Neuropsychological Status    Immediate Memory Index Score  49   -HG    Immediate Memory Percentile  0 %   -HG    Immediate Memory Qualitative Description  extremely low   -HG    Visuospatial Index Score  109   -HG    Visuospatial Percentile  73 %   -HG    Visuospatial Qualitative Description  average   -HG    Language Index Score  88   -HG    Language Percentile  21 %   -HG    Language Qualitative Description  low average   -HG    Attention Index Score  85   -HG    Attention Percentile  16 %   -HG    Attention Qualitative Description  low average   -HG    Delayed Memory Index Score  87   -HG    Delayed Memory Percentile  19 %   -HG    Delayed Memory Qualitative Description  low average   -HG    Total Index Score  418   -HG    Total Percentile  8 %   -HG    Total Qualitative Description  borderline   -HG       SLP Clinical Impressions    SLP Diagnosis  Mild to Moderate cognitive impairment   -HG    Rehab Potential/Prognosis  excellent   -HG    SLC Criteria for Skilled Therapy Interventions Met  yes   -HG    Functional Impact  functional impact in ADLs   -HG      User Key  (r) = Recorded By, (t) = Taken By, (c) = Cosigned By    Initials Name Provider Type     Anjali Serrano MS CCC-SLP Speech and Language Pathologist                        SLP OP Goals     Row Name 07/26/19 1300          Goal Type Needed    Goal Type Needed  Memory;Attention/Orientation;Other Adult Goals;Auditory Comprehension  -        Subjective Comments    Subjective Comments  Pt alert, cooperative, returned with day planner; recognized the need to use the more expanded version of his planner for more notes.   -        Auditory Comprehension Goals    Patient will comprehend abstract and complex information presented in all social, avocational, or work settings  90%:;with cues  -      Status: Patient will comprehend abstract and complex information presented in all social, avocational, or work settings  New  -HG     Patient will improve comprehension of spoken language by following Multi-step directions  80%:;with cues  -HG     Status: Patient will improve comprehension of spoken language by following Multi-step directions  Progressing as expected  -HG     Comments: Patient will improve comprehension of spoken language by following Multi-step directions  7/26/19: 2 step oral directions: 70% accuracy.   -HG     Patient will demonstrate comprehension of spoken language by answering questions about a multi paragraph length content  80%:;with cues  -HG     Status: Patient will demonstrate comprehension of spoken language by answering questions about a multi paragraph length content  Progressing as expected  -HG        Memory Goals    Patient and family will implement compensatory strategies to maximize patient’s Memory function so patient can continue to participate in daily activities  90%:;with cues  -HG     Status: Patient and family will implement compensatory strategies to maximize patient’s Memory function so patient can continue to participate in daily activities  New  -HG     Patient will demonstrate improved ability to recall information by immediately recalling a series of words  80%:;related;after delay;with cues  -HG     Status: Patient will demonstrate improved ability to recall information by immediately recalling a series of words  New  -HG     Patient’s memory skills will be enhanced as reported by patient by utilizing internal memory strategies to recall up to 3 pieces of information after a 5- minute delay  80%:;with cues  -HG     Status: Patient’s memory skills will be enhanced as reported by patient by utilizing internal memory strategies to recall up to 3 pieces of information after a 5- minute delay  Progressing as expected  -HG     Comments: Patient’s memory skills will be  enhanced as reported by patient by utilizing internal memory strategies to recall up to 3 pieces of information after a 5- minute delay  19: Delayed recall of 4 related words and pt was 3/4 I'ly; 4/4 x 2. Increased to 5 related words and pt was 4/5 and then 5/5.   -HG     Patient’s memory skills will be enhanced as reported by patient by using external memory aides  80%:;with cues  -HG     Status: Patient’s memory skills will be enhanced as reported by patient by using external memory aides  Progressing as expected  -HG     Comments: Patient’s memory skills will be enhanced as reported by patient by using external memory aides  19: Fxnl use of planner: pt was writing down his TO DO LIST and not what he had done.   -HG        Attention/Orientation Goals    Patient will be able to execute all activities necessary to manage a home  Independently  -HG     Status: Patient will be able to execute all activities necessary to manage a home  New  -HG     Patient will improve attention skills by sustaining focus in order to actively hold and manipulate information provided (e.g., sequencing auditorily presented number series in ascending or descending order)  90%:;with cues  -HG     Status: Patient will improve attention skills by sustaining focus in order to actively hold and manipulate information provided (e.g., sequencing auditorily presented number series in ascending or descending order)  New  -HG     Patient will improve attention skills by alternating or shifting focus between two different tasks in order to complete both tasks  80%:;with cues  -HG     Status: Patient will improve attention skills by alternating or shifting focus between two different tasks in order to complete both tasks  Progressing as expected  -HG     Comments: Patient will improve attention skills by alternating or shifting focus between two different tasks in order to complete both tasks  19: Sorting card by katie and REJI and pt  required mod cues and repeated instructions and pt was 60% accurate.   -HG     Patient will improve attention skills by sustaining focus to high-level cognitive tasks in order to complete task  80%:;with cues  -HG     Status: Patient will improve attention skills by sustaining focus to high-level cognitive tasks in order to complete task  Progressing as expected  -HG     Comments: Patient will improve attention skills by sustaining focus to high-level cognitive tasks in order to complete task  7/26/19: Recognizing odd numbers in suit of cards while tapping the table and pt was 70% accurate.   -HG        Other Goals    Other Adult Goal- 1  Pt will complete thought organization tasks with 80% accuracy.  -HG     Status: Other Adult Goal- 1  Progressing as expected  -HG     Comments: Other Adult Goal- 1  7/26/19: Divergent naming: pt was 10/10.   -HG     Other Adult Goal- 2  Pt will improve RBANS Total Score to at least 90 placing pt in the Average range.   -HG     Status: Other Adult Goal- 2  Progressing as expected  -HG     Comments: Other Adult Goal- 2  --  -HG     Other Adult Goal- 3  --  -HG     Status: Other Adult Goal- 3  --  -HG     Comments: Other Adult Goal- 3  --  -HG     Other Adult Goal- 4  --  -HG     Status: Other Adult Goal- 4  --  -HG     Comments: Other Adult Goal- 4  --  -HG     Other Adult Goal- 5  --  -HG     Status: Other Adult Goal- 5  --  -HG     Comments: Other Adult Goal- 5  --  -HG     Other Adult Goal- 6  --  -HG     Status: Other Adult Goal- 6  --  -HG     Comments: Other Adult Goal- 6  --  -HG     Other Adult Goal- 7  --  -HG     Status: Other Adult Goal- 7  --  -HG     Comments: Other Adult Goal- 7  --  -HG     Other Adult Goal- 8  --  -HG     Status: Other Adult Goal- 8  --  -HG     Comments: Other Adult Goal- 8  --  -HG        SLP Time Calculation    SLP Goal Re-Cert Due Date  08/21/19  -HG       User Key  (r) = Recorded By, (t) = Taken By, (c) = Cosigned By    Initials Name Provider  Type    DALJIT Anjali Serrano MS CCC-SLP Speech and Language Pathologist          OP SLP Education     Row Name 07/26/19 1300       Education    Education Comments  Hmwk. for 5 related words.   -HG      User Key  (r) = Recorded By, (t) = Taken By, (c) = Cosigned By    Initials Name Effective Dates    DALJIT Zach Anjali GALLARDO MS CCC-SLP 06/22/15 -           OP SLP Assessment/Plan - 07/26/19 1300        SLP Plan    Plan Comments  Cont with Cog tx.    -      User Key  (r) = Recorded By, (t) = Taken By, (c) = Cosigned By    Initials Name Provider Type    Anjali Cortes MS CCC-SLP Speech and Language Pathologist                 Time Calculation:   SLP Start Time: 1300    Therapy Charges for Today     Code Description Service Date Service Provider Modifiers Qty    [id number]  ST TREATMENT SPEECH 4 7/26/2019 Anjali Serrano MS CCC-SLP GN 1                   Anjali Serrano MS CCC-SLP  7/26/2019

## 2019-08-06 ENCOUNTER — HOSPITAL ENCOUNTER (OUTPATIENT)
Dept: SPEECH THERAPY | Facility: HOSPITAL | Age: 79
Setting detail: THERAPIES SERIES
Discharge: HOME | End: 2019-08-06

## 2019-08-06 DIAGNOSIS — R41.3 MEMORY LOSS: Primary | ICD-10-CM

## 2019-08-06 PROCEDURE — 92507 TX SP LANG VOICE COMM INDIV: CPT

## 2019-08-06 NOTE — THERAPY TREATMENT NOTE
Outpatient Speech Language Pathology   Adult Speech Language Cognitive Treatment Note  [facility]     Patient Name: Uche Polanco  : 1940  MRN: [id number]  Today's Date: 2019         Visit Date: 2019   Patient Active Problem List   Diagnosis   • Valvular heart disease   • TIA (transient ischemic attack)   • Severe obstructive sleep apnea   • Hypertension   • Hyperlipidemia   • Gout   • CAD (coronary artery disease)   • AV block   • REM sleep behavior disorder   • Memory loss          Visit Dx:    ICD-10-CM ICD-9-CM   1. Memory loss R41.3 780.93                         SLP OP Goals     Row Name 19 1300          Goal Type Needed    Goal Type Needed  Memory;Attention/Orientation;Other Adult Goals;Auditory Comprehension  -HG        Subjective Comments    Subjective Comments  Pt alert, cooperative,   -HG        Auditory Comprehension Goals    Patient will comprehend abstract and complex information presented in all social, avocational, or work settings  90%:;with cues  -HG     Status: Patient will comprehend abstract and complex information presented in all social, avocational, or work settings  New  -HG     Patient will improve comprehension of spoken language by following Multi-step directions  80%:;with cues  -HG     Status: Patient will improve comprehension of spoken language by following Multi-step directions  Progressing as expected  -HG     Comments: Patient will improve comprehension of spoken language by following Multi-step directions  19: 2 step oral directions: 70% accuracy.   -HG     Patient will demonstrate comprehension of spoken language by answering questions about a multi paragraph length content  80%:;with cues  -HG     Status: Patient will demonstrate comprehension of spoken language by answering questions about a multi paragraph length content  Progressing as expected  -HG        Memory Goals    Patient and family will implement compensatory strategies to maximize  patient’s Memory function so patient can continue to participate in daily activities  90%:;with cues  -HG     Status: Patient and family will implement compensatory strategies to maximize patient’s Memory function so patient can continue to participate in daily activities  New  -HG     Patient will demonstrate improved ability to recall information by immediately recalling a series of words  80%:;related;after delay;with cues  -HG     Status: Patient will demonstrate improved ability to recall information by immediately recalling a series of words  Progressing as expected  -HG     Comments: Patient will demonstrate improved ability to recall information by immediately recalling a series of words  19: 36-50 word paragraphs for immediate recall: pt was . Immediate recall for storytellin/3, using chunking: 3/3,   -HG     Patient’s memory skills will be enhanced as reported by patient by utilizing internal memory strategies to recall up to 3 pieces of information after a 5- minute delay  80%:;with cues  -HG     Status: Patient’s memory skills will be enhanced as reported by patient by utilizing internal memory strategies to recall up to 3 pieces of information after a 5- minute delay  Progressing as expected  -HG     Comments: Patient’s memory skills will be enhanced as reported by patient by utilizing internal memory strategies to recall up to 3 pieces of information after a 5- minute delay  19: Delayed recall of 5 related words and pt was 5/5, 6 related words: pt was 6/6. 7 related words using alphabetical order and chunking according to starting letter and pt was 6/7 with min cue.  19: Delayed recall of 4 related words and pt was 3/4 I'ly; 4/4 x 2. Increased to 5 related words and pt was 4/5 and then 5/5.   -HG     Patient’s memory skills will be enhanced as reported by patient by using external memory aides  80%:;with cues  -HG     Status: Patient’s memory skills will be enhanced as reported by  patient by using external memory aides  Progressing as expected  -HG     Comments: Patient’s memory skills will be enhanced as reported by patient by using external memory aides  19: Pt will increased 19: Fxnl use of planner: pt was writing down his TO DO LIST and not what he had done.   -HG        Attention/Orientation Goals    Patient will be able to execute all activities necessary to manage a home  Independently  -HG     Status: Patient will be able to execute all activities necessary to manage a home  New  -HG     Patient will improve attention skills by sustaining focus in order to actively hold and manipulate information provided (e.g., sequencing auditorily presented number series in ascending or descending order)  90%:;with cues  -HG     Status: Patient will improve attention skills by sustaining focus in order to actively hold and manipulate information provided (e.g., sequencing auditorily presented number series in ascending or descending order)  Progressing as expected  -HG     Comments: Patient will improve attention skills by sustaining focus in order to actively hold and manipulate information provided (e.g., sequencing auditorily presented number series in ascending or descending order)  19: Mental Manipulation for word placement and pt was 80% accurate for attribute and placement.   -HG     Patient will improve attention skills by alternating or shifting focus between two different tasks in order to complete both tasks  80%:;with cues  -HG     Status: Patient will improve attention skills by alternating or shifting focus between two different tasks in order to complete both tasks  Progressing as expected  -HG     Comments: Patient will improve attention skills by alternating or shifting focus between two different tasks in order to complete both tasks  19: Sorting card by katie and REJI and pt required mod cues and repeated instructions and pt was 60% accurate.   -HG     Patient  will improve attention skills by sustaining focus to high-level cognitive tasks in order to complete task  80%:;with cues  -HG     Status: Patient will improve attention skills by sustaining focus to high-level cognitive tasks in order to complete task  Progressing as expected  -HG     Comments: Patient will improve attention skills by sustaining focus to high-level cognitive tasks in order to complete task  7/26/19: Recognizing odd numbers in suit of cards while tapping the table and pt was 70% accurate.   -HG        Other Goals    Other Adult Goal- 1  Pt will complete thought organization tasks with 80% accuracy.  -HG     Status: Other Adult Goal- 1  Progressing as expected  -HG     Comments: Other Adult Goal- 1  7/26/19: Divergent naming: pt was 10/10.   -HG     Other Adult Goal- 2  Pt will improve RBANS Total Score to at least 90 placing pt in the Average range.   -HG     Status: Other Adult Goal- 2  Progressing as expected  -HG     Comments: Other Adult Goal- 2  --  -HG     Other Adult Goal- 3  --  -HG     Status: Other Adult Goal- 3  --  -HG     Comments: Other Adult Goal- 3  --  -HG     Other Adult Goal- 4  --  -HG     Status: Other Adult Goal- 4  --  -HG     Comments: Other Adult Goal- 4  --  -HG     Other Adult Goal- 5  --  -HG     Status: Other Adult Goal- 5  --  -HG     Comments: Other Adult Goal- 5  --  -HG     Other Adult Goal- 6  --  -HG     Status: Other Adult Goal- 6  --  -HG     Comments: Other Adult Goal- 6  --  -HG     Other Adult Goal- 7  --  -HG     Status: Other Adult Goal- 7  --  -HG     Comments: Other Adult Goal- 7  --  -HG     Other Adult Goal- 8  --  -HG     Status: Other Adult Goal- 8  --  -HG     Comments: Other Adult Goal- 8  --  -HG        SLP Time Calculation    SLP Goal Re-Cert Due Date  08/21/19  -HG       User Key  (r) = Recorded By, (t) = Taken By, (c) = Cosigned By    Initials Name Provider Type    Anjali Cortes MS CCC-SLP Speech and Language Pathologist          OP SLP  Education     Row Name 08/06/19 1300       Education    Education Comments  Hmwk for 8 related words.   -HG      User Key  (r) = Recorded By, (t) = Taken By, (c) = Cosigned By    Initials Name Effective Dates    Anjali Cortes MS CCC-SLP 06/22/15 -           OP SLP Assessment/Plan - 08/06/19 1300        SLP Plan    Plan Comments  Cont with Cog tx.    -HG      User Key  (r) = Recorded By, (t) = Taken By, (c) = Cosigned By    Initials Name Provider Type    Anjali Cortes MS CCC-SLP Speech and Language Pathologist                 Time Calculation:   SLP Start Time: 1300    Therapy Charges for Today     Code Description Service Date Service Provider Modifiers Qty    31920220622 HC ST TREATMENT SPEECH 4 8/6/2019 Anjali Serrano MS CCC-SLP GN 1                   Anjali Serrano MS CCC-SLP  8/6/2019

## 2019-08-08 ENCOUNTER — CLINICAL SUPPORT NO REQUIREMENTS (OUTPATIENT)
Dept: CARDIOLOGY | Facility: CLINIC | Age: 79
End: 2019-08-08

## 2019-08-08 DIAGNOSIS — I44.30 AV BLOCK: Primary | ICD-10-CM

## 2019-08-08 PROCEDURE — 93294 REM INTERROG EVL PM/LDLS PM: CPT | Performed by: INTERNAL MEDICINE

## 2019-08-08 PROCEDURE — 93296 REM INTERROG EVL PM/IDS: CPT | Performed by: INTERNAL MEDICINE

## 2019-08-08 RX ORDER — CLONAZEPAM 1 MG/1
1 TABLET ORAL
Qty: 30 TABLET | Refills: 2 | Status: CANCELLED | OUTPATIENT
Start: 2019-08-08

## 2019-08-08 RX ORDER — CLONAZEPAM 1 MG/1
1 TABLET ORAL
Qty: 30 TABLET | Refills: 0 | Status: SHIPPED | OUTPATIENT
Start: 2019-08-08 | End: 2019-09-06 | Stop reason: SDUPTHER

## 2019-08-09 ENCOUNTER — HOSPITAL ENCOUNTER (OUTPATIENT)
Dept: SPEECH THERAPY | Facility: HOSPITAL | Age: 79
Setting detail: THERAPIES SERIES
Discharge: HOME | End: 2019-08-09

## 2019-08-09 DIAGNOSIS — R41.3 MEMORY LOSS: Primary | ICD-10-CM

## 2019-08-09 PROCEDURE — 92507 TX SP LANG VOICE COMM INDIV: CPT

## 2019-08-09 NOTE — THERAPY TREATMENT NOTE
Outpatient Speech Language Pathology   Adult Speech Language Cognitive Treatment Note  [facility]     Patient Name: Uche Polanco  : 1940  MRN: [id number]  Today's Date: 2019         Visit Date: 2019   Patient Active Problem List   Diagnosis   • Valvular heart disease   • TIA (transient ischemic attack)   • Severe obstructive sleep apnea   • Hypertension   • Hyperlipidemia   • Gout   • CAD (coronary artery disease)   • AV block   • REM sleep behavior disorder   • Memory loss          Visit Dx:    ICD-10-CM ICD-9-CM   1. Memory loss R41.3 780.93                         SLP OP Goals     Row Name 19 1300          Goal Type Needed    Goal Type Needed  Memory;Attention/Orientation;Other Adult Goals  -HG        Subjective Comments    Subjective Comments  Pt alert, cooperative, returned with homework.   -HG        Auditory Comprehension Goals    Patient will comprehend abstract and complex information presented in all social, avocational, or work settings  90%:;with cues  -HG     Status: Patient will comprehend abstract and complex information presented in all social, avocational, or work settings  New  -HG     Patient will improve comprehension of spoken language by following Multi-step directions  80%:;with cues  -HG     Status: Patient will improve comprehension of spoken language by following Multi-step directions  Progressing as expected  -HG     Comments: Patient will improve comprehension of spoken language by following Multi-step directions  19: 2 step oral directions: pt was 100% accurate. 3 step oral directions: pt was 12/15. 19: 2 step oral directions: 70% accuracy.   -HG     Patient will demonstrate comprehension of spoken language by answering questions about a multi paragraph length content  80%:;with cues  -HG     Status: Patient will demonstrate comprehension of spoken language by answering questions about a multi paragraph length content  Progressing as expected  -HG         Memory Goals    Patient and family will implement compensatory strategies to maximize patient’s Memory function so patient can continue to participate in daily activities  90%:;with cues  -HG     Status: Patient and family will implement compensatory strategies to maximize patient’s Memory function so patient can continue to participate in daily activities  New  -HG     Patient will demonstrate improved ability to recall information by immediately recalling a series of words  80%:;related;after delay;with cues  -HG     Status: Patient will demonstrate improved ability to recall information by immediately recalling a series of words  Progressing as expected  -HG     Comments: Patient will demonstrate improved ability to recall information by immediately recalling a series of words  19: 36-50 word paragraphs for immediate recall: pt was . Immediate recall for storytellin/3, using chunking: 3/3,   -HG     Patient’s memory skills will be enhanced as reported by patient by utilizing internal memory strategies to recall up to 3 pieces of information after a 5- minute delay  80%:;with cues  -HG     Status: Patient’s memory skills will be enhanced as reported by patient by utilizing internal memory strategies to recall up to 3 pieces of information after a 5- minute delay  Progressing as expected  -HG     Comments: Patient’s memory skills will be enhanced as reported by patient by utilizing internal memory strategies to recall up to 3 pieces of information after a 5- minute delay  19: Delayed recall of 7 words: pt was 6/7 x 2, 7/7 x 2 19: Delayed recall of 5 related words and pt was 5/5, 6 related words: pt was 6/6. 7 related words using alphabetical order and chunking according to starting letter and pt was 6/7 with min cue.  19: Delayed recall of 4 related words and pt was 3/4 I'ly; 4/4 x 2. Increased to 5 related words and pt was 4/5 and then 5/5.   -HG     Patient’s memory skills will be  enhanced as reported by patient by using external memory aides  80%:;with cues  -HG     Status: Patient’s memory skills will be enhanced as reported by patient by using external memory aides  Progressing as expected  -HG     Comments: Patient’s memory skills will be enhanced as reported by patient by using external memory aides  8/9/19: Pt with increased use of his planner with daily notes.  7/26/19: Fxnl use of planner: pt was writing down his TO DO LIST and not what he had done.   -HG        Attention/Orientation Goals    Patient will be able to execute all activities necessary to manage a home  Independently  -HG     Status: Patient will be able to execute all activities necessary to manage a home  New  -HG     Patient will improve attention skills by sustaining focus in order to actively hold and manipulate information provided (e.g., sequencing auditorily presented number series in ascending or descending order)  90%:;with cues  -HG     Status: Patient will improve attention skills by sustaining focus in order to actively hold and manipulate information provided (e.g., sequencing auditorily presented number series in ascending or descending order)  Progressing as expected  -HG     Comments: Patient will improve attention skills by sustaining focus in order to actively hold and manipulate information provided (e.g., sequencing auditorily presented number series in ascending or descending order)  8/6/19: Mental Manipulation for word placement and pt was 80% accurate for attribute and placement.   -HG     Patient will improve attention skills by alternating or shifting focus between two different tasks in order to complete both tasks  80%:;with cues  -HG     Status: Patient will improve attention skills by alternating or shifting focus between two different tasks in order to complete both tasks  Progressing as expected  -HG     Comments: Patient will improve attention skills by alternating or shifting focus  between two different tasks in order to complete both tasks  19: Sorting card by suit and  and pt required mod cues and repeated instructions and pt was 60% accurate.   -HG     Patient will improve attention skills by sustaining focus to high-level cognitive tasks in order to complete task  80%:;with cues  -HG     Status: Patient will improve attention skills by sustaining focus to high-level cognitive tasks in order to complete task  Progressing as expected  -HG     Comments: Patient will improve attention skills by sustaining focus to high-level cognitive tasks in order to complete task  19: One Pile Card Game: pt was 70% accurate. 19: Recognizing odd numbers in suit of cards while tapping the table and pt was 70% accurate.   -HG        Other Goals    Other Adult Goal- 1  Pt will complete thought organization tasks with 80% accuracy.  -HG     Status: Other Adult Goal- 1  Progressing as expected  -HG     Comments: Other Adult Goal- 1  19: Divergent concrete category naming and pt was 15/15 x 2. 19: Divergent naming: pt was 10/10.   -HG     Other Adult Goal- 2  Pt will improve RBANS Total Score to at least 90 placing pt in the Average range.   -HG     Status: Other Adult Goal- 2  New  -HG     Comments: Other Adult Goal- 2  --  -HG     Other Adult Goal- 3  --  -HG     Status: Other Adult Goal- 3  --  -HG     Comments: Other Adult Goal- 3  --  -HG     Other Adult Goal- 4  --  -HG     Status: Other Adult Goal- 4  --  -HG     Comments: Other Adult Goal- 4  --  -HG     Other Adult Goal- 5  --  -HG     Status: Other Adult Goal- 5  --  -HG     Comments: Other Adult Goal- 5  --  -HG     Other Adult Goal- 6  --  -HG     Status: Other Adult Goal- 6  --  -HG     Comments: Other Adult Goal- 6  --  -HG     Other Adult Goal- 7  --  -HG     Status: Other Adult Goal- 7  --  -HG     Comments: Other Adult Goal- 7  --  -HG     Other Adult Goal- 8  --  -HG     Status: Other Adult Goal- 8  --  -HG     Comments:  Other Adult Goal- 8  --  -        SLP Time Calculation    SLP Goal Re-Cert Due Date  08/21/19  -HG       User Key  (r) = Recorded By, (t) = Taken By, (c) = Cosigned By    Initials Name Provider Type    Anjali Cortes MS CCC-SLP Speech and Language Pathologist          OP SLP Education     Row Name 08/09/19 1300       Education    Education Comments  Hmwk for 8 related words- pt studied 7 words for hmwk last session.   -      User Key  (r) = Recorded By, (t) = Taken By, (c) = Cosigned By    Initials Name Effective Dates    Anjali Cortes MS CCC-SLP 06/22/15 -           OP SLP Assessment/Plan - 08/09/19 1300        SLP Plan    Plan Comments  Cont with Cog tx.    -      User Key  (r) = Recorded By, (t) = Taken By, (c) = Cosigned By    Initials Name Provider Type    Anjali Cortes MS CCC-SLP Speech and Language Pathologist                 Time Calculation:   SLP Start Time: 1300    Therapy Charges for Today     Code Description Service Date Service Provider Modifiers Qty    79136237514  ST TREATMENT SPEECH 4 8/9/2019 Anjali Serrano MS CCC-SLP GN 1                   Anjali Serrano MS CCC-SLP  8/9/2019

## 2019-08-16 ENCOUNTER — HOSPITAL ENCOUNTER (OUTPATIENT)
Dept: SPEECH THERAPY | Facility: HOSPITAL | Age: 79
Setting detail: THERAPIES SERIES
Discharge: HOME | End: 2019-08-16

## 2019-08-16 DIAGNOSIS — R41.3 MEMORY LOSS: Primary | ICD-10-CM

## 2019-08-16 PROCEDURE — 92507 TX SP LANG VOICE COMM INDIV: CPT

## 2019-08-16 NOTE — THERAPY TREATMENT NOTE
Outpatient Speech Language Pathology   Adult Speech Language Cognitive Treatment Note  [facility]     Patient Name: Uche Polanco  : 1940  MRN: [id number]  Today's Date: 2019         Visit Date: 2019   Patient Active Problem List   Diagnosis   • Valvular heart disease   • TIA (transient ischemic attack)   • Severe obstructive sleep apnea   • Hypertension   • Hyperlipidemia   • Gout   • CAD (coronary artery disease)   • AV block   • REM sleep behavior disorder   • Memory loss          Visit Dx:    ICD-10-CM ICD-9-CM   1. Memory loss R41.3 780.93                         SLP OP Goals     Row Name 19 1300          Goal Type Needed    Goal Type Needed  Memory;Attention/Orientation;Other Adult Goals  -HG        Subjective Comments    Subjective Comments  Pt alert, cooperative,   -HG        Auditory Comprehension Goals    Patient will comprehend abstract and complex information presented in all social, avocational, or work settings  90%:;with cues  -HG     Status: Patient will comprehend abstract and complex information presented in all social, avocational, or work settings  New  -HG     Patient will improve comprehension of spoken language by following Multi-step directions  80%:;with cues  -HG     Status: Patient will improve comprehension of spoken language by following Multi-step directions  Progressing as expected  -HG     Comments: Patient will improve comprehension of spoken language by following Multi-step directions  19: 2 step oral directions: pt was 100% accurate. 3 step oral directions: pt was 12/15. 19: 2 step oral directions: 70% accuracy.   -HG     Patient will demonstrate comprehension of spoken language by answering questions about a multi paragraph length content  80%:;with cues  -HG     Status: Patient will demonstrate comprehension of spoken language by answering questions about a multi paragraph length content  Progressing as expected  -HG        Memory Goals     Patient and family will implement compensatory strategies to maximize patient’s Memory function so patient can continue to participate in daily activities  90%:;with cues  -HG     Status: Patient and family will implement compensatory strategies to maximize patient’s Memory function so patient can continue to participate in daily activities  New  -HG     Patient will demonstrate improved ability to recall information by immediately recalling a series of words  80%:;related;after delay;with cues  -HG     Status: Patient will demonstrate improved ability to recall information by immediately recalling a series of words  Progressing as expected  -HG     Comments: Patient will demonstrate improved ability to recall information by immediately recalling a series of words  19: 50-65 words: pt was 6/6 x 3 for immediate story recall. 19: 36-50 word paragraphs for immediate recall: pt was 7/8. Immediate recall for storytellin/3, using chunking: 3/3,   -HG     Patient’s memory skills will be enhanced as reported by patient by utilizing internal memory strategies to recall up to 3 pieces of information after a 5- minute delay  80%:;with cues  -HG     Status: Patient’s memory skills will be enhanced as reported by patient by utilizing internal memory strategies to recall up to 3 pieces of information after a 5- minute delay  Progressing as expected  -HG     Comments: Patient’s memory skills will be enhanced as reported by patient by utilizing internal memory strategies to recall up to 3 pieces of information after a 5- minute delay  19: Delayed recall of 8 related words, pt was 8/8 x 2. 4 un-related words, pt was 4/4 x 2. 19: Delayed recall of 7 words: pt was 6/7 x 2, 7/7 x 2 19: Delayed recall of 5 related words and pt was 5/5, 6 related words: pt was 6/6. 7 related words using alphabetical order and chunking according to starting letter and pt was 6/7 with min cue.  19: Delayed recall of 4 related  words and pt was 3/4 I'ly; 4/4 x 2. Increased to 5 related words and pt was 4/5 and then 5/5.   -HG     Patient’s memory skills will be enhanced as reported by patient by using external memory aides  80%:;with cues  -HG     Status: Patient’s memory skills will be enhanced as reported by patient by using external memory aides  Progressing as expected  -HG     Comments: Patient’s memory skills will be enhanced as reported by patient by using external memory aides  8/16/19: Pt continues to use his planner daily. 8/9/19: Pt with increased use of his planner with daily notes.  7/26/19: Fxnl use of planner: pt was writing down his TO DO LIST and not what he had done.   -HG        Attention/Orientation Goals    Patient will be able to execute all activities necessary to manage a home  Independently  -HG     Status: Patient will be able to execute all activities necessary to manage a home  New  -HG     Patient will improve attention skills by sustaining focus in order to actively hold and manipulate information provided (e.g., sequencing auditorily presented number series in ascending or descending order)  90%:;with cues  -HG     Status: Patient will improve attention skills by sustaining focus in order to actively hold and manipulate information provided (e.g., sequencing auditorily presented number series in ascending or descending order)  Progressing as expected  -HG     Comments: Patient will improve attention skills by sustaining focus in order to actively hold and manipulate information provided (e.g., sequencing auditorily presented number series in ascending or descending order)  8/6/19: Mental Manipulation for word placement and pt was 80% accurate for attribute and placement.   -HG     Patient will improve attention skills by alternating or shifting focus between two different tasks in order to complete both tasks  80%:;with cues  -HG     Status: Patient will improve attention skills by alternating or shifting  focus between two different tasks in order to complete both tasks  Progressing as expected  -HG     Comments: Patient will improve attention skills by alternating or shifting focus between two different tasks in order to complete both tasks  19: Sorting card by suit and  and pt was 70% accurate. 19: Sorting card by suit and  and pt required mod cues and repeated instructions and pt was 60% accurate.   -HG     Patient will improve attention skills by sustaining focus to high-level cognitive tasks in order to complete task  80%:;with cues  -HG     Status: Patient will improve attention skills by sustaining focus to high-level cognitive tasks in order to complete task  Progressing as expected  -HG     Comments: Patient will improve attention skills by sustaining focus to high-level cognitive tasks in order to complete task  19: One Pile Card Game: pt was 70% accurate. 19: Recognizing odd numbers in suit of cards while tapping the table and pt was 70% accurate.   -HG        Other Goals    Other Adult Goal- 1  Pt will complete thought organization tasks with 80% accuracy.  -HG     Status: Other Adult Goal- 1  Progressing as expected  -HG     Comments: Other Adult Goal- 1  19: Divergent concrete category naming and pt was 15/15 x 2. 19: Divergent naming: pt was 10/10.   -HG     Other Adult Goal- 2  Pt will improve RBANS Total Score to at least 90 placing pt in the Average range.   -HG     Status: Other Adult Goal- 2  New  -HG        SLP Time Calculation    SLP Goal Re-Cert Due Date  19  -HG       User Key  (r) = Recorded By, (t) = Taken By, (c) = Cosigned By    Initials Name Provider Type    Anjali Cortes MS CCC-SLP Speech and Language Pathologist          OP SLP Education     Row Name 19 1300       Education    Education Comments  Hmwk for 5 un-related words.   -HG      User Key  (r) = Recorded By, (t) = Taken By, (c) = Cosigned By    Initials Name Effective Dates     Anjali Cortes MS CCC-SLP 06/22/15 -           OP SLP Assessment/Plan - 08/16/19 1300        SLP Plan    Plan Comments  Cont with Cog tx.    -      User Key  (r) = Recorded By, (t) = Taken By, (c) = Cosigned By    Initials Name Provider Type    Anjali Cortes MS CCC-SLP Speech and Language Pathologist                 Time Calculation:   SLP Start Time: 1300    Therapy Charges for Today     Code Description Service Date Service Provider Modifiers Qty    [id number]  ST TREATMENT SPEECH 4 8/16/2019 Anjali Serrano MS CCC-SLP GN 1                   Anjali Serrano MS CCC-SLP  8/16/2019

## 2019-08-20 ENCOUNTER — HOSPITAL ENCOUNTER (OUTPATIENT)
Dept: SPEECH THERAPY | Facility: HOSPITAL | Age: 79
Setting detail: THERAPIES SERIES
Discharge: HOME | End: 2019-08-20

## 2019-08-20 DIAGNOSIS — R41.3 MEMORY LOSS: Primary | ICD-10-CM

## 2019-08-20 PROCEDURE — 92507 TX SP LANG VOICE COMM INDIV: CPT

## 2019-08-20 NOTE — THERAPY PROGRESS REPORT/RE-CERT
Outpatient Speech Language Pathology   Adult Speech Language Cognitive Progress Note  [facility]     Patient Name: Uche Polanco  : 1940  MRN: [id number]  Today's Date: 2019         Visit Date: 2019   Patient Active Problem List   Diagnosis   • Valvular heart disease   • TIA (transient ischemic attack)   • Severe obstructive sleep apnea   • Hypertension   • Hyperlipidemia   • Gout   • CAD (coronary artery disease)   • AV block   • REM sleep behavior disorder   • Memory loss          Visit Dx:    ICD-10-CM ICD-9-CM   1. Memory loss R41.3 780.93                         SLP OP Goals     Row Name 19 1300          Goal Type Needed    Goal Type Needed  Memory;Attention/Orientation;Other Adult Goals  -HG        Subjective Comments    Subjective Comments  Pt alert, cooperative, returned with notebook.   -HG        Auditory Comprehension Goals    Patient will comprehend abstract and complex information presented in all social, avocational, or work settings  90%:;with cues  -HG     Status: Patient will comprehend abstract and complex information presented in all social, avocational, or work settings  New  -HG     Patient will improve comprehension of spoken language by following Multi-step directions  80%:;with cues  -HG     Status: Patient will improve comprehension of spoken language by following Multi-step directions  Progressing as expected  -HG     Comments: Patient will improve comprehension of spoken language by following Multi-step directions  19: 2 step oral directions: pt was 100% accurate. 3 step oral directions: pt was 12/15. 19: 2 step oral directions: 70% accuracy.   -HG     Patient will demonstrate comprehension of spoken language by answering questions about a multi paragraph length content  80%:;with cues  -HG     Status: Patient will demonstrate comprehension of spoken language by answering questions about a multi paragraph length content  Progressing as expected  -HG         Memory Goals    Patient and family will implement compensatory strategies to maximize patient’s Memory function so patient can continue to participate in daily activities  90%:;with cues  -HG     Status: Patient and family will implement compensatory strategies to maximize patient’s Memory function so patient can continue to participate in daily activities  New  -HG     Patient will demonstrate improved ability to recall information by immediately recalling a series of words  80%:;related;after delay;with cues  -HG     Status: Patient will demonstrate improved ability to recall information by immediately recalling a series of words  Progressing as expected  -HG     Comments: Patient will demonstrate improved ability to recall information by immediately recalling a series of words  19: 50-65 words: pt was 6/6 x 3 for immediate story recall. 19: 36-50 word paragraphs for immediate recall: pt was . Immediate recall for storytellin/3, using chunking: 3/3,   -HG     Patient’s memory skills will be enhanced as reported by patient by utilizing internal memory strategies to recall up to 3 pieces of information after a 5- minute delay  80%:;with cues  -HG     Status: Patient’s memory skills will be enhanced as reported by patient by utilizing internal memory strategies to recall up to 3 pieces of information after a 5- minute delay  Progressing as expected  -HG     Comments: Patient’s memory skills will be enhanced as reported by patient by utilizing internal memory strategies to recall up to 3 pieces of information after a 5- minute delay  19: Delayed recall of 5 un-related words and pt was 5/5 x 2. 6 un-related words: pt was 4/6.  19: Delayed recall of 8 related words, pt was 8/8 x 2. 4 un-related words, pt was 4/4 x 2. 19: Delayed recall of 7 words: pt was 6/7 x 2, 7/7 x 2 19: Delayed recall of 5 related words and pt was 5/5, 6 related words: pt was 6/6. 7 related words using  alphabetical order and chunking according to starting letter and pt was 6/7 with min cue.  7/26/19: Delayed recall of 4 related words and pt was 3/4 I'ly; 4/4 x 2. Increased to 5 related words and pt was 4/5 and then 5/5.   -HG     Patient’s memory skills will be enhanced as reported by patient by using external memory aides  80%:;with cues  -HG     Status: Patient’s memory skills will be enhanced as reported by patient by using external memory aides  Progressing as expected  -HG     Comments: Patient’s memory skills will be enhanced as reported by patient by using external memory aides  8/16/19: Pt continues to use his planner daily. 8/9/19: Pt with increased use of his planner with daily notes.  7/26/19: Fxnl use of planner: pt was writing down his TO DO LIST and not what he had done.   -HG        Attention/Orientation Goals    Patient will be able to execute all activities necessary to manage a home  Independently  -HG     Status: Patient will be able to execute all activities necessary to manage a home  New  -HG     Patient will improve attention skills by sustaining focus in order to actively hold and manipulate information provided (e.g., sequencing auditorily presented number series in ascending or descending order)  90%:;with cues  -HG     Status: Patient will improve attention skills by sustaining focus in order to actively hold and manipulate information provided (e.g., sequencing auditorily presented number series in ascending or descending order)  Progressing as expected  -HG     Comments: Patient will improve attention skills by sustaining focus in order to actively hold and manipulate information provided (e.g., sequencing auditorily presented number series in ascending or descending order)  8/20/19: Mental Manipulation for worder order of alphabetical vs reversal and pt was 50% accurate.  8/6/19: Mental Manipulation for word placement and pt was 80% accurate for attribute and placement.   -HG      Patient will improve attention skills by alternating or shifting focus between two different tasks in order to complete both tasks  80%:;with cues  -HG     Status: Patient will improve attention skills by alternating or shifting focus between two different tasks in order to complete both tasks  Progressing as expected  -HG     Comments: Patient will improve attention skills by alternating or shifting focus between two different tasks in order to complete both tasks  19: Sorting card by suit and  and pt was 70% accurate. 19: Sorting card by suit and  and pt required mod cues and repeated instructions and pt was 60% accurate.   -HG     Patient will improve attention skills by sustaining focus to high-level cognitive tasks in order to complete task  80%:;with cues  -HG     Status: Patient will improve attention skills by sustaining focus to high-level cognitive tasks in order to complete task  Progressing as expected  -HG     Comments: Patient will improve attention skills by sustaining focus to high-level cognitive tasks in order to complete task  19: One Pile Card Game: pt was 70% accurate with min cues.  19: One Pile Card Game: pt was 70% accurate. 19: Recognizing odd numbers in suit of cards while tapping the table and pt was 70% accurate.   -HG        Other Goals    Other Adult Goal- 1  Pt will complete thought organization tasks with 80% accuracy.  -HG     Status: Other Adult Goal- 1  Progressing as expected  -HG     Comments: Other Adult Goal- 1  19: Divergent concrete category naming and pt was 15/15 x 2. 19: Divergent naming: pt was 10/10.   -HG     Other Adult Goal- 2  Pt will improve RBANS Total Score to at least 90 placing pt in the Average range.   -HG     Status: Other Adult Goal- 2  New  -HG        SLP Time Calculation    SLP Goal Re-Cert Due Date  19  -HG       User Key  (r) = Recorded By, (t) = Taken By, (c) = Cosigned By    Initials Name Provider Type    HG  Anjali Serrano MS CCC-SLP Speech and Language Pathologist          OP SLP Education     Row Name 08/20/19 1300       Education    Education Comments  Hmwk for 6 un-related words.   -HG      User Key  (r) = Recorded By, (t) = Taken By, (c) = Cosigned By    Initials Name Effective Dates    Anjali Cortes MS CCC-SLP 06/22/15 -           OP SLP Assessment/Plan - 08/20/19 1300        SLP Plan    Plan Comments  Cont with Cog tx.    -      User Key  (r) = Recorded By, (t) = Taken By, (c) = Cosigned By    Initials Name Provider Type    Anjali Cortes MS CCC-SLP Speech and Language Pathologist                 Time Calculation:   SLP Start Time: 1300    Therapy Charges for Today     Code Description Service Date Service Provider Modifiers Qty    [id number]  ST TREATMENT SPEECH 4 8/20/2019 Anjali Serrano MS CCC-SLP GN 1                   Anjali Serrano MS CCC-SLP  8/20/2019

## 2019-08-23 ENCOUNTER — HOSPITAL ENCOUNTER (OUTPATIENT)
Dept: SPEECH THERAPY | Facility: HOSPITAL | Age: 79
Setting detail: THERAPIES SERIES
Discharge: HOME | End: 2019-08-23

## 2019-08-23 PROCEDURE — 92507 TX SP LANG VOICE COMM INDIV: CPT

## 2019-08-23 NOTE — THERAPY PROGRESS REPORT/RE-CERT
Outpatient Speech Language Pathology   Adult Speech Language Cognitive Progress Note  [facility]     Patient Name: Uche Polanco  : 1940  MRN: [id number]  Today's Date: 2019         Visit Date: 2019   Patient Active Problem List   Diagnosis   • Valvular heart disease   • TIA (transient ischemic attack)   • Severe obstructive sleep apnea   • Hypertension   • Hyperlipidemia   • Gout   • CAD (coronary artery disease)   • AV block   • REM sleep behavior disorder   • Memory loss          Visit Dx:  No diagnosis found.                      SLP OP Goals     Row Name 19 1300          Goal Type Needed    Goal Type Needed  Memory;Attention/Orientation;Other Adult Goals  -HG        Subjective Comments    Subjective Comments  Pt alert, cooperative, returned with journal.   -HG        Auditory Comprehension Goals    Patient will comprehend abstract and complex information presented in all social, avocational, or work settings  90%:;with cues  -HG     Status: Patient will comprehend abstract and complex information presented in all social, avocational, or work settings  New  -HG     Patient will improve comprehension of spoken language by following Multi-step directions  80%:;with cues  -HG     Status: Patient will improve comprehension of spoken language by following Multi-step directions  Progressing as expected  -HG     Comments: Patient will improve comprehension of spoken language by following Multi-step directions  19: 2 step oral directions: pt was 100% accurate. 3 step oral directions: pt was 12/15. 19: 2 step oral directions: 70% accuracy.   -HG     Patient will demonstrate comprehension of spoken language by answering questions about a multi paragraph length content  80%:;with cues  -HG     Status: Patient will demonstrate comprehension of spoken language by answering questions about a multi paragraph length content  Progressing as expected  -HG        Memory Goals    Patient and  family will implement compensatory strategies to maximize patient’s Memory function so patient can continue to participate in daily activities  90%:;with cues  -HG     Status: Patient and family will implement compensatory strategies to maximize patient’s Memory function so patient can continue to participate in daily activities  New  -HG     Patient will demonstrate improved ability to recall information by immediately recalling a series of words  80%:;related;after delay;with cues  -HG     Status: Patient will demonstrate improved ability to recall information by immediately recalling a series of words  Progressing as expected  -HG     Comments: Patient will demonstrate improved ability to recall information by immediately recalling a series of words  19: Pt was able to recall 5 related words with accuracy, 6 words, pt was 5/6. 19: 50-65 words: pt was 6/6 x 3 for immediate story recall. 19: 36-50 word paragraphs for immediate recall: pt was 7/8. Immediate recall for storytellin/3, using chunking: 3/3,   -HG     Patient’s memory skills will be enhanced as reported by patient by utilizing internal memory strategies to recall up to 3 pieces of information after a 5- minute delay  80%:;with cues  -HG     Status: Patient’s memory skills will be enhanced as reported by patient by utilizing internal memory strategies to recall up to 3 pieces of information after a 5- minute delay  Progressing as expected  -HG     Comments: Patient’s memory skills will be enhanced as reported by patient by utilizing internal memory strategies to recall up to 3 pieces of information after a 5- minute delay  19: Delayed recall of 6 un-related words: pt was 6/6 x 3- gave 7 for hmwk. 19: Delayed recall of 5 un-related words and pt was 5/5 x 2. 6 un-related words: pt was 4/6.  19: Delayed recall of 8 related words, pt was 8/8 x 2. 4 un-related words, pt was 4/4 x 2. 19: Delayed recall of 7 words: pt was 6/7  x 2, 7/7 x 2 8/6/19: Delayed recall of 5 related words and pt was 5/5, 6 related words: pt was 6/6. 7 related words using alphabetical order and chunking according to starting letter and pt was 6/7 with min cue.  7/26/19: Delayed recall of 4 related words and pt was 3/4 I'ly; 4/4 x 2. Increased to 5 related words and pt was 4/5 and then 5/5.   -HG     Patient’s memory skills will be enhanced as reported by patient by using external memory aides  80%:;with cues  -HG     Status: Patient’s memory skills will be enhanced as reported by patient by using external memory aides  Progressing as expected  -HG     Comments: Patient’s memory skills will be enhanced as reported by patient by using external memory aides  8/16/19: Pt continues to use his planner daily. 8/9/19: Pt with increased use of his planner with daily notes.  7/26/19: Fxnl use of planner: pt was writing down his TO DO LIST and not what he had done.   -HG        Attention/Orientation Goals    Patient will be able to execute all activities necessary to manage a home  Independently  -HG     Status: Patient will be able to execute all activities necessary to manage a home  New  -HG     Patient will improve attention skills by sustaining focus in order to actively hold and manipulate information provided (e.g., sequencing auditorily presented number series in ascending or descending order)  90%:;with cues  -HG     Status: Patient will improve attention skills by sustaining focus in order to actively hold and manipulate information provided (e.g., sequencing auditorily presented number series in ascending or descending order)  Progressing as expected  -HG     Comments: Patient will improve attention skills by sustaining focus in order to actively hold and manipulate information provided (e.g., sequencing auditorily presented number series in ascending or descending order)  8/20/19: Mental Manipulation for worder order of alphabetical vs reversal and pt was 50%  accurate.  19: Mental Manipulation for word placement and pt was 80% accurate for attribute and placement.   -HG     Patient will improve attention skills by alternating or shifting focus between two different tasks in order to complete both tasks  80%:;with cues  -HG     Status: Patient will improve attention skills by alternating or shifting focus between two different tasks in order to complete both tasks  Progressing as expected  -HG     Comments: Patient will improve attention skills by alternating or shifting focus between two different tasks in order to complete both tasks  19: Card game and pt was 80% accurate for new rules and watching increasing piles of cards for potential moves.  19: Sorting card by suit and  and pt was 70% accurate. 19: Sorting card by suit and  and pt required mod cues and repeated instructions and pt was 60% accurate.   -HG     Patient will improve attention skills by sustaining focus to high-level cognitive tasks in order to complete task  80%:;with cues  -HG     Status: Patient will improve attention skills by sustaining focus to high-level cognitive tasks in order to complete task  Progressing as expected  -HG     Comments: Patient will improve attention skills by sustaining focus to high-level cognitive tasks in order to complete task  19: One Pile Card Game: pt was 70% accurate with min cues.  19: One Pile Card Game: pt was 70% accurate. 19: Recognizing odd numbers in suit of cards while tapping the table and pt was 70% accurate.   -HG        Other Goals    Other Adult Goal- 1  Pt will complete thought organization tasks with 80% accuracy.  -HG     Status: Other Adult Goal- 1  Progressing as expected  -HG     Comments: Other Adult Goal- 1  19: Divergent concrete category naming and pt was 15/15 x 2. 19: Divergent naming: pt was 10/10.   -HG     Other Adult Goal- 2  Pt will improve RBANS Total Score to at least 90 placing pt in the  Average range.   -HG     Status: Other Adult Goal- 2  New  -HG     Comments: Other Adult Goal- 2  --  -HG     Other Adult Goal- 3  --  -HG     Status: Other Adult Goal- 3  --  -HG     Comments: Other Adult Goal- 3  --  -HG     Other Adult Goal- 4  --  -HG     Status: Other Adult Goal- 4  --  -HG     Comments: Other Adult Goal- 4  --  -HG     Other Adult Goal- 5  --  -HG     Status: Other Adult Goal- 5  --  -HG     Comments: Other Adult Goal- 5  --  -HG     Other Adult Goal- 6  --  -HG     Status: Other Adult Goal- 6  --  -HG     Comments: Other Adult Goal- 6  --  -HG     Other Adult Goal- 7  --  -HG     Status: Other Adult Goal- 7  --  -HG     Comments: Other Adult Goal- 7  --  -HG     Other Adult Goal- 8  --  -HG     Status: Other Adult Goal- 8  --  -HG     Comments: Other Adult Goal- 8  --  -HG        SLP Time Calculation    SLP Goal Re-Cert Due Date  09/19/19  -HG       User Key  (r) = Recorded By, (t) = Taken By, (c) = Cosigned By    Initials Name Provider Type    HG Anjali Serrano MS CCC-SLP Speech and Language Pathologist          OP SLP Education     Row Name 08/23/19 1300       Education    Barriers to Learning  No barriers identified  -    Education Provided  Patient demonstrated recommended strategies;Patient requires further education on strategies, risks  -    Assessed  Learning needs;Learning motivation;Learning preferences;Learning readiness  -    Learning Motivation  Strong  -    Learning Method  Explanation;Demonstration;Teach back;Written materials  -    Teaching Response  Verbalized understanding;Demonstrated understanding;Reinforcement needed  -    Education Comments  Hmwk for 7 un-related words.   -      User Key  (r) = Recorded By, (t) = Taken By, (c) = Cosigned By    Initials Name Effective Dates    HG Anjali Serrano MS CCC-SLP 06/22/15 -           OP SLP Assessment/Plan - 08/23/19 1300        SLP Assessment    Functional Problems  Speech Language- Adult/Cognition   -HG     Impact on Function: Adult Speech Language/Cognition  Trouble learning or remembering new information;Restrictions in personal and social life   -    Clinical Impression: Speech Language-Adult/Congnition  Mild-Moderate:;Cognitive Communication Impairment   -HG    Functional Problems Comment  Pt with improved notetaking skills.   -HG    SLP Diagnosis  Mild to Moderate Cognitive Impairment   -    Prognosis  Excellent (comment)   -    Patient/caregiver participated in establishment of treatment plan and goals  Yes   -HG    Patient would benefit from skilled therapy intervention  Yes   -HG       SLP Plan    Frequency  1-2x/week   -HG    Duration  8 weeks   -HG    Planned CPT's?  SLP INDIVIDUAL SPEECH THERAPY: 94105   -    Expected Duration Therapy Session - minutes  45-60 minutes   -HG    Plan Comments  Cont with Cog tx.    -      User Key  (r) = Recorded By, (t) = Taken By, (c) = Cosigned By    Initials Name Provider Type     Anjali Serrano MS CCC-SLP Speech and Language Pathologist                 Time Calculation:   SLP Start Time: 1300    Therapy Charges for Today     Code Description Service Date Service Provider Modifiers Qty    [id number]  ST TREATMENT SPEECH 4 8/23/2019 Anjali Serrano MS CCC-SLP GN 1                   Anjali Serrano MS CCC-SLP  8/23/2019

## 2019-08-30 ENCOUNTER — HOSPITAL ENCOUNTER (OUTPATIENT)
Dept: SPEECH THERAPY | Facility: HOSPITAL | Age: 79
Setting detail: THERAPIES SERIES
Discharge: HOME | End: 2019-08-30

## 2019-08-30 DIAGNOSIS — R41.3 MEMORY LOSS: Primary | ICD-10-CM

## 2019-08-30 PROCEDURE — 92507 TX SP LANG VOICE COMM INDIV: CPT

## 2019-09-03 ENCOUNTER — HOSPITAL ENCOUNTER (OUTPATIENT)
Dept: SPEECH THERAPY | Facility: HOSPITAL | Age: 79
Setting detail: THERAPIES SERIES
Discharge: HOME | End: 2019-09-03

## 2019-09-03 DIAGNOSIS — R41.3 MEMORY LOSS: Primary | ICD-10-CM

## 2019-09-03 PROCEDURE — 92507 TX SP LANG VOICE COMM INDIV: CPT

## 2019-09-03 NOTE — THERAPY TREATMENT NOTE
Outpatient Speech Language Pathology   Adult Speech Language Cognitive Treatment Note  [facility]     Patient Name: Uche Polanco  : 1940  MRN: [id number]  Today's Date: 9/3/2019         Visit Date: 2019   Patient Active Problem List   Diagnosis   • Valvular heart disease   • TIA (transient ischemic attack)   • Severe obstructive sleep apnea   • Hypertension   • Hyperlipidemia   • Gout   • CAD (coronary artery disease)   • AV block   • REM sleep behavior disorder   • Memory loss          Visit Dx:    ICD-10-CM ICD-9-CM   1. Memory loss R41.3 780.93                         SLP OP Goals     Row Name 19 1400          Goal Type Needed    Goal Type Needed  Memory;Attention/Orientation;Other Adult Goals  -HG        Subjective Comments    Subjective Comments  Pt alert, cooperative, returned with homework.   -HG        Auditory Comprehension Goals    Patient will comprehend abstract and complex information presented in all social, avocational, or work settings  90%:;with cues  -HG     Status: Patient will comprehend abstract and complex information presented in all social, avocational, or work settings  New  -HG     Patient will improve comprehension of spoken language by following Multi-step directions  80%:;with cues  -HG     Status: Patient will improve comprehension of spoken language by following Multi-step directions  Progressing as expected  -HG     Comments: Patient will improve comprehension of spoken language by following Multi-step directions  9/3/19: 2 step/4 component directions: pt was 70% accurate.   19: 2 step oral directions: pt was 100% accurate. 3 step oral directions: pt was 12/15. 19: 2 step oral directions: 70% accuracy.   -HG     Patient will demonstrate comprehension of spoken language by answering questions about a multi paragraph length content  80%:;with cues  -HG     Status: Patient will demonstrate comprehension of spoken language by answering questions about a  multi paragraph length content  New  -HG     Comments: Patient will demonstrate comprehension of spoken language by answering questions about a multi paragraph length content  --  -HG        Memory Goals    Patient and family will implement compensatory strategies to maximize patient’s Memory function so patient can continue to participate in daily activities  90%:;with cues  -HG     Status: Patient and family will implement compensatory strategies to maximize patient’s Memory function so patient can continue to participate in daily activities  New  -HG     Patient will demonstrate improved ability to recall information by immediately recalling a series of words  80%:;related;after delay;with cues  -HG     Status: Patient will demonstrate improved ability to recall information by immediately recalling a series of words  Progressing as expected  -HG     Comments: Patient will demonstrate improved ability to recall information by immediately recalling a series of words  9/3/19: Immediate recall of shapes and figures (5) and pt improved to 80% accurate.  19: Pt was able to recall 5 related words with accuracy, 6 words, pt was 5/6. 19: 50-65 words: pt was 6/6 x 3 for immediate story recall. 19: 36-50 word paragraphs for immediate recall: pt was 7/8. Immediate recall for storytellin/3, using chunking: 3/3,   -HG     Patient’s memory skills will be enhanced as reported by patient by utilizing internal memory strategies to recall up to 3 pieces of information after a 5- minute delay  80%:;with cues  -HG     Status: Patient’s memory skills will be enhanced as reported by patient by utilizing internal memory strategies to recall up to 3 pieces of information after a 5- minute delay  Progressing as expected  -HG     Comments: Patient’s memory skills will be enhanced as reported by patient by utilizing internal memory strategies to recall up to 3 pieces of information after a 5- minute delay  9/3/19: Delayed  recall of 8 un-related words and pt was 8/8 x 2. 8/30/19: Delayed recall of 7 un-related pictures, pt was 7/7 x 2. 8/23/19: Delayed recall of 6 un-related words: pt was 6/6 x 3- gave 7 for hmwk. 8/20/19: Delayed recall of 5 un-related words and pt was 5/5 x 2. 6 un-related words: pt was 4/6.  8/16/19: Delayed recall of 8 related words, pt was 8/8 x 2. 4 un-related words, pt was 4/4 x 2. 8/9/19: Delayed recall of 7 words: pt was 6/7 x 2, 7/7 x 2 8/6/19: Delayed recall of 5 related words and pt was 5/5, 6 related words: pt was 6/6. 7 related words using alphabetical order and chunking according to starting letter and pt was 6/7 with min cue.  7/26/19: Delayed recall of 4 related words and pt was 3/4 I'ly; 4/4 x 2. Increased to 5 related words and pt was 4/5 and then 5/5.   -HG     Patient’s memory skills will be enhanced as reported by patient by using external memory aides  80%:;with cues  -HG     Status: Patient’s memory skills will be enhanced as reported by patient by using external memory aides  Progressing as expected  -HG     Comments: Patient’s memory skills will be enhanced as reported by patient by using external memory aides  9/3/19: Pt using his calendar consistently however, pt not using the daily journaling section as much as SLP would like. 8/30/19: Pt required min cues to catch up on daily entries. 8/16/19: Pt continues to use his planner daily. 8/9/19: Pt with increased use of his planner with daily notes.  7/26/19: Fxnl use of planner: pt was writing down his TO DO LIST and not what he had done.   -HG        Attention/Orientation Goals    Patient will be able to execute all activities necessary to manage a home  Independently  -HG     Status: Patient will be able to execute all activities necessary to manage a home  New  -HG     Patient will improve attention skills by sustaining focus in order to actively hold and manipulate information provided (e.g., sequencing auditorily presented number series  in ascending or descending order)  90%:;with cues  -HG     Status: Patient will improve attention skills by sustaining focus in order to actively hold and manipulate information provided (e.g., sequencing auditorily presented number series in ascending or descending order)  Progressing as expected  -HG     Comments: Patient will improve attention skills by sustaining focus in order to actively hold and manipulate information provided (e.g., sequencing auditorily presented number series in ascending or descending order)  19: Mental Manipulation: pt was 80% accurate with re-read for ranking. 19: Mental Manipulation for worder order of alphabetical vs reversal and pt was 50% accurate.  19: Mental Manipulation for word placement and pt was 80% accurate for attribute and placement.   -HG     Patient will improve attention skills by alternating or shifting focus between two different tasks in order to complete both tasks  80%:;with cues  -HG     Status: Patient will improve attention skills by alternating or shifting focus between two different tasks in order to complete both tasks  Progressing as expected  -HG     Comments: Patient will improve attention skills by alternating or shifting focus between two different tasks in order to complete both tasks  19: Card game and pt was 80% accurate for new rules and watching increasing piles of cards for potential moves.  19: Sorting card by suit and  and pt was 70% accurate. 19: Sorting card by suit and  and pt required mod cues and repeated instructions and pt was 60% accurate.   -HG     Patient will improve attention skills by sustaining focus to high-level cognitive tasks in order to complete task  80%:;with cues  -HG     Status: Patient will improve attention skills by sustaining focus to high-level cognitive tasks in order to complete task  Progressing as expected  -HG     Comments: Patient will improve attention skills by sustaining  focus to high-level cognitive tasks in order to complete task  8/30/19: One Pile Card game: Pt required max cues in order to recall the rules of the game and to play accordingly. 8/20/19: One Pile Card Game: pt was 70% accurate with min cues.  8/9/19: One Pile Card Game: pt was 70% accurate. 7/26/19: Recognizing odd numbers in suit of cards while tapping the table and pt was 70% accurate.   -HG        Other Goals    Other Adult Goal- 1  Pt will complete thought organization tasks with 80% accuracy.  -HG     Status: Other Adult Goal- 1  Progressing as expected  -HG     Comments: Other Adult Goal- 1  8/30/19: Abstract divergent naming, pt was 8/10. 8/9/19: Divergent concrete category naming and pt was 15/15 x 2. 7/26/19: Divergent naming: pt was 10/10.   -HG     Other Adult Goal- 2  Pt will improve RBANS Total Score to at least 90 placing pt in the Average range.   -HG     Status: Other Adult Goal- 2  New  -HG     Comments: Other Adult Goal- 2  --  -HG     Other Adult Goal- 3  --  -HG     Status: Other Adult Goal- 3  --  -HG     Comments: Other Adult Goal- 3  --  -HG     Other Adult Goal- 4  --  -HG     Status: Other Adult Goal- 4  --  -HG     Comments: Other Adult Goal- 4  --  -HG     Other Adult Goal- 5  --  -HG     Status: Other Adult Goal- 5  --  -HG     Comments: Other Adult Goal- 5  --  -HG     Other Adult Goal- 6  --  -HG     Status: Other Adult Goal- 6  --  -HG     Comments: Other Adult Goal- 6  --  -HG     Other Adult Goal- 7  --  -HG     Status: Other Adult Goal- 7  --  -HG     Comments: Other Adult Goal- 7  --  -HG     Other Adult Goal- 8  --  -HG     Status: Other Adult Goal- 8  --  -HG     Comments: Other Adult Goal- 8  --  -HG        SLP Time Calculation    SLP Goal Re-Cert Due Date  09/19/19  -HG       User Key  (r) = Recorded By, (t) = Taken By, (c) = Cosigned By    Initials Name Provider Type    Anjali Cortes MS CCC-SLP Speech and Language Pathologist          OP SLP Education     Row Name  09/03/19 1400       Education    Education Comments  Hmwk for 6 shapes and figures for immediate recall.   -      User Key  (r) = Recorded By, (t) = Taken By, (c) = Cosigned By    Initials Name Effective Dates    Anjali Cortes MS CCC-SLP 06/22/15 -           OP SLP Assessment/Plan - 09/03/19 1400        SLP Plan    Plan Comments  Cont with Cog tx.    -      User Key  (r) = Recorded By, (t) = Taken By, (c) = Cosigned By    Initials Name Provider Type    Anjali Cortes MS CCC-SLP Speech and Language Pathologist                 Time Calculation:   SLP Start Time: 1400    Therapy Charges for Today     Code Description Service Date Service Provider Modifiers Qty    [id number]  ST TREATMENT SPEECH 5 9/3/2019 Anjali Serrano MS CCC-SLP GN 1                   Anjali Serrano MS CCC-SLP  9/3/2019

## 2019-09-06 ENCOUNTER — HOSPITAL ENCOUNTER (OUTPATIENT)
Dept: SPEECH THERAPY | Facility: HOSPITAL | Age: 79
Setting detail: THERAPIES SERIES
Discharge: HOME | End: 2019-09-06

## 2019-09-06 DIAGNOSIS — R41.3 MEMORY LOSS: Primary | ICD-10-CM

## 2019-09-06 PROCEDURE — 92507 TX SP LANG VOICE COMM INDIV: CPT

## 2019-09-06 NOTE — THERAPY TREATMENT NOTE
Outpatient Speech Language Pathology   Adult Speech Language Cognitive Treatment Note  [facility]     Patient Name: Uche Polanco  : 1940  MRN: [id number]  Today's Date: 2019         Visit Date: 2019   Patient Active Problem List   Diagnosis   • Valvular heart disease   • TIA (transient ischemic attack)   • Severe obstructive sleep apnea   • Hypertension   • Hyperlipidemia   • Gout   • CAD (coronary artery disease)   • AV block   • REM sleep behavior disorder   • Memory loss          Visit Dx:    ICD-10-CM ICD-9-CM   1. Memory loss R41.3 780.93                         SLP OP Goals     Row Name 19 1300          Goal Type Needed    Goal Type Needed  Memory;Attention/Orientation;Other Adult Goals  -HG        Subjective Comments    Subjective Comments  Pt alert, cooperative, returned with homework.   -HG        Auditory Comprehension Goals    Patient will comprehend abstract and complex information presented in all social, avocational, or work settings  90%:;with cues  -HG     Status: Patient will comprehend abstract and complex information presented in all social, avocational, or work settings  New  -HG     Patient will improve comprehension of spoken language by following Multi-step directions  80%:;with cues  -HG     Status: Patient will improve comprehension of spoken language by following Multi-step directions  Progressing as expected  -HG     Comments: Patient will improve comprehension of spoken language by following Multi-step directions  9/3/19: 2 step/4 component directions: pt was 70% accurate.   19: 2 step oral directions: pt was 100% accurate. 3 step oral directions: pt was 12/15. 19: 2 step oral directions: 70% accuracy.   -HG     Patient will demonstrate comprehension of spoken language by answering questions about a multi paragraph length content  80%:;with cues  -HG     Status: Patient will demonstrate comprehension of spoken language by answering questions about a  multi paragraph length content  Progressing as expected  -HG     Comments: Patient will demonstrate comprehension of spoken language by answering questions about a multi paragraph length content  19: 50-65 word paragraphs: pt was 90% accurate.   -HG        Memory Goals    Patient and family will implement compensatory strategies to maximize patient’s Memory function so patient can continue to participate in daily activities  90%:;with cues  -HG     Status: Patient and family will implement compensatory strategies to maximize patient’s Memory function so patient can continue to participate in daily activities  New  -HG     Patient will demonstrate improved ability to recall information by immediately recalling a series of words  80%:;related;after delay;with cues  -HG     Status: Patient will demonstrate improved ability to recall information by immediately recalling a series of words  Progressing as expected  -HG     Comments: Patient will demonstrate improved ability to recall information by immediately recalling a series of words  19: Immediate recall of shapes and figures (6) and pt was 75% accurate.  9/3/19: Immediate recall of shapes and figures (5) and pt improved to 80% accurate.  19: Pt was able to recall 5 related words with accuracy, 6 words, pt was 5/6. 19: 50-65 words: pt was 6/6 x 3 for immediate story recall. 19: 36-50 word paragraphs for immediate recall: pt was 7/8. Immediate recall for storytellin/3, using chunking: 3/3,   -HG     Patient’s memory skills will be enhanced as reported by patient by utilizing internal memory strategies to recall up to 3 pieces of information after a 5- minute delay  80%:;with cues  -HG     Status: Patient’s memory skills will be enhanced as reported by patient by utilizing internal memory strategies to recall up to 3 pieces of information after a 5- minute delay  Progressing as expected  -HG     Comments: Patient’s memory skills will be  enhanced as reported by patient by utilizing internal memory strategies to recall up to 3 pieces of information after a 5- minute delay  9/3/19: Delayed recall of 8 un-related words and pt was 8/8 x 2. 8/30/19: Delayed recall of 7 un-related pictures, pt was 7/7 x 2. 8/23/19: Delayed recall of 6 un-related words: pt was 6/6 x 3- gave 7 for hmwk. 8/20/19: Delayed recall of 5 un-related words and pt was 5/5 x 2. 6 un-related words: pt was 4/6.  8/16/19: Delayed recall of 8 related words, pt was 8/8 x 2. 4 un-related words, pt was 4/4 x 2. 8/9/19: Delayed recall of 7 words: pt was 6/7 x 2, 7/7 x 2 8/6/19: Delayed recall of 5 related words and pt was 5/5, 6 related words: pt was 6/6. 7 related words using alphabetical order and chunking according to starting letter and pt was 6/7 with min cue.  7/26/19: Delayed recall of 4 related words and pt was 3/4 I'ly; 4/4 x 2. Increased to 5 related words and pt was 4/5 and then 5/5.   -HG     Patient’s memory skills will be enhanced as reported by patient by using external memory aides  80%:;with cues  -HG     Status: Patient’s memory skills will be enhanced as reported by patient by using external memory aides  Progressing as expected  -     Comments: Patient’s memory skills will be enhanced as reported by patient by using external memory aides  9/6/19: Pt is making daily notes in his planner. 9/3/19: Pt using his calendar consistently however, pt not using the daily journaling section as much as SLP would like. 8/30/19: Pt required min cues to catch up on daily entries. 8/16/19: Pt continues to use his planner daily. 8/9/19: Pt with increased use of his planner with daily notes.  7/26/19: Fxnl use of planner: pt was writing down his TO DO LIST and not what he had done.   -HG        Attention/Orientation Goals    Patient will be able to execute all activities necessary to manage a home  Independently  -HG     Status: Patient will be able to execute all activities necessary to  manage a home  New  -HG     Patient will improve attention skills by sustaining focus in order to actively hold and manipulate information provided (e.g., sequencing auditorily presented number series in ascending or descending order)  90%:;with cues  -HG     Status: Patient will improve attention skills by sustaining focus in order to actively hold and manipulate information provided (e.g., sequencing auditorily presented number series in ascending or descending order)  Progressing as expected  -HG     Comments: Patient will improve attention skills by sustaining focus in order to actively hold and manipulate information provided (e.g., sequencing auditorily presented number series in ascending or descending order)  19: Mental Manipulation: pt was 80% accurate with re-read for ranking. 19: Mental Manipulation for worder order of alphabetical vs reversal and pt was 50% accurate.  19: Mental Manipulation for word placement and pt was 80% accurate for attribute and placement.   -HG     Patient will improve attention skills by alternating or shifting focus between two different tasks in order to complete both tasks  80%:;with cues  -HG     Status: Patient will improve attention skills by alternating or shifting focus between two different tasks in order to complete both tasks  Progressing as expected  -HG     Comments: Patient will improve attention skills by alternating or shifting focus between two different tasks in order to complete both tasks  19: Visual Scanning: Pt required mod cues for alternating between letters and was 80 % accurate.  19: Card game and pt was 80% accurate for new rules and watching increasing piles of cards for potential moves.  19: Sorting card by katie and REJI and pt was 70% accurate. 19: Sorting card by suomaira and  and pt required mod cues and repeated instructions and pt was 60% accurate.   -HG     Patient will improve attention skills by sustaining focus  to high-level cognitive tasks in order to complete task  80%:;with cues  -HG     Status: Patient will improve attention skills by sustaining focus to high-level cognitive tasks in order to complete task  Progressing as expected  -HG     Comments: Patient will improve attention skills by sustaining focus to high-level cognitive tasks in order to complete task  8/30/19: One Pile Card game: Pt required max cues in order to recall the rules of the game and to play accordingly. 8/20/19: One Pile Card Game: pt was 70% accurate with min cues.  8/9/19: One Pile Card Game: pt was 70% accurate. 7/26/19: Recognizing odd numbers in suit of cards while tapping the table and pt was 70% accurate.   -HG        Other Goals    Other Adult Goal- 1  Pt will complete thought organization tasks with 80% accuracy.  -HG     Status: Other Adult Goal- 1  Progressing as expected  -HG     Comments: Other Adult Goal- 1  8/30/19: Abstract divergent naming, pt was 8/10. 8/9/19: Divergent concrete category naming and pt was 15/15 x 2. 7/26/19: Divergent naming: pt was 10/10.   -HG     Other Adult Goal- 2  Pt will improve RBANS Total Score to at least 90 placing pt in the Average range.   -HG     Status: Other Adult Goal- 2  New  -HG     Comments: Other Adult Goal- 2  --  -HG     Other Adult Goal- 3  --  -HG     Status: Other Adult Goal- 3  --  -HG     Comments: Other Adult Goal- 3  --  -HG     Other Adult Goal- 4  --  -HG     Status: Other Adult Goal- 4  --  -HG     Comments: Other Adult Goal- 4  --  -HG     Other Adult Goal- 5  --  -HG     Status: Other Adult Goal- 5  --  -HG     Comments: Other Adult Goal- 5  --  -HG     Other Adult Goal- 6  --  -HG     Status: Other Adult Goal- 6  --  -HG     Comments: Other Adult Goal- 6  --  -HG     Other Adult Goal- 7  --  -HG     Status: Other Adult Goal- 7  --  -HG     Comments: Other Adult Goal- 7  --  -HG     Other Adult Goal- 8  --  -HG     Status: Other Adult Goal- 8  --  -HG     Comments: Other Adult  Goal- 8  --  -        SLP Time Calculation    SLP Goal Re-Cert Due Date  09/19/19  -       User Key  (r) = Recorded By, (t) = Taken By, (c) = Cosigned By    Initials Name Provider Type    Anjali Cortes MS CCC-SLP Speech and Language Pathologist          OP SLP Education     Row Name 09/06/19 1300       Education    Education Comments  Hmwk for journal writing.   -      User Key  (r) = Recorded By, (t) = Taken By, (c) = Cosigned By    Initials Name Effective Dates    Anjali Cortes MS CCC-SLP 06/22/15 -           OP SLP Assessment/Plan - 09/06/19 1300        SLP Plan    Plan Comments  Cont with Cog tx.    -      User Key  (r) = Recorded By, (t) = Taken By, (c) = Cosigned By    Initials Name Provider Type    Anjali Cortes MS CCC-SLP Speech and Language Pathologist                 Time Calculation:   SLP Start Time: 1300    Therapy Charges for Today     Code Description Service Date Service Provider Modifiers Qty    [id number]  ST TREATMENT SPEECH 4 9/6/2019 Anjali Serrano MS CCC-SLP GN 1                   Anjali Serrano MS CCC-SLP  9/6/2019

## 2019-09-09 RX ORDER — CLONAZEPAM 1 MG/1
1 TABLET ORAL
Qty: 30 TABLET | Refills: 0 | Status: SHIPPED | OUTPATIENT
Start: 2019-09-09 | End: 2019-10-07 | Stop reason: SDUPTHER

## 2019-09-10 ENCOUNTER — HOSPITAL ENCOUNTER (OUTPATIENT)
Dept: SPEECH THERAPY | Facility: HOSPITAL | Age: 79
Setting detail: THERAPIES SERIES
Discharge: HOME | End: 2019-09-10

## 2019-09-10 DIAGNOSIS — R41.3 MEMORY LOSS: Primary | ICD-10-CM

## 2019-09-10 PROCEDURE — 92507 TX SP LANG VOICE COMM INDIV: CPT

## 2019-09-10 NOTE — THERAPY TREATMENT NOTE
Outpatient Speech Language Pathology   Adult Speech Language Cognitive Treatment Note  [facility]     Patient Name: Uche Polanco  : 1940  MRN: [id number]  Today's Date: 9/10/2019         Visit Date: 09/10/2019   Patient Active Problem List   Diagnosis   • Valvular heart disease   • TIA (transient ischemic attack)   • Severe obstructive sleep apnea   • Hypertension   • Hyperlipidemia   • Gout   • CAD (coronary artery disease)   • AV block   • REM sleep behavior disorder   • Memory loss          Visit Dx:    ICD-10-CM ICD-9-CM   1. Memory loss R41.3 780.93                         SLP OP Goals     Row Name 09/10/19 1400          Goal Type Needed    Goal Type Needed  Memory;Attention/Orientation;Other Adult Goals  -HG        Subjective Comments    Subjective Comments  Pt alert, cooperative, returned with homework.   -HG        Auditory Comprehension Goals    Patient will comprehend abstract and complex information presented in all social, avocational, or work settings  90%:;with cues  -HG     Status: Patient will comprehend abstract and complex information presented in all social, avocational, or work settings  New  -HG     Patient will improve comprehension of spoken language by following Multi-step directions  80%:;with cues  -HG     Status: Patient will improve comprehension of spoken language by following Multi-step directions  Progressing as expected  -HG     Comments: Patient will improve comprehension of spoken language by following Multi-step directions  9/3/19: 2 step/4 component directions: pt was 70% accurate.   19: 2 step oral directions: pt was 100% accurate. 3 step oral directions: pt was 12/15. 19: 2 step oral directions: 70% accuracy.   -HG     Patient will demonstrate comprehension of spoken language by answering questions about a multi paragraph length content  80%:;with cues  -HG     Status: Patient will demonstrate comprehension of spoken language by answering questions about a  multi paragraph length content  Progressing as expected  -HG     Comments: Patient will demonstrate comprehension of spoken language by answering questions about a multi paragraph length content  19: 50-65 word paragraphs: pt was 90% accurate.   -HG        Memory Goals    Patient and family will implement compensatory strategies to maximize patient’s Memory function so patient can continue to participate in daily activities  90%:;with cues  -HG     Status: Patient and family will implement compensatory strategies to maximize patient’s Memory function so patient can continue to participate in daily activities  New  -HG     Patient will demonstrate improved ability to recall information by immediately recalling a series of words  80%:;related;after delay;with cues  -HG     Status: Patient will demonstrate improved ability to recall information by immediately recalling a series of words  Progressing as expected  -HG     Comments: Patient will demonstrate improved ability to recall information by immediately recalling a series of words  19: Immediate recall of shapes and figures (6) and pt was 75% accurate.  9/3/19: Immediate recall of shapes and figures (5) and pt improved to 80% accurate.  19: Pt was able to recall 5 related words with accuracy, 6 words, pt was 5/6. 19: 50-65 words: pt was 6/6 x 3 for immediate story recall. 19: 36-50 word paragraphs for immediate recall: pt was 7/8. Immediate recall for storytellin/3, using chunking: 3/3,   -HG     Patient’s memory skills will be enhanced as reported by patient by utilizing internal memory strategies to recall up to 3 pieces of information after a 5- minute delay  80%:;with cues  -HG     Status: Patient’s memory skills will be enhanced as reported by patient by utilizing internal memory strategies to recall up to 3 pieces of information after a 5- minute delay  Progressing as expected  -HG     Comments: Patient’s memory skills will be  enhanced as reported by patient by utilizing internal memory strategies to recall up to 3 pieces of information after a 5- minute delay  9/3/19: Delayed recall of 8 un-related words and pt was 8/8 x 2. 8/30/19: Delayed recall of 7 un-related pictures, pt was 7/7 x 2. 8/23/19: Delayed recall of 6 un-related words: pt was 6/6 x 3- gave 7 for hmwk. 8/20/19: Delayed recall of 5 un-related words and pt was 5/5 x 2. 6 un-related words: pt was 4/6.  8/16/19: Delayed recall of 8 related words, pt was 8/8 x 2. 4 un-related words, pt was 4/4 x 2. 8/9/19: Delayed recall of 7 words: pt was 6/7 x 2, 7/7 x 2 8/6/19: Delayed recall of 5 related words and pt was 5/5, 6 related words: pt was 6/6. 7 related words using alphabetical order and chunking according to starting letter and pt was 6/7 with min cue.  7/26/19: Delayed recall of 4 related words and pt was 3/4 I'ly; 4/4 x 2. Increased to 5 related words and pt was 4/5 and then 5/5.   -HG     Patient’s memory skills will be enhanced as reported by patient by using external memory aides  80%:;with cues  -HG     Status: Patient’s memory skills will be enhanced as reported by patient by using external memory aides  Progressing as expected  -     Comments: Patient’s memory skills will be enhanced as reported by patient by using external memory aides  9/6/19: Pt is making daily notes in his planner. 9/3/19: Pt using his calendar consistently however, pt not using the daily journaling section as much as SLP would like. 8/30/19: Pt required min cues to catch up on daily entries. 8/16/19: Pt continues to use his planner daily. 8/9/19: Pt with increased use of his planner with daily notes.  7/26/19: Fxnl use of planner: pt was writing down his TO DO LIST and not what he had done.   -HG        Attention/Orientation Goals    Patient will be able to execute all activities necessary to manage a home  Independently  -HG     Status: Patient will be able to execute all activities necessary to  manage a home  New  -HG     Patient will improve attention skills by sustaining focus in order to actively hold and manipulate information provided (e.g., sequencing auditorily presented number series in ascending or descending order)  90%:;with cues  -HG     Status: Patient will improve attention skills by sustaining focus in order to actively hold and manipulate information provided (e.g., sequencing auditorily presented number series in ascending or descending order)  Progressing as expected  -HG     Comments: Patient will improve attention skills by sustaining focus in order to actively hold and manipulate information provided (e.g., sequencing auditorily presented number series in ascending or descending order)  9/10/19: 4 word for category exclusion and pt was 90% accurate and for 5 words, pt was 60% accurate.  19: Mental Manipulation: pt was 80% accurate with re-read for ranking. 19: Mental Manipulation for worder order of alphabetical vs reversal and pt was 50% accurate.  19: Mental Manipulation for word placement and pt was 80% accurate for attribute and placement.   -HG     Patient will improve attention skills by alternating or shifting focus between two different tasks in order to complete both tasks  80%:;with cues  -HG     Status: Patient will improve attention skills by alternating or shifting focus between two different tasks in order to complete both tasks  Progressing as expected  -HG     Comments: Patient will improve attention skills by alternating or shifting focus between two different tasks in order to complete both tasks  9/10/19: Pt able to recall up to 7 digits. 19: Visual Scanning: Pt required mod cues for alternating between letters and was 80 % accurate.  19: Card game and pt was 80% accurate for new rules and watching increasing piles of cards for potential moves.  19: Sorting card by katie and REJI and pt was 70% accurate. 19: Sorting card by katie and REJI  and pt required mod cues and repeated instructions and pt was 60% accurate.   -HG     Patient will improve attention skills by sustaining focus to high-level cognitive tasks in order to complete task  80%:;with cues  -HG     Status: Patient will improve attention skills by sustaining focus to high-level cognitive tasks in order to complete task  Progressing as expected  -HG     Comments: Patient will improve attention skills by sustaining focus to high-level cognitive tasks in order to complete task  9/10/19: One Pile Card Game and pt required mod cues and pt was 70% accurate. 8/30/19: One Pile Card game: Pt required max cues in order to recall the rules of the game and to play accordingly. 8/20/19: One Pile Card Game: pt was 70% accurate with min cues.  8/9/19: One Pile Card Game: pt was 70% accurate. 7/26/19: Recognizing odd numbers in suit of cards while tapping the table and pt was 70% accurate.   -HG        Other Goals    Other Adult Goal- 1  Pt will complete thought organization tasks with 80% accuracy.  -HG     Status: Other Adult Goal- 1  Progressing as expected  -HG     Comments: Other Adult Goal- 1  8/30/19: Abstract divergent naming, pt was 8/10. 8/9/19: Divergent concrete category naming and pt was 15/15 x 2. 7/26/19: Divergent naming: pt was 10/10.   -HG     Other Adult Goal- 2  Pt will improve RBANS Total Score to at least 90 placing pt in the Average range.   -HG     Status: Other Adult Goal- 2  New  -HG     Comments: Other Adult Goal- 2  --  -HG     Other Adult Goal- 3  --  -HG     Status: Other Adult Goal- 3  --  -HG     Comments: Other Adult Goal- 3  --  -HG     Other Adult Goal- 4  --  -HG     Status: Other Adult Goal- 4  --  -HG     Comments: Other Adult Goal- 4  --  -HG     Other Adult Goal- 5  --  -HG     Status: Other Adult Goal- 5  --  -HG     Comments: Other Adult Goal- 5  --  -HG     Other Adult Goal- 6  --  -HG     Status: Other Adult Goal- 6  --  -HG     Comments: Other Adult Goal- 6  --   -HG     Other Adult Goal- 7  --  -HG     Status: Other Adult Goal- 7  --  -HG     Comments: Other Adult Goal- 7  --  -HG     Other Adult Goal- 8  --  -HG     Status: Other Adult Goal- 8  --  -HG     Comments: Other Adult Goal- 8  --  -HG        SLP Time Calculation    SLP Goal Re-Cert Due Date  09/19/19  -HG       User Key  (r) = Recorded By, (t) = Taken By, (c) = Cosigned By    Initials Name Provider Type    Anjali Cortes MS CCC-SLP Speech and Language Pathologist          OP SLP Education     Row Name 09/10/19 1400       Education    Education Comments  Hmwk for recall of shapes/figures.   -HG      User Key  (r) = Recorded By, (t) = Taken By, (c) = Cosigned By    Initials Name Effective Dates    Anjali Cortes MS CCC-SLP 06/22/15 -           OP SLP Assessment/Plan - 09/10/19 1400        SLP Plan    Plan Comments  Cont with Cog tx.    -HG      User Key  (r) = Recorded By, (t) = Taken By, (c) = Cosigned By    Initials Name Provider Type    Anjali Cortes MS CCC-SLP Speech and Language Pathologist                 Time Calculation:   SLP Start Time: 1500    Therapy Charges for Today     Code Description Service Date Service Provider Modifiers Qty    [id number]  ST TREATMENT SPEECH 4 9/10/2019 Anjali Serrano MS CCC-SLP GN 1                   Anjali Serrano MS CCC-SLP  9/10/2019

## 2019-09-13 ENCOUNTER — HOSPITAL ENCOUNTER (OUTPATIENT)
Dept: SPEECH THERAPY | Facility: HOSPITAL | Age: 79
Setting detail: THERAPIES SERIES
Discharge: HOME | End: 2019-09-13

## 2019-09-13 DIAGNOSIS — R41.3 MEMORY LOSS: Primary | ICD-10-CM

## 2019-09-13 PROCEDURE — 92507 TX SP LANG VOICE COMM INDIV: CPT

## 2019-09-13 NOTE — THERAPY TREATMENT NOTE
Outpatient Speech Language Pathology   Adult Speech Language Cognitive Treatment Note  [facility]     Patient Name: Uche Polanco  : 1940  MRN: [id number]  Today's Date: 2019         Visit Date: 2019   Patient Active Problem List   Diagnosis   • Valvular heart disease   • TIA (transient ischemic attack)   • Severe obstructive sleep apnea   • Hypertension   • Hyperlipidemia   • Gout   • CAD (coronary artery disease)   • AV block   • REM sleep behavior disorder   • Memory loss          Visit Dx:    ICD-10-CM ICD-9-CM   1. Memory loss R41.3 780.93                         SLP OP Goals     Row Name 19 1300          Goal Type Needed    Goal Type Needed  Memory;Attention/Orientation;Other Adult Goals  -HG        Subjective Comments    Subjective Comments  Pt alert, cooperative, returned with day planner   -HG        Auditory Comprehension Goals    Patient will comprehend abstract and complex information presented in all social, avocational, or work settings  90%:;with cues  -HG     Status: Patient will comprehend abstract and complex information presented in all social, avocational, or work settings  New  -HG     Patient will improve comprehension of spoken language by following Multi-step directions  80%:;with cues  -HG     Status: Patient will improve comprehension of spoken language by following Multi-step directions  Progressing as expected  -HG     Comments: Patient will improve comprehension of spoken language by following Multi-step directions  9/3/19: 2 step/4 component directions: pt was 70% accurate.   19: 2 step oral directions: pt was 100% accurate. 3 step oral directions: pt was 12/15. 19: 2 step oral directions: 70% accuracy.   -HG     Patient will demonstrate comprehension of spoken language by answering questions about a multi paragraph length content  80%:;with cues  -HG     Status: Patient will demonstrate comprehension of spoken language by answering questions about a  multi paragraph length content  Achieved  -HG     Comments: Patient will demonstrate comprehension of spoken language by answering questions about a multi paragraph length content  19: 65-80 word paragraphs: pt was 100% accurate. 19: 50-65 word paragraphs: pt was 90% accurate.   -HG        Memory Goals    Patient and family will implement compensatory strategies to maximize patient’s Memory function so patient can continue to participate in daily activities  90%:;with cues  -HG     Status: Patient and family will implement compensatory strategies to maximize patient’s Memory function so patient can continue to participate in daily activities  New  -HG     Patient will demonstrate improved ability to recall information by immediately recalling a series of words  80%:;related;after delay;with cues  -HG     Status: Patient will demonstrate improved ability to recall information by immediately recalling a series of words  Progressing as expected  -HG     Comments: Patient will demonstrate improved ability to recall information by immediately recalling a series of words  19: Immediate recall of boxed information: pt was 85% accurate. 19: Immediate recall of shapes and figures (6) and pt was 75% accurate.  9/3/19: Immediate recall of shapes and figures (5) and pt improved to 80% accurate.  19: Pt was able to recall 5 related words with accuracy, 6 words, pt was 5/6. 19: 50-65 words: pt was 6/6 x 3 for immediate story recall. 19: 36-50 word paragraphs for immediate recall: pt was 7/8. Immediate recall for storytellin/3, using chunking: 3/3,   -HG     Patient’s memory skills will be enhanced as reported by patient by utilizing internal memory strategies to recall up to 3 pieces of information after a 5- minute delay  80%:;with cues  -HG     Status: Patient’s memory skills will be enhanced as reported by patient by utilizing internal memory strategies to recall up to 3 pieces of  information after a 5- minute delay  Progressing as expected  -HG     Comments: Patient’s memory skills will be enhanced as reported by patient by utilizing internal memory strategies to recall up to 3 pieces of information after a 5- minute delay  9/3/19: Delayed recall of 8 un-related words and pt was 8/8 x 2. 8/30/19: Delayed recall of 7 un-related pictures, pt was 7/7 x 2. 8/23/19: Delayed recall of 6 un-related words: pt was 6/6 x 3- gave 7 for hmwk. 8/20/19: Delayed recall of 5 un-related words and pt was 5/5 x 2. 6 un-related words: pt was 4/6.  8/16/19: Delayed recall of 8 related words, pt was 8/8 x 2. 4 un-related words, pt was 4/4 x 2. 8/9/19: Delayed recall of 7 words: pt was 6/7 x 2, 7/7 x 2 8/6/19: Delayed recall of 5 related words and pt was 5/5, 6 related words: pt was 6/6. 7 related words using alphabetical order and chunking according to starting letter and pt was 6/7 with min cue.  7/26/19: Delayed recall of 4 related words and pt was 3/4 I'ly; 4/4 x 2. Increased to 5 related words and pt was 4/5 and then 5/5.   -HG     Patient’s memory skills will be enhanced as reported by patient by using external memory aides  80%:;with cues  -HG     Status: Patient’s memory skills will be enhanced as reported by patient by using external memory aides  Progressing as expected  -HG     Comments: Patient’s memory skills will be enhanced as reported by patient by using external memory aides  9/6/19: Pt is making daily notes in his planner. 9/3/19: Pt using his calendar consistently however, pt not using the daily journaling section as much as SLP would like. 8/30/19: Pt required min cues to catch up on daily entries. 8/16/19: Pt continues to use his planner daily. 8/9/19: Pt with increased use of his planner with daily notes.  7/26/19: Fxnl use of planner: pt was writing down his TO DO LIST and not what he had done.   -HG        Attention/Orientation Goals    Patient will be able to execute all activities  necessary to manage a home  Independently  -HG     Status: Patient will be able to execute all activities necessary to manage a home  New  -HG     Patient will improve attention skills by sustaining focus in order to actively hold and manipulate information provided (e.g., sequencing auditorily presented number series in ascending or descending order)  90%:;with cues  -HG     Status: Patient will improve attention skills by sustaining focus in order to actively hold and manipulate information provided (e.g., sequencing auditorily presented number series in ascending or descending order)  Progressing as expected  -HG     Comments: Patient will improve attention skills by sustaining focus in order to actively hold and manipulate information provided (e.g., sequencing auditorily presented number series in ascending or descending order)  9/10/19: 4 word for category exclusion and pt was 90% accurate and for 5 words, pt was 60% accurate.  8/30/19: Mental Manipulation: pt was 80% accurate with re-read for ranking. 8/20/19: Mental Manipulation for worder order of alphabetical vs reversal and pt was 50% accurate.  8/6/19: Mental Manipulation for word placement and pt was 80% accurate for attribute and placement.   -HG     Patient will improve attention skills by alternating or shifting focus between two different tasks in order to complete both tasks  80%:;with cues  -HG     Status: Patient will improve attention skills by alternating or shifting focus between two different tasks in order to complete both tasks  Progressing as expected  -HG     Comments: Patient will improve attention skills by alternating or shifting focus between two different tasks in order to complete both tasks  9/10/19: Pt able to recall up to 7 digits. 9/6/19: Visual Scanning: Pt required mod cues for alternating between letters and was 80 % accurate.  8/23/19: Card game and pt was 80% accurate for new rules and watching increasing piles of cards for  potential moves.  19: Sorting card by suit and  and pt was 70% accurate. 19: Sorting card by suit and  and pt required mod cues and repeated instructions and pt was 60% accurate.   -HG     Patient will improve attention skills by sustaining focus to high-level cognitive tasks in order to complete task  80%:;with cues  -HG     Status: Patient will improve attention skills by sustaining focus to high-level cognitive tasks in order to complete task  Progressing as expected  -HG     Comments: Patient will improve attention skills by sustaining focus to high-level cognitive tasks in order to complete task  19: One pile card game, pt requires less cues than previous sessions. 9/10/19: One Pile Card Game and pt required mod cues and pt was 70% accurate. 19: One Pile Card game: Pt required max cues in order to recall the rules of the game and to play accordingly. 19: One Pile Card Game: pt was 70% accurate with min cues.  19: One Pile Card Game: pt was 70% accurate. 19: Recognizing odd numbers in suit of cards while tapping the table and pt was 70% accurate.   -HG        Other Goals    Other Adult Goal- 1  Pt will complete thought organization tasks with 80% accuracy.  -HG     Status: Other Adult Goal- 1  Progressing as expected  -HG     Comments: Other Adult Goal- 1  19: Abstract divergent naming, pt was 8/10. 19: Divergent concrete category naming and pt was 15/15 x 2. 19: Divergent naming: pt was 10/10.   -HG     Other Adult Goal- 2  Pt will improve RBANS Total Score to at least 90 placing pt in the Average range.   -HG     Status: Other Adult Goal- 2  New  -HG     Comments: Other Adult Goal- 2  --  -HG     Other Adult Goal- 3  --  -HG     Status: Other Adult Goal- 3  --  -HG     Comments: Other Adult Goal- 3  --  -HG     Other Adult Goal- 4  --  -HG     Status: Other Adult Goal- 4  --  -HG     Comments: Other Adult Goal- 4  --  -HG     Other Adult Goal- 5  --  -HG      Status: Other Adult Goal- 5  --  -HG     Comments: Other Adult Goal- 5  --  -HG     Other Adult Goal- 6  --  -HG     Status: Other Adult Goal- 6  --  -HG     Comments: Other Adult Goal- 6  --  -HG     Other Adult Goal- 7  --  -HG     Status: Other Adult Goal- 7  --  -HG     Comments: Other Adult Goal- 7  --  -HG     Other Adult Goal- 8  --  -HG     Status: Other Adult Goal- 8  --  -HG     Comments: Other Adult Goal- 8  --  -HG        SLP Time Calculation    SLP Goal Re-Cert Due Date  09/19/19  -HG       User Key  (r) = Recorded By, (t) = Taken By, (c) = Cosigned By    Initials Name Provider Type    Anjali Cortes MS CCC-SLP Speech and Language Pathologist          OP SLP Education     Row Name 09/13/19 1300       Education    Education Comments  Hmwk for name/picture association.   -HG      User Key  (r) = Recorded By, (t) = Taken By, (c) = Cosigned By    Initials Name Effective Dates    Anjali Cortes MS CCC-SLP 06/22/15 -           OP SLP Assessment/Plan - 09/13/19 1300        SLP Plan    Plan Comments  Cont with Cog tx.    -HG      User Key  (r) = Recorded By, (t) = Taken By, (c) = Cosigned By    Initials Name Provider Type    Anjali Cortes MS CCC-SLP Speech and Language Pathologist                 Time Calculation:   SLP Start Time: 1300    Therapy Charges for Today     Code Description Service Date Service Provider Modifiers Qty    [id number]  ST TREATMENT SPEECH 4 9/13/2019 Anjali Serrano MS CCC-SLP GN 1                   Anjali Serrano MS CCC-SLP  9/13/2019

## 2019-09-16 ENCOUNTER — HOSPITAL ENCOUNTER (OUTPATIENT)
Dept: SPEECH THERAPY | Facility: HOSPITAL | Age: 79
Setting detail: THERAPIES SERIES
Discharge: HOME | End: 2019-09-16

## 2019-09-16 DIAGNOSIS — R41.3 MEMORY LOSS: Primary | ICD-10-CM

## 2019-09-16 PROCEDURE — 92507 TX SP LANG VOICE COMM INDIV: CPT

## 2019-09-16 NOTE — THERAPY PROGRESS REPORT/RE-CERT
Outpatient Speech Language Pathology   Adult Speech Language Cognitive Progress Note  [facility]     Patient Name: Uche Polanco  : 1940  MRN: [id number]  Today's Date: 2019         Visit Date: 2019   Patient Active Problem List   Diagnosis   • Valvular heart disease   • TIA (transient ischemic attack)   • Severe obstructive sleep apnea   • Hypertension   • Hyperlipidemia   • Gout   • CAD (coronary artery disease)   • AV block   • REM sleep behavior disorder   • Memory loss          Visit Dx:    ICD-10-CM ICD-9-CM   1. Memory loss R41.3 780.93                         SLP OP Goals     Row Name 19 0900          Goal Type Needed    Goal Type Needed  Memory;Attention/Orientation;Other Adult Goals  -HG        Subjective Comments    Subjective Comments  Pt alert, cooperative, returned with hmwk.   -HG        Auditory Comprehension Goals    Patient will comprehend abstract and complex information presented in all social, avocational, or work settings  90%:;with cues  -HG     Status: Patient will comprehend abstract and complex information presented in all social, avocational, or work settings  New  -HG     Patient will improve comprehension of spoken language by following Multi-step directions  80%:;with cues  -HG     Status: Patient will improve comprehension of spoken language by following Multi-step directions  Progressing as expected  -HG     Comments: Patient will improve comprehension of spoken language by following Multi-step directions  9/3/19: 2 step/4 component directions: pt was 70% accurate.   19: 2 step oral directions: pt was 100% accurate. 3 step oral directions: pt was 12/15. 19: 2 step oral directions: 70% accuracy.   -HG     Patient will demonstrate comprehension of spoken language by answering questions about a multi paragraph length content  80%:;with cues  -HG     Status: Patient will demonstrate comprehension of spoken language by answering questions about a multi  paragraph length content  Achieved  -HG     Comments: Patient will demonstrate comprehension of spoken language by answering questions about a multi paragraph length content  19: RBANS Paragraph recall and pt was 50% accurate for immediate.  19: 65-80 word paragraphs: pt was 100% accurate. 19: 50-65 word paragraphs: pt was 90% accurate.   -HG        Memory Goals    Patient and family will implement compensatory strategies to maximize patient’s Memory function so patient can continue to participate in daily activities  90%:;with cues  -HG     Status: Patient and family will implement compensatory strategies to maximize patient’s Memory function so patient can continue to participate in daily activities  New  -HG     Patient will demonstrate improved ability to recall information by immediately recalling a series of words  80%:;related;after delay;with cues  -HG     Status: Patient will demonstrate improved ability to recall information by immediately recalling a series of words  Progressing as expected  -HG     Comments: Patient will demonstrate improved ability to recall information by immediately recalling a series of words  19: RBANS Immediate recall and pt improved score to an 83 up from 49 at time of eval. 19: Immediate recall of boxed information: pt was 85% accurate. 19: Immediate recall of shapes and figures (6) and pt was 75% accurate.  9/3/19: Immediate recall of shapes and figures (5) and pt improved to 80% accurate.  19: Pt was able to recall 5 related words with accuracy, 6 words, pt was 5/6. 19: 50-65 words: pt was 6/6 x 3 for immediate story recall. 19: 36-50 word paragraphs for immediate recall: pt was 7/8. Immediate recall for storytellin/3, using chunking: 3/3,   -HG     Patient’s memory skills will be enhanced as reported by patient by utilizing internal memory strategies to recall up to 3 pieces of information after a 5- minute delay  80%:;with cues   -HG     Status: Patient’s memory skills will be enhanced as reported by patient by utilizing internal memory strategies to recall up to 3 pieces of information after a 5- minute delay  Progressing as expected  -HG     Comments: Patient’s memory skills will be enhanced as reported by patient by utilizing internal memory strategies to recall up to 3 pieces of information after a 5- minute delay  9/16/19: RBANS Delayed recall score of 81 down from 87 at time of eval.- remains in low average range.  9/3/19: Delayed recall of 8 un-related words and pt was 8/8 x 2. 8/30/19: Delayed recall of 7 un-related pictures, pt was 7/7 x 2. 8/23/19: Delayed recall of 6 un-related words: pt was 6/6 x 3- gave 7 for hmwk. 8/20/19: Delayed recall of 5 un-related words and pt was 5/5 x 2. 6 un-related words: pt was 4/6.  8/16/19: Delayed recall of 8 related words, pt was 8/8 x 2. 4 un-related words, pt was 4/4 x 2. 8/9/19: Delayed recall of 7 words: pt was 6/7 x 2, 7/7 x 2 8/6/19: Delayed recall of 5 related words and pt was 5/5, 6 related words: pt was 6/6. 7 related words using alphabetical order and chunking according to starting letter and pt was 6/7 with min cue.  7/26/19: Delayed recall of 4 related words and pt was 3/4 I'ly; 4/4 x 2. Increased to 5 related words and pt was 4/5 and then 5/5.   -HG     Patient’s memory skills will be enhanced as reported by patient by using external memory aides  80%:;with cues  -HG     Status: Patient’s memory skills will be enhanced as reported by patient by using external memory aides  Progressing as expected  -HG     Comments: Patient’s memory skills will be enhanced as reported by patient by using external memory aides  9/6/19: Pt is making daily notes in his planner. 9/3/19: Pt using his calendar consistently however, pt not using the daily journaling section as much as SLP would like. 8/30/19: Pt required min cues to catch up on daily entries. 8/16/19: Pt continues to use his planner daily.  8/9/19: Pt with increased use of his planner with daily notes.  7/26/19: Fxnl use of planner: pt was writing down his TO DO LIST and not what he had done.   -HG        Attention/Orientation Goals    Patient will be able to execute all activities necessary to manage a home  Independently  -HG     Status: Patient will be able to execute all activities necessary to manage a home  New  -HG     Patient will improve attention skills by sustaining focus in order to actively hold and manipulate information provided (e.g., sequencing auditorily presented number series in ascending or descending order)  90%:;with cues  -HG     Status: Patient will improve attention skills by sustaining focus in order to actively hold and manipulate information provided (e.g., sequencing auditorily presented number series in ascending or descending order)  Progressing as expected  -HG     Comments: Patient will improve attention skills by sustaining focus in order to actively hold and manipulate information provided (e.g., sequencing auditorily presented number series in ascending or descending order)  9/10/19: 4 word for category exclusion and pt was 90% accurate and for 5 words, pt was 60% accurate.  8/30/19: Mental Manipulation: pt was 80% accurate with re-read for ranking. 8/20/19: Mental Manipulation for worder order of alphabetical vs reversal and pt was 50% accurate.  8/6/19: Mental Manipulation for word placement and pt was 80% accurate for attribute and placement.   -HG     Patient will improve attention skills by alternating or shifting focus between two different tasks in order to complete both tasks  80%:;with cues  -HG     Status: Patient will improve attention skills by alternating or shifting focus between two different tasks in order to complete both tasks  Progressing as expected  -HG     Comments: Patient will improve attention skills by alternating or shifting focus between two different tasks in order to complete both tasks   9/10/19: Pt able to recall up to 7 digits. 19: Visual Scanning: Pt required mod cues for alternating between letters and was 80 % accurate.  19: Card game and pt was 80% accurate for new rules and watching increasing piles of cards for potential moves.  19: Sorting card by suit and  and pt was 70% accurate. 19: Sorting card by suit and  and pt required mod cues and repeated instructions and pt was 60% accurate.   -HG     Patient will improve attention skills by sustaining focus to high-level cognitive tasks in order to complete task  80%:;with cues  -HG     Status: Patient will improve attention skills by sustaining focus to high-level cognitive tasks in order to complete task  Progressing as expected  -HG     Comments: Patient will improve attention skills by sustaining focus to high-level cognitive tasks in order to complete task  19: One pile card game, pt requires less cues than previous sessions. 9/10/19: One Pile Card Game and pt required mod cues and pt was 70% accurate. 19: One Pile Card game: Pt required max cues in order to recall the rules of the game and to play accordingly. 19: One Pile Card Game: pt was 70% accurate with min cues.  19: One Pile Card Game: pt was 70% accurate. 19: Recognizing odd numbers in suit of cards while tapping the table and pt was 70% accurate.   -HG        Other Goals    Other Adult Goal- 1  Pt will complete thought organization tasks with 80% accuracy.  -HG     Status: Other Adult Goal- 1  Progressing as expected  -HG     Comments: Other Adult Goal- 1  19: RBANS Language score of 88 remains the same as time of initial eval. 19: Abstract divergent naming, pt was 8/10. 19: Divergent concrete category naming and pt was 15/15 x 2. 19: Divergent naming: pt was 10/10.   -HG     Other Adult Goal- 2  Pt will improve RBANS Total Score to at least 90 placing pt in the Average range.   -HG     Status: Other Adult Goal-  2  Progressing as expected  -HG     Comments: Other Adult Goal- 2  9/16/19: RBANS Total score improved to an 84- up from 79 at time of initial eval.   -HG     Other Adult Goal- 3  --  -HG     Status: Other Adult Goal- 3  --  -HG     Comments: Other Adult Goal- 3  --  -HG     Other Adult Goal- 4  --  -HG     Status: Other Adult Goal- 4  --  -HG     Comments: Other Adult Goal- 4  --  -HG     Other Adult Goal- 5  --  -HG     Status: Other Adult Goal- 5  --  -HG     Comments: Other Adult Goal- 5  --  -HG     Other Adult Goal- 6  --  -HG     Status: Other Adult Goal- 6  --  -HG     Comments: Other Adult Goal- 6  --  -HG     Other Adult Goal- 7  --  -HG     Status: Other Adult Goal- 7  --  -HG     Comments: Other Adult Goal- 7  --  -HG     Other Adult Goal- 8  --  -HG     Status: Other Adult Goal- 8  --  -HG     Comments: Other Adult Goal- 8  --  -HG        SLP Time Calculation    SLP Goal Re-Cert Due Date  10/16/19  -       User Key  (r) = Recorded By, (t) = Taken By, (c) = Cosigned By    Initials Name Provider Type     Anjali Serrano MS CCC-SLP Speech and Language Pathologist          OP SLP Education     Row Name 09/16/19 0900       Education    Barriers to Learning  No barriers identified  -    Education Provided  Described results of evaluation;Patient expressed understanding of evaluation;Patient participated in establishing goals and treatment plan;Patient demonstrated recommended strategies;Patient requires further education on strategies, risks  -    Assessed  Learning needs;Learning motivation;Learning preferences;Learning readiness  -    Learning Motivation  Strong  -    Learning Method  Explanation;Demonstration;Teach back;Written materials  -    Teaching Response  Verbalized understanding;Demonstrated understanding;Reinforcement needed  -    Education Comments  Hmwk for delayed recall.   -      User Key  (r) = Recorded By, (t) = Taken By, (c) = Cosigned By    Initials Name Effective  Dates    HG Anjali Serrano MS CCC-SLP 06/22/15 -           OP SLP Assessment/Plan - 09/16/19 0900        SLP Assessment    Functional Problems  Speech Language- Adult/Cognition   -HG    Impact on Function: Adult Speech Language/Cognition  Trouble learning or remembering new information;Restrictions in personal and social life   -HG    Clinical Impression: Speech Language-Adult/Congnition  Mild:;Cognitive Communication Impairment   -HG    Functional Problems Comment  Pt is using his planner in an expanded version than when he first started tx.   -HG    Clinical Impression Comments  RBANS re-assessment and pt was 84, up from 79 at time of initial eval.    -HG    Please refer to paper survey for additional self-reported information  Yes   -HG    Please refer to items scanned into chart for additional diagnostic informaiton and handouts as provided by clinician  Yes   -HG    SLP Diagnosis  Mild Cognitive Impariment   -HG    Prognosis  Excellent (comment)   -HG    Patient/caregiver participated in establishment of treatment plan and goals  Yes   -HG    Patient would benefit from skilled therapy intervention  Yes   -HG       SLP Plan    Frequency  1-2/week   -HG    Duration  8 weeks   -HG    Planned CPT's?  SLP INDIVIDUAL SPEECH THERAPY: 09341   -HG    Expected Duration Therapy Session - minutes  45-60 minutes   -HG    Plan Comments  Cont with Cog tx.    -HG      User Key  (r) = Recorded By, (t) = Taken By, (c) = Cosigned By    Initials Name Provider Type    HG Anjali Serrano MS CCC-SLP Speech and Language Pathologist                 Time Calculation:   SLP Start Time: 0900    Therapy Charges for Today     Code Description Service Date Service Provider Modifiers Qty    [id number]  ST TREATMENT SPEECH 5 9/16/2019 Anjali Serrano MS CCC-SLP GN 1                   Anjali Serrano MS CCC-SLP  9/16/2019

## 2019-09-20 ENCOUNTER — HOSPITAL ENCOUNTER (OUTPATIENT)
Dept: SPEECH THERAPY | Facility: HOSPITAL | Age: 79
Setting detail: THERAPIES SERIES
Discharge: HOME | End: 2019-09-20

## 2019-09-20 DIAGNOSIS — R41.3 MEMORY LOSS: Primary | ICD-10-CM

## 2019-09-20 PROCEDURE — 92507 TX SP LANG VOICE COMM INDIV: CPT

## 2019-09-20 NOTE — THERAPY TREATMENT NOTE
Outpatient Speech Language Pathology   Adult Speech Language Cognitive Treatment Note  [facility]     Patient Name: Uche Polanco  : 1940  MRN: [id number]  Today's Date: 2019         Visit Date: 2019   Patient Active Problem List   Diagnosis   • Valvular heart disease   • TIA (transient ischemic attack)   • Severe obstructive sleep apnea   • Hypertension   • Hyperlipidemia   • Gout   • CAD (coronary artery disease)   • AV block   • REM sleep behavior disorder   • Memory loss          Visit Dx:    ICD-10-CM ICD-9-CM   1. Memory loss R41.3 780.93                         SLP OP Goals     Row Name 19 1300          Goal Type Needed    Goal Type Needed  Memory;Attention/Orientation;Other Adult Goals  -HG        Subjective Comments    Subjective Comments  Pt alert, cooperative, returned knowing his score results.   -HG        Auditory Comprehension Goals    Patient will comprehend abstract and complex information presented in all social, avocational, or work settings  90%:;with cues  -HG     Status: Patient will comprehend abstract and complex information presented in all social, avocational, or work settings  New  -HG     Patient will improve comprehension of spoken language by following Multi-step directions  80%:;with cues  -HG     Status: Patient will improve comprehension of spoken language by following Multi-step directions  Progressing as expected  -HG     Comments: Patient will improve comprehension of spoken language by following Multi-step directions  19: 9/3/19: 2 step/4 component directions: pt was 70% accurate.   19: 2 step oral directions: pt was 100% accurate. 3 step oral directions: pt was 12/15. 19: 2 step oral directions: 70% accuracy.   -HG     Patient will demonstrate comprehension of spoken language by answering questions about a multi paragraph length content  80%:;with cues  -HG     Status: Patient will demonstrate comprehension of spoken language by  answering questions about a multi paragraph length content  Achieved  -HG     Comments: Patient will demonstrate comprehension of spoken language by answering questions about a multi paragraph length content  9/16/19: RBANS Paragraph recall and pt was 50% accurate for immediate.  9/13/19: 65-80 word paragraphs: pt was 100% accurate. 9/6/19: 50-65 word paragraphs: pt was 90% accurate.   -HG        Memory Goals    Patient and family will implement compensatory strategies to maximize patient’s Memory function so patient can continue to participate in daily activities  90%:;with cues  -HG     Status: Patient and family will implement compensatory strategies to maximize patient’s Memory function so patient can continue to participate in daily activities  Progressing as expected  -HG     Comments: Patient and family will implement compensatory strategies to maximize patient’s Memory function so patient can continue to participate in daily activities  9/20/19: Immediate recall of Memory Puzzle Book and pt was 60% accurate.    -HG     Patient will demonstrate improved ability to recall information by immediately recalling a series of words  80%:;related;after delay;with cues  -HG     Status: Patient will demonstrate improved ability to recall information by immediately recalling a series of words  Progressing as expected  -HG     Comments: Patient will demonstrate improved ability to recall information by immediately recalling a series of words  9/20/19: Spaced Retrieval Method: pt was 4/12. 9/16/19: RBANS Immediate recall and pt improved score to an 83 up from 49 at time of eval. 9/13/19: Immediate recall of boxed information: pt was 85% accurate. 9/6/19: Immediate recall of shapes and figures (6) and pt was 75% accurate.  9/3/19: Immediate recall of shapes and figures (5) and pt improved to 80% accurate.  8/23/19: Pt was able to recall 5 related words with accuracy, 6 words, pt was 5/6. 8/16/19: 50-65 words: pt was 6/6 x  3 for immediate story recall. 19: 36-50 word paragraphs for immediate recall: pt was . Immediate recall for storytellin/3, using chunking: 3/3,   -HG     Patient’s memory skills will be enhanced as reported by patient by utilizing internal memory strategies to recall up to 3 pieces of information after a 5- minute delay  80%:;with cues  -HG     Status: Patient’s memory skills will be enhanced as reported by patient by utilizing internal memory strategies to recall up to 3 pieces of information after a 5- minute delay  Progressing as expected  -HG     Comments: Patient’s memory skills will be enhanced as reported by patient by utilizing internal memory strategies to recall up to 3 pieces of information after a 5- minute delay  19: Delayed recall of words from SRT and pt was 3/12. 19: RBANS Delayed recall score of 81 down from 87 at time of eval.- remains in low average range.  9/3/19: Delayed recall of 8 un-related words and pt was 8/8 x 2. 19: Delayed recall of 7 un-related pictures, pt was 7/7 x 2. 19: Delayed recall of 6 un-related words: pt was 6/6 x 3- gave 7 for hmwk. 19: Delayed recall of 5 un-related words and pt was 5/5 x 2. 6 un-related words: pt was 4/6.  19: Delayed recall of 8 related words, pt was 8/8 x 2. 4 un-related words, pt was 4/4 x 2. 19: Delayed recall of 7 words: pt was 6/7 x 2, 7/7 x 2 19: Delayed recall of 5 related words and pt was 5/5, 6 related words: pt was 6/6. 7 related words using alphabetical order and chunking according to starting letter and pt was 6/7 with min cue.  19: Delayed recall of 4 related words and pt was 3/4 I'ly; 4/4 x 2. Increased to 5 related words and pt was 4/5 and then 5/5.   -HG     Patient’s memory skills will be enhanced as reported by patient by using external memory aides  80%:;with cues  -HG     Status: Patient’s memory skills will be enhanced as reported by patient by using external memory aides   Progressing as expected  -HG     Comments: Patient’s memory skills will be enhanced as reported by patient by using external memory aides  9/20/19: Pt is using planner 90% of the time with mild-moderate level of details. 9/6/19: Pt is making daily notes in his planner. 9/3/19: Pt using his calendar consistently however, pt not using the daily journaling section as much as SLP would like. 8/30/19: Pt required min cues to catch up on daily entries. 8/16/19: Pt continues to use his planner daily. 8/9/19: Pt with increased use of his planner with daily notes.  7/26/19: Fxnl use of planner: pt was writing down his TO DO LIST and not what he had done.   -HG        Attention/Orientation Goals    Patient will be able to execute all activities necessary to manage a home  Independently  -HG     Status: Patient will be able to execute all activities necessary to manage a home  New  -HG     Patient will improve attention skills by sustaining focus in order to actively hold and manipulate information provided (e.g., sequencing auditorily presented number series in ascending or descending order)  90%:;with cues  -HG     Status: Patient will improve attention skills by sustaining focus in order to actively hold and manipulate information provided (e.g., sequencing auditorily presented number series in ascending or descending order)  Progressing as expected  -HG     Comments: Patient will improve attention skills by sustaining focus in order to actively hold and manipulate information provided (e.g., sequencing auditorily presented number series in ascending or descending order)  9/10/19: 4 word for category exclusion and pt was 90% accurate and for 5 words, pt was 60% accurate.  8/30/19: Mental Manipulation: pt was 80% accurate with re-read for ranking. 8/20/19: Mental Manipulation for worder order of alphabetical vs reversal and pt was 50% accurate.  8/6/19: Mental Manipulation for word placement and pt was 80% accurate for  attribute and placement.   -HG     Patient will improve attention skills by alternating or shifting focus between two different tasks in order to complete both tasks  80%:;with cues  -HG     Status: Patient will improve attention skills by alternating or shifting focus between two different tasks in order to complete both tasks  Progressing as expected  -HG     Comments: Patient will improve attention skills by alternating or shifting focus between two different tasks in order to complete both tasks  9/10/19: Pt able to recall up to 7 digits. 19: Visual Scanning: Pt required mod cues for alternating between letters and was 80 % accurate.  19: Card game and pt was 80% accurate for new rules and watching increasing piles of cards for potential moves.  19: Sorting card by suit and  and pt was 70% accurate. 19: Sorting card by suit and  and pt required mod cues and repeated instructions and pt was 60% accurate.   -HG     Patient will improve attention skills by sustaining focus to high-level cognitive tasks in order to complete task  80%:;with cues  -HG     Status: Patient will improve attention skills by sustaining focus to high-level cognitive tasks in order to complete task  Progressing as expected  -HG     Comments: Patient will improve attention skills by sustaining focus to high-level cognitive tasks in order to complete task  19: One Pile Card Game and pt was 75% accurate this date with min cues needed t/o the game. 19: One pile card game, pt requires less cues than previous sessions. 9/10/19: One Pile Card Game and pt required mod cues and pt was 70% accurate. 19: One Pile Card game: Pt required max cues in order to recall the rules of the game and to play accordingly. 19: One Pile Card Game: pt was 70% accurate with min cues.  19: One Pile Card Game: pt was 70% accurate. 19: Recognizing odd numbers in suit of cards while tapping the table and pt was 70%  accurate.   -HG        Other Goals    Other Adult Goal- 1  Pt will complete thought organization tasks with 80% accuracy.  -HG     Status: Other Adult Goal- 1  Progressing as expected  -HG     Comments: Other Adult Goal- 1  9/16/19: RBANS Language score of 88 remains the same as time of initial eval. 8/30/19: Abstract divergent naming, pt was 8/10. 8/9/19: Divergent concrete category naming and pt was 15/15 x 2. 7/26/19: Divergent naming: pt was 10/10.   -HG     Other Adult Goal- 2  Pt will improve RBANS Total Score to at least 90 placing pt in the Average range.   -HG     Status: Other Adult Goal- 2  Progressing as expected  -HG     Comments: Other Adult Goal- 2  9/16/19: RBANS Total score improved to an 84- up from 79 at time of initial eval.   -HG        SLP Time Calculation    SLP Goal Re-Cert Due Date  10/16/19  -HG       User Key  (r) = Recorded By, (t) = Taken By, (c) = Cosigned By    Initials Name Provider Type    Anjali Cortes MS CCC-SLP Speech and Language Pathologist          OP SLP Education     Row Name 09/20/19 1300       Education    Education Comments  Hmwk for attention to detail.   -HG      User Key  (r) = Recorded By, (t) = Taken By, (c) = Cosigned By    Initials Name Effective Dates    Anjali Cortes MS CCC-SLP 06/22/15 -           OP SLP Assessment/Plan - 09/20/19 1300        SLP Plan    Plan Comments  Cont with Cog tx.    -HG      User Key  (r) = Recorded By, (t) = Taken By, (c) = Cosigned By    Initials Name Provider Type    Anjali Cortes MS CCC-SLP Speech and Language Pathologist                 Time Calculation:   SLP Start Time: 1300    Therapy Charges for Today     Code Description Service Date Service Provider Modifiers Qty    [id number] HC ST TREATMENT SPEECH 4 9/20/2019 Anjali Serrano MS CCC-SLP GN 1                   Anjali Serrano MS CCC-SLP  9/20/2019

## 2019-09-24 ENCOUNTER — HOSPITAL ENCOUNTER (OUTPATIENT)
Dept: SPEECH THERAPY | Facility: HOSPITAL | Age: 79
Setting detail: THERAPIES SERIES
Discharge: HOME | End: 2019-09-24

## 2019-09-24 DIAGNOSIS — R41.3 MEMORY LOSS: Primary | ICD-10-CM

## 2019-09-24 PROCEDURE — 92507 TX SP LANG VOICE COMM INDIV: CPT

## 2019-09-24 NOTE — THERAPY TREATMENT NOTE
Outpatient Speech Language Pathology   Adult Speech Language Cognitive Treatment Note  [facility]     Patient Name: Uche Polanco  : 1940  MRN: [id number]  Today's Date: 2019         Visit Date: 2019   Patient Active Problem List   Diagnosis   • Valvular heart disease   • TIA (transient ischemic attack)   • Severe obstructive sleep apnea   • Hypertension   • Hyperlipidemia   • Gout   • CAD (coronary artery disease)   • AV block   • REM sleep behavior disorder   • Memory loss          Visit Dx:    ICD-10-CM ICD-9-CM   1. Memory loss R41.3 780.93                         SLP OP Goals     Row Name 19 1400          Goal Type Needed    Goal Type Needed  Memory;Attention/Orientation;Other Adult Goals  -HG        Subjective Comments    Subjective Comments  Pt alert, cooperative, returned with homework.   -HG        Auditory Comprehension Goals    Patient will comprehend abstract and complex information presented in all social, avocational, or work settings  90%:;with cues  -HG     Status: Patient will comprehend abstract and complex information presented in all social, avocational, or work settings  New  -HG     Patient will improve comprehension of spoken language by following Multi-step directions  80%:;with cues  -HG     Status: Patient will improve comprehension of spoken language by following Multi-step directions  Progressing as expected  -HG     Comments: Patient will improve comprehension of spoken language by following Multi-step directions  19: 3 step/6 component questions: pt was 13/15 for written directions after studying for hmwk. 19:Gave for hmwk. 9/3/19: 2 step/4 component directions: pt was 70% accurate.   19: 2 step oral directions: pt was 100% accurate. 3 step oral directions: pt was 12/15. 19: 2 step oral directions: 70% accuracy.   -HG     Patient will demonstrate comprehension of spoken language by answering questions about a multi paragraph length content   80%:;with cues  -HG     Status: Patient will demonstrate comprehension of spoken language by answering questions about a multi paragraph length content  Achieved  -HG     Comments: Patient will demonstrate comprehension of spoken language by answering questions about a multi paragraph length content  9/16/19: RBANS Paragraph recall and pt was 50% accurate for immediate.  9/13/19: 65-80 word paragraphs: pt was 100% accurate. 9/6/19: 50-65 word paragraphs: pt was 90% accurate.   -HG        Memory Goals    Patient and family will implement compensatory strategies to maximize patient’s Memory function so patient can continue to participate in daily activities  90%:;with cues  -HG     Status: Patient and family will implement compensatory strategies to maximize patient’s Memory function so patient can continue to participate in daily activities  Progressing as expected  -HG     Comments: Patient and family will implement compensatory strategies to maximize patient’s Memory function so patient can continue to participate in daily activities  9/20/19: Immediate recall of Memory Puzzle Book and pt was 60% accurate.    -HG     Patient will demonstrate improved ability to recall information by immediately recalling a series of words  80%:;related;after delay;with cues  -HG     Status: Patient will demonstrate improved ability to recall information by immediately recalling a series of words  Progressing as expected  -HG     Comments: Patient will demonstrate improved ability to recall information by immediately recalling a series of words  9/24/19: Recalling Boxed Information for numbers: pt was 80% accurate. 9/20/19: Spaced Retrieval Method: pt was 4/12. 9/16/19: RBANS Immediate recall and pt improved score to an 83 up from 49 at time of eval. 9/13/19: Immediate recall of boxed information: pt was 85% accurate. 9/6/19: Immediate recall of shapes and figures (6) and pt was 75% accurate.  9/3/19: Immediate recall of shapes and  figures (5) and pt improved to 80% accurate.  19: Pt was able to recall 5 related words with accuracy, 6 words, pt was 5/6. 19: 50-65 words: pt was 6/6 x 3 for immediate story recall. 19: 36-50 word paragraphs for immediate recall: pt was 7/8. Immediate recall for storytellin/3, using chunking: 3/3,   -HG     Patient’s memory skills will be enhanced as reported by patient by utilizing internal memory strategies to recall up to 3 pieces of information after a 5- minute delay  80%:;with cues  -HG     Status: Patient’s memory skills will be enhanced as reported by patient by utilizing internal memory strategies to recall up to 3 pieces of information after a 5- minute delay  Progressing as expected  -HG     Comments: Patient’s memory skills will be enhanced as reported by patient by utilizing internal memory strategies to recall up to 3 pieces of information after a 5- minute delay  19: Delayed recall of 16 groupable words and pt was 14/16. Increased delay of 16 groupable words: pt was 10/16.  19: Delayed recall of words from SRT and pt was 3/12. 19: RBANS Delayed recall score of 81 down from 87 at time of eval.- remains in low average range.  9/3/19: Delayed recall of 8 un-related words and pt was 8/8 x 2. 19: Delayed recall of 7 un-related pictures, pt was 7/7 x 2. 19: Delayed recall of 6 un-related words: pt was 6/6 x 3- gave 7 for hmwk. 19: Delayed recall of 5 un-related words and pt was 5/5 x 2. 6 un-related words: pt was 4/6.  19: Delayed recall of 8 related words, pt was 8/8 x 2. 4 un-related words, pt was 4/4 x 2. 19: Delayed recall of 7 words: pt was 6/7 x 2, 7/7 x 2 19: Delayed recall of 5 related words and pt was 5/5, 6 related words: pt was 6/6. 7 related words using alphabetical order and chunking according to starting letter and pt was 6/7 with min cue.  19: Delayed recall of 4 related words and pt was 3/4 I'ly; 4/4 x 2. Increased to 5  related words and pt was 4/5 and then 5/5.   -HG     Patient’s memory skills will be enhanced as reported by patient by using external memory aides  80%:;with cues  -HG     Status: Patient’s memory skills will be enhanced as reported by patient by using external memory aides  Progressing as expected  -HG     Comments: Patient’s memory skills will be enhanced as reported by patient by using external memory aides  9/20/19: Pt is using planner 90% of the time with mild-moderate level of details. 9/6/19: Pt is making daily notes in his planner. 9/3/19: Pt using his calendar consistently however, pt not using the daily journaling section as much as SLP would like. 8/30/19: Pt required min cues to catch up on daily entries. 8/16/19: Pt continues to use his planner daily. 8/9/19: Pt with increased use of his planner with daily notes.  7/26/19: Fxnl use of planner: pt was writing down his TO DO LIST and not what he had done.   -HG        Attention/Orientation Goals    Patient will be able to execute all activities necessary to manage a home  Independently  -HG     Status: Patient will be able to execute all activities necessary to manage a home  Progressing as expected  -HG     Comments: Patient will be able to execute all activities necessary to manage a home  9/24/19: 3 step/6 componenet oral directions and pt was 9/12.   -HG     Patient will improve attention skills by sustaining focus in order to actively hold and manipulate information provided (e.g., sequencing auditorily presented number series in ascending or descending order)  90%:;with cues  -HG     Status: Patient will improve attention skills by sustaining focus in order to actively hold and manipulate information provided (e.g., sequencing auditorily presented number series in ascending or descending order)  Progressing as expected  -HG     Comments: Patient will improve attention skills by sustaining focus in order to actively hold and manipulate information  provided (e.g., sequencing auditorily presented number series in ascending or descending order)  9/10/19: 4 word for category exclusion and pt was 90% accurate and for 5 words, pt was 60% accurate.  19: Mental Manipulation: pt was 80% accurate with re-read for ranking. 19: Mental Manipulation for worder order of alphabetical vs reversal and pt was 50% accurate.  19: Mental Manipulation for word placement and pt was 80% accurate for attribute and placement.   -HG     Patient will improve attention skills by alternating or shifting focus between two different tasks in order to complete both tasks  80%:;with cues  -HG     Status: Patient will improve attention skills by alternating or shifting focus between two different tasks in order to complete both tasks  Progressing as expected  -HG     Comments: Patient will improve attention skills by alternating or shifting focus between two different tasks in order to complete both tasks  9/10/19: Pt able to recall up to 7 digits. 19: Visual Scanning: Pt required mod cues for alternating between letters and was 80 % accurate.  19: Card game and pt was 80% accurate for new rules and watching increasing piles of cards for potential moves.  19: Sorting card by suit and  and pt was 70% accurate. 19: Sorting card by suit and  and pt required mod cues and repeated instructions and pt was 60% accurate.   -HG     Patient will improve attention skills by sustaining focus to high-level cognitive tasks in order to complete task  80%:;with cues  -HG     Status: Patient will improve attention skills by sustaining focus to high-level cognitive tasks in order to complete task  Progressing as expected  -HG     Comments: Patient will improve attention skills by sustaining focus to high-level cognitive tasks in order to complete task  19: One Pile Card Game and pt was 75% accurate this date with min cues needed t/o the game. 19: One pile card  game, pt requires less cues than previous sessions. 9/10/19: One Pile Card Game and pt required mod cues and pt was 70% accurate. 8/30/19: One Pile Card game: Pt required max cues in order to recall the rules of the game and to play accordingly. 8/20/19: One Pile Card Game: pt was 70% accurate with min cues.  8/9/19: One Pile Card Game: pt was 70% accurate. 7/26/19: Recognizing odd numbers in suit of cards while tapping the table and pt was 70% accurate.   -HG        Other Goals    Other Adult Goal- 1  Pt will complete thought organization tasks with 80% accuracy.  -HG     Status: Other Adult Goal- 1  Progressing as expected  -HG     Comments: Other Adult Goal- 1  9/16/19: RBANS Language score of 88 remains the same as time of initial eval. 8/30/19: Abstract divergent naming, pt was 8/10. 8/9/19: Divergent concrete category naming and pt was 15/15 x 2. 7/26/19: Divergent naming: pt was 10/10.   -HG     Other Adult Goal- 2  Pt will improve RBANS Total Score to at least 90 placing pt in the Average range.   -HG     Status: Other Adult Goal- 2  Progressing as expected  -HG     Comments: Other Adult Goal- 2  9/16/19: RBANS Total score improved to an 84- up from 79 at time of initial eval.   -HG     Other Adult Goal- 3  --  -HG     Status: Other Adult Goal- 3  --  -HG     Comments: Other Adult Goal- 3  --  -HG     Other Adult Goal- 4  --  -HG     Status: Other Adult Goal- 4  --  -HG     Comments: Other Adult Goal- 4  --  -HG     Other Adult Goal- 5  --  -HG     Status: Other Adult Goal- 5  --  -HG     Comments: Other Adult Goal- 5  --  -HG     Other Adult Goal- 6  --  -HG     Status: Other Adult Goal- 6  --  -HG     Comments: Other Adult Goal- 6  --  -HG     Other Adult Goal- 7  --  -HG     Status: Other Adult Goal- 7  --  -HG     Comments: Other Adult Goal- 7  --  -HG     Other Adult Goal- 8  --  -HG     Status: Other Adult Goal- 8  --  -HG     Comments: Other Adult Goal- 8  --  -HG        SLP Time Calculation    SLP  Goal Re-Cert Due Date  10/16/19  -       User Key  (r) = Recorded By, (t) = Taken By, (c) = Cosigned By    Initials Name Provider Type    Anjali Cortes MS CCC-SLP Speech and Language Pathologist          OP SLP Education     Row Name 09/24/19 1400       Education    Education Comments  Hmwk for delayed recall of 12 objects  -HG      User Key  (r) = Recorded By, (t) = Taken By, (c) = Cosigned By    Initials Name Effective Dates    Anjali Cortes MS CCC-SLP 06/22/15 -           OP SLP Assessment/Plan - 09/24/19 1400        SLP Plan    Plan Comments  Cont with Cog tx.    -HG      User Key  (r) = Recorded By, (t) = Taken By, (c) = Cosigned By    Initials Name Provider Type    Anjali Cortes MS CCC-SLP Speech and Language Pathologist                 Time Calculation:   SLP Start Time: 1400    Therapy Charges for Today     Code Description Service Date Service Provider Modifiers Qty    [id number] HC ST TREATMENT SPEECH 4 9/24/2019 Anjali Serrano MS CCC-SLP GN 1                   Anjali Serrano MS CCC-SLP  9/24/2019

## 2019-09-27 ENCOUNTER — HOSPITAL ENCOUNTER (OUTPATIENT)
Dept: SPEECH THERAPY | Facility: HOSPITAL | Age: 79
Setting detail: THERAPIES SERIES
Discharge: HOME | End: 2019-09-27

## 2019-09-27 DIAGNOSIS — R41.3 MEMORY LOSS: Primary | ICD-10-CM

## 2019-09-27 PROCEDURE — 92507 TX SP LANG VOICE COMM INDIV: CPT

## 2019-09-27 NOTE — THERAPY TREATMENT NOTE
Outpatient Speech Language Pathology   Adult Speech Language Cognitive Treatment Note  [facility]     Patient Name: Uche Polanco  : 1940  MRN: [id number]  Today's Date: 2019         Visit Date: 2019   Patient Active Problem List   Diagnosis   • Valvular heart disease   • TIA (transient ischemic attack)   • Severe obstructive sleep apnea   • Hypertension   • Hyperlipidemia   • Gout   • CAD (coronary artery disease)   • AV block   • REM sleep behavior disorder   • Memory loss          Visit Dx:    ICD-10-CM ICD-9-CM   1. Memory loss R41.3 780.93                         SLP OP Goals     Row Name 19 0900          Goal Type Needed    Goal Type Needed  Memory;Attention/Orientation;Other Adult Goals  -HG        Subjective Comments    Subjective Comments  Pt alert, cooperative, returned with homework.   -HG        Auditory Comprehension Goals    Patient will comprehend abstract and complex information presented in all social, avocational, or work settings  90%:;with cues  -HG     Status: Patient will comprehend abstract and complex information presented in all social, avocational, or work settings  New  -HG     Patient will improve comprehension of spoken language by following Multi-step directions  80%:;with cues  -HG     Status: Patient will improve comprehension of spoken language by following Multi-step directions  Progressing as expected  -HG     Comments: Patient will improve comprehension of spoken language by following Multi-step directions  19: 3 step/6 component questions: pt was 13/15 for written directions after studying for hmwk. 19:Gave for hmwk. 9/3/19: 2 step/4 component directions: pt was 70% accurate.   19: 2 step oral directions: pt was 100% accurate. 3 step oral directions: pt was 12/15. 19: 2 step oral directions: 70% accuracy.   -HG     Patient will demonstrate comprehension of spoken language by answering questions about a multi paragraph length content   80%:;with cues  -HG     Status: Patient will demonstrate comprehension of spoken language by answering questions about a multi paragraph length content  Achieved  -HG     Comments: Patient will demonstrate comprehension of spoken language by answering questions about a multi paragraph length content  9/16/19: RBANS Paragraph recall and pt was 50% accurate for immediate.  9/13/19: 65-80 word paragraphs: pt was 100% accurate. 9/6/19: 50-65 word paragraphs: pt was 90% accurate.   -HG        Memory Goals    Patient and family will implement compensatory strategies to maximize patient’s Memory function so patient can continue to participate in daily activities  90%:;with cues  -HG     Status: Patient and family will implement compensatory strategies to maximize patient’s Memory function so patient can continue to participate in daily activities  Progressing as expected  -HG     Comments: Patient and family will implement compensatory strategies to maximize patient’s Memory function so patient can continue to participate in daily activities  9/20/19: Immediate recall of Memory Puzzle Book and pt was 60% accurate.    -HG     Patient will demonstrate improved ability to recall information by immediately recalling a series of words  80%:;related;after delay;with cues  -HG     Status: Patient will demonstrate improved ability to recall information by immediately recalling a series of words  Progressing as expected  -HG     Comments: Patient will demonstrate improved ability to recall information by immediately recalling a series of words  9/24/19: Recalling Boxed Information for numbers: pt was 80% accurate. 9/20/19: Spaced Retrieval Method: pt was 4/12. 9/16/19: RBANS Immediate recall and pt improved score to an 83 up from 49 at time of eval. 9/13/19: Immediate recall of boxed information: pt was 85% accurate. 9/6/19: Immediate recall of shapes and figures (6) and pt was 75% accurate.  9/3/19: Immediate recall of shapes and  figures (5) and pt improved to 80% accurate.  19: Pt was able to recall 5 related words with accuracy, 6 words, pt was 5/6. 19: 50-65 words: pt was 6/6 x 3 for immediate story recall. 19: 36-50 word paragraphs for immediate recall: pt was 7/8. Immediate recall for storytellin/3, using chunking: 3/3,   -HG     Patient’s memory skills will be enhanced as reported by patient by utilizing internal memory strategies to recall up to 3 pieces of information after a 5- minute delay  80%:;with cues  -HG     Status: Patient’s memory skills will be enhanced as reported by patient by utilizing internal memory strategies to recall up to 3 pieces of information after a 5- minute delay  Progressing as expected  -HG     Comments: Patient’s memory skills will be enhanced as reported by patient by utilizing internal memory strategies to recall up to 3 pieces of information after a 5- minute delay  19: Delayed recall of 12 groupable pictures and pt was 12/12 x 2. 19: Delayed recall of 16 groupable words and pt was 14/16. Increased delay of 16 groupable words: pt was 10/16.  19: Delayed recall of words from SRT and pt was 3/12. 19: RBANS Delayed recall score of 81 down from 87 at time of eval.- remains in low average range.  9/3/19: Delayed recall of 8 un-related words and pt was 8/8 x 2. 19: Delayed recall of 7 un-related pictures, pt was 7/7 x 2. 19: Delayed recall of 6 un-related words: pt was 6/6 x 3- gave 7 for hmwk. 19: Delayed recall of 5 un-related words and pt was 5/5 x 2. 6 un-related words: pt was 4/6.  19: Delayed recall of 8 related words, pt was 8/8 x 2. 4 un-related words, pt was 4/4 x 2. 19: Delayed recall of 7 words: pt was 6/7 x 2, 7/7 x 2 19: Delayed recall of 5 related words and pt was 5/5, 6 related words: pt was 6/6. 7 related words using alphabetical order and chunking according to starting letter and pt was 6/7 with min cue.  19: Delayed  recall of 4 related words and pt was 3/4 I'ly; 4/4 x 2. Increased to 5 related words and pt was 4/5 and then 5/5.   -HG     Patient’s memory skills will be enhanced as reported by patient by using external memory aides  80%:;with cues  -HG     Status: Patient’s memory skills will be enhanced as reported by patient by using external memory aides  Progressing as expected  -HG     Comments: Patient’s memory skills will be enhanced as reported by patient by using external memory aides  9/20/19: Pt is using planner 90% of the time with mild-moderate level of details. 9/6/19: Pt is making daily notes in his planner. 9/3/19: Pt using his calendar consistently however, pt not using the daily journaling section as much as SLP would like. 8/30/19: Pt required min cues to catch up on daily entries. 8/16/19: Pt continues to use his planner daily. 8/9/19: Pt with increased use of his planner with daily notes.  7/26/19: Fxnl use of planner: pt was writing down his TO DO LIST and not what he had done.   -HG        Attention/Orientation Goals    Patient will be able to execute all activities necessary to manage a home  Independently  -HG     Status: Patient will be able to execute all activities necessary to manage a home  Progressing as expected  -HG     Comments: Patient will be able to execute all activities necessary to manage a home  9/24/19: 3 step/6 componenet oral directions and pt was 9/12.   -HG     Patient will improve attention skills by sustaining focus in order to actively hold and manipulate information provided (e.g., sequencing auditorily presented number series in ascending or descending order)  90%:;with cues  -HG     Status: Patient will improve attention skills by sustaining focus in order to actively hold and manipulate information provided (e.g., sequencing auditorily presented number series in ascending or descending order)  Progressing as expected  -HG     Comments: Patient will improve attention skills by  sustaining focus in order to actively hold and manipulate information provided (e.g., sequencing auditorily presented number series in ascending or descending order)  9/10/19: 4 word for category exclusion and pt was 90% accurate and for 5 words, pt was 60% accurate.  19: Mental Manipulation: pt was 80% accurate with re-read for ranking. 19: Mental Manipulation for worder order of alphabetical vs reversal and pt was 50% accurate.  19: Mental Manipulation for word placement and pt was 80% accurate for attribute and placement.   -HG     Patient will improve attention skills by alternating or shifting focus between two different tasks in order to complete both tasks  80%:;with cues  -HG     Status: Patient will improve attention skills by alternating or shifting focus between two different tasks in order to complete both tasks  Progressing as expected  -HG     Comments: Patient will improve attention skills by alternating or shifting focus between two different tasks in order to complete both tasks  19: While cards being turned over, pt had to tap table if the card played was lower than the previous one. Pt was 60% accurate. 9/10/19: Pt able to recall up to 7 digits. 19: Visual Scanning: Pt required mod cues for alternating between letters and was 80 % accurate.  19: Card game and pt was 80% accurate for new rules and watching increasing piles of cards for potential moves.  19: Sorting card by suit and  and pt was 70% accurate. 19: Sorting card by suit and  and pt required mod cues and repeated instructions and pt was 60% accurate.   -HG     Patient will improve attention skills by sustaining focus to high-level cognitive tasks in order to complete task  80%:;with cues  -HG     Status: Patient will improve attention skills by sustaining focus to high-level cognitive tasks in order to complete task  Progressing as expected  -HG     Comments: Patient will improve attention  skills by sustaining focus to high-level cognitive tasks in order to complete task  9/27/19: ID of even numbers and pt was 90% accurate during a game of fast pace recognizition. 9/20/19: One Pile Card Game and pt was 75% accurate this date with min cues needed t/o the game. 9/13/19: One pile card game, pt requires less cues than previous sessions. 9/10/19: One Pile Card Game and pt required mod cues and pt was 70% accurate. 8/30/19: One Pile Card game: Pt required max cues in order to recall the rules of the game and to play accordingly. 8/20/19: One Pile Card Game: pt was 70% accurate with min cues.  8/9/19: One Pile Card Game: pt was 70% accurate. 7/26/19: Recognizing odd numbers in suit of cards while tapping the table and pt was 70% accurate.   -HG        Other Goals    Other Adult Goal- 1  Pt will complete thought organization tasks with 80% accuracy.  -HG     Status: Other Adult Goal- 1  Progressing as expected  -HG     Comments: Other Adult Goal- 1  9/16/19: RBANS Language score of 88 remains the same as time of initial eval. 8/30/19: Abstract divergent naming, pt was 8/10. 8/9/19: Divergent concrete category naming and pt was 15/15 x 2. 7/26/19: Divergent naming: pt was 10/10.   -HG     Other Adult Goal- 2  Pt will improve RBANS Total Score to at least 90 placing pt in the Average range.   -HG     Status: Other Adult Goal- 2  Progressing as expected  -HG     Comments: Other Adult Goal- 2  9/16/19: RBANS Total score improved to an 84- up from 79 at time of initial eval.   -HG     Other Adult Goal- 3  --  -HG     Status: Other Adult Goal- 3  --  -HG     Comments: Other Adult Goal- 3  --  -HG     Other Adult Goal- 4  --  -HG     Status: Other Adult Goal- 4  --  -HG     Comments: Other Adult Goal- 4  --  -HG     Other Adult Goal- 5  --  -HG     Status: Other Adult Goal- 5  --  -HG     Comments: Other Adult Goal- 5  --  -HG     Other Adult Goal- 6  --  -HG     Status: Other Adult Goal- 6  --  -HG     Comments:  Other Adult Goal- 6  --  -HG     Other Adult Goal- 7  --  -HG     Status: Other Adult Goal- 7  --  -HG     Comments: Other Adult Goal- 7  --  -HG     Other Adult Goal- 8  --  -HG     Status: Other Adult Goal- 8  --  -HG     Comments: Other Adult Goal- 8  --  -HG        SLP Time Calculation    SLP Goal Re-Cert Due Date  10/16/19  -HG       User Key  (r) = Recorded By, (t) = Taken By, (c) = Cosigned By    Initials Name Provider Type    Anjali Cortes MS CCC-SLP Speech and Language Pathologist          OP SLP Education     Row Name 09/27/19 0900       Education    Education Comments  Hmwk for   -HG      User Key  (r) = Recorded By, (t) = Taken By, (c) = Cosigned By    Initials Name Effective Dates    Anjali Cortes MS CCC-SLP 06/22/15 -           OP SLP Assessment/Plan - 09/27/19 0900        SLP Plan    Plan Comments  Cont with Cog tx.    -HG      User Key  (r) = Recorded By, (t) = Taken By, (c) = Cosigned By    Initials Name Provider Type    Anjali Cortes MS CCC-SLP Speech and Language Pathologist                 Time Calculation:   SLP Start Time: 0900    Therapy Charges for Today     Code Description Service Date Service Provider Modifiers Qty    [id number]  ST TREATMENT SPEECH 4 9/27/2019 Anjali Serrano MS CCC-SLP GN 1                   Anjali Serrano MS CCC-SLP  9/27/2019

## 2019-10-04 ENCOUNTER — HOSPITAL ENCOUNTER (OUTPATIENT)
Dept: SPEECH THERAPY | Facility: HOSPITAL | Age: 79
Setting detail: THERAPIES SERIES
Discharge: HOME | End: 2019-10-04

## 2019-10-04 DIAGNOSIS — R41.3 MEMORY LOSS: Primary | ICD-10-CM

## 2019-10-04 PROCEDURE — 92507 TX SP LANG VOICE COMM INDIV: CPT

## 2019-10-07 RX ORDER — CLONAZEPAM 1 MG/1
1 TABLET ORAL
Qty: 30 TABLET | Refills: 0 | Status: SHIPPED | OUTPATIENT
Start: 2019-10-07 | End: 2019-11-05 | Stop reason: SDUPTHER

## 2019-10-09 ENCOUNTER — TELEPHONE (OUTPATIENT)
Dept: CARDIOLOGY | Facility: CLINIC | Age: 79
End: 2019-10-09

## 2019-10-09 NOTE — TELEPHONE ENCOUNTER
States he has dizzy spells and the room spins when he changes positions. No other symptoms reported. B/P 116/70. Talked to Parisa Muñoz in device clinic. She states nothing showed up on his device from the transmission. Patient notified. He states has an appt with his PCP in the morning to have his dizziness checked. Instructed him to call with any questions or concerns if not resolved with his PCP tomorrow. He verbalized understanding.Discussed above note with  in clinic. He concurs.

## 2019-10-15 ENCOUNTER — HOSPITAL ENCOUNTER (OUTPATIENT)
Dept: SPEECH THERAPY | Facility: HOSPITAL | Age: 79
Setting detail: THERAPIES SERIES
Discharge: HOME | End: 2019-10-15

## 2019-10-15 DIAGNOSIS — R41.3 MEMORY LOSS: Primary | ICD-10-CM

## 2019-10-15 PROCEDURE — 92507 TX SP LANG VOICE COMM INDIV: CPT

## 2019-10-15 NOTE — THERAPY PROGRESS REPORT/RE-CERT
Outpatient Speech Language Pathology   Adult Speech Language Cognitive Progress Note  [facility]     Patient Name: Uche Polanco  : 1940  MRN: [id number]  Today's Date: 10/15/2019         Visit Date: 10/15/2019   Patient Active Problem List   Diagnosis   • Valvular heart disease   • TIA (transient ischemic attack)   • Severe obstructive sleep apnea   • Hypertension   • Hyperlipidemia   • Gout   • CAD (coronary artery disease)   • AV block   • REM sleep behavior disorder   • Memory loss          Visit Dx:    ICD-10-CM ICD-9-CM   1. Memory loss R41.3 780.93                         SLP OP Goals     Row Name 10/15/19 0800          Goal Type Needed    Goal Type Needed  Memory;Attention/Orientation;Other Adult Goals  -HG        Subjective Comments    Subjective Comments  Pt alert, cooperative, returned with homework.   -HG        Auditory Comprehension Goals    Patient will comprehend abstract and complex information presented in all social, avocational, or work settings  90%:;with cues  -HG     Status: Patient will comprehend abstract and complex information presented in all social, avocational, or work settings  New  -HG     Patient will improve comprehension of spoken language by following Multi-step directions  80%:;with cues  -HG     Status: Patient will improve comprehension of spoken language by following Multi-step directions  Progressing as expected  -HG     Comments: Patient will improve comprehension of spoken language by following Multi-step directions  19: 3 step/6 component questions: pt was 13/15 for written directions after studying for hmwk. 19:Gave for hmwk. 9/3/19: 2 step/4 component directions: pt was 70% accurate.   19: 2 step oral directions: pt was 100% accurate. 3 step oral directions: pt was 12/15. 19: 2 step oral directions: 70% accuracy.   -HG     Patient will demonstrate comprehension of spoken language by answering questions about a multi paragraph length content   80%:;with cues  -HG     Status: Patient will demonstrate comprehension of spoken language by answering questions about a multi paragraph length content  Achieved  -HG     Comments: Patient will demonstrate comprehension of spoken language by answering questions about a multi paragraph length content  10/15/129: RBANS Paragraph recall for immediate: pt was 11/24 and for delayed, pt was 6/12. 10/1/19: 36-50 word paragraph: 11/12. 9/16/19: RBANS Paragraph recall and pt was 50% accurate for immediate.  9/13/19: 65-80 word paragraphs: pt was 100% accurate. 9/6/19: 50-65 word paragraphs: pt was 90% accurate.   -HG        Memory Goals    Patient and family will implement compensatory strategies to maximize patient’s Memory function so patient can continue to participate in daily activities  90%:;with cues  -HG     Status: Patient and family will implement compensatory strategies to maximize patient’s Memory function so patient can continue to participate in daily activities  Progressing as expected  -HG     Comments: Patient and family will implement compensatory strategies to maximize patient’s Memory function so patient can continue to participate in daily activities  9/20/19: Immediate recall of Memory Puzzle Book and pt was 60% accurate.    -HG     Patient will demonstrate improved ability to recall information by immediately recalling a series of words  80%:;related;after delay;with cues  -HG     Status: Patient will demonstrate improved ability to recall information by immediately recalling a series of words  Progressing as expected  -HG     Comments: Patient will demonstrate improved ability to recall information by immediately recalling a series of words  10/15/19: RBANS Immediate recall score dropped to a 73 from an 83 at last re-assessment. 10/4/19: Memory Recall for a picture scene and pt was 75% accurate. 9/24/19: Recalling Boxed Information for numbers: pt was 80% accurate. 9/20/19: Spaced Retrieval Method: pt  was 4/12. 19: RBANS Immediate recall and pt improved score to an 83 up from 49 at time of eval. 19: Immediate recall of boxed information: pt was 85% accurate. 19: Immediate recall of shapes and figures (6) and pt was 75% accurate.  9/3/19: Immediate recall of shapes and figures (5) and pt improved to 80% accurate.  19: Pt was able to recall 5 related words with accuracy, 6 words, pt was 5/6. 19: 50-65 words: pt was 6/6 x 3 for immediate story recall. 19: 36-50 word paragraphs for immediate recall: pt was 7/8. Immediate recall for storytellin3, using chunking: 3/3,   -HG     Patient’s memory skills will be enhanced as reported by patient by utilizing internal memory strategies to recall up to 3 pieces of information after a 5- minute delay  80%:;with cues  -HG     Status: Patient’s memory skills will be enhanced as reported by patient by utilizing internal memory strategies to recall up to 3 pieces of information after a 5- minute delay  Progressing as expected  -HG     Comments: Patient’s memory skills will be enhanced as reported by patient by utilizing internal memory strategies to recall up to 3 pieces of information after a 5- minute delay  10/15/19: RBANS Delayed recall score of 78 dropped slightly from an 81 at previous re-assessment. 10/1/19:Delayed recall of paragraph information and pt was 2/3 for topics. 19: Delayed recall of 12 groupable pictures and pt was /12 x 2. 19: Delayed recall of 16 groupable words and pt was 14/16. Increased delay of 16 groupable words: pt was 10/16.  19: Delayed recall of words from SRT and pt was 3/12. 19: RBANS Delayed recall score of 81 down from 87 at time of eval.- remains in low average range.  9/3/19: Delayed recall of 8 un-related words and pt was 8/8 x 2. 19: Delayed recall of 7 un-related pictures, pt was 7/7 x 2. 19: Delayed recall of 6 un-related words: pt was 6/6 x 3- gave 7 for hmwk. 19: Delayed  recall of 5 un-related words and pt was 5/5 x 2. 6 un-related words: pt was 4/6.  8/16/19: Delayed recall of 8 related words, pt was 8/8 x 2. 4 un-related words, pt was 4/4 x 2. 8/9/19: Delayed recall of 7 words: pt was 6/7 x 2, 7/7 x 2 8/6/19: Delayed recall of 5 related words and pt was 5/5, 6 related words: pt was 6/6. 7 related words using alphabetical order and chunking according to starting letter and pt was 6/7 with min cue.  7/26/19: Delayed recall of 4 related words and pt was 3/4 I'ly; 4/4 x 2. Increased to 5 related words and pt was 4/5 and then 5/5.   -HG     Patient’s memory skills will be enhanced as reported by patient by using external memory aides  80%:;with cues  -HG     Status: Patient’s memory skills will be enhanced as reported by patient by using external memory aides  Progressing as expected  -HG     Comments: Patient’s memory skills will be enhanced as reported by patient by using external memory aides  9/20/19: Pt is using planner 90% of the time with mild-moderate level of details. 9/6/19: Pt is making daily notes in his planner. 9/3/19: Pt using his calendar consistently however, pt not using the daily journaling section as much as SLP would like. 8/30/19: Pt required min cues to catch up on daily entries. 8/16/19: Pt continues to use his planner daily. 8/9/19: Pt with increased use of his planner with daily notes.  7/26/19: Fxnl use of planner: pt was writing down his TO DO LIST and not what he had done.   -HG        Attention/Orientation Goals    Patient will be able to execute all activities necessary to manage a home  Independently  -HG     Status: Patient will be able to execute all activities necessary to manage a home  Progressing as expected  -HG     Comments: Patient will be able to execute all activities necessary to manage a home  10/15/19: RBANS Attention score remained an 88 since previous assessment. 9/24/19: 3 step/6 componenet oral directions and pt was 9/12.   -HG      Patient will improve attention skills by sustaining focus in order to actively hold and manipulate information provided (e.g., sequencing auditorily presented number series in ascending or descending order)  90%:;with cues  -HG     Status: Patient will improve attention skills by sustaining focus in order to actively hold and manipulate information provided (e.g., sequencing auditorily presented number series in ascending or descending order)  Progressing as expected  -HG     Comments: Patient will improve attention skills by sustaining focus in order to actively hold and manipulate information provided (e.g., sequencing auditorily presented number series in ascending or descending order)  10/4/19: Mental Manipulation 4 word progression: pt was 60% accurate with repetition needed.  9/10/19: 4 word for category exclusion and pt was 90% accurate and for 5 words, pt was 60% accurate.  8/30/19: Mental Manipulation: pt was 80% accurate with re-read for ranking. 8/20/19: Mental Manipulation for worder order of alphabetical vs reversal and pt was 50% accurate.  8/6/19: Mental Manipulation for word placement and pt was 80% accurate for attribute and placement.   -HG     Patient will improve attention skills by alternating or shifting focus between two different tasks in order to complete both tasks  80%:;with cues  -HG     Status: Patient will improve attention skills by alternating or shifting focus between two different tasks in order to complete both tasks  Progressing as expected  -HG     Comments: Patient will improve attention skills by alternating or shifting focus between two different tasks in order to complete both tasks  9/27/19: While cards being turned over, pt had to tap table if the card played was lower than the previous one. Pt was 60% accurate. 9/10/19: Pt able to recall up to 7 digits. 9/6/19: Visual Scanning: Pt required mod cues for alternating between letters and was 80 % accurate.  8/23/19: Card game  and pt was 80% accurate for new rules and watching increasing piles of cards for potential moves.  19: Sorting card by suit and  and pt was 70% accurate. 19: Sorting card by suit and  and pt required mod cues and repeated instructions and pt was 60% accurate.   -HG     Patient will improve attention skills by sustaining focus to high-level cognitive tasks in order to complete task  80%:;with cues  -HG     Status: Patient will improve attention skills by sustaining focus to high-level cognitive tasks in order to complete task  Progressing as expected  -HG     Comments: Patient will improve attention skills by sustaining focus to high-level cognitive tasks in order to complete task  19: ID of even numbers and pt was 90% accurate during a game of fast pace recognizition. 19: One Pile Card Game and pt was 75% accurate this date with min cues needed t/o the game. 19: One pile card game, pt requires less cues than previous sessions. 9/10/19: One Pile Card Game and pt required mod cues and pt was 70% accurate. 19: One Pile Card game: Pt required max cues in order to recall the rules of the game and to play accordingly. 19: One Pile Card Game: pt was 70% accurate with min cues.  19: One Pile Card Game: pt was 70% accurate. 19: Recognizing odd numbers in suit of cards while tapping the table and pt was 70% accurate.   -HG        Other Goals    Other Adult Goal- 1  Pt will complete thought organization tasks with 80% accuracy.  -HG     Status: Other Adult Goal- 1  Progressing as expected  -HG     Comments: Other Adult Goal- 1  10/15/19: RBANS Language score improved to a 92 from an 88 at last re-assessment. 10/1/19: Divergent naming: pt was 80% accurate. 19: RBANS Language score of 88 remains the same as time of initial eval. 19: Abstract divergent naming, pt was 8/10. 19: Divergent concrete category naming and pt was 15/15 x 2. 19: Divergent naming: pt was  10/10.   -HG     Other Adult Goal- 2  Pt will improve RBANS Total Score to at least 90 placing pt in the Average range.   -HG     Status: Other Adult Goal- 2  Progressing as expected  -HG     Comments: Other Adult Goal- 2  10/15/19: RBANS Total score remained at 84 (Low Average). 9/16/19: RBANS Total score improved to an 84- up from 79 at time of initial eval.   -HG     Other Adult Goal- 3  Patient will perform divergent naming tasks, generating 10 items per abstract category within 60 seconds, across two trials in order to improve semantic fluency.  -HG     Status: Other Adult Goal- 3  Progressing as expected  -HG     Comments: Other Adult Goal- 3  4/14/17: 10/10 for abstract categories. 3/24/17: 11/10 for concrete categories.   Pt able to name 16 items in a sixty second period of time on the RBANS. 3/17/17: For abstract category naming, pt was 100% accurate. Pt was 10/15 for abstract items. 2/23: Pt was on average 10-13/15. 2/27: Abstract items: pt was at least 10 items with 2 minute time frame allowed. 3/3: Pt was 15/15 for abstract categories  3/6/17: For abstract naming in one minute: T1:10/10 T2:8/10  -HG     Other Adult Goal- 4  Patient will perform executive functioning task with 70% accuracy when provided prompts only in order to improve strategic thinking.  -HG     Status: Other Adult Goal- 4  Progressing as expected  -HG     Comments: Other Adult Goal- 4  4/14/17: Pt currently selling a house, managing finances, moving arrangments, traveling out of town, booking airfares, etc. 3/27/17: For recall of 16 objects and w/o review, pt was 50% accurate and with review ? 3/24/17: visuospatial recall, pt performed in the average range on the RBANS. 3/17/17: Pt given a task that involved visual recall as well as reasoning and problem solving and pt was ?Divided atten task while asked to perform a prospective memory task. Pt required mod cues this date. 2/23: Pt was 50-75% accurate this date with a mental flexibilty  task and this was given a homework. 3/3: Card game played that was taught several weeks ago and pt was 70% accurate.  -HG     Other Adult Goal- 5  Patient will improve cognition as evidenced by STGs met. (LTG)  -HG     Status: Other Adult Goal- 5  Progressing as expected  -HG     Comments: Other Adult Goal- 5   4/14/17: For delayed storytelling, pt was 80% accuracy with no review strategies. 3/27/17: For delayed recall of names and pictures, pt was 80% accuracy.  3/24/17: For story re-telling, immediate and delayed, pt was borderline. 3/17/17: Pt had to recall 16 items with no review from almost 2 weeks ago and pt was 80% accurate. Pt using visualization and grouping in order to recall verbally presented information. 2/27: For visual recall of pictures, pt was 80% accurate.   -HG     Other Adult Goal- 6  Patient will perform alternating attention task with 80% accuracy across two trials in order to enhance mental flexibility and attention.  -HG     Status: Other Adult Goal- 6  Progressing as expected  -HG     Comments: Other Adult Goal- 6  3/27/17: For mental manipulation of three words forward and three words backward: pt was 100% accurate with 3 words and 80% accurate with four words. 3/24/17: For attention on the RBANS, pt was in the average range. Card game and pt was 80% accurate. 2/23: For answering questions given a list of four words, pt was 80% accurate. 2/27: For mental manipulation with numbers, pt was 100% accurate. 3/3: While playing cards and naming items from a delayed recall list activity, pt was 75% accurate. 3/6/17: While corssing out numbers, pt had to switch with background music provided: pt was 90% accurate.   -HG     Other Adult Goal- 7  Patient will perform divided attention task with 70% accuracy across two trials independently in order to improve multitasking abilities.  -HG     Status: Other Adult Goal- 7  Progressing as expected  -HG     Comments: Other Adult Goal- 7  4/14/17: For high  level mult-tasking, pt was 80-90% accurate independently. 3/17/17: Pt required min cues for completing exec fxn task in the correct order. Pt to complete a maze while listening to music and pt was 50% accurate this date. 2/16: More of a prospective memory task thrown in the midst of a cognitive task and pt was 90% accurate.  2/23: For divided attention, pt was 70% accurate with min verbal cues. 3/3:While listening to music and recalling a word list, pt was 70% accurate.   -     Other Adult Goal- 8  Pt will recall short paragraph material presentally orally using strategies with 90% accuracy in order to improve immediate recall skills.  -     Status: Other Adult Goal- 8  New  -     Comments: Other Adult Goal- 8   3/27/17: For 22-36 word paragraphs, 85% accurate. For 36-50 words, 90% accurate.  pt was 3/6/17: Using a who what when where why strategy, pt was 80% accurate; without, pt was 60% accurate.   -        SLP Time Calculation    SLP Goal Re-Cert Due Date  11/14/19  -       User Key  (r) = Recorded By, (t) = Taken By, (c) = Cosigned By    Initials Name Provider Type     Anjali Serrano MS CCC-SLP Speech and Language Pathologist          OP SLP Education     Row Name 10/15/19 0800       Education    Barriers to Learning  No barriers identified  -    Education Provided  Described results of evaluation;Patient expressed understanding of evaluation;Patient demonstrated recommended strategies;Patient requires further education on strategies, risks  -    Assessed  Learning needs;Learning motivation;Learning preferences;Learning readiness  -    Learning Motivation  Strong  -    Learning Method  Explanation;Demonstration;Teach back;Written materials  -    Teaching Response  Verbalized understanding;Demonstrated understanding;Reinforcement needed  -    Education Comments  Education ongoing for use of his planner for noting things he has completed for later recall.   -      User Key  (r) =  Recorded By, (t) = Taken By, (c) = Cosigned By    Initials Name Effective Dates    HG Anjali Serrano MS CCC-SLP 06/22/15 -           OP SLP Assessment/Plan - 10/15/19 0800        SLP Assessment    Functional Problems  Speech Language- Adult/Cognition   -HG    Impact on Function: Adult Speech Language/Cognition  Trouble learning or remembering new information;Restrictions in personal and social life   -HG    Clinical Impression: Speech Language-Adult/Congnition  Mild-Moderate:;Cognitive Communication Impairment   -HG    Functional Problems Comment  Pt recognizes that his mind isn't as sharp and he isn't sure what is different but that recall is harder.   -HG    Clinical Impression Comments  RBANS Total score of 84 remains the same as previous assessment.   -HG    Please refer to paper survey for additional self-reported information  Yes   -HG    Please refer to items scanned into chart for additional diagnostic informaiton and handouts as provided by clinician  Yes   -HG    SLP Diagnosis  Mild to Moderate Cognitive impairment   -HG    Prognosis  Excellent (comment)   -HG    Patient/caregiver participated in establishment of treatment plan and goals  Yes   -HG    Patient would benefit from skilled therapy intervention  Yes   -HG       SLP Plan    Frequency  1-2x/week   -HG    Duration  6 weeks   -HG    Planned CPT's?  SLP INDIVIDUAL SPEECH THERAPY: 34205   -HG    Expected Duration Therapy Session - minutes  45-60 minutes   -HG    Plan Comments  Cont with Cog tx.    -HG      User Key  (r) = Recorded By, (t) = Taken By, (c) = Cosigned By    Initials Name Provider Type    DALJIT Anjali Serrano MS CCC-SLP Speech and Language Pathologist                 Time Calculation:   SLP Start Time: 0800    Therapy Charges for Today     Code Description Service Date Service Provider Modifiers Qty    [id number] [facility] TREATMENT SPEECH 6 10/15/2019 Anjali Serrano MS CCC-SLP GN 1                   Anjali Serrano MS  CCC-SLP  10/15/2019

## 2019-10-18 ENCOUNTER — HOSPITAL ENCOUNTER (OUTPATIENT)
Dept: SPEECH THERAPY | Facility: HOSPITAL | Age: 79
Setting detail: THERAPIES SERIES
Discharge: HOME | End: 2019-10-18

## 2019-10-18 DIAGNOSIS — R41.3 MEMORY LOSS: Primary | ICD-10-CM

## 2019-10-18 PROCEDURE — 92507 TX SP LANG VOICE COMM INDIV: CPT

## 2019-10-18 NOTE — THERAPY TREATMENT NOTE
"Outpatient Speech Language Pathology   Adult Speech Language Cognitive Treatment Note  [facility]     Patient Name: Uche Polanco  : 1940  MRN: [id number]  Today's Date: 10/18/2019         Visit Date: 10/18/2019   Patient Active Problem List   Diagnosis   • Valvular heart disease   • TIA (transient ischemic attack)   • Severe obstructive sleep apnea   • Hypertension   • Hyperlipidemia   • Gout   • CAD (coronary artery disease)   • AV block   • REM sleep behavior disorder   • Memory loss          Visit Dx:    ICD-10-CM ICD-9-CM   1. Memory loss R41.3 780.93                         SLP OP Goals     Row Name 10/18/19 0900          Goal Type Needed    Goal Type Needed  Memory;Attention/Orientation;Other Adult Goals  -HG        Subjective Comments    Subjective Comments  Pt alert, cooperative, returned with homework. Pt states that he gets a feeling in his head and feels he needs to \"rest his brain\". Pt reports that doing certain therapy exercises requires a break. Feels like \"overload\".   -HG        Auditory Comprehension Goals    Patient will comprehend abstract and complex information presented in all social, avocational, or work settings  90%:;with cues  -HG     Status: Patient will comprehend abstract and complex information presented in all social, avocational, or work settings  New  -HG     Patient will improve comprehension of spoken language by following Multi-step directions  80%:;with cues  -HG     Status: Patient will improve comprehension of spoken language by following Multi-step directions  Progressing as expected  -HG     Comments: Patient will improve comprehension of spoken language by following Multi-step directions  19: 3 step/6 component questions: pt was 15 for written directions after studying for hmwk. 19:Gave for hmwk. 9/3/19: 2 step/4 component directions: pt was 70% accurate.   19: 2 step oral directions: pt was 100% accurate. 3 step oral directions: pt was 12/15. " 7/26/19: 2 step oral directions: 70% accuracy.   -HG     Patient will demonstrate comprehension of spoken language by answering questions about a multi paragraph length content  80%:;with cues  -HG     Status: Patient will demonstrate comprehension of spoken language by answering questions about a multi paragraph length content  Achieved  -HG     Comments: Patient will demonstrate comprehension of spoken language by answering questions about a multi paragraph length content  10/15/129: RBANS Paragraph recall for immediate: pt was 11/24 and for delayed, pt was 6/12. 10/1/19: 36-50 word paragraph: 11/12. 9/16/19: RBANS Paragraph recall and pt was 50% accurate for immediate.  9/13/19: 65-80 word paragraphs: pt was 100% accurate. 9/6/19: 50-65 word paragraphs: pt was 90% accurate.   -HG        Memory Goals    Patient and family will implement compensatory strategies to maximize patient’s Memory function so patient can continue to participate in daily activities  90%:;with cues  -HG     Status: Patient and family will implement compensatory strategies to maximize patient’s Memory function so patient can continue to participate in daily activities  Progressing as expected  -HG     Comments: Patient and family will implement compensatory strategies to maximize patient’s Memory function so patient can continue to participate in daily activities  9/20/19: Immediate recall of Memory Puzzle Book and pt was 60% accurate.    -HG     Patient will demonstrate improved ability to recall information by immediately recalling a series of words  80%:;related;after delay;with cues  -HG     Status: Patient will demonstrate improved ability to recall information by immediately recalling a series of words  Progressing as expected  -HG     Comments: Patient will demonstrate improved ability to recall information by immediately recalling a series of words  10/18/19: Immediate recall for Memory Matches i pad game and pt was 70% accurate.   Fastest time memory matches and pt was impulsive but completed in a little 60 secs. 10/15/19: RBANS Immediate recall score dropped to a 73 from an 83 at last re-assessment. 10/4/19: Memory Recall for a picture scene and pt was 75% accurate. 19: Recalling Boxed Information for numbers: pt was 80% accurate. 19: Spaced Retrieval Method: pt was 4/12. 19: RBANS Immediate recall and pt improved score to an 83 up from 49 at time of eval. 19: Immediate recall of boxed information: pt was 85% accurate. 19: Immediate recall of shapes and figures (6) and pt was 75% accurate.  9/3/19: Immediate recall of shapes and figures (5) and pt improved to 80% accurate.  19: Pt was able to recall 5 related words with accuracy, 6 words, pt was 5/6. 19: 50-65 words: pt was 6/6 x 3 for immediate story recall. 19: 36-50 word paragraphs for immediate recall: pt was 7/8. Immediate recall for storytellin/3, using chunking: 3/3,   -HG     Patient’s memory skills will be enhanced as reported by patient by utilizing internal memory strategies to recall up to 3 pieces of information after a 5- minute delay  80%:;with cues  -HG     Status: Patient’s memory skills will be enhanced as reported by patient by utilizing internal memory strategies to recall up to 3 pieces of information after a 5- minute delay  Progressing as expected  -HG     Comments: Patient’s memory skills will be enhanced as reported by patient by utilizing internal memory strategies to recall up to 3 pieces of information after a 5- minute delay  10/18/19: Delayed recall for 5 un-related word from homework and pt was 5/5 x 3. Added word 10/15/19: RBANS Delayed recall score of 78 dropped slightly from an 81 at previous re-assessment. 10/1/19:Delayed recall of paragraph information and pt was 2/3 for topics. 19: Delayed recall of 12 groupable pictures and pt was 12/12 x 2. 19: Delayed recall of 16 groupable words and pt was 14/16.  Increased delay of 16 groupable words: pt was 10/16.  9/20/19: Delayed recall of words from SRT and pt was 3/12. 9/16/19: RBANS Delayed recall score of 81 down from 87 at time of eval.- remains in low average range.  9/3/19: Delayed recall of 8 un-related words and pt was 8/8 x 2. 8/30/19: Delayed recall of 7 un-related pictures, pt was 7/7 x 2. 8/23/19: Delayed recall of 6 un-related words: pt was 6/6 x 3- gave 7 for hmwk. 8/20/19: Delayed recall of 5 un-related words and pt was 5/5 x 2. 6 un-related words: pt was 4/6.  8/16/19: Delayed recall of 8 related words, pt was 8/8 x 2. 4 un-related words, pt was 4/4 x 2. 8/9/19: Delayed recall of 7 words: pt was 6/7 x 2, 7/7 x 2 8/6/19: Delayed recall of 5 related words and pt was 5/5, 6 related words: pt was 6/6. 7 related words using alphabetical order and chunking according to starting letter and pt was 6/7 with min cue.  7/26/19: Delayed recall of 4 related words and pt was 3/4 I'ly; 4/4 x 2. Increased to 5 related words and pt was 4/5 and then 5/5.   -HG     Patient’s memory skills will be enhanced as reported by patient by using external memory aides  80%:;with cues  -HG     Status: Patient’s memory skills will be enhanced as reported by patient by using external memory aides  Progressing as expected  -HG     Comments: Patient’s memory skills will be enhanced as reported by patient by using external memory aides  9/20/19: Pt is using planner 90% of the time with mild-moderate level of details. 9/6/19: Pt is making daily notes in his planner. 9/3/19: Pt using his calendar consistently however, pt not using the daily journaling section as much as SLP would like. 8/30/19: Pt required min cues to catch up on daily entries. 8/16/19: Pt continues to use his planner daily. 8/9/19: Pt with increased use of his planner with daily notes.  7/26/19: Fxnl use of planner: pt was writing down his TO DO LIST and not what he had done.   -HG        Attention/Orientation Goals     Patient will be able to execute all activities necessary to manage a home  Independently  -HG     Status: Patient will be able to execute all activities necessary to manage a home  Progressing as expected  -HG     Comments: Patient will be able to execute all activities necessary to manage a home  10/15/19: RBANS Attention score remained an 88 since previous assessment. 9/24/19: 3 step/6 componenet oral directions and pt was 9/12.   -HG     Patient will improve attention skills by sustaining focus in order to actively hold and manipulate information provided (e.g., sequencing auditorily presented number series in ascending or descending order)  90%:;with cues  -HG     Status: Patient will improve attention skills by sustaining focus in order to actively hold and manipulate information provided (e.g., sequencing auditorily presented number series in ascending or descending order)  Progressing as expected  -HG     Comments: Patient will improve attention skills by sustaining focus in order to actively hold and manipulate information provided (e.g., sequencing auditorily presented number series in ascending or descending order)  10/4/19: Mental Manipulation 4 word progression: pt was 60% accurate with repetition needed.  9/10/19: 4 word for category exclusion and pt was 90% accurate and for 5 words, pt was 60% accurate.  8/30/19: Mental Manipulation: pt was 80% accurate with re-read for ranking. 8/20/19: Mental Manipulation for worder order of alphabetical vs reversal and pt was 50% accurate.  8/6/19: Mental Manipulation for word placement and pt was 80% accurate for attribute and placement.   -HG     Patient will improve attention skills by alternating or shifting focus between two different tasks in order to complete both tasks  80%:;with cues  -HG     Status: Patient will improve attention skills by alternating or shifting focus between two different tasks in order to complete both tasks  Progressing as expected   -HG     Comments: Patient will improve attention skills by alternating or shifting focus between two different tasks in order to complete both tasks  19: While cards being turned over, pt had to tap table if the card played was lower than the previous one. Pt was 60% accurate. 9/10/19: Pt able to recall up to 7 digits. 19: Visual Scanning: Pt required mod cues for alternating between letters and was 80 % accurate.  19: Card game and pt was 80% accurate for new rules and watching increasing piles of cards for potential moves.  19: Sorting card by suit and  and pt was 70% accurate. 19: Sorting card by suit and  and pt required mod cues and repeated instructions and pt was 60% accurate.   -HG     Patient will improve attention skills by sustaining focus to high-level cognitive tasks in order to complete task  80%:;with cues  -HG     Status: Patient will improve attention skills by sustaining focus to high-level cognitive tasks in order to complete task  Progressing as expected  -HG     Comments: Patient will improve attention skills by sustaining focus to high-level cognitive tasks in order to complete task  19: ID of even numbers and pt was 90% accurate during a game of fast pace recognizition. 19: One Pile Card Game and pt was 75% accurate this date with min cues needed t/o the game. 19: One pile card game, pt requires less cues than previous sessions. 9/10/19: One Pile Card Game and pt required mod cues and pt was 70% accurate. 19: One Pile Card game: Pt required max cues in order to recall the rules of the game and to play accordingly. 19: One Pile Card Game: pt was 70% accurate with min cues.  19: One Pile Card Game: pt was 70% accurate. 19: Recognizing odd numbers in suit of cards while tapping the table and pt was 70% accurate.   -HG        Other Goals    Other Adult Goal- 1  Pt will complete thought organization tasks with 80% accuracy.  -HG      Status: Other Adult Goal- 1  Progressing as expected  -HG     Comments: Other Adult Goal- 1  10/18/19: Divergent abstract naming: pt was 100% accurate for 6 sets. Convergent naming: pt was 21/25. 10/15/19: RBANS Language score improved to a 92 from an 88 at last re-assessment. 10/1/19: Divergent naming: pt was 80% accurate. 9/16/19: RBANS Language score of 88 remains the same as time of initial eval. 8/30/19: Abstract divergent naming, pt was 8/10. 8/9/19: Divergent concrete category naming and pt was 15/15 x 2. 7/26/19: Divergent naming: pt was 10/10.   -HG     Other Adult Goal- 2  Pt will improve RBANS Total Score to at least 90 placing pt in the Average range.   -HG     Status: Other Adult Goal- 2  Progressing as expected  -HG     Comments: Other Adult Goal- 2  10/15/19: RBANS Total score remained at 84 (Low Average). 9/16/19: RBANS Total score improved to an 84- up from 79 at time of initial eval.   -HG     Other Adult Goal- 3  Patient will perform divergent naming tasks, generating 10 items per abstract category within 60 seconds, across two trials in order to improve semantic fluency.  -HG     Status: Other Adult Goal- 3  Progressing as expected  -HG     Comments: Other Adult Goal- 3  --  -HG     Other Adult Goal- 4  --  -HG     Status: Other Adult Goal- 4  --  -HG     Comments: Other Adult Goal- 4  --  -HG     Other Adult Goal- 5  --  -HG     Status: Other Adult Goal- 5  --  -HG     Comments: Other Adult Goal- 5  --  -HG     Other Adult Goal- 6  --  -HG     Status: Other Adult Goal- 6  --  -HG     Comments: Other Adult Goal- 6  --  -HG     Other Adult Goal- 7  --  -HG     Status: Other Adult Goal- 7  --  -HG     Comments: Other Adult Goal- 7  --  -HG     Other Adult Goal- 8  --  -HG     Status: Other Adult Goal- 8  --  -HG     Comments: Other Adult Goal- 8  --  -HG        SLP Time Calculation    SLP Goal Re-Cert Due Date  11/14/19  -HG       User Key  (r) = Recorded By, (t) = Taken By, (c) = Cosigned By     Initials Name Provider Type    DALJIT Anjali Serrano MS CCC-SLP Speech and Language Pathologist          OP SLP Education     Row Name 10/18/19 0900       Education    Education Comments  Education for use of [employer] and other Brain Games at home.   -      User Key  (r) = Recorded By, (t) = Taken By, (c) = Cosigned By    Initials Name Effective Dates    DALJIT Anjali Serrano MS CCC-SLP 06/22/15 -           OP SLP Assessment/Plan - 10/18/19 0900        SLP Plan    Plan Comments  Cont with Cog tx.    -      User Key  (r) = Recorded By, (t) = Taken By, (c) = Cosigned By    Initials Name Provider Type    DALJIT Anjali Serrano MS CCC-SLP Speech and Language Pathologist                 Time Calculation:   SLP Start Time: 0900    Therapy Charges for Today     Code Description Service Date Service Provider Modifiers Qty    [id number]  ST TREATMENT SPEECH 4 10/18/2019 Anjali Serrano MS CCC-SLP GN 1                   Anjali Serrano MS CCC-SLP  10/18/2019

## 2019-10-21 ENCOUNTER — HOSPITAL ENCOUNTER (OUTPATIENT)
Dept: SPEECH THERAPY | Facility: HOSPITAL | Age: 79
Setting detail: THERAPIES SERIES
Discharge: HOME | End: 2019-10-21

## 2019-10-21 DIAGNOSIS — R41.3 MEMORY LOSS: Primary | ICD-10-CM

## 2019-10-21 PROCEDURE — 92507 TX SP LANG VOICE COMM INDIV: CPT

## 2019-10-21 NOTE — THERAPY TREATMENT NOTE
Outpatient Speech Language Pathology   Adult Speech Language Cognitive Treatment Note  [facility]     Patient Name: Uche Polanco  : 1940  MRN: [id number]  Today's Date: 10/21/2019         Visit Date: 10/21/2019   Patient Active Problem List   Diagnosis   • Valvular heart disease   • TIA (transient ischemic attack)   • Severe obstructive sleep apnea   • Hypertension   • Hyperlipidemia   • Gout   • CAD (coronary artery disease)   • AV block   • REM sleep behavior disorder   • Memory loss          Visit Dx:    ICD-10-CM ICD-9-CM   1. Memory loss R41.3 780.93                         SLP OP Goals     Row Name 10/21/19 0900          Goal Type Needed    Goal Type Needed  Memory;Attention/Orientation;Other Adult Goals  -HG        Subjective Comments    Subjective Comments  Pt alert, cooperative, returned with 7 un-related words. Pt stated when handed his   -HG        Auditory Comprehension Goals    Patient will comprehend abstract and complex information presented in all social, avocational, or work settings  90%:;with cues  -HG     Status: Patient will comprehend abstract and complex information presented in all social, avocational, or work settings  New  -HG     Patient will improve comprehension of spoken language by following Multi-step directions  80%:;with cues  -HG     Status: Patient will improve comprehension of spoken language by following Multi-step directions  Progressing as expected  -HG     Comments: Patient will improve comprehension of spoken language by following Multi-step directions  19: 3 step/6 component questions: pt was 13/15 for written directions after studying for hmwk. 19:Gave for hmwk. 9/3/19: 2 step/4 component directions: pt was 70% accurate.   19: 2 step oral directions: pt was 100% accurate. 3 step oral directions: pt was 12/15. 19: 2 step oral directions: 70% accuracy.   -HG     Patient will demonstrate comprehension of spoken language by answering questions  about a multi paragraph length content  80%:;with cues  -HG     Status: Patient will demonstrate comprehension of spoken language by answering questions about a multi paragraph length content  Achieved  -HG     Comments: Patient will demonstrate comprehension of spoken language by answering questions about a multi paragraph length content  10/15/19: RBANS Paragraph recall for immediate: pt was 11/24 and for delayed, pt was 6/12. 10/1/19: 36-50 word paragraph: 11/12. 9/16/19: RBANS Paragraph recall and pt was 50% accurate for immediate.  9/13/19: 65-80 word paragraphs: pt was 100% accurate. 9/6/19: 50-65 word paragraphs: pt was 90% accurate.   -HG        Memory Goals    Patient and family will implement compensatory strategies to maximize patient’s Memory function so patient can continue to participate in daily activities  90%:;with cues  -HG     Status: Patient and family will implement compensatory strategies to maximize patient’s Memory function so patient can continue to participate in daily activities  Progressing as expected  -HG     Comments: Patient and family will implement compensatory strategies to maximize patient’s Memory function so patient can continue to participate in daily activities  9/20/19: Immediate recall of Memory Puzzle Book and pt was 60% accurate.    -HG     Patient will demonstrate improved ability to recall information by immediately recalling a series of words  80%:;related;after delay;with cues  -HG     Status: Patient will demonstrate improved ability to recall information by immediately recalling a series of words  Progressing as expected  -HG     Comments: Patient will demonstrate improved ability to recall information by immediately recalling a series of words  10/21/19: Recall from paragraph in an executive fxn task and pt was 50% accurate with mod cues. 10/18/19: Immediate recall for Memory Matches i pad game and pt was 70% accurate.  Fastest time memory matches and pt was  impulsive but completed in a little 60 secs. 10/15/19: RBANS Immediate recall score dropped to a 73 from an 83 at last re-assessment. 10/4/19: Memory Recall for a picture scene and pt was 75% accurate. 19: Recalling Boxed Information for numbers: pt was 80% accurate. 19: Spaced Retrieval Method: pt was 4/12. 19: RBANS Immediate recall and pt improved score to an 83 up from 49 at time of eval. 19: Immediate recall of boxed information: pt was 85% accurate. 19: Immediate recall of shapes and figures (6) and pt was 75% accurate.  9/3/19: Immediate recall of shapes and figures (5) and pt improved to 80% accurate.  19: Pt was able to recall 5 related words with accuracy, 6 words, pt was 5/6. 19: 50-65 words: pt was 6/6 x 3 for immediate story recall. 19: 36-50 word paragraphs for immediate recall: pt was 7/8. Immediate recall for storytellin/3, using chunking: 3/3,   -HG     Patient’s memory skills will be enhanced as reported by patient by utilizing internal memory strategies to recall up to 3 pieces of information after a 5- minute delay  80%:;with cues  -HG     Status: Patient’s memory skills will be enhanced as reported by patient by utilizing internal memory strategies to recall up to 3 pieces of information after a 5- minute delay  Progressing as expected  -HG     Comments: Patient’s memory skills will be enhanced as reported by patient by utilizing internal memory strategies to recall up to 3 pieces of information after a 5- minute delay  10/21/19: Delayed recall for 7 un-related words from hwmk and pt was 6/7. With review and increased delay, pt was 7/7.  10/18/19: Delayed recall for 5 un-related word from homework and pt was 5/5 x 3. Added word 10/15/19: RBANS Delayed recall score of 78 dropped slightly from an 81 at previous re-assessment. 10/1/19:Delayed recall of paragraph information and pt was 2/3 for topics. 19: Delayed recall of 12 groupable pictures and pt  was 12/12 x 2. 9/24/19: Delayed recall of 16 groupable words and pt was 14/16. Increased delay of 16 groupable words: pt was 10/16.  9/20/19: Delayed recall of words from SRT and pt was 3/12. 9/16/19: RBANS Delayed recall score of 81 down from 87 at time of eval.- remains in low average range.  9/3/19: Delayed recall of 8 un-related words and pt was 8/8 x 2. 8/30/19: Delayed recall of 7 un-related pictures, pt was 7/7 x 2. 8/23/19: Delayed recall of 6 un-related words: pt was 6/6 x 3- gave 7 for hmwk. 8/20/19: Delayed recall of 5 un-related words and pt was 5/5 x 2. 6 un-related words: pt was 4/6.  8/16/19: Delayed recall of 8 related words, pt was 8/8 x 2. 4 un-related words, pt was 4/4 x 2. 8/9/19: Delayed recall of 7 words: pt was 6/7 x 2, 7/7 x 2 8/6/19: Delayed recall of 5 related words and pt was 5/5, 6 related words: pt was 6/6. 7 related words using alphabetical order and chunking according to starting letter and pt was 6/7 with min cue.  7/26/19: Delayed recall of 4 related words and pt was 3/4 I'ly; 4/4 x 2. Increased to 5 related words and pt was 4/5 and then 5/5.   -HG     Patient’s memory skills will be enhanced as reported by patient by using external memory aides  80%:;with cues  -HG     Status: Patient’s memory skills will be enhanced as reported by patient by using external memory aides  Progressing as expected  -HG     Comments: Patient’s memory skills will be enhanced as reported by patient by using external memory aides  9/20/19: Pt is using planner 90% of the time with mild-moderate level of details. 9/6/19: Pt is making daily notes in his planner. 9/3/19: Pt using his calendar consistently however, pt not using the daily journaling section as much as SLP would like. 8/30/19: Pt required min cues to catch up on daily entries. 8/16/19: Pt continues to use his planner daily. 8/9/19: Pt with increased use of his planner with daily notes.  7/26/19: Fxnl use of planner: pt was writing down his TO DO  LIST and not what he had done.   -HG        Attention/Orientation Goals    Patient will be able to execute all activities necessary to manage a home  Independently  -HG     Status: Patient will be able to execute all activities necessary to manage a home  Progressing as expected  -HG     Comments: Patient will be able to execute all activities necessary to manage a home  10/15/19: RBANS Attention score remained an 88 since previous assessment. 9/24/19: 3 step/6 componenet oral directions and pt was 9/12.   -HG     Patient will improve attention skills by sustaining focus in order to actively hold and manipulate information provided (e.g., sequencing auditorily presented number series in ascending or descending order)  90%:;with cues  -HG     Status: Patient will improve attention skills by sustaining focus in order to actively hold and manipulate information provided (e.g., sequencing auditorily presented number series in ascending or descending order)  Progressing as expected  -HG     Comments: Patient will improve attention skills by sustaining focus in order to actively hold and manipulate information provided (e.g., sequencing auditorily presented number series in ascending or descending order)  10/4/19: Mental Manipulation 4 word progression: pt was 60% accurate with repetition needed.  9/10/19: 4 word for category exclusion and pt was 90% accurate and for 5 words, pt was 60% accurate.  8/30/19: Mental Manipulation: pt was 80% accurate with re-read for ranking. 8/20/19: Mental Manipulation for worder order of alphabetical vs reversal and pt was 50% accurate.  8/6/19: Mental Manipulation for word placement and pt was 80% accurate for attribute and placement.   -HG     Patient will improve attention skills by alternating or shifting focus between two different tasks in order to complete both tasks  80%:;with cues  -HG     Status: Patient will improve attention skills by alternating or shifting focus between two  different tasks in order to complete both tasks  Progressing as expected  -HG     Comments: Patient will improve attention skills by alternating or shifting focus between two different tasks in order to complete both tasks  10/21/19: While completing an executive function task, pt exhibited difficulty with attention and required mod cues and was 70% accurate. 19: While cards being turned over, pt had to tap table if the card played was lower than the previous one. Pt was 60% accurate. 9/10/19: Pt able to recall up to 7 digits. 19: Visual Scanning: Pt required mod cues for alternating between letters and was 80 % accurate.  19: Card game and pt was 80% accurate for new rules and watching increasing piles of cards for potential moves.  19: Sorting card by katie and REJI and pt was 70% accurate. 19: Sorting card by katie and  and pt required mod cues and repeated instructions and pt was 60% accurate.   -HG     Patient will improve attention skills by sustaining focus to high-level cognitive tasks in order to complete task  80%:;with cues  -HG     Status: Patient will improve attention skills by sustaining focus to high-level cognitive tasks in order to complete task  Progressing as expected  -HG     Comments: Patient will improve attention skills by sustaining focus to high-level cognitive tasks in order to complete task  19: ID of even numbers and pt was 90% accurate during a game of fast pace recognizition. 19: One Pile Card Game and pt was 75% accurate this date with min cues needed t/o the game. 19: One pile card game, pt requires less cues than previous sessions. 9/10/19: One Pile Card Game and pt required mod cues and pt was 70% accurate. 19: One Pile Card game: Pt required max cues in order to recall the rules of the game and to play accordingly. 19: One Pile Card Game: pt was 70% accurate with min cues.  19: One Pile Card Game: pt was 70% accurate. 19:  Recognizing odd numbers in suit of cards while tapping the table and pt was 70% accurate.   -HG        Other Goals    Other Adult Goal- 1  Pt will complete thought organization tasks with 80% accuracy.  -HG     Status: Other Adult Goal- 1  Progressing as expected  -HG     Comments: Other Adult Goal- 1  10/18/19: Divergent abstract naming: pt was 100% accurate for 6 sets. Convergent naming: pt was 21/25. 10/15/19: RBANS Language score improved to a 92 from an 88 at last re-assessment. 10/1/19: Divergent naming: pt was 80% accurate. 9/16/19: RBANS Language score of 88 remains the same as time of initial eval. 8/30/19: Abstract divergent naming, pt was 8/10. 8/9/19: Divergent concrete category naming and pt was 15/15 x 2. 7/26/19: Divergent naming: pt was 10/10.   -HG     Other Adult Goal- 2  Pt will improve RBANS Total Score to at least 90 placing pt in the Average range.   -HG     Status: Other Adult Goal- 2  Progressing as expected  -HG     Comments: Other Adult Goal- 2  10/15/19: RBANS Total score remained at 84 (Low Average). 9/16/19: RBANS Total score improved to an 84- up from 79 at time of initial eval.   -HG     Other Adult Goal- 3  --  -HG     Status: Other Adult Goal- 3  --  -HG     Comments: Other Adult Goal- 3  --  -HG     Other Adult Goal- 4  --  -HG     Status: Other Adult Goal- 4  --  -HG     Comments: Other Adult Goal- 4  --  -HG     Other Adult Goal- 5  --  -HG     Status: Other Adult Goal- 5  --  -HG     Comments: Other Adult Goal- 5  --  -HG     Other Adult Goal- 6  --  -HG     Status: Other Adult Goal- 6  --  -HG     Comments: Other Adult Goal- 6  --  -HG     Other Adult Goal- 7  --  -HG     Status: Other Adult Goal- 7  --  -HG     Comments: Other Adult Goal- 7  --  -HG     Other Adult Goal- 8  --  -HG     Status: Other Adult Goal- 8  --  -HG     Comments: Other Adult Goal- 8  --  -HG        SLP Time Calculation    SLP Goal Re-Cert Due Date  11/14/19  -HG       User Key  (r) = Recorded By, (t) =  Taken By, (c) = Cosigned By    Initials Name Provider Type    Anjali Cortes MS CCC-SLP Speech and Language Pathologist          OP SLP Education     Row Name 10/21/19 0900       Education    Education Comments  Homework for 8 un-related words.   -      User Key  (r) = Recorded By, (t) = Taken By, (c) = Cosigned By    Initials Name Effective Dates    Anjali Cortes MS CCC-SLP 06/22/15 -           OP SLP Assessment/Plan - 10/21/19 0900        SLP Plan    Plan Comments  Cont with Cog tx.    -      User Key  (r) = Recorded By, (t) = Taken By, (c) = Cosigned By    Initials Name Provider Type    Anjali Cortes MS CCC-SLP Speech and Language Pathologist                 Time Calculation:   SLP Start Time: 0900    Therapy Charges for Today     Code Description Service Date Service Provider Modifiers Qty    [id number]  ST TREATMENT SPEECH 4 10/21/2019 Anjali Serrano MS CCC-SLP GN 1                   Anjali Serrano MS CCC-SLP  10/21/2019   I agrree with the scribe documentation

## 2019-10-22 ENCOUNTER — OFFICE VISIT (OUTPATIENT)
Dept: CARDIOLOGY | Facility: CLINIC | Age: 79
End: 2019-10-22

## 2019-10-22 VITALS
BODY MASS INDEX: 25.84 KG/M2 | HEART RATE: 74 BPM | SYSTOLIC BLOOD PRESSURE: 132 MMHG | HEIGHT: 73 IN | DIASTOLIC BLOOD PRESSURE: 64 MMHG | OXYGEN SATURATION: 97 % | WEIGHT: 195 LBS

## 2019-10-22 DIAGNOSIS — I25.10 CORONARY ARTERY DISEASE INVOLVING NATIVE CORONARY ARTERY OF NATIVE HEART WITHOUT ANGINA PECTORIS: ICD-10-CM

## 2019-10-22 DIAGNOSIS — I38 VALVULAR HEART DISEASE: Primary | ICD-10-CM

## 2019-10-22 DIAGNOSIS — E78.2 MIXED HYPERLIPIDEMIA: ICD-10-CM

## 2019-10-22 DIAGNOSIS — I10 ESSENTIAL HYPERTENSION: ICD-10-CM

## 2019-10-22 PROCEDURE — 99214 OFFICE O/P EST MOD 30 MIN: CPT | Performed by: INTERNAL MEDICINE

## 2019-10-22 RX ORDER — AZELASTINE HCL 205.5 UG/1
SPRAY NASAL
Refills: 1 | Status: ON HOLD | COMMUNITY
Start: 2019-09-30 | End: 2020-07-22

## 2019-10-22 RX ORDER — DONEPEZIL HYDROCHLORIDE 10 MG/1
10 TABLET, FILM COATED ORAL DAILY
Refills: 3 | COMMUNITY
Start: 2019-09-16 | End: 2020-01-17

## 2019-10-22 RX ORDER — MECLIZINE HCL 12.5 MG/1
12.5 TABLET ORAL AS NEEDED
Refills: 0 | COMMUNITY
Start: 2019-10-10 | End: 2021-01-28

## 2019-10-22 NOTE — PROGRESS NOTES
[facility]  Office visit  Uche Polanco  1940  [phone]  VISIT DATE:  10/22/2019      PCP: Jd Tapia MD  [id number]  [facility] [address]    CC:  Chief Complaint   Patient presents with   • Coronary Artery Disease   • Dizziness       PROBLEM LIST:  1. Valvular heart disease:  a. History of mitral valve repair at [facility] 2005, records pending.  b. Echocardiogram, August 2014: EF normal at 50% to 55%, moderate AR, mild MR, left atrium at 3.8 cm.   c. Echo, January 2017: EF 50%, moderate aortic insufficiency  2. Recent TIA:  a. Status post Medtronic link loop recorder implantation, November 2014.  b. Recent interrogation revealing AV block, pauses, symptomatic with dizziness and lightheadedness.  3. Obstructive sleep apnea requiring CPAP.   4. Benign hypertension.   5. Gout.   6. Coronary artery disease by report, data insufficient.   7. REM disorder.   8. Dyslipidemia.   9. Family history of hypertension.   10. AV block, symptomatic with lightheadedness, status post pacemaker implantation, 04/17/2015, Dr. Robert Gerber: Medtronic Advisa  MRIA2 DR01.   11. Surgical history:   a. Hemorrhoidectomy.   b. Basal cell carcinoma removed.   c. Valvular repair (mitral valve).      Cardiac studies and procedures:  February 2019 echo  · Left ventricular systolic function is moderately decreased.  · Estimated EF appears to be in the range of 36 - 40%.  · The following left ventricular wall segments are dyskinetic: apical lateral, apical inferior, apical septal and apex. The following left ventricular wall segments are akinetic: apical anterior.  · Left ventricular wall thickness is consistent with mild concentric hypertrophy.  · Mild-to-moderate mitral valve stenosis is present  · Mild to moderate aortic valve regurgitation is present.    March 2019 myocardial perfusion imaging  · Left ventricular ejection fraction is normal (Calculated EF =  54%).  · Fixed defect consistent with moderate size apical infarct with extension into the inferior wall, associated moderate hypokinesis.  · No ischemia visualized  · Impressions are consistent with an intermediate risk study.    ASSESSMENT:   Diagnosis Plan   1. Valvular heart disease     2. Essential hypertension     3. Mixed hyperlipidemia     4. Coronary artery disease involving native coronary artery of native heart without angina pectoris       PLAN:  Cardiomyopathy: Unclear whether interval development of apical regional wall motion abnormality and perfusion defect is due to chronic RV apical pacing or interval development of obstructive coronary disease.  He is currently asymptomatic and stable, [place] heart class I.  Afterload is well controlled.  Cardiovascular risk factors are well controlled.  Repeating limited echo to assess EF and regional wall motion abnormalities.  If he has worsening regional wall motion abnormalities overall EGFR declining functional capacity will proceed with definitive evaluation of coronary anatomy with left heart catheterization.  Would then need to consider device upgrade to by V pacing.    Mitral valve prolapse status post mitral valve repair: Stable on exam today.  Continue routine clinical follow-up and intermittent echocardiographic surveillance    Aortic insufficiency: Mild-Moderate and stable.  Continue clinical follow-up and surveillance echocardiographic assessment.    History of TIA: No evidence of atrial arrhythmia.  Continue Plavix for stroke prophylaxis.  Continue statin.  Afterload controlled, goal less than 130/80 mmHg.    Hypertension: Goal less than 130/80 mmHg.  Continue current medical therapy.    Symptomatic high-grade AV block: Device dependent.  Currently asymptomatic, continue routine follow-up in device clinic.    Hyperlipidemia: Goal LDL less than 70, continue statin.      Subjective  Reports stable functional capacity.  Still exercising on a regular  "basis without difficulty.  Blood pressures running less than 140/90 mmHg.  Tolerating statin without myalgias.  Denies bleeding complications on Plavix, mild intermittent bruising.  Denies chest pain, palpitations or dyspnea on exertion.  compliant with medical therapy.  Recently had a squamous cell skin cancer removed from the left side of his face.    PHYSICAL EXAMINATION:  Vitals:    10/22/19 1436   BP: 132/64   BP Location: Left arm   Patient Position: Sitting   Pulse: 74   SpO2: 97%   Weight: 88.5 kg (195 lb)   Height: 185.4 cm (73\")     General Appearance:    Alert, cooperative, no distress, appears stated age   Head:    Normocephalic, without obvious abnormality, atraumatic   Eyes:    conjunctiva/corneas clear   Nose:   Nares normal, septum midline, mucosa normal, no drainage   Throat:   Lips, teeth and gums normal   Neck:   Supple, symmetrical, trachea midline, no carotid    bruit or JVD   Lungs:     Clear to auscultation bilaterally, respirations unlabored   Chest Wall:    No tenderness or deformity    Heart:    Regular rate and rhythm, S1 and S2 normal, no murmur, rub   or gallop, normal carotid impulse bilaterally without bruit.   Abdomen:     Soft, non-tender   Extremities:   Extremities normal, atraumatic, no cyanosis or edema   Pulses:   2+ and symmetric all extremities   Skin:   Skin color, texture, turgor normal, no rashes or lesions       Diagnostic Data:  Procedures  Lab Results   Component Value Date    CHLPL 190 08/22/2014    TRIG 189 (H) 08/22/2014    HDL 31 (L) 08/22/2014     Lab Results   Component Value Date    GLUCOSE 79 12/12/2016    BUN 16 12/12/2016    CREATININE 1.40 (H) 12/12/2016     12/12/2016    K 4.2 12/12/2016     12/12/2016    CO2 29.0 12/12/2016     Lab Results   Component Value Date    HGBA1C 5.6 04/08/2015     Lab Results   Component Value Date    WBC 7.58 12/12/2016    HGB 14.0 12/12/2016    HCT 42.6 12/12/2016     12/12/2016       Allergies  Allergies "   Allergen Reactions   • Sulfa Antibiotics        Current Medications    Current Outpatient Medications:   •  atorvastatin (LIPITOR) 20 MG tablet, TAKE 1 TABLET BY MOUTH DAILY, Disp: 90 tablet, Rfl: 2  •  azelastine (ASTEPRO) 0.15 % solution nasal spray, U 2 SPRAYS IEN BID, Disp: , Rfl: 1  •  clonazePAM (KlonoPIN) 1 MG tablet, Take 1 tablet by mouth every night at bedtime., Disp: 30 tablet, Rfl: 0  •  clopidogrel (PLAVIX) 75 MG tablet, Take 1 tablet by mouth Daily., Disp: 90 tablet, Rfl: 1  •  donepezil (ARICEPT) 10 MG tablet, Take 10 mg by mouth Daily., Disp: , Rfl: 3  •  meclizine (ANTIVERT) 12.5 MG tablet, TK 1 TO 2 TS PO Q 4 TO 6 H PRN DIZZINESS, Disp: , Rfl: 0  •  Multiple Vitamins-Minerals (PRESERVISION AREDS 2 PO), Take 1 tablet by mouth 2 (Two) Times a Day., Disp: , Rfl:   •  mupirocin (BACTROBAN) 2 % ointment, Apply 1 application topically to the appropriate area as directed As Needed., Disp: , Rfl:           ROS  Review of Systems   Cardiovascular: Negative for chest pain, dyspnea on exertion, irregular heartbeat and palpitations.   Respiratory: Negative for cough, shortness of breath and sleep disturbances due to breathing.    Neurological: Positive for light-headedness.         SOCIAL HX  Social History     Socioeconomic History   • Marital status:      Spouse name: Not on file   • Number of children: Not on file   • Years of education: Not on file   • Highest education level: Not on file   Tobacco Use   • Smoking status: Never Smoker   • Smokeless tobacco: Never Used   Substance and Sexual Activity   • Alcohol use: Yes     Alcohol/week: 0.6 - 2.4 oz     Types: 1 - 4 Glasses of wine per week     Comment: week with dinner   • Drug use: No   • Sexual activity: Defer       FAMILY HX  Family History   Problem Relation Age of Onset   • Ovarian cancer Mother    • Lymphoma Father    • Cancer Father    • Hypertension Other              Jd Limon III, MD, [facility]

## 2019-10-25 ENCOUNTER — HOSPITAL ENCOUNTER (OUTPATIENT)
Dept: SPEECH THERAPY | Facility: HOSPITAL | Age: 79
Setting detail: THERAPIES SERIES
Discharge: HOME | End: 2019-10-25

## 2019-10-25 DIAGNOSIS — R41.3 MEMORY LOSS: Primary | ICD-10-CM

## 2019-10-25 PROCEDURE — 92507 TX SP LANG VOICE COMM INDIV: CPT

## 2019-10-25 NOTE — THERAPY TREATMENT NOTE
Outpatient Speech Language Pathology   Adult Speech Language Cognitive Treatment Note  [facility]     Patient Name: Uche Polanco  : 1940  MRN: [id number]  Today's Date: 10/25/2019         Visit Date: 10/25/2019   Patient Active Problem List   Diagnosis   • Valvular heart disease   • TIA (transient ischemic attack)   • Severe obstructive sleep apnea   • Hypertension   • Hyperlipidemia   • Gout   • CAD (coronary artery disease)   • AV block   • REM sleep behavior disorder   • Memory loss          Visit Dx:    ICD-10-CM ICD-9-CM   1. Memory loss R41.3 780.93                         SLP OP Goals     Row Name 10/25/19 1300          Goal Type Needed    Goal Type Needed  Memory;Attention/Orientation;Other Adult Goals  -HG        Subjective Comments    Subjective Comments  Pt alert, cooperative, returned with homework.   -HG        Auditory Comprehension Goals    Patient will comprehend abstract and complex information presented in all social, avocational, or work settings  90%:;with cues  -HG     Status: Patient will comprehend abstract and complex information presented in all social, avocational, or work settings  New  -HG     Patient will improve comprehension of spoken language by following Multi-step directions  80%:;with cues  -HG     Status: Patient will improve comprehension of spoken language by following Multi-step directions  Progressing as expected  -HG     Comments: Patient will improve comprehension of spoken language by following Multi-step directions  10/25/19: 3 step/6 component questions and pt was 90% accurate completing while directions were being read. 19: 3 step/6 component questions: pt was 13/15 for written directions after studying for hmwk. 19:Gave for hmwk. 9/3/19: 2 step/4 component directions: pt was 70% accurate.   19: 2 step oral directions: pt was 100% accurate. 3 step oral directions: pt was 12/15. 19: 2 step oral directions: 70% accuracy.   -HG     Patient  will demonstrate comprehension of spoken language by answering questions about a multi paragraph length content  80%:;with cues  -HG     Status: Patient will demonstrate comprehension of spoken language by answering questions about a multi paragraph length content  Achieved  -HG     Comments: Patient will demonstrate comprehension of spoken language by answering questions about a multi paragraph length content  10/15/19: RBANS Paragraph recall for immediate: pt was 11/24 and for delayed, pt was 6/12. 10/1/19: 36-50 word paragraph: 11/12. 9/16/19: RBANS Paragraph recall and pt was 50% accurate for immediate.  9/13/19: 65-80 word paragraphs: pt was 100% accurate. 9/6/19: 50-65 word paragraphs: pt was 90% accurate.   -HG        Memory Goals    Patient and family will implement compensatory strategies to maximize patient’s Memory function so patient can continue to participate in daily activities  90%:;with cues  -HG     Status: Patient and family will implement compensatory strategies to maximize patient’s Memory function so patient can continue to participate in daily activities  Progressing as expected  -HG     Comments: Patient and family will implement compensatory strategies to maximize patient’s Memory function so patient can continue to participate in daily activities  9/20/19: Immediate recall of Memory Puzzle Book and pt was 60% accurate.    -HG     Patient will demonstrate improved ability to recall information by immediately recalling a series of words  80%:;related;after delay;with cues  -HG     Status: Patient will demonstrate improved ability to recall information by immediately recalling a series of words  Progressing as expected  -HG     Comments: Patient will demonstrate improved ability to recall information by immediately recalling a series of words  10/21/19: Recall from paragraph in an executive fxn task and pt was 50% accurate with mod cues. 10/18/19: Immediate recall for Memory Matches i pad game  and pt was 70% accurate.  Fastest time memory matches and pt was impulsive but completed in a little 60 secs. 10/15/19: RBANS Immediate recall score dropped to a 73 from an 83 at last re-assessment. 10/4/19: Memory Recall for a picture scene and pt was 75% accurate. 19: Recalling Boxed Information for numbers: pt was 80% accurate. 19: Spaced Retrieval Method: pt was 4/12. 19: RBANS Immediate recall and pt improved score to an 83 up from 49 at time of eval. 19: Immediate recall of boxed information: pt was 85% accurate. 19: Immediate recall of shapes and figures (6) and pt was 75% accurate.  9/3/19: Immediate recall of shapes and figures (5) and pt improved to 80% accurate.  19: Pt was able to recall 5 related words with accuracy, 6 words, pt was 5/6. 19: 50-65 words: pt was 6/6 x 3 for immediate story recall. 19: 36-50 word paragraphs for immediate recall: pt was 7/8. Immediate recall for storytellin/3, using chunking: 3/3,   -HG     Patient’s memory skills will be enhanced as reported by patient by utilizing internal memory strategies to recall up to 3 pieces of information after a 5- minute delay  80%:;with cues  -HG     Status: Patient’s memory skills will be enhanced as reported by patient by utilizing internal memory strategies to recall up to 3 pieces of information after a 5- minute delay  Progressing as expected  -HG     Comments: Patient’s memory skills will be enhanced as reported by patient by utilizing internal memory strategies to recall up to 3 pieces of information after a 5- minute delay  10/25/19: Delayed recall of 4 un-related words, pt was 4/4 with min cues, Independently got it 4/4 x 2.  10/21/19: Delayed recall for 7 un-related words from hwmk and pt was 6/7. With review and increased delay, pt was 7/7.  10/18/19: Delayed recall for 5 un-related word from homework and pt was 5/5 x 3. Added word 10/15/19: RBANS Delayed recall score of 78 dropped slightly  from an 81 at previous re-assessment. 10/1/19:Delayed recall of paragraph information and pt was 2/3 for topics. 9/27/19: Delayed recall of 12 groupable pictures and pt was 12/12 x 2. 9/24/19: Delayed recall of 16 groupable words and pt was 14/16. Increased delay of 16 groupable words: pt was 10/16.  9/20/19: Delayed recall of words from SRT and pt was 3/12. 9/16/19: RBANS Delayed recall score of 81 down from 87 at time of eval.- remains in low average range.  9/3/19: Delayed recall of 8 un-related words and pt was 8/8 x 2. 8/30/19: Delayed recall of 7 un-related pictures, pt was 7/7 x 2. 8/23/19: Delayed recall of 6 un-related words: pt was 6/6 x 3- gave 7 for hmwk. 8/20/19: Delayed recall of 5 un-related words and pt was 5/5 x 2. 6 un-related words: pt was 4/6.  8/16/19: Delayed recall of 8 related words, pt was 8/8 x 2. 4 un-related words, pt was 4/4 x 2. 8/9/19: Delayed recall of 7 words: pt was 6/7 x 2, 7/7 x 2 8/6/19: Delayed recall of 5 related words and pt was 5/5, 6 related words: pt was 6/6. 7 related words using alphabetical order and chunking according to starting letter and pt was 6/7 with min cue.  7/26/19: Delayed recall of 4 related words and pt was 3/4 I'ly; 4/4 x 2. Increased to 5 related words and pt was 4/5 and then 5/5.   -HG     Patient’s memory skills will be enhanced as reported by patient by using external memory aides  80%:;with cues  -HG     Status: Patient’s memory skills will be enhanced as reported by patient by using external memory aides  Progressing as expected  -HG     Comments: Patient’s memory skills will be enhanced as reported by patient by using external memory aides  10/25/19: Using planner for calendar with 100% accurate. Use of daily entries, pt was 80% accurate. 9/20/19: Pt is using planner 90% of the time with mild-moderate level of details. 9/6/19: Pt is making daily notes in his planner. 9/3/19: Pt using his calendar consistently however, pt not using the daily  journaling section as much as SLP would like. 8/30/19: Pt required min cues to catch up on daily entries. 8/16/19: Pt continues to use his planner daily. 8/9/19: Pt with increased use of his planner with daily notes.  7/26/19: Fxnl use of planner: pt was writing down his TO DO LIST and not what he had done.   -HG        Attention/Orientation Goals    Patient will be able to execute all activities necessary to manage a home  Independently  -HG     Status: Patient will be able to execute all activities necessary to manage a home  Progressing as expected  -HG     Comments: Patient will be able to execute all activities necessary to manage a home  10/15/19: RBANS Attention score remained an 88 since previous assessment. 9/24/19: 3 step/6 componenet oral directions and pt was 9/12.   -HG     Patient will improve attention skills by sustaining focus in order to actively hold and manipulate information provided (e.g., sequencing auditorily presented number series in ascending or descending order)  90%:;with cues  -HG     Status: Patient will improve attention skills by sustaining focus in order to actively hold and manipulate information provided (e.g., sequencing auditorily presented number series in ascending or descending order)  Progressing as expected  -HG     Comments: Patient will improve attention skills by sustaining focus in order to actively hold and manipulate information provided (e.g., sequencing auditorily presented number series in ascending or descending order)  10/4/19: Mental Manipulation 4 word progression: pt was 60% accurate with repetition needed.  9/10/19: 4 word for category exclusion and pt was 90% accurate and for 5 words, pt was 60% accurate.  8/30/19: Mental Manipulation: pt was 80% accurate with re-read for ranking. 8/20/19: Mental Manipulation for worder order of alphabetical vs reversal and pt was 50% accurate.  8/6/19: Mental Manipulation for word placement and pt was 80% accurate for  attribute and placement.   -HG     Patient will improve attention skills by alternating or shifting focus between two different tasks in order to complete both tasks  80%:;with cues  -HG     Status: Patient will improve attention skills by alternating or shifting focus between two different tasks in order to complete both tasks  Progressing as expected  -HG     Comments: Patient will improve attention skills by alternating or shifting focus between two different tasks in order to complete both tasks  10/21/19: While completing an executive function task, pt exhibited difficulty with attention and required mod cues and was 70% accurate. 19: While cards being turned over, pt had to tap table if the card played was lower than the previous one. Pt was 60% accurate. 9/10/19: Pt able to recall up to 7 digits. 19: Visual Scanning: Pt required mod cues for alternating between letters and was 80 % accurate.  19: Card game and pt was 80% accurate for new rules and watching increasing piles of cards for potential moves.  19: Sorting card by suit and  and pt was 70% accurate. 19: Sorting card by suit and  and pt required mod cues and repeated instructions and pt was 60% accurate.   -HG     Patient will improve attention skills by sustaining focus to high-level cognitive tasks in order to complete task  80%:;with cues  -HG     Status: Patient will improve attention skills by sustaining focus to high-level cognitive tasks in order to complete task  Progressing as expected  -HG     Comments: Patient will improve attention skills by sustaining focus to high-level cognitive tasks in order to complete task  10/25/19: Sustained Attention on APT and pt was 30/30 and for Complex Sustained Attention and pt was 24/30- above average.  19: ID of even numbers and pt was 90% accurate during a game of fast pace recognizition. 19: One Pile Card Game and pt was 75% accurate this date with min cues needed  t/o the game. 9/13/19: One pile card game, pt requires less cues than previous sessions. 9/10/19: One Pile Card Game and pt required mod cues and pt was 70% accurate. 8/30/19: One Pile Card game: Pt required max cues in order to recall the rules of the game and to play accordingly. 8/20/19: One Pile Card Game: pt was 70% accurate with min cues.  8/9/19: One Pile Card Game: pt was 70% accurate. 7/26/19: Recognizing odd numbers in suit of cards while tapping the table and pt was 70% accurate.   -HG        Other Goals    Other Adult Goal- 1  Pt will complete thought organization tasks with 80% accuracy.  -HG     Status: Other Adult Goal- 1  Progressing as expected  -HG     Comments: Other Adult Goal- 1  10/18/19: Divergent abstract naming: pt was 100% accurate for 6 sets. Convergent naming: pt was 21/25. 10/15/19: RBANS Language score improved to a 92 from an 88 at last re-assessment. 10/1/19: Divergent naming: pt was 80% accurate. 9/16/19: RBANS Language score of 88 remains the same as time of initial eval. 8/30/19: Abstract divergent naming, pt was 8/10. 8/9/19: Divergent concrete category naming and pt was 15/15 x 2. 7/26/19: Divergent naming: pt was 10/10.   -HG     Other Adult Goal- 2  Pt will improve RBANS Total Score to at least 90 placing pt in the Average range.   -HG     Status: Other Adult Goal- 2  Progressing as expected  -HG     Comments: Other Adult Goal- 2  10/15/19: RBANS Total score remained at 84 (Low Average). 9/16/19: RBANS Total score improved to an 84- up from 79 at time of initial eval.   -HG     Other Adult Goal- 3  Patient will perform divergent naming tasks, generating 10 items per abstract category within 60 seconds, across two trials in order to improve semantic fluency.  -HG     Status: Other Adult Goal- 3  Progressing as expected  -HG     Comments: Other Adult Goal- 3  4/14/17: 10/10 for abstract categories. 3/24/17: 11/10 for concrete categories.   Pt able to name 16 items in a sixty  second period of time on the RBANS. 3/17/17: For abstract category naming, pt was 100% accurate. Pt was 10/15 for abstract items. 2/23: Pt was on average 10-13/15. 2/27: Abstract items: pt was at least 10 items with 2 minute time frame allowed. 3/3: Pt was 15/15 for abstract categories  3/6/17: For abstract naming in one minute: T1:10/10 T2:8/10  -HG     Other Adult Goal- 4  Patient will perform executive functioning task with 70% accuracy when provided prompts only in order to improve strategic thinking.  -HG     Status: Other Adult Goal- 4  Progressing as expected  -HG     Comments: Other Adult Goal- 4  4/14/17: Pt currently selling a house, managing finances, moving arrangments, traveling out of town, booking airfares, etc. 3/27/17: For recall of 16 objects and w/o review, pt was 50% accurate and with review ? 3/24/17: visuospatial recall, pt performed in the average range on the RBANS. 3/17/17: Pt given a task that involved visual recall as well as reasoning and problem solving and pt was ?Divided atten task while asked to perform a prospective memory task. Pt required mod cues this date. 2/23: Pt was 50-75% accurate this date with a mental flexibilty task and this was given a homework. 3/3: Card game played that was taught several weeks ago and pt was 70% accurate.  -HG     Other Adult Goal- 5  Patient will improve cognition as evidenced by STGs met. (LTG)  -HG     Status: Other Adult Goal- 5  Progressing as expected  -HG     Comments: Other Adult Goal- 5   4/14/17: For delayed storytelling, pt was 80% accuracy with no review strategies. 3/27/17: For delayed recall of names and pictures, pt was 80% accuracy.  3/24/17: For story re-telling, immediate and delayed, pt was borderline. 3/17/17: Pt had to recall 16 items with no review from almost 2 weeks ago and pt was 80% accurate. Pt using visualization and grouping in order to recall verbally presented information. 2/27: For visual recall of pictures, pt was 80%  accurate.   -HG     Other Adult Goal- 6  Patient will perform alternating attention task with 80% accuracy across two trials in order to enhance mental flexibility and attention.  -HG     Status: Other Adult Goal- 6  Progressing as expected  -HG     Comments: Other Adult Goal- 6  3/27/17: For mental manipulation of three words forward and three words backward: pt was 100% accurate with 3 words and 80% accurate with four words. 3/24/17: For attention on the RBANS, pt was in the average range. Card game and pt was 80% accurate. 2/23: For answering questions given a list of four words, pt was 80% accurate. 2/27: For mental manipulation with numbers, pt was 100% accurate. 3/3: While playing cards and naming items from a delayed recall list activity, pt was 75% accurate. 3/6/17: While corssing out numbers, pt had to switch with background music provided: pt was 90% accurate.   -HG     Other Adult Goal- 7  Patient will perform divided attention task with 70% accuracy across two trials independently in order to improve multitasking abilities.  -HG     Status: Other Adult Goal- 7  Progressing as expected  -HG     Comments: Other Adult Goal- 7  4/14/17: For high level mult-tasking, pt was 80-90% accurate independently. 3/17/17: Pt required min cues for completing exec fxn task in the correct order. Pt to complete a maze while listening to music and pt was 50% accurate this date. 2/16: More of a prospective memory task thrown in the midst of a cognitive task and pt was 90% accurate.  2/23: For divided attention, pt was 70% accurate with min verbal cues. 3/3:While listening to music and recalling a word list, pt was 70% accurate.   -HG     Other Adult Goal- 8  Pt will recall short paragraph material presentally orally using strategies with 90% accuracy in order to improve immediate recall skills.  -HG     Status: Other Adult Goal- 8  New  -HG     Comments: Other Adult Goal- 8   3/27/17: For 22-36 word paragraphs, 85% accurate.  For 36-50 words, 90% accurate.  pt was 3/6/17: Using a who what when where why strategy, pt was 80% accurate; without, pt was 60% accurate.   -        SLP Time Calculation    SLP Goal Re-Cert Due Date  11/14/19  -       User Key  (r) = Recorded By, (t) = Taken By, (c) = Cosigned By    Initials Name Provider Type    Anjali Cortes MS CCC-SLP Speech and Language Pathologist          OP SLP Education     Row Name 10/25/19 1300       Education    Education Comments  Homework for new 6 un-related words.   -      User Key  (r) = Recorded By, (t) = Taken By, (c) = Cosigned By    Initials Name Effective Dates    Anjali Cortes MS CCC-SLP 06/22/15 -           OP SLP Assessment/Plan - 10/25/19 1300        SLP Plan    Plan Comments  Cont with Cog tx.    -      User Key  (r) = Recorded By, (t) = Taken By, (c) = Cosigned By    Initials Name Provider Type    Anjali Cortes MS CCC-SLP Speech and Language Pathologist                 Time Calculation:   SLP Start Time: 1300    Therapy Charges for Today     Code Description Service Date Service Provider Modifiers Qty    [id number] HC ST TREATMENT SPEECH 5 10/25/2019 Anjali Serrano MS CCC-SLP GN 1                   Anjali Serrano MS CCC-JOSSE  10/25/2019

## 2019-11-01 ENCOUNTER — HOSPITAL ENCOUNTER (OUTPATIENT)
Dept: SPEECH THERAPY | Facility: HOSPITAL | Age: 79
Setting detail: THERAPIES SERIES
Discharge: HOME | End: 2019-11-01

## 2019-11-01 DIAGNOSIS — R41.3 MEMORY LOSS: Primary | ICD-10-CM

## 2019-11-01 PROCEDURE — 92507 TX SP LANG VOICE COMM INDIV: CPT

## 2019-11-01 NOTE — THERAPY TREATMENT NOTE
Outpatient Speech Language Pathology   Adult Speech Language Cognitive Treatment Note  [facility]     Patient Name: Uche Polanco  : 1940  MRN: [id number]  Today's Date: 2019         Visit Date: 2019   Patient Active Problem List   Diagnosis   • Valvular heart disease   • TIA (transient ischemic attack)   • Severe obstructive sleep apnea   • Hypertension   • Hyperlipidemia   • Gout   • CAD (coronary artery disease)   • AV block   • REM sleep behavior disorder   • Memory loss          Visit Dx:    ICD-10-CM ICD-9-CM   1. Memory loss R41.3 780.93                         SLP OP Goals     Row Name 19 0800          Goal Type Needed    Goal Type Needed  Memory;Attention/Orientation;Other Adult Goals  -HG        Subjective Comments    Subjective Comments  Pt alert, cooperative, left his planner at home due to running late this date.   -HG        Auditory Comprehension Goals    Patient will comprehend abstract and complex information presented in all social, avocational, or work settings  90%:;with cues  -HG     Status: Patient will comprehend abstract and complex information presented in all social, avocational, or work settings  New  -HG     Patient will improve comprehension of spoken language by following Multi-step directions  80%:;with cues  -HG     Status: Patient will improve comprehension of spoken language by following Multi-step directions  Progressing as expected  -HG     Comments: Patient will improve comprehension of spoken language by following Multi-step directions  10/25/19: 3 step/6 component questions and pt was 90% accurate completing while directions were being read. 19: 3 step/6 component questions: pt was 13/15 for written directions after studying for hmwk. 19:Gave for hmwk. 9/3/19: 2 step/4 component directions: pt was 70% accurate.   19: 2 step oral directions: pt was 100% accurate. 3 step oral directions: pt was 12/15. 19: 2 step oral directions: 70%  accuracy.   -HG     Patient will demonstrate comprehension of spoken language by answering questions about a multi paragraph length content  80%:;with cues  -HG     Status: Patient will demonstrate comprehension of spoken language by answering questions about a multi paragraph length content  Achieved  -HG     Comments: Patient will demonstrate comprehension of spoken language by answering questions about a multi paragraph length content  10/15/19: RBANS Paragraph recall for immediate: pt was 11/24 and for delayed, pt was 6/12. 10/1/19: 36-50 word paragraph: 11/12. 9/16/19: RBANS Paragraph recall and pt was 50% accurate for immediate.  9/13/19: 65-80 word paragraphs: pt was 100% accurate. 9/6/19: 50-65 word paragraphs: pt was 90% accurate.   -HG        Memory Goals    Patient and family will implement compensatory strategies to maximize patient’s Memory function so patient can continue to participate in daily activities  90%:;with cues  -HG     Status: Patient and family will implement compensatory strategies to maximize patient’s Memory function so patient can continue to participate in daily activities  Progressing as expected  -HG     Comments: Patient and family will implement compensatory strategies to maximize patient’s Memory function so patient can continue to participate in daily activities  9/20/19: Immediate recall of Memory Puzzle Book and pt was 60% accurate.    -HG     Patient will demonstrate improved ability to recall information by immediately recalling a series of words  80%:;related;after delay;with cues  -HG     Status: Patient will demonstrate improved ability to recall information by immediately recalling a series of words  Progressing as expected  -HG     Comments: Patient will demonstrate improved ability to recall information by immediately recalling a series of words  10/21/19: Recall from paragraph in an executive fxn task and pt was 50% accurate with mod cues. 10/18/19: Immediate recall  for Memory Matches i pad game and pt was 70% accurate.  Fastest time memory matches and pt was impulsive but completed in a little 60 secs. 10/15/19: RBANS Immediate recall score dropped to a 73 from an 83 at last re-assessment. 10/4/19: Memory Recall for a picture scene and pt was 75% accurate. 19: Recalling Boxed Information for numbers: pt was 80% accurate. 19: Spaced Retrieval Method: pt was 4/12. 19: RBANS Immediate recall and pt improved score to an 83 up from 49 at time of eval. 19: Immediate recall of boxed information: pt was 85% accurate. 19: Immediate recall of shapes and figures (6) and pt was 75% accurate.  9/3/19: Immediate recall of shapes and figures (5) and pt improved to 80% accurate.  19: Pt was able to recall 5 related words with accuracy, 6 words, pt was 5/6. 19: 50-65 words: pt was 6/6 x 3 for immediate story recall. 19: 36-50 word paragraphs for immediate recall: pt was 7/8. Immediate recall for storytellin/3, using chunking: 3/3,   -HG     Patient’s memory skills will be enhanced as reported by patient by utilizing internal memory strategies to recall up to 3 pieces of information after a 5- minute delay  80%:;with cues  -HG     Status: Patient’s memory skills will be enhanced as reported by patient by utilizing internal memory strategies to recall up to 3 pieces of information after a 5- minute delay  Progressing as expected  -HG     Comments: Patient’s memory skills will be enhanced as reported by patient by utilizing internal memory strategies to recall up to 3 pieces of information after a 5- minute delay  19: Delayed recall of 6 un-related words: pt was 6/6 and added 2 more words and pt was 6/7 and then 6/8.  10/25/19: Delayed recall of 4 un-related words, pt was 4/4 with min cues, Independently got it 4/4 x 2.  10/21/19: Delayed recall for 7 un-related words from [facility] and pt was 6/7. With review and increased delay, pt was 7/7.  10/18/19:  Delayed recall for 5 un-related word from homework and pt was 5/5 x 3. Added word 10/15/19: RBANS Delayed recall score of 78 dropped slightly from an 81 at previous re-assessment. 10/1/19:Delayed recall of paragraph information and pt was 2/3 for topics. 9/27/19: Delayed recall of 12 groupable pictures and pt was 12/12 x 2. 9/24/19: Delayed recall of 16 groupable words and pt was 14/16. Increased delay of 16 groupable words: pt was 10/16.  9/20/19: Delayed recall of words from SRT and pt was 3/12. 9/16/19: RBANS Delayed recall score of 81 down from 87 at time of eval.- remains in low average range.  9/3/19: Delayed recall of 8 un-related words and pt was 8/8 x 2. 8/30/19: Delayed recall of 7 un-related pictures, pt was 7/7 x 2. 8/23/19: Delayed recall of 6 un-related words: pt was 6/6 x 3- gave 7 for hmwk. 8/20/19: Delayed recall of 5 un-related words and pt was 5/5 x 2. 6 un-related words: pt was 4/6.  8/16/19: Delayed recall of 8 related words, pt was 8/8 x 2. 4 un-related words, pt was 4/4 x 2. 8/9/19: Delayed recall of 7 words: pt was 6/7 x 2, 7/7 x 2 8/6/19: Delayed recall of 5 related words and pt was 5/5, 6 related words: pt was 6/6. 7 related words using alphabetical order and chunking according to starting letter and pt was 6/7 with min cue.  7/26/19: Delayed recall of 4 related words and pt was 3/4 I'ly; 4/4 x 2. Increased to 5 related words and pt was 4/5 and then 5/5.   -HG     Patient’s memory skills will be enhanced as reported by patient by using external memory aides  80%:;with cues  -HG     Status: Patient’s memory skills will be enhanced as reported by patient by using external memory aides  Progressing as expected  -HG     Comments: Patient’s memory skills will be enhanced as reported by patient by using external memory aides  10/25/19: Using planner for calendar with 100% accurate. Use of daily entries, pt was 80% accurate. 9/20/19: Pt is using planner 90% of the time with mild-moderate level of  details. 9/6/19: Pt is making daily notes in his planner. 9/3/19: Pt using his calendar consistently however, pt not using the daily journaling section as much as SLP would like. 8/30/19: Pt required min cues to catch up on daily entries. 8/16/19: Pt continues to use his planner daily. 8/9/19: Pt with increased use of his planner with daily notes.  7/26/19: Fxnl use of planner: pt was writing down his TO DO LIST and not what he had done.   -HG        Attention/Orientation Goals    Patient will be able to execute all activities necessary to manage a home  Independently  -HG     Status: Patient will be able to execute all activities necessary to manage a home  Progressing as expected  -HG     Comments: Patient will be able to execute all activities necessary to manage a home  10/15/19: RBANS Attention score remained an 88 since previous assessment. 9/24/19: 3 step/6 componenet oral directions and pt was 9/12.   -HG     Patient will improve attention skills by sustaining focus in order to actively hold and manipulate information provided (e.g., sequencing auditorily presented number series in ascending or descending order)  90%:;with cues  -HG     Status: Patient will improve attention skills by sustaining focus in order to actively hold and manipulate information provided (e.g., sequencing auditorily presented number series in ascending or descending order)  Progressing as expected  -HG     Comments: Patient will improve attention skills by sustaining focus in order to actively hold and manipulate information provided (e.g., sequencing auditorily presented number series in ascending or descending order)  11/1/19: Alternating Attention: pt was Above Average on the APT. 10/4/19: Mental Manipulation 4 word progression: pt was 60% accurate with repetition needed.  9/10/19: 4 word for category exclusion and pt was 90% accurate and for 5 words, pt was 60% accurate.  8/30/19: Mental Manipulation: pt was 80% accurate with  re-read for ranking. 19: Mental Manipulation for worder order of alphabetical vs reversal and pt was 50% accurate.  19: Mental Manipulation for word placement and pt was 80% accurate for attribute and placement.   -HG     Patient will improve attention skills by alternating or shifting focus between two different tasks in order to complete both tasks  80%:;with cues  -HG     Status: Patient will improve attention skills by alternating or shifting focus between two different tasks in order to complete both tasks  Progressing as expected  -HG     Comments: Patient will improve attention skills by alternating or shifting focus between two different tasks in order to complete both tasks  19: Divided Attention APT score was Below Average. 10/21/19: While completing an executive function task, pt exhibited difficulty with attention and required mod cues and was 70% accurate. 19: While cards being turned over, pt had to tap table if the card played was lower than the previous one. Pt was 60% accurate. 9/10/19: Pt able to recall up to 7 digits. 19: Visual Scanning: Pt required mod cues for alternating between letters and was 80 % accurate.  19: Card game and pt was 80% accurate for new rules and watching increasing piles of cards for potential moves.  19: Sorting card by suit and  and pt was 70% accurate. 19: Sorting card by suit and  and pt required mod cues and repeated instructions and pt was 60% accurate.   -HG     Patient will improve attention skills by sustaining focus to high-level cognitive tasks in order to complete task  80%:;with cues  -HG     Status: Patient will improve attention skills by sustaining focus to high-level cognitive tasks in order to complete task  Progressing as expected  -HG     Comments: Patient will improve attention skills by sustaining focus to high-level cognitive tasks in order to complete task  10/25/19: Sustained Attention on APT and pt was  30/30 and for Complex Sustained Attention and pt was 24/30- above average.  9/27/19: ID of even numbers and pt was 90% accurate during a game of fast pace recognizition. 9/20/19: One Pile Card Game and pt was 75% accurate this date with min cues needed t/o the game. 9/13/19: One pile card game, pt requires less cues than previous sessions. 9/10/19: One Pile Card Game and pt required mod cues and pt was 70% accurate. 8/30/19: One Pile Card game: Pt required max cues in order to recall the rules of the game and to play accordingly. 8/20/19: One Pile Card Game: pt was 70% accurate with min cues.  8/9/19: One Pile Card Game: pt was 70% accurate. 7/26/19: Recognizing odd numbers in suit of cards while tapping the table and pt was 70% accurate.   -HG        Other Goals    Other Adult Goal- 1  Pt will complete thought organization tasks with 80% accuracy.  -HG     Status: Other Adult Goal- 1  Progressing as expected  -HG     Comments: Other Adult Goal- 1  11/1/19: Divergent abstract naming: pt was 10/10 x 2. 10/18/19: Divergent abstract naming: pt was 100% accurate for 6 sets. Convergent naming: pt was 21/25. 10/15/19: RBANS Language score improved to a 92 from an 88 at last re-assessment. 10/1/19: Divergent naming: pt was 80% accurate. 9/16/19: RBANS Language score of 88 remains the same as time of initial eval. 8/30/19: Abstract divergent naming, pt was 8/10. 8/9/19: Divergent concrete category naming and pt was 15/15 x 2. 7/26/19: Divergent naming: pt was 10/10.   -HG     Other Adult Goal- 2  Pt will improve RBANS Total Score to at least 90 placing pt in the Average range.   -HG     Status: Other Adult Goal- 2  Progressing as expected  -HG     Comments: Other Adult Goal- 2  10/15/19: RBANS Total score remained at 84 (Low Average). 9/16/19: RBANS Total score improved to an 84- up from 79 at time of initial eval.   -HG     Other Adult Goal- 3  Patient will perform divergent naming tasks, generating 10 items per abstract  category within 60 seconds, across two trials in order to improve semantic fluency.  -HG     Status: Other Adult Goal- 3  Progressing as expected  -HG     Comments: Other Adult Goal- 3  --  -HG     Other Adult Goal- 4  --  -HG     Status: Other Adult Goal- 4  --  -HG     Comments: Other Adult Goal- 4  --  -HG     Other Adult Goal- 5  --  -HG     Status: Other Adult Goal- 5  --  -HG     Comments: Other Adult Goal- 5  --  -HG     Other Adult Goal- 6  --  -HG     Status: Other Adult Goal- 6  --  -HG     Comments: Other Adult Goal- 6  --  -HG     Other Adult Goal- 7  --  -HG     Status: Other Adult Goal- 7  --  -HG     Comments: Other Adult Goal- 7  --  -HG     Other Adult Goal- 8  --  -HG     Status: Other Adult Goal- 8  --  -HG     Comments: Other Adult Goal- 8  --  -HG        SLP Time Calculation    SLP Goal Re-Cert Due Date  11/14/19  -HG       User Key  (r) = Recorded By, (t) = Taken By, (c) = Cosigned By    Initials Name Provider Type    Anjali Cortes MS CCC-SLP Speech and Language Pathologist          OP SLP Education     Row Name 11/01/19 0800       Education    Education Comments  Hwmk for 8 un-related words.   -HG      User Key  (r) = Recorded By, (t) = Taken By, (c) = Cosigned By    Initials Name Effective Dates    Anjali Cortes MS CCC-SLP 06/22/15 -           OP SLP Assessment/Plan - 11/01/19 0800        SLP Plan    Plan Comments  Cont with Cog tx.    -HG      User Key  (r) = Recorded By, (t) = Taken By, (c) = Cosigned By    Initials Name Provider Type    Anjali Cortes MS CCC-SLP Speech and Language Pathologist                 Time Calculation:   SLP Start Time: 0800    Therapy Charges for Today     Code Description Service Date Service Provider Modifiers Qty    [id number]  ST TREATMENT SPEECH 4 11/1/2019 Anjali Serrano MS CCC-SLP GN 1                   Anjali Serrano MS CCC-SLP  11/1/2019

## 2019-11-05 ENCOUNTER — HOSPITAL ENCOUNTER (OUTPATIENT)
Dept: SPEECH THERAPY | Facility: HOSPITAL | Age: 79
Setting detail: THERAPIES SERIES
Discharge: HOME | End: 2019-11-05

## 2019-11-05 ENCOUNTER — OFFICE VISIT (OUTPATIENT)
Dept: SLEEP MEDICINE | Facility: HOSPITAL | Age: 79
End: 2019-11-05

## 2019-11-05 VITALS
HEART RATE: 69 BPM | OXYGEN SATURATION: 96 % | WEIGHT: 208 LBS | HEIGHT: 73 IN | SYSTOLIC BLOOD PRESSURE: 115 MMHG | BODY MASS INDEX: 27.57 KG/M2 | DIASTOLIC BLOOD PRESSURE: 67 MMHG

## 2019-11-05 DIAGNOSIS — F51.5 NIGHTMARES: ICD-10-CM

## 2019-11-05 DIAGNOSIS — G47.52 REM SLEEP BEHAVIOR DISORDER: Primary | ICD-10-CM

## 2019-11-05 DIAGNOSIS — G47.52 REM SLEEP BEHAVIOR DISORDER: ICD-10-CM

## 2019-11-05 DIAGNOSIS — R41.3 MEMORY LOSS: Primary | ICD-10-CM

## 2019-11-05 PROCEDURE — 92507 TX SP LANG VOICE COMM INDIV: CPT

## 2019-11-05 PROCEDURE — 99213 OFFICE O/P EST LOW 20 MIN: CPT | Performed by: NURSE PRACTITIONER

## 2019-11-05 RX ORDER — CLONAZEPAM 1 MG/1
1 TABLET ORAL
Qty: 30 TABLET | Refills: 0 | Status: SHIPPED | OUTPATIENT
Start: 2019-11-05 | End: 2019-11-05 | Stop reason: SDUPTHER

## 2019-11-05 RX ORDER — CLONAZEPAM 1 MG/1
1 TABLET ORAL
Qty: 90 TABLET | Refills: 0 | Status: SHIPPED | OUTPATIENT
Start: 2019-11-05 | End: 2019-12-23 | Stop reason: SDUPTHER

## 2019-11-05 RX ORDER — PRAZOSIN HYDROCHLORIDE 1 MG/1
1 CAPSULE ORAL NIGHTLY
Qty: 30 CAPSULE | Refills: 3 | Status: SHIPPED | OUTPATIENT
Start: 2019-11-05 | End: 2020-05-08

## 2019-11-05 NOTE — THERAPY TREATMENT NOTE
Outpatient Speech Language Pathology   Adult Speech Language Cognitive Treatment Note  [facility]     Patient Name: Uche Polanco  : 1940  MRN: [id number]  Today's Date: 2019         Visit Date: 2019   Patient Active Problem List   Diagnosis   • Valvular heart disease   • TIA (transient ischemic attack)   • Severe obstructive sleep apnea   • Hypertension   • Hyperlipidemia   • Gout   • CAD (coronary artery disease)   • AV block   • REM sleep behavior disorder   • Memory loss          Visit Dx:    ICD-10-CM ICD-9-CM   1. Memory loss R41.3 780.93                         SLP OP Goals     Row Name 19 1000          Goal Type Needed    Goal Type Needed  Memory;Attention/Orientation;Other Adult Goals  -HG        Subjective Comments    Subjective Comments  Pt alert, cooperative, returned with homework.   -HG        Auditory Comprehension Goals    Patient will comprehend abstract and complex information presented in all social, avocational, or work settings  90%:;with cues  -HG     Status: Patient will comprehend abstract and complex information presented in all social, avocational, or work settings  New  -HG     Patient will improve comprehension of spoken language by following Multi-step directions  80%:;with cues  -HG     Status: Patient will improve comprehension of spoken language by following Multi-step directions  Progressing as expected  -HG     Comments: Patient will improve comprehension of spoken language by following Multi-step directions  10/25/19: 3 step/6 component questions and pt was 90% accurate completing while directions were being read. 19: 3 step/6 component questions: pt was 13/15 for written directions after studying for hmwk. 19:Gave for hmwk. 9/3/19: 2 step/4 component directions: pt was 70% accurate.   19: 2 step oral directions: pt was 100% accurate. 3 step oral directions: pt was 12/15. 19: 2 step oral directions: 70% accuracy.   -HG     Patient will  demonstrate comprehension of spoken language by answering questions about a multi paragraph length content  80%:;with cues  -HG     Status: Patient will demonstrate comprehension of spoken language by answering questions about a multi paragraph length content  Achieved  -HG     Comments: Patient will demonstrate comprehension of spoken language by answering questions about a multi paragraph length content  10/15/19: RBANS Paragraph recall for immediate: pt was 11/24 and for delayed, pt was 6/12. 10/1/19: 36-50 word paragraph: 11/12. 9/16/19: RBANS Paragraph recall and pt was 50% accurate for immediate.  9/13/19: 65-80 word paragraphs: pt was 100% accurate. 9/6/19: 50-65 word paragraphs: pt was 90% accurate.   -HG        Memory Goals    Patient and family will implement compensatory strategies to maximize patient’s Memory function so patient can continue to participate in daily activities  90%:;with cues  -HG     Status: Patient and family will implement compensatory strategies to maximize patient’s Memory function so patient can continue to participate in daily activities  Progressing as expected  -HG     Comments: Patient and family will implement compensatory strategies to maximize patient’s Memory function so patient can continue to participate in daily activities  9/20/19: Immediate recall of Memory Puzzle Book and pt was 60% accurate.    -HG     Patient will demonstrate improved ability to recall information by immediately recalling a series of words  80%:;related;after delay;with cues  -HG     Status: Patient will demonstrate improved ability to recall information by immediately recalling a series of words  Progressing as expected  -HG     Comments: Patient will demonstrate improved ability to recall information by immediately recalling a series of words  11/5/19: Recall of rules of familiar card game and pt was 80% accurate. 10/21/19: Recall from paragraph in an executive fxn task and pt was 50% accurate with  mod cues. 10/18/19: Immediate recall for Memory Matches i pad game and pt was 70% accurate.  Fastest time memory matches and pt was impulsive but completed in a little 60 secs. 10/15/19: RBANS Immediate recall score dropped to a 73 from an 83 at last re-assessment. 10/4/19: Memory Recall for a picture scene and pt was 75% accurate. 19: Recalling Boxed Information for numbers: pt was 80% accurate. 19: Spaced Retrieval Method: pt was 4/12. 19: RBANS Immediate recall and pt improved score to an 83 up from 49 at time of eval. 19: Immediate recall of boxed information: pt was 85% accurate. 19: Immediate recall of shapes and figures (6) and pt was 75% accurate.  9/3/19: Immediate recall of shapes and figures (5) and pt improved to 80% accurate.  19: Pt was able to recall 5 related words with accuracy, 6 words, pt was 5/6. 19: 50-65 words: pt was 6/6 x 3 for immediate story recall. 19: 36-50 word paragraphs for immediate recall: pt was 7/8. Immediate recall for storytellin/3, using chunking: 3/3,   -HG     Patient’s memory skills will be enhanced as reported by patient by utilizing internal memory strategies to recall up to 3 pieces of information after a 5- minute delay  80%:;with cues  -HG     Status: Patient’s memory skills will be enhanced as reported by patient by utilizing internal memory strategies to recall up to 3 pieces of information after a 5- minute delay  Progressing as expected  -HG     Comments: Patient’s memory skills will be enhanced as reported by patient by utilizing internal memory strategies to recall up to 3 pieces of information after a 5- minute delay  19: Delayed recall of 7 un-related words from homework and previous sessions and pt was 7/7. 19: Delayed recall of 6 un-related words: pt was 6/6 and added 2 more words and pt was 6/7 and then 6/8.  10/25/19: Delayed recall of 4 un-related words, pt was 4/4 with min cues, Independently got it 4/4 x  2.  10/21/19: Delayed recall for 7 un-related words from hwmk and pt was 6/7. With review and increased delay, pt was 7/7.  10/18/19: Delayed recall for 5 un-related word from homework and pt was 5/5 x 3. Added word 10/15/19: RBANS Delayed recall score of 78 dropped slightly from an 81 at previous re-assessment. 10/1/19:Delayed recall of paragraph information and pt was 2/3 for topics. 9/27/19: Delayed recall of 12 groupable pictures and pt was 12/12 x 2. 9/24/19: Delayed recall of 16 groupable words and pt was 14/16. Increased delay of 16 groupable words: pt was 10/16.  9/20/19: Delayed recall of words from SRT and pt was 3/12. 9/16/19: RBANS Delayed recall score of 81 down from 87 at time of eval.- remains in low average range.  9/3/19: Delayed recall of 8 un-related words and pt was 8/8 x 2. 8/30/19: Delayed recall of 7 un-related pictures, pt was 7/7 x 2. 8/23/19: Delayed recall of 6 un-related words: pt was 6/6 x 3- gave 7 for hmwk. 8/20/19: Delayed recall of 5 un-related words and pt was 5/5 x 2. 6 un-related words: pt was 4/6.  8/16/19: Delayed recall of 8 related words, pt was 8/8 x 2. 4 un-related words, pt was 4/4 x 2. 8/9/19: Delayed recall of 7 words: pt was 6/7 x 2, 7/7 x 2 8/6/19: Delayed recall of 5 related words and pt was 5/5, 6 related words: pt was 6/6. 7 related words using alphabetical order and chunking according to starting letter and pt was 6/7 with min cue.  7/26/19: Delayed recall of 4 related words and pt was 3/4 I'ly; 4/4 x 2. Increased to 5 related words and pt was 4/5 and then 5/5.   -HG     Patient’s memory skills will be enhanced as reported by patient by using external memory aides  80%:;with cues  -HG     Status: Patient’s memory skills will be enhanced as reported by patient by using external memory aides  Progressing as expected  -HG     Comments: Patient’s memory skills will be enhanced as reported by patient by using external memory aides  10/25/19: Using planner for calendar  with 100% accurate. Use of daily entries, pt was 80% accurate. 9/20/19: Pt is using planner 90% of the time with mild-moderate level of details. 9/6/19: Pt is making daily notes in his planner. 9/3/19: Pt using his calendar consistently however, pt not using the daily journaling section as much as SLP would like. 8/30/19: Pt required min cues to catch up on daily entries. 8/16/19: Pt continues to use his planner daily. 8/9/19: Pt with increased use of his planner with daily notes.  7/26/19: Fxnl use of planner: pt was writing down his TO DO LIST and not what he had done.   -HG        Attention/Orientation Goals    Patient will be able to execute all activities necessary to manage a home  Independently  -HG     Status: Patient will be able to execute all activities necessary to manage a home  Progressing as expected  -HG     Comments: Patient will be able to execute all activities necessary to manage a home  10/15/19: RBANS Attention score remained an 88 since previous assessment. 9/24/19: 3 step/6 componenet oral directions and pt was 9/12.   -HG     Patient will improve attention skills by sustaining focus in order to actively hold and manipulate information provided (e.g., sequencing auditorily presented number series in ascending or descending order)  90%:;with cues  -HG     Status: Patient will improve attention skills by sustaining focus in order to actively hold and manipulate information provided (e.g., sequencing auditorily presented number series in ascending or descending order)  Progressing as expected  -HG     Comments: Patient will improve attention skills by sustaining focus in order to actively hold and manipulate information provided (e.g., sequencing auditorily presented number series in ascending or descending order)  11/1/19: Alternating Attention: pt was Above Average on the APT. 10/4/19: Mental Manipulation 4 word progression: pt was 60% accurate with repetition needed.  9/10/19: 4 word for  category exclusion and pt was 90% accurate and for 5 words, pt was 60% accurate.  19: Mental Manipulation: pt was 80% accurate with re-read for ranking. 19: Mental Manipulation for worder order of alphabetical vs reversal and pt was 50% accurate.  19: Mental Manipulation for word placement and pt was 80% accurate for attribute and placement.   -HG     Patient will improve attention skills by alternating or shifting focus between two different tasks in order to complete both tasks  80%:;with cues  -HG     Status: Patient will improve attention skills by alternating or shifting focus between two different tasks in order to complete both tasks  Progressing as expected  -HG     Comments: Patient will improve attention skills by alternating or shifting focus between two different tasks in order to complete both tasks  19: Divided Attention APT score was Below Average. 10/21/19: While completing an executive function task, pt exhibited difficulty with attention and required mod cues and was 70% accurate. 19: While cards being turned over, pt had to tap table if the card played was lower than the previous one. Pt was 60% accurate. 9/10/19: Pt able to recall up to 7 digits. 19: Visual Scanning: Pt required mod cues for alternating between letters and was 80 % accurate.  19: Card game and pt was 80% accurate for new rules and watching increasing piles of cards for potential moves.  19: Sorting card by katie and REJI and pt was 70% accurate. 19: Sorting card by suit and  and pt required mod cues and repeated instructions and pt was 60% accurate.   -HG     Patient will improve attention skills by sustaining focus to high-level cognitive tasks in order to complete task  80%:;with cues  -HG     Status: Patient will improve attention skills by sustaining focus to high-level cognitive tasks in order to complete task  Progressing as expected  -HG     Comments: Patient will improve  attention skills by sustaining focus to high-level cognitive tasks in order to complete task  11/5/19: One Pile Card Game and pt was 80% accurate with min cues. 10/25/19: Sustained Attention on APT and pt was 30/30 and for Complex Sustained Attention and pt was 24/30- above average.  9/27/19: ID of even numbers and pt was 90% accurate during a game of fast pace recognizition. 9/20/19: One Pile Card Game and pt was 75% accurate this date with min cues needed t/o the game. 9/13/19: One pile card game, pt requires less cues than previous sessions. 9/10/19: One Pile Card Game and pt required mod cues and pt was 70% accurate. 8/30/19: One Pile Card game: Pt required max cues in order to recall the rules of the game and to play accordingly. 8/20/19: One Pile Card Game: pt was 70% accurate with min cues.  8/9/19: One Pile Card Game: pt was 70% accurate. 7/26/19: Recognizing odd numbers in suit of cards while tapping the table and pt was 70% accurate.   -HG        Other Goals    Other Adult Goal- 1  Pt will complete thought organization tasks with 80% accuracy.  -HG     Status: Other Adult Goal- 1  Progressing as expected  -HG     Comments: Other Adult Goal- 1  11/1/19: Divergent abstract naming: pt was 10/10 x 2. 10/18/19: Divergent abstract naming: pt was 100% accurate for 6 sets. Convergent naming: pt was 21/25. 10/15/19: RBANS Language score improved to a 92 from an 88 at last re-assessment. 10/1/19: Divergent naming: pt was 80% accurate. 9/16/19: RBANS Language score of 88 remains the same as time of initial eval. 8/30/19: Abstract divergent naming, pt was 8/10. 8/9/19: Divergent concrete category naming and pt was 15/15 x 2. 7/26/19: Divergent naming: pt was 10/10.   -HG     Other Adult Goal- 2  Pt will improve RBANS Total Score to at least 90 placing pt in the Average range.   -HG     Status: Other Adult Goal- 2  Progressing as expected  -HG     Comments: Other Adult Goal- 2  10/15/19: RBANS Total score remained at  84 (Low Average). 9/16/19: RBANS Total score improved to an 84- up from 79 at time of initial eval.   -HG     Other Adult Goal- 3  Patient will perform divergent naming tasks, generating 10 items per abstract category within 60 seconds, across two trials in order to improve semantic fluency.  -HG     Status: Other Adult Goal- 3  Progressing as expected  -HG        SLP Time Calculation    SLP Goal Re-Cert Due Date  11/14/19  -HG       User Key  (r) = Recorded By, (t) = Taken By, (c) = Cosigned By    Initials Name Provider Type    Anjali Cortes MS CCC-SLP Speech and Language Pathologist              OP SLP Assessment/Plan - 11/05/19 1000        SLP Plan    Plan Comments  Cont wiht Cog tx.   -HG      User Key  (r) = Recorded By, (t) = Taken By, (c) = Cosigned By    Initials Name Provider Type    Anjali Cortes MS CCC-SLP Speech and Language Pathologist                 Time Calculation:   SLP Start Time: 1000                 Anjali Serrano MS CCC-SLP  11/5/2019

## 2019-11-05 NOTE — PROGRESS NOTES
Chief Complaint:   Chief Complaint   Patient presents with   • Follow-up       HPI:    Uche Polanco is a 79 y.o. male here for follow-up of room behavior disorder, nightmares.  Patient does continue to do well with clonazepam 1 mg at bedtime this seems to make his anxiety lessen about going to sleep and he does have decreased movements and tends to not get out of the bed when he takes this medication.  He is still having nightmares 1-2 times every 2 weeks this is better than originally but he still kicking his wife during the dreams and she is asking for some medication to help with this.        Current medications are:   Current Outpatient Medications:   •  atorvastatin (LIPITOR) 20 MG tablet, TAKE 1 TABLET BY MOUTH DAILY, Disp: 90 tablet, Rfl: 2  •  azelastine (ASTEPRO) 0.15 % solution nasal spray, U 2 SPRAYS IEN BID, Disp: , Rfl: 1  •  clonazePAM (KlonoPIN) 1 MG tablet, Take 1 tablet by mouth every night at bedtime., Disp: 30 tablet, Rfl: 0  •  clopidogrel (PLAVIX) 75 MG tablet, Take 1 tablet by mouth Daily., Disp: 90 tablet, Rfl: 1  •  donepezil (ARICEPT) 10 MG tablet, Take 10 mg by mouth Daily., Disp: , Rfl: 3  •  meclizine (ANTIVERT) 12.5 MG tablet, TK 1 TO 2 TS PO Q 4 TO 6 H PRN DIZZINESS, Disp: , Rfl: 0  •  Multiple Vitamins-Minerals (PRESERVISION AREDS 2 PO), Take 1 tablet by mouth 2 (Two) Times a Day., Disp: , Rfl:   •  mupirocin (BACTROBAN) 2 % ointment, Apply 1 application topically to the appropriate area as directed As Needed., Disp: , Rfl:   •  prazosin (MINIPRESS) 1 MG capsule, Take 1 capsule by mouth Every Night., Disp: 30 capsule, Rfl: 3.      The patient's relevant past medical, surgical, family and social history were reviewed and updated in Epic as appropriate.       Review of Systems   Eyes: Positive for visual disturbance.   Respiratory: Positive for apnea.    Psychiatric/Behavioral: Positive for sleep disturbance.        Memory loss   All other systems reviewed and are  negative.        Objective:    Physical Exam   Constitutional: He is oriented to person, place, and time. He appears well-developed and well-nourished.   HENT:   Head: Normocephalic and atraumatic.   Mouth/Throat: Oropharynx is clear and moist.   Mallampati 3 anatomy   Eyes: Conjunctivae are normal.   Neck: Neck supple.   Cardiovascular: Normal rate and regular rhythm.   Pulmonary/Chest: Effort normal and breath sounds normal.   Neurological: He is alert and oriented to person, place, and time.   Skin: Skin is warm and dry.   Psychiatric: He has a normal mood and affect. His behavior is normal. Judgment and thought content normal.   Nursing note and vitals reviewed.    CPAP compliance up-to-date    ASSESSMENT/PLAN    Uche was seen today for follow-up.    Diagnoses and all orders for this visit:    REM sleep behavior disorder  -     prazosin (MINIPRESS) 1 MG capsule; Take 1 capsule by mouth Every Night.  -     clonazePAM (KlonoPIN) 1 MG tablet; Take 1 tablet by mouth every night at bedtime.    Nightmares  -     prazosin (MINIPRESS) 1 MG capsule; Take 1 capsule by mouth Every Night.    Other orders  -     Cancel: CPAP Therapy            1. Counseled patient regarding multimodal approach with healthy nutrition, healthy sleep, regular physical activity, social activities, counseling, and medications. Encouraged to practice lateral sleep position. Avoid alcohol and sedatives close to bedtime.  Prazosin 1 mg p.o. nightly #30 no refills.  Clonazepam 1 mg at at bedtime #90 with 0 refills per Dr. Dr. Hansen.  Marcio compliant and updated today.  I have reviewed the results of my evaluation and impression and discussed my recommendations in detail with the patient.      Signed by  JONATHAN Preciado    November 5, 2019      CC: Jd Tapia MD          No ref. provider found

## 2019-11-07 ENCOUNTER — HOSPITAL ENCOUNTER (OUTPATIENT)
Dept: SPEECH THERAPY | Facility: HOSPITAL | Age: 79
Setting detail: THERAPIES SERIES
Discharge: HOME | End: 2019-11-07

## 2019-11-07 DIAGNOSIS — R41.3 MEMORY LOSS: Primary | ICD-10-CM

## 2019-11-07 PROCEDURE — 92507 TX SP LANG VOICE COMM INDIV: CPT

## 2019-11-07 NOTE — THERAPY TREATMENT NOTE
Outpatient Speech Language Pathology   Adult Speech Language Cognitive Treatment Note  [facility]     Patient Name: Uche Polanco  : 1940  MRN: [id number]  Today's Date: 2019         Visit Date: 2019   Patient Active Problem List   Diagnosis   • Valvular heart disease   • TIA (transient ischemic attack)   • Severe obstructive sleep apnea   • Hypertension   • Hyperlipidemia   • Gout   • CAD (coronary artery disease)   • AV block   • REM sleep behavior disorder   • Memory loss          Visit Dx:    ICD-10-CM ICD-9-CM   1. Memory loss R41.3 780.93                         SLP OP Goals     Row Name 19 1300          Goal Type Needed    Goal Type Needed  Memory;Attention/Orientation;Other Adult Goals  -HG        Subjective Comments    Subjective Comments  Pt alert, cooperative  -HG        Auditory Comprehension Goals    Patient will comprehend abstract and complex information presented in all social, avocational, or work settings  90%:;with cues  -HG     Status: Patient will comprehend abstract and complex information presented in all social, avocational, or work settings  New  -HG     Patient will improve comprehension of spoken language by following Multi-step directions  80%:;with cues  -HG     Status: Patient will improve comprehension of spoken language by following Multi-step directions  Progressing as expected  -HG     Comments: Patient will improve comprehension of spoken language by following Multi-step directions  10/25/19: 3 step/6 component questions and pt was 90% accurate completing while directions were being read. 19: 3 step/6 component questions: pt was 13/15 for written directions after studying for hmwk. 19:Gave for hmwk. 9/3/19: 2 step/4 component directions: pt was 70% accurate.   19: 2 step oral directions: pt was 100% accurate. 3 step oral directions: pt was 12/15. 19: 2 step oral directions: 70% accuracy.   -HG     Patient will demonstrate comprehension  of spoken language by answering questions about a multi paragraph length content  80%:;with cues  -HG     Status: Patient will demonstrate comprehension of spoken language by answering questions about a multi paragraph length content  Achieved  -HG     Comments: Patient will demonstrate comprehension of spoken language by answering questions about a multi paragraph length content  10/15/19: RBANS Paragraph recall for immediate: pt was 11/24 and for delayed, pt was 6/12. 10/1/19: 36-50 word paragraph: 11/12. 9/16/19: RBANS Paragraph recall and pt was 50% accurate for immediate.  9/13/19: 65-80 word paragraphs: pt was 100% accurate. 9/6/19: 50-65 word paragraphs: pt was 90% accurate.   -HG        Memory Goals    Patient and family will implement compensatory strategies to maximize patient’s Memory function so patient can continue to participate in daily activities  90%:;with cues  -HG     Status: Patient and family will implement compensatory strategies to maximize patient’s Memory function so patient can continue to participate in daily activities  Progressing as expected  -HG     Comments: Patient and family will implement compensatory strategies to maximize patient’s Memory function so patient can continue to participate in daily activities  9/20/19: Immediate recall of Memory Puzzle Book and pt was 60% accurate.    -HG     Patient will demonstrate improved ability to recall information by immediately recalling a series of words  80%:;related;after delay;with cues  -HG     Status: Patient will demonstrate improved ability to recall information by immediately recalling a series of words  Progressing as expected  -HG     Comments: Patient will demonstrate improved ability to recall information by immediately recalling a series of words  11/7/19: ABC game for Immediate recall and pt was 70% accurate I'ly.  11/5/19: Recall of rules of familiar card game and pt was 80% accurate. 10/21/19: Recall from paragraph in an  executive fxn task and pt was 50% accurate with mod cues. 10/18/19: Immediate recall for Memory Matches i pad game and pt was 70% accurate.  Fastest time memory matches and pt was impulsive but completed in a little 60 secs. 10/15/19: RBANS Immediate recall score dropped to a 73 from an 83 at last re-assessment. 10/4/19: Memory Recall for a picture scene and pt was 75% accurate. 19: Recalling Boxed Information for numbers: pt was 80% accurate. 19: Spaced Retrieval Method: pt was . 19: RBANS Immediate recall and pt improved score to an 83 up from 49 at time of eval. 19: Immediate recall of boxed information: pt was 85% accurate. 19: Immediate recall of shapes and figures (6) and pt was 75% accurate.  9/3/19: Immediate recall of shapes and figures (5) and pt improved to 80% accurate.  19: Pt was able to recall 5 related words with accuracy, 6 words, pt was 5/6. 19: 50-65 words: pt was 6/6 x 3 for immediate story recall. 19: 36-50 word paragraphs for immediate recall: pt was 7/8. Immediate recall for storytellin/3, using chunking: 3/3,   -HG     Patient’s memory skills will be enhanced as reported by patient by utilizing internal memory strategies to recall up to 3 pieces of information after a 5- minute delay  80%:;with cues  -HG     Status: Patient’s memory skills will be enhanced as reported by patient by utilizing internal memory strategies to recall up to 3 pieces of information after a 5- minute delay  Progressing as expected  -HG     Comments: Patient’s memory skills will be enhanced as reported by patient by utilizing internal memory strategies to recall up to 3 pieces of information after a 5- minute delay  19: Delayed recall of 7 un-related words from homework and previous sessions and pt was 7/7. 19: Delayed recall of 6 un-related words: pt was 6/6 and added 2 more words and pt was 6/7 and then 6/8.  10/25/19: Delayed recall of 4 un-related words, pt  was 4/4 with min cues, Independently got it 4/4 x 2.  10/21/19: Delayed recall for 7 un-related words from hwmk and pt was 6/7. With review and increased delay, pt was 7/7.  10/18/19: Delayed recall for 5 un-related word from homework and pt was 5/5 x 3. Added word 10/15/19: RBANS Delayed recall score of 78 dropped slightly from an 81 at previous re-assessment. 10/1/19:Delayed recall of paragraph information and pt was 2/3 for topics. 9/27/19: Delayed recall of 12 groupable pictures and pt was 12/12 x 2. 9/24/19: Delayed recall of 16 groupable words and pt was 14/16. Increased delay of 16 groupable words: pt was 10/16.  9/20/19: Delayed recall of words from SRT and pt was 3/12. 9/16/19: RBANS Delayed recall score of 81 down from 87 at time of eval.- remains in low average range.  9/3/19: Delayed recall of 8 un-related words and pt was 8/8 x 2. 8/30/19: Delayed recall of 7 un-related pictures, pt was 7/7 x 2. 8/23/19: Delayed recall of 6 un-related words: pt was 6/6 x 3- gave 7 for hmwk. 8/20/19: Delayed recall of 5 un-related words and pt was 5/5 x 2. 6 un-related words: pt was 4/6.  8/16/19: Delayed recall of 8 related words, pt was 8/8 x 2. 4 un-related words, pt was 4/4 x 2. 8/9/19: Delayed recall of 7 words: pt was 6/7 x 2, 7/7 x 2 8/6/19: Delayed recall of 5 related words and pt was 5/5, 6 related words: pt was 6/6. 7 related words using alphabetical order and chunking according to starting letter and pt was 6/7 with min cue.  7/26/19: Delayed recall of 4 related words and pt was 3/4 I'ly; 4/4 x 2. Increased to 5 related words and pt was 4/5 and then 5/5.   -HG     Patient’s memory skills will be enhanced as reported by patient by using external memory aides  80%:;with cues  -HG     Status: Patient’s memory skills will be enhanced as reported by patient by using external memory aides  Progressing as expected  -HG     Comments: Patient’s memory skills will be enhanced as reported by patient by using external  memory aides  11/7/19: Pt using his day planner for daily and monthly use. 10/25/19: Using planner for calendar with 100% accurate. Use of daily entries, pt was 80% accurate. 9/20/19: Pt is using planner 90% of the time with mild-moderate level of details. 9/6/19: Pt is making daily notes in his planner. 9/3/19: Pt using his calendar consistently however, pt not using the daily journaling section as much as SLP would like. 8/30/19: Pt required min cues to catch up on daily entries. 8/16/19: Pt continues to use his planner daily. 8/9/19: Pt with increased use of his planner with daily notes.  7/26/19: Fxnl use of planner: pt was writing down his TO DO LIST and not what he had done.   -HG        Attention/Orientation Goals    Patient will be able to execute all activities necessary to manage a home  Independently  -HG     Status: Patient will be able to execute all activities necessary to manage a home  Progressing as expected  -HG     Comments: Patient will be able to execute all activities necessary to manage a home  10/15/19: RBANS Attention score remained an 88 since previous assessment. 9/24/19: 3 step/6 componenet oral directions and pt was 9/12.   -HG     Patient will improve attention skills by sustaining focus in order to actively hold and manipulate information provided (e.g., sequencing auditorily presented number series in ascending or descending order)  90%:;with cues  -HG     Status: Patient will improve attention skills by sustaining focus in order to actively hold and manipulate information provided (e.g., sequencing auditorily presented number series in ascending or descending order)  Progressing as expected  -HG     Comments: Patient will improve attention skills by sustaining focus in order to actively hold and manipulate information provided (e.g., sequencing auditorily presented number series in ascending or descending order)  11/1/19: Alternating Attention: pt was Above Average on the APT.  10/4/19: Mental Manipulation 4 word progression: pt was 60% accurate with repetition needed.  9/10/19: 4 word for category exclusion and pt was 90% accurate and for 5 words, pt was 60% accurate.  19: Mental Manipulation: pt was 80% accurate with re-read for ranking. 19: Mental Manipulation for worder order of alphabetical vs reversal and pt was 50% accurate.  19: Mental Manipulation for word placement and pt was 80% accurate for attribute and placement.   -HG     Patient will improve attention skills by alternating or shifting focus between two different tasks in order to complete both tasks  80%:;with cues  -HG     Status: Patient will improve attention skills by alternating or shifting focus between two different tasks in order to complete both tasks  Progressing as expected  -HG     Comments: Patient will improve attention skills by alternating or shifting focus between two different tasks in order to complete both tasks  19: Divided Attention APT score was Below Average. 10/21/19: While completing an executive function task, pt exhibited difficulty with attention and required mod cues and was 70% accurate. 19: While cards being turned over, pt had to tap table if the card played was lower than the previous one. Pt was 60% accurate. 9/10/19: Pt able to recall up to 7 digits. 19: Visual Scanning: Pt required mod cues for alternating between letters and was 80 % accurate.  19: Card game and pt was 80% accurate for new rules and watching increasing piles of cards for potential moves.  19: Sorting card by suit and  and pt was 70% accurate. 19: Sorting card by katie and  and pt required mod cues and repeated instructions and pt was 60% accurate.   -HG     Patient will improve attention skills by sustaining focus to high-level cognitive tasks in order to complete task  80%:;with cues  -HG     Status: Patient will improve attention skills by sustaining focus to high-level  cognitive tasks in order to complete task  Progressing as expected  -HG     Comments: Patient will improve attention skills by sustaining focus to high-level cognitive tasks in order to complete task  11/5/19: One Pile Card Game and pt was 80% accurate with min cues. 10/25/19: Sustained Attention on APT and pt was 30/30 and for Complex Sustained Attention and pt was 24/30- above average.  9/27/19: ID of even numbers and pt was 90% accurate during a game of fast pace recognizition. 9/20/19: One Pile Card Game and pt was 75% accurate this date with min cues needed t/o the game. 9/13/19: One pile card game, pt requires less cues than previous sessions. 9/10/19: One Pile Card Game and pt required mod cues and pt was 70% accurate. 8/30/19: One Pile Card game: Pt required max cues in order to recall the rules of the game and to play accordingly. 8/20/19: One Pile Card Game: pt was 70% accurate with min cues.  8/9/19: One Pile Card Game: pt was 70% accurate. 7/26/19: Recognizing odd numbers in suit of cards while tapping the table and pt was 70% accurate.   -HG        Other Goals    Other Adult Goal- 1  Pt will complete thought organization tasks with 80% accuracy.  -HG     Status: Other Adult Goal- 1  Progressing as expected  -HG     Comments: Other Adult Goal- 1  11/1/19: Divergent abstract naming: pt was 10/10 x 2. 10/18/19: Divergent abstract naming: pt was 100% accurate for 6 sets. Convergent naming: pt was 21/25. 10/15/19: RBANS Language score improved to a 92 from an 88 at last re-assessment. 10/1/19: Divergent naming: pt was 80% accurate. 9/16/19: RBANS Language score of 88 remains the same as time of initial eval. 8/30/19: Abstract divergent naming, pt was 8/10. 8/9/19: Divergent concrete category naming and pt was 15/15 x 2. 7/26/19: Divergent naming: pt was 10/10.   -HG     Other Adult Goal- 2  Pt will improve RBANS Total Score to at least 90 placing pt in the Average range.   -HG     Status: Other Adult Goal- 2   Progressing as expected  -HG     Comments: Other Adult Goal- 2  10/15/19: RBANS Total score remained at 84 (Low Average). 9/16/19: RBANS Total score improved to an 84- up from 79 at time of initial eval.   -HG     Other Adult Goal- 3  Patient will perform divergent naming tasks, generating 10 items per abstract category within 60 seconds, across two trials in order to improve semantic fluency.  -HG     Status: Other Adult Goal- 3  Progressing as expected  -HG        SLP Time Calculation    SLP Goal Re-Cert Due Date  11/14/19  -HG       User Key  (r) = Recorded By, (t) = Taken By, (c) = Cosigned By    Initials Name Provider Type    Anjali Cortes MS CCC-SLP Speech and Language Pathologist          OP SLP Education     Row Name 11/07/19 1300       Education    Education Comments  Hmwk for use of daily planner as a delayed recall strategy.   -HG      User Key  (r) = Recorded By, (t) = Taken By, (c) = Cosigned By    Initials Name Effective Dates    Anjali Cortes MS CCC-SLP 06/22/15 -           OP SLP Assessment/Plan - 11/07/19 1300        SLP Plan    Plan Comments  Cont with Cog tx.    -HG      User Key  (r) = Recorded By, (t) = Taken By, (c) = Cosigned By    Initials Name Provider Type    Anjali Cortes MS CCC-SLP Speech and Language Pathologist                 Time Calculation:   SLP Start Time: 1300    Therapy Charges for Today     Code Description Service Date Service Provider Modifiers Qty    [id number]  ST TREATMENT SPEECH 4 11/7/2019 Anjali Serrano MS CCC-SLP GN 1                   Anjali Serrano MS CCC-SLP  11/7/2019

## 2019-11-12 ENCOUNTER — HOSPITAL ENCOUNTER (OUTPATIENT)
Dept: SPEECH THERAPY | Facility: HOSPITAL | Age: 79
Setting detail: THERAPIES SERIES
Discharge: HOME | End: 2019-11-12

## 2019-11-12 DIAGNOSIS — R41.3 MEMORY LOSS: Primary | ICD-10-CM

## 2019-11-12 PROCEDURE — 92507 TX SP LANG VOICE COMM INDIV: CPT

## 2019-11-12 NOTE — THERAPY PROGRESS REPORT/RE-CERT
Outpatient Speech Language Pathology   Adult Speech Language Cognitive Progress Note  [facility]     Patient Name: Uche Polanco  : 1940  MRN: [id number]  Today's Date: 2019         Visit Date: 2019   Patient Active Problem List   Diagnosis   • Valvular heart disease   • TIA (transient ischemic attack)   • Severe obstructive sleep apnea   • Hypertension   • Hyperlipidemia   • Gout   • CAD (coronary artery disease)   • AV block   • REM sleep behavior disorder   • Memory loss          Visit Dx:    ICD-10-CM ICD-9-CM   1. Memory loss R41.3 780.93                         SLP OP Goals     Row Name 19 1500          Goal Type Needed    Goal Type Needed  Memory;Attention/Orientation;Other Adult Goals  -HG        Subjective Comments    Subjective Comments  Pt alert, cooperative, returned with homework.   -HG        Auditory Comprehension Goals    Patient will comprehend abstract and complex information presented in all social, avocational, or work settings  90%:;with cues  -HG     Status: Patient will comprehend abstract and complex information presented in all social, avocational, or work settings  New  -HG     Patient will improve comprehension of spoken language by following Multi-step directions  80%:;with cues  -HG     Status: Patient will improve comprehension of spoken language by following Multi-step directions  Progressing as expected  -HG     Comments: Patient will improve comprehension of spoken language by following Multi-step directions  10/25/19: 3 step/6 component questions and pt was 90% accurate completing while directions were being read. 19: 3 step/6 component questions: pt was 13/15 for written directions after studying for hmwk. 19:Gave for hmwk. 9/3/19: 2 step/4 component directions: pt was 70% accurate.   19: 2 step oral directions: pt was 100% accurate. 3 step oral directions: pt was 12/15. 19: 2 step oral directions: 70% accuracy.   -HG     Patient will  demonstrate comprehension of spoken language by answering questions about a multi paragraph length content  80%:;with cues  -HG     Status: Patient will demonstrate comprehension of spoken language by answering questions about a multi paragraph length content  Achieved  -HG     Comments: Patient will demonstrate comprehension of spoken language by answering questions about a multi paragraph length content  11/12/19: RBANS Paragraph recall for immediate and pt was 15/24, delayed and pt was 6/12. 10/15/19: RBANS Paragraph recall for immediate: pt was 11/24 and for delayed, pt was 6/12. 10/1/19: 36-50 word paragraph: 11/12. 9/16/19: RBANS Paragraph recall and pt was 50% accurate for immediate.  9/13/19: 65-80 word paragraphs: pt was 100% accurate. 9/6/19: 50-65 word paragraphs: pt was 90% accurate.   -HG        Memory Goals    Patient and family will implement compensatory strategies to maximize patient’s Memory function so patient can continue to participate in daily activities  90%:;with cues  -HG     Status: Patient and family will implement compensatory strategies to maximize patient’s Memory function so patient can continue to participate in daily activities  Progressing as expected  -HG     Comments: Patient and family will implement compensatory strategies to maximize patient’s Memory function so patient can continue to participate in daily activities  11/12/19: RBANS Immediate recall score of 83 was improved from last re-assessment. 9/20/19: Immediate recall of Memory Puzzle Book and pt was 60% accurate.    -HG     Patient will demonstrate improved ability to recall information by immediately recalling a series of words  80%:;related;after delay;with cues  -HG     Status: Patient will demonstrate improved ability to recall information by immediately recalling a series of words  Progressing as expected  -HG     Comments: Patient will demonstrate improved ability to recall information by immediately recalling a  series of words  19: ABC game for Immediate recall and pt was 70% accurate I'ly.  19: Recall of rules of familiar card game and pt was 80% accurate. 10/21/19: Recall from paragraph in an executive fxn task and pt was 50% accurate with mod cues. 10/18/19: Immediate recall for Memory Matches i pad game and pt was 70% accurate.  Fastest time memory matches and pt was impulsive but completed in a little 60 secs. 10/15/19: RBANS Immediate recall score dropped to a 73 from an 83 at last re-assessment. 10/4/19: Memory Recall for a picture scene and pt was 75% accurate. 19: Recalling Boxed Information for numbers: pt was 80% accurate. 19: Spaced Retrieval Method: pt was . 19: RBANS Immediate recall and pt improved score to an 83 up from 49 at time of eval. 19: Immediate recall of boxed information: pt was 85% accurate. 19: Immediate recall of shapes and figures (6) and pt was 75% accurate.  9/3/19: Immediate recall of shapes and figures (5) and pt improved to 80% accurate.  19: Pt was able to recall 5 related words with accuracy, 6 words, pt was 5/6. 19: 50-65 words: pt was 6/6 x 3 for immediate story recall. 19: 36-50 word paragraphs for immediate recall: pt was 7/8. Immediate recall for storytellin/3, using chunking: 3/3,   -HG     Patient’s memory skills will be enhanced as reported by patient by utilizing internal memory strategies to recall up to 3 pieces of information after a 5- minute delay  80%:;with cues  -HG     Status: Patient’s memory skills will be enhanced as reported by patient by utilizing internal memory strategies to recall up to 3 pieces of information after a 5- minute delay  Progressing as expected  -HG     Comments: Patient’s memory skills will be enhanced as reported by patient by utilizing internal memory strategies to recall up to 3 pieces of information after a 5- minute delay  19: RBANS Delayed recall score of 95 was improved from last  re-assessment. 11/5/19: Delayed recall of 7 un-related words from homework and previous sessions and pt was 7/7. 11/1/19: Delayed recall of 6 un-related words: pt was 6/6 and added 2 more words and pt was 6/7 and then 6/8.  10/25/19: Delayed recall of 4 un-related words, pt was 4/4 with min cues, Independently got it 4/4 x 2.  10/21/19: Delayed recall for 7 un-related words from hwmk and pt was 6/7. With review and increased delay, pt was 7/7.  10/18/19: Delayed recall for 5 un-related word from homework and pt was 5/5 x 3. Added word 10/15/19: RBANS Delayed recall score of 78 dropped slightly from an 81 at previous re-assessment. 10/1/19:Delayed recall of paragraph information and pt was 2/3 for topics. 9/27/19: Delayed recall of 12 groupable pictures and pt was 12/12 x 2. 9/24/19: Delayed recall of 16 groupable words and pt was 14/16. Increased delay of 16 groupable words: pt was 10/16.  9/20/19: Delayed recall of words from SRT and pt was 3/12. 9/16/19: RBANS Delayed recall score of 81 down from 87 at time of eval.- remains in low average range.  9/3/19: Delayed recall of 8 un-related words and pt was 8/8 x 2. 8/30/19: Delayed recall of 7 un-related pictures, pt was 7/7 x 2. 8/23/19: Delayed recall of 6 un-related words: pt was 6/6 x 3- gave 7 for hmwk. 8/20/19: Delayed recall of 5 un-related words and pt was 5/5 x 2. 6 un-related words: pt was 4/6.  8/16/19: Delayed recall of 8 related words, pt was 8/8 x 2. 4 un-related words, pt was 4/4 x 2. 8/9/19: Delayed recall of 7 words: pt was 6/7 x 2, 7/7 x 2 8/6/19: Delayed recall of 5 related words and pt was 5/5, 6 related words: pt was 6/6. 7 related words using alphabetical order and chunking according to starting letter and pt was 6/7 with min cue.  7/26/19: Delayed recall of 4 related words and pt was 3/4 I'ly; 4/4 x 2. Increased to 5 related words and pt was 4/5 and then 5/5.   -HG     Patient’s memory skills will be enhanced as reported by patient by using  external memory aides  80%:;with cues  -HG     Status: Patient’s memory skills will be enhanced as reported by patient by using external memory aides  Progressing as expected  -HG     Comments: Patient’s memory skills will be enhanced as reported by patient by using external memory aides  11/7/19: Pt using his day planner for daily and monthly use. 10/25/19: Using planner for calendar with 100% accurate. Use of daily entries, pt was 80% accurate. 9/20/19: Pt is using planner 90% of the time with mild-moderate level of details. 9/6/19: Pt is making daily notes in his planner. 9/3/19: Pt using his calendar consistently however, pt not using the daily journaling section as much as SLP would like. 8/30/19: Pt required min cues to catch up on daily entries. 8/16/19: Pt continues to use his planner daily. 8/9/19: Pt with increased use of his planner with daily notes.  7/26/19: Fxnl use of planner: pt was writing down his TO DO LIST and not what he had done.   -HG        Attention/Orientation Goals    Patient will be able to execute all activities necessary to manage a home  Independently  -HG     Status: Patient will be able to execute all activities necessary to manage a home  Progressing as expected  -HG     Comments: Patient will be able to execute all activities necessary to manage a home  11/12/19: RBANS Attention score of 85 places pt in the low average range which is where pt was at last re-assessement. 10/15/19: RBANS Attention score remained an 88 since previous assessment. 9/24/19: 3 step/6 componenet oral directions and pt was 9/12.   -HG     Patient will improve attention skills by sustaining focus in order to actively hold and manipulate information provided (e.g., sequencing auditorily presented number series in ascending or descending order)  90%:;with cues  -HG     Status: Patient will improve attention skills by sustaining focus in order to actively hold and manipulate information provided (e.g.,  sequencing auditorily presented number series in ascending or descending order)  Progressing as expected  -HG     Comments: Patient will improve attention skills by sustaining focus in order to actively hold and manipulate information provided (e.g., sequencing auditorily presented number series in ascending or descending order)  11/1/19: Alternating Attention: pt was Above Average on the APT. 10/4/19: Mental Manipulation 4 word progression: pt was 60% accurate with repetition needed.  9/10/19: 4 word for category exclusion and pt was 90% accurate and for 5 words, pt was 60% accurate.  8/30/19: Mental Manipulation: pt was 80% accurate with re-read for ranking. 8/20/19: Mental Manipulation for worder order of alphabetical vs reversal and pt was 50% accurate.  8/6/19: Mental Manipulation for word placement and pt was 80% accurate for attribute and placement.   -HG     Patient will improve attention skills by alternating or shifting focus between two different tasks in order to complete both tasks  80%:;with cues  -HG     Status: Patient will improve attention skills by alternating or shifting focus between two different tasks in order to complete both tasks  Progressing as expected  -HG     Comments: Patient will improve attention skills by alternating or shifting focus between two different tasks in order to complete both tasks  11/1/19: Divided Attention APT score was Below Average. 10/21/19: While completing an executive function task, pt exhibited difficulty with attention and required mod cues and was 70% accurate. 9/27/19: While cards being turned over, pt had to tap table if the card played was lower than the previous one. Pt was 60% accurate. 9/10/19: Pt able to recall up to 7 digits. 9/6/19: Visual Scanning: Pt required mod cues for alternating between letters and was 80 % accurate.  8/23/19: Card game and pt was 80% accurate for new rules and watching increasing piles of cards for potential moves.  8/16/19:  Sorting card by suit and  and pt was 70% accurate. 19: Sorting card by suit and  and pt required mod cues and repeated instructions and pt was 60% accurate.   -HG     Patient will improve attention skills by sustaining focus to high-level cognitive tasks in order to complete task  80%:;with cues  -HG     Status: Patient will improve attention skills by sustaining focus to high-level cognitive tasks in order to complete task  Progressing as expected  -HG     Comments: Patient will improve attention skills by sustaining focus to high-level cognitive tasks in order to complete task  19: One Pile Card Game and pt was 80% accurate with min cues. 10/25/19: Sustained Attention on APT and pt was 30/30 and for Complex Sustained Attention and pt was 24/30- above average.  19: ID of even numbers and pt was 90% accurate during a game of fast pace recognizition. 19: One Pile Card Game and pt was 75% accurate this date with min cues needed t/o the game. 19: One pile card game, pt requires less cues than previous sessions. 9/10/19: One Pile Card Game and pt required mod cues and pt was 70% accurate. 19: One Pile Card game: Pt required max cues in order to recall the rules of the game and to play accordingly. 19: One Pile Card Game: pt was 70% accurate with min cues.  19: One Pile Card Game: pt was 70% accurate. 19: Recognizing odd numbers in suit of cards while tapping the table and pt was 70% accurate.   -HG        Other Goals    Other Adult Goal- 1  Pt will complete thought organization tasks with 80% accuracy.  -HG     Status: Other Adult Goal- 1  Progressing as expected  -HG     Comments: Other Adult Goal- 1  19: RBANS Language score remained in the average range. 19: Divergent abstract naming: pt was 10/10 x 2. 10/18/19: Divergent abstract naming: pt was 100% accurate for 6 sets. Convergent naming: pt was /. 10/15/19: RBANS Language score improved to a 92 from  an 88 at last re-assessment. 10/1/19: Divergent naming: pt was 80% accurate. 9/16/19: RBANS Language score of 88 remains the same as time of initial eval. 8/30/19: Abstract divergent naming, pt was 8/10. 8/9/19: Divergent concrete category naming and pt was 15/15 x 2. 7/26/19: Divergent naming: pt was 10/10.   -HG     Other Adult Goal- 2  Pt will improve RBANS Total Score to at least 90 placing pt in the Average range.   -HG     Status: Other Adult Goal- 2  Progressing as expected  -HG     Comments: Other Adult Goal- 2  11/12/19: RBANS Total score of 87, slight increase from previous assessment. 10/15/19: RBANS Total score remained at 84 (Low Average). 9/16/19: RBANS Total score improved to an 84- up from 79 at time of initial eval.   -HG     Other Adult Goal- 3  Patient will perform divergent naming tasks, generating 10 items per abstract category within 60 seconds, across two trials in order to improve semantic fluency.  -HG     Status: Other Adult Goal- 3  Progressing as expected  -        SLP Time Calculation    SLP Goal Re-Cert Due Date  12/12/19  -       User Key  (r) = Recorded By, (t) = Taken By, (c) = Cosigned By    Initials Name Provider Type     Anjali Serrano MS CCC-SLP Speech and Language Pathologist          OP SLP Education     Row Name 11/12/19 1500       Education    Barriers to Learning  No barriers identified  -    Education Provided  Patient demonstrated recommended strategies;Patient requires further education on strategies, risks  -    Assessed  Learning needs;Learning motivation;Learning preferences;Learning readiness  -    Learning Motivation  Strong  -    Learning Method  Explanation;Demonstration;Teach back;Written materials  -    Teaching Response  Verbalized understanding;Demonstrated understanding;Reinforcement needed  -    Education Comments  Hmwk for 7 new un-related words using strategies for recall.   -      User Key  (r) = Recorded By, (t) = Taken By, (c) =  Cosigned By    Initials Name Effective Dates    HG Anjali Serrano MS CCC-SLP 06/22/15 -           OP SLP Assessment/Plan - 11/12/19 1500        SLP Assessment    Functional Problems  Speech Language- Adult/Cognition   -HG    Impact on Function: Adult Speech Language/Cognition  Trouble learning or remembering new information;Poor attention to task   -HG    Clinical Impression: Speech Language-Adult/Congnition  Mild:;Cognitive Communication Impairment   -HG    Functional Problems Comment  Pt with daily struggles of recall, relies heavily on his day planner.   -HG    Clinical Impression Comments  RBANS Total Score improved to an 87 from an 84 at time of last re-assesement.   -HG    Please refer to paper survey for additional self-reported information  Yes   -HG    Please refer to items scanned into chart for additional diagnostic informaiton and handouts as provided by clinician  Yes   -HG    SLP Diagnosis  Mild cognitive impairment   -HG    Prognosis  Excellent (comment)   -HG    Patient/caregiver participated in establishment of treatment plan and goals  Yes   -HG    Patient would benefit from skilled therapy intervention  Yes   -HG       SLP Plan    Frequency  1-2x/week   -HG    Duration  6-8 weeks   -HG    Planned CPT's?  SLP INDIVIDUAL SPEECH THERAPY: 91335   -HG    Expected Duration Therapy Session - minutes  45-60 minutes   -HG    Plan Comments  Cont with Cog tx.    -HG      User Key  (r) = Recorded By, (t) = Taken By, (c) = Cosigned By    Initials Name Provider Type    Anjali Cortes MS CCC-SLP Speech and Language Pathologist                 Time Calculation:   SLP Start Time: 1500    Therapy Charges for Today     Code Description Service Date Service Provider Modifiers Qty    [id number]  ST TREATMENT SPEECH 6 11/12/2019 Anjali Serrano MS CCC-SLP GN 1                   Anjali Serrano MS CCC-SLP  11/12/2019

## 2019-11-15 ENCOUNTER — HOSPITAL ENCOUNTER (OUTPATIENT)
Dept: SPEECH THERAPY | Facility: HOSPITAL | Age: 79
Setting detail: THERAPIES SERIES
Discharge: HOME | End: 2019-11-15

## 2019-11-15 DIAGNOSIS — R41.3 MEMORY LOSS: Primary | ICD-10-CM

## 2019-11-15 PROCEDURE — 92507 TX SP LANG VOICE COMM INDIV: CPT

## 2019-11-15 NOTE — THERAPY TREATMENT NOTE
Outpatient Speech Language Pathology   Adult Speech Language Cognitive Treatment Note  [facility]     Patient Name: Uche Polanco  : 1940  MRN: [id number]  Today's Date: 11/15/2019         Visit Date: 11/15/2019   Patient Active Problem List   Diagnosis   • Valvular heart disease   • TIA (transient ischemic attack)   • Severe obstructive sleep apnea   • Hypertension   • Hyperlipidemia   • Gout   • CAD (coronary artery disease)   • AV block   • REM sleep behavior disorder   • Memory loss          Visit Dx:    ICD-10-CM ICD-9-CM   1. Memory loss R41.3 780.93                         SLP OP Goals     Row Name 11/15/19 1500          Goal Type Needed    Goal Type Needed  Memory;Attention/Orientation;Other Adult Goals  -HG        Subjective Comments    Subjective Comments  Pt alert, cooperative, prepared with his hmwk.   -HG        Auditory Comprehension Goals    Patient will comprehend abstract and complex information presented in all social, avocational, or work settings  90%:;with cues  -HG     Status: Patient will comprehend abstract and complex information presented in all social, avocational, or work settings  New  -HG     Patient will improve comprehension of spoken language by following Multi-step directions  80%:;with cues  -HG     Status: Patient will improve comprehension of spoken language by following Multi-step directions  Progressing as expected  -HG     Comments: Patient will improve comprehension of spoken language by following Multi-step directions  10/25/19: 3 step/6 component questions and pt was 90% accurate completing while directions were being read. 19: 3 step/6 component questions: pt was 13/15 for written directions after studying for hmwk. 19:Gave for hmwk. 9/3/19: 2 step/4 component directions: pt was 70% accurate.   19: 2 step oral directions: pt was 100% accurate. 3 step oral directions: pt was 12/15. 19: 2 step oral directions: 70% accuracy.   -HG     Patient  will demonstrate comprehension of spoken language by answering questions about a multi paragraph length content  80%:;with cues  -HG     Status: Patient will demonstrate comprehension of spoken language by answering questions about a multi paragraph length content  Achieved  -HG     Comments: Patient will demonstrate comprehension of spoken language by answering questions about a multi paragraph length content  11/12/19: RBANS Paragraph recall for immediate and pt was 15/24, delayed and pt was 6/12. 10/15/19: RBANS Paragraph recall for immediate: pt was 11/24 and for delayed, pt was 6/12. 10/1/19: 36-50 word paragraph: 11/12. 9/16/19: RBANS Paragraph recall and pt was 50% accurate for immediate.  9/13/19: 65-80 word paragraphs: pt was 100% accurate. 9/6/19: 50-65 word paragraphs: pt was 90% accurate.   -HG        Memory Goals    Patient and family will implement compensatory strategies to maximize patient’s Memory function so patient can continue to participate in daily activities  90%:;with cues  -HG     Status: Patient and family will implement compensatory strategies to maximize patient’s Memory function so patient can continue to participate in daily activities  Progressing as expected  -HG     Comments: Patient and family will implement compensatory strategies to maximize patient’s Memory function so patient can continue to participate in daily activities  11/12/19: RBANS Immediate recall score of 83 was improved from last re-assessment. 9/20/19: Immediate recall of Memory Puzzle Book and pt was 60% accurate.    -HG     Patient will demonstrate improved ability to recall information by immediately recalling a series of words  80%:;related;after delay;with cues  -HG     Status: Patient will demonstrate improved ability to recall information by immediately recalling a series of words  Progressing as expected  -HG     Comments: Patient will demonstrate improved ability to recall information by immediately  recalling a series of words  11/15/19: Immediate recall of Memory Puzzle Book and pt was 75% accurate. 19: ABC game for Immediate recall and pt was 70% accurate I'ly.  19: Recall of rules of familiar card game and pt was 80% accurate. 10/21/19: Recall from paragraph in an executive fxn task and pt was 50% accurate with mod cues. 10/18/19: Immediate recall for Memory Matches i pad game and pt was 70% accurate.  Fastest time memory matches and pt was impulsive but completed in a little 60 secs. 10/15/19: RBANS Immediate recall score dropped to a 73 from an 83 at last re-assessment. 10/4/19: Memory Recall for a picture scene and pt was 75% accurate. 19: Recalling Boxed Information for numbers: pt was 80% accurate. 19: Spaced Retrieval Method: pt was . 19: RBANS Immediate recall and pt improved score to an 83 up from 49 at time of eval. 19: Immediate recall of boxed information: pt was 85% accurate. 19: Immediate recall of shapes and figures (6) and pt was 75% accurate.  9/3/19: Immediate recall of shapes and figures (5) and pt improved to 80% accurate.  19: Pt was able to recall 5 related words with accuracy, 6 words, pt was 5/6. 19: 50-65 words: pt was 6/6 x 3 for immediate story recall. 19: 36-50 word paragraphs for immediate recall: pt was 7/8. Immediate recall for storytellin/3, using chunking: 3/3,   -HG     Patient’s memory skills will be enhanced as reported by patient by utilizing internal memory strategies to recall up to 3 pieces of information after a 5- minute delay  80%:;with cues  -HG     Status: Patient’s memory skills will be enhanced as reported by patient by utilizing internal memory strategies to recall up to 3 pieces of information after a 5- minute delay  Progressing as expected  -HG     Comments: Patient’s memory skills will be enhanced as reported by patient by utilizing internal memory strategies to recall up to 3 pieces of information  after a 5- minute delay  11/15/19: Delayed recall of 7 un-related words from hmwk and pt was 7/7 x 3. 11/12/19: RBANS Delayed recall score of 95 was improved from last re-assessment. 11/5/19: Delayed recall of 7 un-related words from homework and previous sessions and pt was 7/7. 11/1/19: Delayed recall of 6 un-related words: pt was 6/6 and added 2 more words and pt was 6/7 and then 6/8.  10/25/19: Delayed recall of 4 un-related words, pt was 4/4 with min cues, Independently got it 4/4 x 2.  10/21/19: Delayed recall for 7 un-related words from hwmk and pt was 6/7. With review and increased delay, pt was 7/7.  10/18/19: Delayed recall for 5 un-related word from homework and pt was 5/5 x 3. Added word 10/15/19: RBANS Delayed recall score of 78 dropped slightly from an 81 at previous re-assessment. 10/1/19:Delayed recall of paragraph information and pt was 2/3 for topics. 9/27/19: Delayed recall of 12 groupable pictures and pt was 12/12 x 2. 9/24/19: Delayed recall of 16 groupable words and pt was 14/16. Increased delay of 16 groupable words: pt was 10/16.  9/20/19: Delayed recall of words from SRT and pt was 3/12. 9/16/19: RBANS Delayed recall score of 81 down from 87 at time of eval.- remains in low average range.  9/3/19: Delayed recall of 8 un-related words and pt was 8/8 x 2. 8/30/19: Delayed recall of 7 un-related pictures, pt was 7/7 x 2. 8/23/19: Delayed recall of 6 un-related words: pt was 6/6 x 3- gave 7 for hmwk. 8/20/19: Delayed recall of 5 un-related words and pt was 5/5 x 2. 6 un-related words: pt was 4/6.  8/16/19: Delayed recall of 8 related words, pt was 8/8 x 2. 4 un-related words, pt was 4/4 x 2. 8/9/19: Delayed recall of 7 words: pt was 6/7 x 2, 7/7 x 2 8/6/19: Delayed recall of 5 related words and pt was 5/5, 6 related words: pt was 6/6. 7 related words using alphabetical order and chunking according to starting letter and pt was 6/7 with min cue.  7/26/19: Delayed recall of 4 related words and pt  was 3/4 I'ly; 4/4 x 2. Increased to 5 related words and pt was 4/5 and then 5/5.   -HG     Patient’s memory skills will be enhanced as reported by patient by using external memory aides  80%:;with cues  -HG     Status: Patient’s memory skills will be enhanced as reported by patient by using external memory aides  Progressing as expected  -HG     Comments: Patient’s memory skills will be enhanced as reported by patient by using external memory aides  11/7/19: Pt using his day planner for daily and monthly use. 10/25/19: Using planner for calendar with 100% accurate. Use of daily entries, pt was 80% accurate. 9/20/19: Pt is using planner 90% of the time with mild-moderate level of details. 9/6/19: Pt is making daily notes in his planner. 9/3/19: Pt using his calendar consistently however, pt not using the daily journaling section as much as SLP would like. 8/30/19: Pt required min cues to catch up on daily entries. 8/16/19: Pt continues to use his planner daily. 8/9/19: Pt with increased use of his planner with daily notes.  7/26/19: Fxnl use of planner: pt was writing down his TO DO LIST and not what he had done.   -HG        Attention/Orientation Goals    Patient will be able to execute all activities necessary to manage a home  Independently  -HG     Status: Patient will be able to execute all activities necessary to manage a home  Progressing as expected  -HG     Comments: Patient will be able to execute all activities necessary to manage a home  11/12/19: RBANS Attention score of 85 places pt in the low average range which is where pt was at last re-assessement. 10/15/19: RBANS Attention score remained an 88 since previous assessment. 9/24/19: 3 step/6 componenet oral directions and pt was 9/12.   -HG     Patient will improve attention skills by sustaining focus in order to actively hold and manipulate information provided (e.g., sequencing auditorily presented number series in ascending or descending order)   90%:;with cues  -HG     Status: Patient will improve attention skills by sustaining focus in order to actively hold and manipulate information provided (e.g., sequencing auditorily presented number series in ascending or descending order)  Progressing as expected  -HG     Comments: Patient will improve attention skills by sustaining focus in order to actively hold and manipulate information provided (e.g., sequencing auditorily presented number series in ascending or descending order)  11/1/19: Alternating Attention: pt was Above Average on the APT. 10/4/19: Mental Manipulation 4 word progression: pt was 60% accurate with repetition needed.  9/10/19: 4 word for category exclusion and pt was 90% accurate and for 5 words, pt was 60% accurate.  8/30/19: Mental Manipulation: pt was 80% accurate with re-read for ranking. 8/20/19: Mental Manipulation for worder order of alphabetical vs reversal and pt was 50% accurate.  8/6/19: Mental Manipulation for word placement and pt was 80% accurate for attribute and placement.   -HG     Patient will improve attention skills by alternating or shifting focus between two different tasks in order to complete both tasks  80%:;with cues  -HG     Status: Patient will improve attention skills by alternating or shifting focus between two different tasks in order to complete both tasks  Progressing as expected  -HG     Comments: Patient will improve attention skills by alternating or shifting focus between two different tasks in order to complete both tasks  11/15/19: Attention to detail for use of a chart and analyzing numbers- new task for pt and pt was 90% accurate. 11/1/19: Divided Attention APT score was Below Average. 10/21/19: While completing an executive function task, pt exhibited difficulty with attention and required mod cues and was 70% accurate. 9/27/19: While cards being turned over, pt had to tap table if the card played was lower than the previous one. Pt was 60% accurate.  9/10/19: Pt able to recall up to 7 digits. 19: Visual Scanning: Pt required mod cues for alternating between letters and was 80 % accurate.  19: Card game and pt was 80% accurate for new rules and watching increasing piles of cards for potential moves.  19: Sorting card by suit and  and pt was 70% accurate. 19: Sorting card by suit and  and pt required mod cues and repeated instructions and pt was 60% accurate.   -HG     Patient will improve attention skills by sustaining focus to high-level cognitive tasks in order to complete task  80%:;with cues  -HG     Status: Patient will improve attention skills by sustaining focus to high-level cognitive tasks in order to complete task  Progressing as expected  -HG     Comments: Patient will improve attention skills by sustaining focus to high-level cognitive tasks in order to complete task  19: One Pile Card Game and pt was 80% accurate with min cues. 10/25/19: Sustained Attention on APT and pt was 30/30 and for Complex Sustained Attention and pt was 24/30- above average.  19: ID of even numbers and pt was 90% accurate during a game of fast pace recognizition. 19: One Pile Card Game and pt was 75% accurate this date with min cues needed t/o the game. 19: One pile card game, pt requires less cues than previous sessions. 9/10/19: One Pile Card Game and pt required mod cues and pt was 70% accurate. 19: One Pile Card game: Pt required max cues in order to recall the rules of the game and to play accordingly. 19: One Pile Card Game: pt was 70% accurate with min cues.  19: One Pile Card Game: pt was 70% accurate. 19: Recognizing odd numbers in suit of cards while tapping the table and pt was 70% accurate.   -HG        Other Goals    Other Adult Goal- 1  Pt will complete thought organization tasks with 80% accuracy.  -HG     Status: Other Adult Goal- 1  Progressing as expected  -HG     Comments: Other Adult Goal- 1   11/12/19: RBANS Language score remained in the average range. 11/1/19: Divergent abstract naming: pt was 10/10 x 2. 10/18/19: Divergent abstract naming: pt was 100% accurate for 6 sets. Convergent naming: pt was 21/25. 10/15/19: RBANS Language score improved to a 92 from an 88 at last re-assessment. 10/1/19: Divergent naming: pt was 80% accurate. 9/16/19: RBANS Language score of 88 remains the same as time of initial eval. 8/30/19: Abstract divergent naming, pt was 8/10. 8/9/19: Divergent concrete category naming and pt was 15/15 x 2. 7/26/19: Divergent naming: pt was 10/10.   -HG     Other Adult Goal- 2  Pt will improve RBANS Total Score to at least 90 placing pt in the Average range.   -HG     Status: Other Adult Goal- 2  Progressing as expected  -HG     Comments: Other Adult Goal- 2  11/12/19: RBANS Total score of 87, slight increase from previous assessment. 10/15/19: RBANS Total score remained at 84 (Low Average). 9/16/19: RBANS Total score improved to an 84- up from 79 at time of initial eval.   -HG     Other Adult Goal- 3  Patient will perform divergent naming tasks, generating 10 items per abstract category within 60 seconds, across two trials in order to improve semantic fluency.  -HG     Status: Other Adult Goal- 3  Progressing as expected  -HG     Comments: Other Adult Goal- 3  4/14/17: 10/10 for abstract categories. 3/24/17: 11/10 for concrete categories.   Pt able to name 16 items in a sixty second period of time on the RBANS. 3/17/17: For abstract category naming, pt was 100% accurate. Pt was 10/15 for abstract items. 2/23: Pt was on average 10-13/15. 2/27: Abstract items: pt was at least 10 items with 2 minute time frame allowed. 3/3: Pt was 15/15 for abstract categories  3/6/17: For abstract naming in one minute: T1:10/10 T2:8/10  -HG     Other Adult Goal- 4  Patient will perform executive functioning task with 70% accuracy when provided prompts only in order to improve strategic thinking.  -HG      Status: Other Adult Goal- 4  Progressing as expected  -HG     Comments: Other Adult Goal- 4  4/14/17: Pt currently selling a house, managing finances, moving arrangments, traveling out of town, booking airfares, etc. 3/27/17: For recall of 16 objects and w/o review, pt was 50% accurate and with review ? 3/24/17: visuospatial recall, pt performed in the average range on the RBANS. 3/17/17: Pt given a task that involved visual recall as well as reasoning and problem solving and pt was ?Divided atten task while asked to perform a prospective memory task. Pt required mod cues this date. 2/23: Pt was 50-75% accurate this date with a mental flexibilty task and this was given a homework. 3/3: Card game played that was taught several weeks ago and pt was 70% accurate.  -HG     Other Adult Goal- 5  Patient will improve cognition as evidenced by STGs met. (LTG)  -HG     Status: Other Adult Goal- 5  Progressing as expected  -HG     Comments: Other Adult Goal- 5   4/14/17: For delayed storytelling, pt was 80% accuracy with no review strategies. 3/27/17: For delayed recall of names and pictures, pt was 80% accuracy.  3/24/17: For story re-telling, immediate and delayed, pt was borderline. 3/17/17: Pt had to recall 16 items with no review from almost 2 weeks ago and pt was 80% accurate. Pt using visualization and grouping in order to recall verbally presented information. 2/27: For visual recall of pictures, pt was 80% accurate.   -HG     Other Adult Goal- 6  Patient will perform alternating attention task with 80% accuracy across two trials in order to enhance mental flexibility and attention.  -HG     Status: Other Adult Goal- 6  Progressing as expected  -HG     Comments: Other Adult Goal- 6  3/27/17: For mental manipulation of three words forward and three words backward: pt was 100% accurate with 3 words and 80% accurate with four words. 3/24/17: For attention on the RBANS, pt was in the average range. Card game and pt was  80% accurate. 2/23: For answering questions given a list of four words, pt was 80% accurate. 2/27: For mental manipulation with numbers, pt was 100% accurate. 3/3: While playing cards and naming items from a delayed recall list activity, pt was 75% accurate. 3/6/17: While corssing out numbers, pt had to switch with background music provided: pt was 90% accurate.   -HG     Other Adult Goal- 7  Patient will perform divided attention task with 70% accuracy across two trials independently in order to improve multitasking abilities.  -HG     Status: Other Adult Goal- 7  Progressing as expected  -HG     Comments: Other Adult Goal- 7  4/14/17: For high level mult-tasking, pt was 80-90% accurate independently. 3/17/17: Pt required min cues for completing exec fxn task in the correct order. Pt to complete a maze while listening to music and pt was 50% accurate this date. 2/16: More of a prospective memory task thrown in the midst of a cognitive task and pt was 90% accurate.  2/23: For divided attention, pt was 70% accurate with min verbal cues. 3/3:While listening to music and recalling a word list, pt was 70% accurate.   -HG     Other Adult Goal- 8  Pt will recall short paragraph material presentally orally using strategies with 90% accuracy in order to improve immediate recall skills.  -HG     Status: Other Adult Goal- 8  New  -HG     Comments: Other Adult Goal- 8   3/27/17: For 22-36 word paragraphs, 85% accurate. For 36-50 words, 90% accurate.  pt was 3/6/17: Using a who what when where why strategy, pt was 80% accurate; without, pt was 60% accurate.   -HG        SLP Time Calculation    SLP Goal Re-Cert Due Date  12/12/19  -HG       User Key  (r) = Recorded By, (t) = Taken By, (c) = Cosigned By    Initials Name Provider Type    Anjali Cortes MS CCC-SLP Speech and Language Pathologist          OP SLP Education     Row Name 11/15/19 1500       Education    Education Comments  Hmwk for 8 un-related words.   -HG       User Key  (r) = Recorded By, (t) = Taken By, (c) = Cosigned By    Initials Name Effective Dates     Anjali Serrano MS CCC-SLP 06/22/15 -           OP SLP Assessment/Plan - 11/15/19 1500        SLP Plan    Plan Comments  Cont with Cog tx.    -HG      User Key  (r) = Recorded By, (t) = Taken By, (c) = Cosigned By    Initials Name Provider Type    Anjali Cortes MS CCC-SLP Speech and Language Pathologist                 Time Calculation:   SLP Start Time: 1500    Therapy Charges for Today     Code Description Service Date Service Provider Modifiers Qty    [id number]  ST TREATMENT SPEECH 5 11/15/2019 Anjali Serrano MS CCC-SLP GN 1                   Anjali Serrano MS CCC-SLP  11/15/2019

## 2019-11-19 ENCOUNTER — APPOINTMENT (OUTPATIENT)
Dept: SPEECH THERAPY | Facility: HOSPITAL | Age: 79
End: 2019-11-19

## 2019-11-20 ENCOUNTER — CLINICAL SUPPORT NO REQUIREMENTS (OUTPATIENT)
Dept: CARDIOLOGY | Facility: CLINIC | Age: 79
End: 2019-11-20

## 2019-11-20 DIAGNOSIS — I38 VALVULAR HEART DISEASE: Primary | ICD-10-CM

## 2019-11-20 DIAGNOSIS — I44.30 AV BLOCK: ICD-10-CM

## 2019-11-20 DIAGNOSIS — G45.9 TIA (TRANSIENT ISCHEMIC ATTACK): ICD-10-CM

## 2019-11-20 PROCEDURE — 93296 REM INTERROG EVL PM/IDS: CPT | Performed by: INTERNAL MEDICINE

## 2019-11-20 PROCEDURE — 93294 REM INTERROG EVL PM/LDLS PM: CPT | Performed by: INTERNAL MEDICINE

## 2019-11-21 ENCOUNTER — APPOINTMENT (OUTPATIENT)
Dept: SPEECH THERAPY | Facility: HOSPITAL | Age: 79
End: 2019-11-21

## 2019-12-05 ENCOUNTER — HOSPITAL ENCOUNTER (OUTPATIENT)
Dept: SPEECH THERAPY | Facility: HOSPITAL | Age: 79
Setting detail: THERAPIES SERIES
Discharge: HOME | End: 2019-12-05

## 2019-12-05 DIAGNOSIS — R41.3 MEMORY LOSS: Primary | ICD-10-CM

## 2019-12-05 PROCEDURE — 92507 TX SP LANG VOICE COMM INDIV: CPT

## 2019-12-05 NOTE — THERAPY TREATMENT NOTE
Outpatient Speech Language Pathology   Adult Speech Language Cognitive Treatment Note  [facility]     Patient Name: Uche Polanco  : 1940  MRN: [id number]  Today's Date: 2019         Visit Date: 2019   Patient Active Problem List   Diagnosis   • Valvular heart disease   • TIA (transient ischemic attack)   • Severe obstructive sleep apnea   • Hypertension   • Hyperlipidemia   • Gout   • CAD (coronary artery disease)   • AV block   • REM sleep behavior disorder   • Memory loss          Visit Dx:    ICD-10-CM ICD-9-CM   1. Memory loss R41.3 780.93                         SLP OP Goals     Row Name 19 1300          Goal Type Needed    Goal Type Needed  Memory;Attention/Orientation;Other Adult Goals  -HG        Subjective Comments    Subjective Comments  Pt alert, cooperative, returned with homework.   -HG        Auditory Comprehension Goals    Patient will comprehend abstract and complex information presented in all social, avocational, or work settings  90%:;with cues  -HG     Status: Patient will comprehend abstract and complex information presented in all social, avocational, or work settings  New  -HG     Patient will improve comprehension of spoken language by following Multi-step directions  80%:;with cues  -HG     Status: Patient will improve comprehension of spoken language by following Multi-step directions  Progressing as expected  -HG     Comments: Patient will improve comprehension of spoken language by following Multi-step directions  10/25/19: 3 step/6 component questions and pt was 90% accurate completing while directions were being read. 19: 3 step/6 component questions: pt was 13/15 for written directions after studying for hmwk. 19:Gave for hmwk. 9/3/19: 2 step/4 component directions: pt was 70% accurate.   19: 2 step oral directions: pt was 100% accurate. 3 step oral directions: pt was 12/15. 19: 2 step oral directions: 70% accuracy.   -HG     Patient will  demonstrate comprehension of spoken language by answering questions about a multi paragraph length content  80%:;with cues  -HG     Status: Patient will demonstrate comprehension of spoken language by answering questions about a multi paragraph length content  Achieved  -HG     Comments: Patient will demonstrate comprehension of spoken language by answering questions about a multi paragraph length content  11/12/19: RBANS Paragraph recall for immediate and pt was 15/24, delayed and pt was 6/12. 10/15/19: RBANS Paragraph recall for immediate: pt was 11/24 and for delayed, pt was 6/12. 10/1/19: 36-50 word paragraph: 11/12. 9/16/19: RBANS Paragraph recall and pt was 50% accurate for immediate.  9/13/19: 65-80 word paragraphs: pt was 100% accurate. 9/6/19: 50-65 word paragraphs: pt was 90% accurate.   -HG        Memory Goals    Patient and family will implement compensatory strategies to maximize patient’s Memory function so patient can continue to participate in daily activities  90%:;with cues  -HG     Status: Patient and family will implement compensatory strategies to maximize patient’s Memory function so patient can continue to participate in daily activities  Progressing as expected  -HG     Comments: Patient and family will implement compensatory strategies to maximize patient’s Memory function so patient can continue to participate in daily activities  11/12/19: RBANS Immediate recall score of 83 was improved from last re-assessment. 9/20/19: Immediate recall of Memory Puzzle Book and pt was 60% accurate.    -HG     Patient will demonstrate improved ability to recall information by immediately recalling a series of words  80%:;related;after delay;with cues  -HG     Status: Patient will demonstrate improved ability to recall information by immediately recalling a series of words  Progressing as expected  -HG     Comments: Patient will demonstrate improved ability to recall information by immediately recalling a  series of words  19: Immediate recall of memory book puzzle: pt was 70% accurate. 11/15/19: Immediate recall of Memory Puzzle Book and pt was 75% accurate. 19: ABC game for Immediate recall and pt was 70% accurate I'ly.  19: Recall of rules of familiar card game and pt was 80% accurate. 10/21/19: Recall from paragraph in an executive fxn task and pt was 50% accurate with mod cues. 10/18/19: Immediate recall for Memory Matches i pad game and pt was 70% accurate.  Fastest time memory matches and pt was impulsive but completed in a little 60 secs. 10/15/19: RBANS Immediate recall score dropped to a 73 from an 83 at last re-assessment. 10/4/19: Memory Recall for a picture scene and pt was 75% accurate. 19: Recalling Boxed Information for numbers: pt was 80% accurate. 19: Spaced Retrieval Method: pt was . 19: RBANS Immediate recall and pt improved score to an 83 up from 49 at time of eval. 19: Immediate recall of boxed information: pt was 85% accurate. 19: Immediate recall of shapes and figures (6) and pt was 75% accurate.  9/3/19: Immediate recall of shapes and figures (5) and pt improved to 80% accurate.  19: Pt was able to recall 5 related words with accuracy, 6 words, pt was 5/6. 19: 50-65 words: pt was 6/6 x 3 for immediate story recall. 19: 36-50 word paragraphs for immediate recall: pt was 7/8. Immediate recall for storytellin/3, using chunking: 3/3,   -HG     Patient’s memory skills will be enhanced as reported by patient by utilizing internal memory strategies to recall up to 3 pieces of information after a 5- minute delay  80%:;with cues  -HG     Status: Patient’s memory skills will be enhanced as reported by patient by utilizing internal memory strategies to recall up to 3 pieces of information after a 5- minute delay  Progressing as expected  -HG     Comments: Patient’s memory skills will be enhanced as reported by patient by utilizing internal  memory strategies to recall up to 3 pieces of information after a 5- minute delay  12/5/19: Delayed recall of 8 related words and pt was 8/8 x 2. 11/15/19: Delayed recall of 7 un-related words from hmwk and pt was 7/7 x 3. 11/12/19: RBANS Delayed recall score of 95 was improved from last re-assessment. 11/5/19: Delayed recall of 7 un-related words from homework and previous sessions and pt was 7/7. 11/1/19: Delayed recall of 6 un-related words: pt was 6/6 and added 2 more words and pt was 6/7 and then 6/8.  10/25/19: Delayed recall of 4 un-related words, pt was 4/4 with min cues, Independently got it 4/4 x 2.  10/21/19: Delayed recall for 7 un-related words from hwmk and pt was 6/7. With review and increased delay, pt was 7/7.  10/18/19: Delayed recall for 5 un-related word from homework and pt was 5/5 x 3. Added word 10/15/19: RBANS Delayed recall score of 78 dropped slightly from an 81 at previous re-assessment. 10/1/19:Delayed recall of paragraph information and pt was 2/3 for topics. 9/27/19: Delayed recall of 12 groupable pictures and pt was 12/12 x 2. 9/24/19: Delayed recall of 16 groupable words and pt was 14/16. Increased delay of 16 groupable words: pt was 10/16.  9/20/19: Delayed recall of words from SRT and pt was 3/12. 9/16/19: RBANS Delayed recall score of 81 down from 87 at time of eval.- remains in low average range.  9/3/19: Delayed recall of 8 un-related words and pt was 8/8 x 2. 8/30/19: Delayed recall of 7 un-related pictures, pt was 7/7 x 2. 8/23/19: Delayed recall of 6 un-related words: pt was 6/6 x 3- gave 7 for hmwk. 8/20/19: Delayed recall of 5 un-related words and pt was 5/5 x 2. 6 un-related words: pt was 4/6.  8/16/19: Delayed recall of 8 related words, pt was 8/8 x 2. 4 un-related words, pt was 4/4 x 2. 8/9/19: Delayed recall of 7 words: pt was 6/7 x 2, 7/7 x 2 8/6/19: Delayed recall of 5 related words and pt was 5/5, 6 related words: pt was 6/6. 7 related words using alphabetical order  and chunking according to starting letter and pt was 6/7 with min cue.  7/26/19: Delayed recall of 4 related words and pt was 3/4 I'ly; 4/4 x 2. Increased to 5 related words and pt was 4/5 and then 5/5.   -HG     Patient’s memory skills will be enhanced as reported by patient by using external memory aides  80%:;with cues  -HG     Status: Patient’s memory skills will be enhanced as reported by patient by using external memory aides  Progressing as expected  -HG     Comments: Patient’s memory skills will be enhanced as reported by patient by using external memory aides  11/7/19: Pt using his day planner for daily and monthly use. 10/25/19: Using planner for calendar with 100% accurate. Use of daily entries, pt was 80% accurate. 9/20/19: Pt is using planner 90% of the time with mild-moderate level of details. 9/6/19: Pt is making daily notes in his planner. 9/3/19: Pt using his calendar consistently however, pt not using the daily journaling section as much as SLP would like. 8/30/19: Pt required min cues to catch up on daily entries. 8/16/19: Pt continues to use his planner daily. 8/9/19: Pt with increased use of his planner with daily notes.  7/26/19: Fxnl use of planner: pt was writing down his TO DO LIST and not what he had done.   -HG        Attention/Orientation Goals    Patient will be able to execute all activities necessary to manage a home  Independently  -HG     Status: Patient will be able to execute all activities necessary to manage a home  Progressing as expected  -HG     Comments: Patient will be able to execute all activities necessary to manage a home  11/12/19: RBANS Attention score of 85 places pt in the low average range which is where pt was at last re-assessement. 10/15/19: RBANS Attention score remained an 88 since previous assessment. 9/24/19: 3 step/6 componenet oral directions and pt was 9/12.   -HG     Patient will improve attention skills by sustaining focus in order to actively hold and  manipulate information provided (e.g., sequencing auditorily presented number series in ascending or descending order)  90%:;with cues  -HG     Status: Patient will improve attention skills by sustaining focus in order to actively hold and manipulate information provided (e.g., sequencing auditorily presented number series in ascending or descending order)  Progressing as expected  -HG     Comments: Patient will improve attention skills by sustaining focus in order to actively hold and manipulate information provided (e.g., sequencing auditorily presented number series in ascending or descending order)  12/5/19: Attention for card sorting and pt was 85% accurate. 11/1/19: Alternating Attention: pt was Above Average on the APT. 10/4/19: Mental Manipulation 4 word progression: pt was 60% accurate with repetition needed.  9/10/19: 4 word for category exclusion and pt was 90% accurate and for 5 words, pt was 60% accurate.  8/30/19: Mental Manipulation: pt was 80% accurate with re-read for ranking. 8/20/19: Mental Manipulation for worder order of alphabetical vs reversal and pt was 50% accurate.  8/6/19: Mental Manipulation for word placement and pt was 80% accurate for attribute and placement.   -HG     Patient will improve attention skills by alternating or shifting focus between two different tasks in order to complete both tasks  80%:;with cues  -HG     Status: Patient will improve attention skills by alternating or shifting focus between two different tasks in order to complete both tasks  Progressing as expected  -HG     Comments: Patient will improve attention skills by alternating or shifting focus between two different tasks in order to complete both tasks  11/15/19: Attention to detail for use of a chart and analyzing numbers- new task for pt and pt was 90% accurate. 11/1/19: Divided Attention APT score was Below Average. 10/21/19: While completing an executive function task, pt exhibited difficulty with  attention and required mod cues and was 70% accurate. 19: While cards being turned over, pt had to tap table if the card played was lower than the previous one. Pt was 60% accurate. 9/10/19: Pt able to recall up to 7 digits. 19: Visual Scanning: Pt required mod cues for alternating between letters and was 80 % accurate.  19: Card game and pt was 80% accurate for new rules and watching increasing piles of cards for potential moves.  19: Sorting card by suit and  and pt was 70% accurate. 19: Sorting card by suit and  and pt required mod cues and repeated instructions and pt was 60% accurate.   -HG     Patient will improve attention skills by sustaining focus to high-level cognitive tasks in order to complete task  80%:;with cues  -HG     Status: Patient will improve attention skills by sustaining focus to high-level cognitive tasks in order to complete task  Progressing as expected  -HG     Comments: Patient will improve attention skills by sustaining focus to high-level cognitive tasks in order to complete task  19: One Pile Card Game and pt was 80% accurate with min cues. 10/25/19: Sustained Attention on APT and pt was 30/30 and for Complex Sustained Attention and pt was 24/30- above average.  19: ID of even numbers and pt was 90% accurate during a game of fast pace recognizition. 19: One Pile Card Game and pt was 75% accurate this date with min cues needed t/o the game. 19: One pile card game, pt requires less cues than previous sessions. 9/10/19: One Pile Card Game and pt required mod cues and pt was 70% accurate. 19: One Pile Card game: Pt required max cues in order to recall the rules of the game and to play accordingly. 19: One Pile Card Game: pt was 70% accurate with min cues.  19: One Pile Card Game: pt was 70% accurate. 19: Recognizing odd numbers in suit of cards while tapping the table and pt was 70% accurate.   -HG        Other Goals     Other Adult Goal- 1  Pt will complete thought organization tasks with 80% accuracy.  -HG     Status: Other Adult Goal- 1  Progressing as expected  -HG     Comments: Other Adult Goal- 1  11/12/19: RBANS Language score remained in the average range. 11/1/19: Divergent abstract naming: pt was 10/10 x 2. 10/18/19: Divergent abstract naming: pt was 100% accurate for 6 sets. Convergent naming: pt was 21/25. 10/15/19: RBANS Language score improved to a 92 from an 88 at last re-assessment. 10/1/19: Divergent naming: pt was 80% accurate. 9/16/19: RBANS Language score of 88 remains the same as time of initial eval. 8/30/19: Abstract divergent naming, pt was 8/10. 8/9/19: Divergent concrete category naming and pt was 15/15 x 2. 7/26/19: Divergent naming: pt was 10/10.   -HG     Other Adult Goal- 2  Pt will improve RBANS Total Score to at least 90 placing pt in the Average range.   -HG     Status: Other Adult Goal- 2  Progressing as expected  -HG     Comments: Other Adult Goal- 2  11/12/19: RBANS Total score of 87, slight increase from previous assessment. 10/15/19: RBANS Total score remained at 84 (Low Average). 9/16/19: RBANS Total score improved to an 84- up from 79 at time of initial eval.   -HG     Other Adult Goal- 3  Patient will perform divergent naming tasks, generating 10 items per abstract category within 60 seconds, across two trials in order to improve semantic fluency.  -HG     Status: Other Adult Goal- 3  Progressing as expected  -HG     Comments: Other Adult Goal- 3  4/14/17: 10/10 for abstract categories. 3/24/17: 11/10 for concrete categories.   Pt able to name 16 items in a sixty second period of time on the RBANS. 3/17/17: For abstract category naming, pt was 100% accurate. Pt was 10/15 for abstract items. 2/23: Pt was on average 10-13/15. 2/27: Abstract items: pt was at least 10 items with 2 minute time frame allowed. 3/3: Pt was 15/15 for abstract categories  3/6/17: For abstract naming in one minute:  T1:10/10 T2:8/10  -HG     Other Adult Goal- 4  Patient will perform executive functioning task with 70% accuracy when provided prompts only in order to improve strategic thinking.  -HG     Status: Other Adult Goal- 4  Progressing as expected  -HG     Comments: Other Adult Goal- 4  4/14/17: Pt currently selling a house, managing finances, moving arrangments, traveling out of town, booking airfares, etc. 3/27/17: For recall of 16 objects and w/o review, pt was 50% accurate and with review ? 3/24/17: visuospatial recall, pt performed in the average range on the RBANS. 3/17/17: Pt given a task that involved visual recall as well as reasoning and problem solving and pt was ?Divided atten task while asked to perform a prospective memory task. Pt required mod cues this date. 2/23: Pt was 50-75% accurate this date with a mental flexibilty task and this was given a homework. 3/3: Card game played that was taught several weeks ago and pt was 70% accurate.  -HG     Other Adult Goal- 5  Patient will improve cognition as evidenced by STGs met. (LTG)  -HG     Status: Other Adult Goal- 5  Progressing as expected  -HG     Comments: Other Adult Goal- 5   4/14/17: For delayed storytelling, pt was 80% accuracy with no review strategies. 3/27/17: For delayed recall of names and pictures, pt was 80% accuracy.  3/24/17: For story re-telling, immediate and delayed, pt was borderline. 3/17/17: Pt had to recall 16 items with no review from almost 2 weeks ago and pt was 80% accurate. Pt using visualization and grouping in order to recall verbally presented information. 2/27: For visual recall of pictures, pt was 80% accurate.   -HG     Other Adult Goal- 6  Patient will perform alternating attention task with 80% accuracy across two trials in order to enhance mental flexibility and attention.  -HG     Status: Other Adult Goal- 6  Progressing as expected  -HG     Comments: Other Adult Goal- 6  3/27/17: For mental manipulation of three words  forward and three words backward: pt was 100% accurate with 3 words and 80% accurate with four words. 3/24/17: For attention on the RBANS, pt was in the average range. Card game and pt was 80% accurate. 2/23: For answering questions given a list of four words, pt was 80% accurate. 2/27: For mental manipulation with numbers, pt was 100% accurate. 3/3: While playing cards and naming items from a delayed recall list activity, pt was 75% accurate. 3/6/17: While corssing out numbers, pt had to switch with background music provided: pt was 90% accurate.   -HG     Other Adult Goal- 7  Patient will perform divided attention task with 70% accuracy across two trials independently in order to improve multitasking abilities.  -HG     Status: Other Adult Goal- 7  Progressing as expected  -HG     Comments: Other Adult Goal- 7  4/14/17: For high level mult-tasking, pt was 80-90% accurate independently. 3/17/17: Pt required min cues for completing exec fxn task in the correct order. Pt to complete a maze while listening to music and pt was 50% accurate this date. 2/16: More of a prospective memory task thrown in the midst of a cognitive task and pt was 90% accurate.  2/23: For divided attention, pt was 70% accurate with min verbal cues. 3/3:While listening to music and recalling a word list, pt was 70% accurate.   -HG     Other Adult Goal- 8  Pt will recall short paragraph material presentally orally using strategies with 90% accuracy in order to improve immediate recall skills.  -HG     Status: Other Adult Goal- 8  New  -HG     Comments: Other Adult Goal- 8   3/27/17: For 22-36 word paragraphs, 85% accurate. For 36-50 words, 90% accurate.  pt was 3/6/17: Using a who what when where why strategy, pt was 80% accurate; without, pt was 60% accurate.   -HG        SLP Time Calculation    SLP Goal Re-Cert Due Date  12/12/19  -HG       User Key  (r) = Recorded By, (t) = Taken By, (c) = Cosigned By    Initials Name Provider Type      Anjali Serrano MS CCC-SLP Speech and Language Pathologist          OP SLP Education     Row Name 12/05/19 1300       Education    Education Comments  Hmwk for strategies at home for recall.   -HG      User Key  (r) = Recorded By, (t) = Taken By, (c) = Cosigned By    Initials Name Effective Dates    Anjali Cortes MS CCC-SLP 06/22/15 -           OP SLP Assessment/Plan - 12/05/19 1300        SLP Plan    Plan Comments  Cont with Cog tx.    -      User Key  (r) = Recorded By, (t) = Taken By, (c) = Cosigned By    Initials Name Provider Type    Anjali Cortes MS CCC-SLP Speech and Language Pathologist                 Time Calculation:   SLP Start Time: 1300    Therapy Charges for Today     Code Description Service Date Service Provider Modifiers Qty    [id number]  ST TREATMENT SPEECH 4 12/5/2019 Anjali Serrano MS CCC-SLP GN 1                   Anjali Serrano MS CCC-SLP  12/5/2019

## 2019-12-06 ENCOUNTER — HOSPITAL ENCOUNTER (OUTPATIENT)
Dept: CARDIOLOGY | Facility: HOSPITAL | Age: 79
Discharge: HOME | End: 2019-12-06
Admitting: INTERNAL MEDICINE

## 2019-12-06 VITALS — HEIGHT: 73 IN | WEIGHT: 208 LBS | BODY MASS INDEX: 27.57 KG/M2

## 2019-12-06 DIAGNOSIS — I38 VALVULAR HEART DISEASE: ICD-10-CM

## 2019-12-06 LAB
BH CV ECHO MEAS - AI DEC SLOPE: 175 CM/SEC^2
BH CV ECHO MEAS - AI MAX PG: 52.7 MMHG
BH CV ECHO MEAS - AI MAX VEL: 363 CM/SEC
BH CV ECHO MEAS - AI P1/2T: 607.5 MSEC
BH CV ECHO MEAS - AO ROOT AREA (BSA CORRECTED): 1.8
BH CV ECHO MEAS - AO ROOT AREA: 11.9 CM^2
BH CV ECHO MEAS - AO ROOT DIAM: 3.9 CM
BH CV ECHO MEAS - BSA(HAYCOCK): 2.2 M^2
BH CV ECHO MEAS - BSA: 2.2 M^2
BH CV ECHO MEAS - BZI_BMI: 27.4 KILOGRAMS/M^2
BH CV ECHO MEAS - BZI_METRIC_HEIGHT: 185.4 CM
BH CV ECHO MEAS - BZI_METRIC_WEIGHT: 94.3 KG
BH CV ECHO MEAS - EDV(CUBED): 216 ML
BH CV ECHO MEAS - EDV(MOD-SP2): 110 ML
BH CV ECHO MEAS - EDV(MOD-SP4): 125 ML
BH CV ECHO MEAS - EDV(TEICH): 180 ML
BH CV ECHO MEAS - EF(CUBED): 72.5 %
BH CV ECHO MEAS - EF(MOD-SP2): 52.1 %
BH CV ECHO MEAS - EF(MOD-SP4): 51 %
BH CV ECHO MEAS - EF(TEICH): 63.4 %
BH CV ECHO MEAS - ESV(CUBED): 59.3 ML
BH CV ECHO MEAS - ESV(MOD-SP2): 52.7 ML
BH CV ECHO MEAS - ESV(MOD-SP4): 61.2 ML
BH CV ECHO MEAS - ESV(TEICH): 65.9 ML
BH CV ECHO MEAS - FS: 35 %
BH CV ECHO MEAS - IVS/LVPW: 1
BH CV ECHO MEAS - IVSD: 0.9 CM
BH CV ECHO MEAS - LA DIMENSION: 4.6 CM
BH CV ECHO MEAS - LA/AO: 1.2
BH CV ECHO MEAS - LV DIASTOLIC VOL/BSA (35-75): 57.1 ML/M^2
BH CV ECHO MEAS - LV MASS(C)D: 215.7 GRAMS
BH CV ECHO MEAS - LV MASS(C)DI: 98.6 GRAMS/M^2
BH CV ECHO MEAS - LV SYSTOLIC VOL/BSA (12-30): 28 ML/M^2
BH CV ECHO MEAS - LVIDD: 6 CM
BH CV ECHO MEAS - LVIDS: 3.9 CM
BH CV ECHO MEAS - LVLD AP2: 8 CM
BH CV ECHO MEAS - LVLD AP4: 9 CM
BH CV ECHO MEAS - LVLS AP2: 7.2 CM
BH CV ECHO MEAS - LVLS AP4: 7.4 CM
BH CV ECHO MEAS - LVOT AREA (M): 4.9 CM^2
BH CV ECHO MEAS - LVOT AREA: 4.9 CM^2
BH CV ECHO MEAS - LVOT DIAM: 2.5 CM
BH CV ECHO MEAS - LVPWD: 0.9 CM
BH CV ECHO MEAS - SI(CUBED): 71.6 ML/M^2
BH CV ECHO MEAS - SI(MOD-SP2): 26.2 ML/M^2
BH CV ECHO MEAS - SI(MOD-SP4): 29.2 ML/M^2
BH CV ECHO MEAS - SI(TEICH): 52.1 ML/M^2
BH CV ECHO MEAS - SV(CUBED): 156.7 ML
BH CV ECHO MEAS - SV(MOD-SP2): 57.3 ML
BH CV ECHO MEAS - SV(MOD-SP4): 63.8 ML
BH CV ECHO MEAS - SV(TEICH): 114.1 ML
BH CV VAS BP LEFT ARM: NORMAL MMHG

## 2019-12-06 PROCEDURE — 93321 DOPPLER ECHO F-UP/LMTD STD: CPT

## 2019-12-06 PROCEDURE — 93321 DOPPLER ECHO F-UP/LMTD STD: CPT | Performed by: INTERNAL MEDICINE

## 2019-12-06 PROCEDURE — 93325 DOPPLER ECHO COLOR FLOW MAPG: CPT | Performed by: INTERNAL MEDICINE

## 2019-12-06 PROCEDURE — 93308 TTE F-UP OR LMTD: CPT | Performed by: INTERNAL MEDICINE

## 2019-12-06 PROCEDURE — 25010000002 SULFUR HEXAFLUORIDE MICROSPH 60.7-25 MG RECONSTITUTED SUSPENSION: Performed by: INTERNAL MEDICINE

## 2019-12-06 PROCEDURE — 93325 DOPPLER ECHO COLOR FLOW MAPG: CPT

## 2019-12-06 PROCEDURE — 93308 TTE F-UP OR LMTD: CPT

## 2019-12-06 RX ADMIN — SULFUR HEXAFLUORIDE 3 ML: KIT at 11:22

## 2019-12-10 ENCOUNTER — HOSPITAL ENCOUNTER (OUTPATIENT)
Dept: SPEECH THERAPY | Facility: HOSPITAL | Age: 79
Setting detail: THERAPIES SERIES
Discharge: HOME | End: 2019-12-10

## 2019-12-10 DIAGNOSIS — R41.3 MEMORY LOSS: Primary | ICD-10-CM

## 2019-12-10 PROCEDURE — 92507 TX SP LANG VOICE COMM INDIV: CPT

## 2019-12-10 NOTE — THERAPY PROGRESS REPORT/RE-CERT
Outpatient Speech Language Pathology   Adult Speech Language Cognitive Progress Note  [facility]     Patient Name: Uche Polanco  : 1940  MRN: [id number]  Today's Date: 12/10/2019         Visit Date: 12/10/2019   Patient Active Problem List   Diagnosis   • Valvular heart disease   • TIA (transient ischemic attack)   • Severe obstructive sleep apnea   • Hypertension   • Hyperlipidemia   • Gout   • CAD (coronary artery disease)   • AV block   • REM sleep behavior disorder   • Memory loss          Visit Dx:    ICD-10-CM ICD-9-CM   1. Memory loss R41.3 780.93                         SLP OP Goals     Row Name 12/10/19 0800          Goal Type Needed    Goal Type Needed  Memory;Attention/Orientation;Other Adult Goals  -HG        Subjective Comments    Subjective Comments  Pt alert, cooperative, returned with homework.   -HG        Auditory Comprehension Goals    Patient will comprehend abstract and complex information presented in all social, avocational, or work settings  90%:;with cues  -HG     Status: Patient will comprehend abstract and complex information presented in all social, avocational, or work settings  New  -HG     Patient will improve comprehension of spoken language by following Multi-step directions  80%:;with cues  -HG     Status: Patient will improve comprehension of spoken language by following Multi-step directions  Progressing as expected  -HG     Comments: Patient will improve comprehension of spoken language by following Multi-step directions  10/25/19: 3 step/6 component questions and pt was 90% accurate completing while directions were being read. 19: 3 step/6 component questions: pt was 13/15 for written directions after studying for hmwk. 19:Gave for hmwk. 9/3/19: 2 step/4 component directions: pt was 70% accurate.   19: 2 step oral directions: pt was 100% accurate. 3 step oral directions: pt was 12/15. 19: 2 step oral directions: 70% accuracy.   -HG     Patient  will demonstrate comprehension of spoken language by answering questions about a multi paragraph length content  80%:;with cues  -HG     Status: Patient will demonstrate comprehension of spoken language by answering questions about a multi paragraph length content  Achieved  -HG     Comments: Patient will demonstrate comprehension of spoken language by answering questions about a multi paragraph length content  11/12/19: RBANS Paragraph recall for immediate and pt was 15/24, delayed and pt was 6/12. 10/15/19: RBANS Paragraph recall for immediate: pt was 11/24 and for delayed, pt was 6/12. 10/1/19: 36-50 word paragraph: 11/12. 9/16/19: RBANS Paragraph recall and pt was 50% accurate for immediate.  9/13/19: 65-80 word paragraphs: pt was 100% accurate. 9/6/19: 50-65 word paragraphs: pt was 90% accurate.   -HG        Memory Goals    Patient and family will implement compensatory strategies to maximize patient’s Memory function so patient can continue to participate in daily activities  90%:;with cues  -HG     Status: Patient and family will implement compensatory strategies to maximize patient’s Memory function so patient can continue to participate in daily activities  Progressing as expected  -HG     Comments: Patient and family will implement compensatory strategies to maximize patient’s Memory function so patient can continue to participate in daily activities  11/12/19: RBANS Immediate recall score of 83 was improved from last re-assessment. 9/20/19: Immediate recall of Memory Puzzle Book and pt was 60% accurate.    -HG     Patient will demonstrate improved ability to recall information by immediately recalling a series of words  80%:;related;after delay;with cues  -HG     Status: Patient will demonstrate improved ability to recall information by immediately recalling a series of words  Progressing as expected  -HG     Comments: Patient will demonstrate improved ability to recall information by immediately  recalling a series of words  12/10/19: Memory for puzzles and pt was 70% accurate. 19: Immediate recall of memory book puzzle: pt was 70% accurate. 11/15/19: Immediate recall of Memory Puzzle Book and pt was 75% accurate. 19: ABC game for Immediate recall and pt was 70% accurate I'ly.  19: Recall of rules of familiar card game and pt was 80% accurate. 10/21/19: Recall from paragraph in an executive fxn task and pt was 50% accurate with mod cues. 10/18/19: Immediate recall for Memory Matches i pad game and pt was 70% accurate.  Fastest time memory matches and pt was impulsive but completed in a little 60 secs. 10/15/19: RBANS Immediate recall score dropped to a 73 from an 83 at last re-assessment. 10/4/19: Memory Recall for a picture scene and pt was 75% accurate. 19: Recalling Boxed Information for numbers: pt was 80% accurate. 19: Spaced Retrieval Method: pt was . 19: RBANS Immediate recall and pt improved score to an 83 up from 49 at time of eval. 19: Immediate recall of boxed information: pt was 85% accurate. 19: Immediate recall of shapes and figures (6) and pt was 75% accurate.  9/3/19: Immediate recall of shapes and figures (5) and pt improved to 80% accurate.  19: Pt was able to recall 5 related words with accuracy, 6 words, pt was 5/6. 19: 50-65 words: pt was 6/6 x 3 for immediate story recall. 19: 36-50 word paragraphs for immediate recall: pt was 7/8. Immediate recall for storytellin/3, using chunking: 3/3,   -HG     Patient’s memory skills will be enhanced as reported by patient by utilizing internal memory strategies to recall up to 3 pieces of information after a 5- minute delay  80%:;with cues  -HG     Status: Patient’s memory skills will be enhanced as reported by patient by utilizing internal memory strategies to recall up to 3 pieces of information after a 5- minute delay  Progressing as expected  -HG     Comments: Patient’s memory skills  will be enhanced as reported by patient by utilizing internal memory strategies to recall up to 3 pieces of information after a 5- minute delay  12/10/19: Delayed recall of 8 related words and pt was 100% accurate. 4 un-related words, pt was 2/4, 3/4, 4/4.  12/5/19: Delayed recall of 8 related words and pt was 8/8 x 2. 11/15/19: Delayed recall of 7 un-related words from hmwk and pt was 7/7 x 3. 11/12/19: RBANS Delayed recall score of 95 was improved from last re-assessment. 11/5/19: Delayed recall of 7 un-related words from homework and previous sessions and pt was 7/7. 11/1/19: Delayed recall of 6 un-related words: pt was 6/6 and added 2 more words and pt was 6/7 and then 6/8.  10/25/19: Delayed recall of 4 un-related words, pt was 4/4 with min cues, Independently got it 4/4 x 2.  10/21/19: Delayed recall for 7 un-related words from hwmk and pt was 6/7. With review and increased delay, pt was 7/7.  10/18/19: Delayed recall for 5 un-related word from homework and pt was 5/5 x 3. Added word 10/15/19: RBANS Delayed recall score of 78 dropped slightly from an 81 at previous re-assessment. 10/1/19:Delayed recall of paragraph information and pt was 2/3 for topics. 9/27/19: Delayed recall of 12 groupable pictures and pt was 12/12 x 2. 9/24/19: Delayed recall of 16 groupable words and pt was 14/16. Increased delay of 16 groupable words: pt was 10/16.  9/20/19: Delayed recall of words from SRT and pt was 3/12. 9/16/19: RBANS Delayed recall score of 81 down from 87 at time of eval.- remains in low average range.  9/3/19: Delayed recall of 8 un-related words and pt was 8/8 x 2. 8/30/19: Delayed recall of 7 un-related pictures, pt was 7/7 x 2. 8/23/19: Delayed recall of 6 un-related words: pt was 6/6 x 3- gave 7 for hmwk. 8/20/19: Delayed recall of 5 un-related words and pt was 5/5 x 2. 6 un-related words: pt was 4/6.  8/16/19: Delayed recall of 8 related words, pt was 8/8 x 2. 4 un-related words, pt was 4/4 x 2. 8/9/19:  Delayed recall of 7 words: pt was 6/7 x 2, 7/7 x 2 8/6/19: Delayed recall of 5 related words and pt was 5/5, 6 related words: pt was 6/6. 7 related words using alphabetical order and chunking according to starting letter and pt was 6/7 with min cue.  7/26/19: Delayed recall of 4 related words and pt was 3/4 I'ly; 4/4 x 2. Increased to 5 related words and pt was 4/5 and then 5/5.   -HG     Patient’s memory skills will be enhanced as reported by patient by using external memory aides  80%:;with cues  -HG     Status: Patient’s memory skills will be enhanced as reported by patient by using external memory aides  Progressing as expected  -HG     Comments: Patient’s memory skills will be enhanced as reported by patient by using external memory aides  11/7/19: Pt using his day planner for daily and monthly use. 10/25/19: Using planner for calendar with 100% accurate. Use of daily entries, pt was 80% accurate. 9/20/19: Pt is using planner 90% of the time with mild-moderate level of details. 9/6/19: Pt is making daily notes in his planner. 9/3/19: Pt using his calendar consistently however, pt not using the daily journaling section as much as SLP would like. 8/30/19: Pt required min cues to catch up on daily entries. 8/16/19: Pt continues to use his planner daily. 8/9/19: Pt with increased use of his planner with daily notes.  7/26/19: Fxnl use of planner: pt was writing down his TO DO LIST and not what he had done.   -HG        Attention/Orientation Goals    Patient will be able to execute all activities necessary to manage a home  Independently  -HG     Status: Patient will be able to execute all activities necessary to manage a home  Progressing as expected  -HG     Comments: Patient will be able to execute all activities necessary to manage a home  11/12/19: RBANS Attention score of 85 places pt in the low average range which is where pt was at last re-assessement. 10/15/19: RBANS Attention score remained an 88 since  previous assessment. 9/24/19: 3 step/6 componenet oral directions and pt was 9/12.   -HG     Patient will improve attention skills by sustaining focus in order to actively hold and manipulate information provided (e.g., sequencing auditorily presented number series in ascending or descending order)  90%:;with cues  -HG     Status: Patient will improve attention skills by sustaining focus in order to actively hold and manipulate information provided (e.g., sequencing auditorily presented number series in ascending or descending order)  Progressing as expected  -HG     Comments: Patient will improve attention skills by sustaining focus in order to actively hold and manipulate information provided (e.g., sequencing auditorily presented number series in ascending or descending order)  12/5/19: Attention for card sorting and pt was 85% accurate. 11/1/19: Alternating Attention: pt was Above Average on the APT. 10/4/19: Mental Manipulation 4 word progression: pt was 60% accurate with repetition needed.  9/10/19: 4 word for category exclusion and pt was 90% accurate and for 5 words, pt was 60% accurate.  8/30/19: Mental Manipulation: pt was 80% accurate with re-read for ranking. 8/20/19: Mental Manipulation for worder order of alphabetical vs reversal and pt was 50% accurate.  8/6/19: Mental Manipulation for word placement and pt was 80% accurate for attribute and placement.   -HG     Patient will improve attention skills by alternating or shifting focus between two different tasks in order to complete both tasks  80%:;with cues  -HG     Status: Patient will improve attention skills by alternating or shifting focus between two different tasks in order to complete both tasks  Progressing as expected  -HG     Comments: Patient will improve attention skills by alternating or shifting focus between two different tasks in order to complete both tasks  12/10/19: Attention for [place] in the Corner and pt was 80% accurate with mod  cues. 11/15/19: Attention to detail for use of a chart and analyzing numbers- new task for pt and pt was 90% accurate. 19: Divided Attention APT score was Below Average. 10/21/19: While completing an executive function task, pt exhibited difficulty with attention and required mod cues and was 70% accurate. 19: While cards being turned over, pt had to tap table if the card played was lower than the previous one. Pt was 60% accurate. 9/10/19: Pt able to recall up to 7 digits. 19: Visual Scanning: Pt required mod cues for alternating between letters and was 80 % accurate.  19: Card game and pt was 80% accurate for new rules and watching increasing piles of cards for potential moves.  19: Sorting card by suit and  and pt was 70% accurate. 19: Sorting card by suit and  and pt required mod cues and repeated instructions and pt was 60% accurate.   -HG     Patient will improve attention skills by sustaining focus to high-level cognitive tasks in order to complete task  80%:;with cues  -HG     Status: Patient will improve attention skills by sustaining focus to high-level cognitive tasks in order to complete task  Progressing as expected  -HG     Comments: Patient will improve attention skills by sustaining focus to high-level cognitive tasks in order to complete task  12/10/19: One Pile Card Game: pt was 80% accurate with min cues. 19: One Pile Card Game and pt was 80% accurate with min cues. 10/25/19: Sustained Attention on APT and pt was 30/30 and for Complex Sustained Attention and pt was 24/30- above average.  19: ID of even numbers and pt was 90% accurate during a game of fast pace recognizition. 19: One Pile Card Game and pt was 75% accurate this date with min cues needed t/o the game. 19: One pile card game, pt requires less cues than previous sessions. 9/10/19: One Pile Card Game and pt required mod cues and pt was 70% accurate. 19: One Pile Card game: Pt  required max cues in order to recall the rules of the game and to play accordingly. 8/20/19: One Pile Card Game: pt was 70% accurate with min cues.  8/9/19: One Pile Card Game: pt was 70% accurate. 7/26/19: Recognizing odd numbers in suit of cards while tapping the table and pt was 70% accurate.   -HG        Other Goals    Other Adult Goal- 1  Pt will complete thought organization tasks with 80% accuracy.  -HG     Status: Other Adult Goal- 1  Progressing as expected  -HG     Comments: Other Adult Goal- 1  11/12/19: RBANS Language score remained in the average range. 11/1/19: Divergent abstract naming: pt was 10/10 x 2. 10/18/19: Divergent abstract naming: pt was 100% accurate for 6 sets. Convergent naming: pt was 21/25. 10/15/19: RBANS Language score improved to a 92 from an 88 at last re-assessment. 10/1/19: Divergent naming: pt was 80% accurate. 9/16/19: RBANS Language score of 88 remains the same as time of initial eval. 8/30/19: Abstract divergent naming, pt was 8/10. 8/9/19: Divergent concrete category naming and pt was 15/15 x 2. 7/26/19: Divergent naming: pt was 10/10.   -HG     Other Adult Goal- 2  Pt will improve RBANS Total Score to at least 90 placing pt in the Average range.   -HG     Status: Other Adult Goal- 2  Progressing as expected  -HG     Comments: Other Adult Goal- 2  --  -HG     Other Adult Goal- 3  --  -HG     Status: Other Adult Goal- 3  --  -HG     Comments: Other Adult Goal- 3  --  -HG     Other Adult Goal- 4  --  -HG     Status: Other Adult Goal- 4  --  -HG     Comments: Other Adult Goal- 4  --  -HG     Other Adult Goal- 5  --  -HG     Status: Other Adult Goal- 5  --  -HG     Comments: Other Adult Goal- 5  --  -HG     Other Adult Goal- 6  --  -HG     Status: Other Adult Goal- 6  --  -HG     Comments: Other Adult Goal- 6  --  -HG     Other Adult Goal- 7  --  -HG     Status: Other Adult Goal- 7  --  -HG     Comments: Other Adult Goal- 7  --  -HG     Other Adult Goal- 8  --  -HG     Status:  Other Adult Goal- 8  --  -HG     Comments: Other Adult Goal- 8  --  -HG        SLP Time Calculation    SLP Goal Re-Cert Due Date  01/09/20  -HG       User Key  (r) = Recorded By, (t) = Taken By, (c) = Cosigned By    Initials Name Provider Type    Anjali Cortes MS CCC-SLP Speech and Language Pathologist          OP SLP Education     Row Name 12/10/19 0800       Education    Barriers to Learning  No barriers identified  -HG    Education Provided  Patient demonstrated recommended strategies;Patient requires further education on strategies, risks  -HG    Assessed  Learning needs;Learning motivation;Learning preferences;Learning readiness  -    Learning Motivation  Strong  -    Learning Method  Explanation;Demonstration;Written materials;Teach back  -HG    Teaching Response  Verbalized understanding;Demonstrated understanding;Reinforcement needed  -HG    Education Comments  Hmwk for 5 un-related words.   -HG      User Key  (r) = Recorded By, (t) = Taken By, (c) = Cosigned By    Initials Name Effective Dates     Anjali Serrano MS MARGO-SLP 06/22/15 -           OP SLP Assessment/Plan - 12/10/19 0800        SLP Assessment    Functional Problems  Speech Language- Adult/Cognition   -    Impact on Function: Adult Speech Language/Cognition  Trouble learning or remembering new information   -    Clinical Impression: Speech Language-Adult/Congnition  Minimal:;Mild:;Cognitive Communication Impairment   -HG    Clinical Impression Comments  RBANS not re-administered this date.    -HG    SLP Diagnosis  Minimal to Mild Cognitive Impairment   -HG    Prognosis  Excellent (comment)   -HG    Patient/caregiver participated in establishment of treatment plan and goals  Yes   -HG    Patient would benefit from skilled therapy intervention  Yes   -HG       SLP Plan    Frequency  1-2x/week   -HG    Duration  4 weeks   -HG    Planned CPT's?  SLP INDIVIDUAL SPEECH THERAPY: 06545   -HG    Expected Duration Therapy Session -  minutes  45-60 minutes   -    Plan Comments  Cont with Cog tx for 3 more tx sessions.   -      User Key  (r) = Recorded By, (t) = Taken By, (c) = Cosigned By    Initials Name Provider Type    Anjali Cortes MS CCC-SLP Speech and Language Pathologist                 Time Calculation:   SLP Start Time: 0800    Therapy Charges for Today     Code Description Service Date Service Provider Modifiers Qty    [id number]  ST TREATMENT SPEECH 5 12/10/2019 Anjali Serrano MS CCC-SLP GN 1                   Anjali Serrano MS CCC-SLP  12/10/2019

## 2019-12-11 RX ORDER — DONEPEZIL HYDROCHLORIDE 10 MG/1
10 TABLET, FILM COATED ORAL DAILY
Qty: 30 TABLET | Refills: 2 | Status: SHIPPED | OUTPATIENT
Start: 2019-12-11 | End: 2020-01-06 | Stop reason: SDUPTHER

## 2019-12-12 ENCOUNTER — APPOINTMENT (OUTPATIENT)
Dept: SPEECH THERAPY | Facility: HOSPITAL | Age: 79
End: 2019-12-12

## 2019-12-16 ENCOUNTER — TELEPHONE (OUTPATIENT)
Dept: NEUROLOGY | Facility: CLINIC | Age: 79
End: 2019-12-16

## 2019-12-16 NOTE — TELEPHONE ENCOUNTER
Pt called and stated that he missed his scheduled appt today and was wondering if there is anyway he could still be seen today. Please advise. Thanks.

## 2019-12-16 NOTE — TELEPHONE ENCOUNTER
Informed pt Per Dr. Newton that unfortunately schedule is booked for the day but will be happy to schedule first available. Rescheduled pt fu appt and informed. Pt stated sincere apologies for missing appt and verbal understanding. Thanks.

## 2019-12-17 ENCOUNTER — HOSPITAL ENCOUNTER (OUTPATIENT)
Dept: SPEECH THERAPY | Facility: HOSPITAL | Age: 79
Setting detail: THERAPIES SERIES
Discharge: HOME | End: 2019-12-17

## 2019-12-17 DIAGNOSIS — R41.3 MEMORY LOSS: Primary | ICD-10-CM

## 2019-12-17 PROCEDURE — 92507 TX SP LANG VOICE COMM INDIV: CPT

## 2019-12-17 NOTE — THERAPY TREATMENT NOTE
Outpatient Speech Language Pathology   Adult Speech Language Cognitive Treatment Note  [facility]     Patient Name: Uche Polanco  : 1940  MRN: [id number]  Today's Date: 2019         Visit Date: 2019   Patient Active Problem List   Diagnosis   • Valvular heart disease   • TIA (transient ischemic attack)   • Severe obstructive sleep apnea   • Hypertension   • Hyperlipidemia   • Gout   • CAD (coronary artery disease)   • AV block   • REM sleep behavior disorder   • Memory loss          Visit Dx:    ICD-10-CM ICD-9-CM   1. Memory loss R41.3 780.93                         SLP OP Goals     Row Name 19 1300          Goal Type Needed    Goal Type Needed  Memory;Attention/Orientation;Other Adult Goals  -HG        Subjective Comments    Subjective Comments  Pt alert, cooperative, admitted to almost missing his MD appt.   -HG        Auditory Comprehension Goals    Patient will comprehend abstract and complex information presented in all social, avocational, or work settings  90%:;with cues  -HG     Status: Patient will comprehend abstract and complex information presented in all social, avocational, or work settings  New  -HG     Patient will improve comprehension of spoken language by following Multi-step directions  80%:;with cues  -HG     Status: Patient will improve comprehension of spoken language by following Multi-step directions  Progressing as expected  -HG     Comments: Patient will improve comprehension of spoken language by following Multi-step directions  10/25/19: 3 step/6 component questions and pt was 90% accurate completing while directions were being read. 19: 3 step/6 component questions: pt was 13/15 for written directions after studying for hmwk. 19:Gave for hmwk. 9/3/19: 2 step/4 component directions: pt was 70% accurate.   19: 2 step oral directions: pt was 100% accurate. 3 step oral directions: pt was 12/15. 19: 2 step oral directions: 70% accuracy.    -HG     Patient will demonstrate comprehension of spoken language by answering questions about a multi paragraph length content  80%:;with cues  -HG     Status: Patient will demonstrate comprehension of spoken language by answering questions about a multi paragraph length content  Achieved  -HG     Comments: Patient will demonstrate comprehension of spoken language by answering questions about a multi paragraph length content  11/12/19: RBANS Paragraph recall for immediate and pt was 15/24, delayed and pt was 6/12. 10/15/19: RBANS Paragraph recall for immediate: pt was 11/24 and for delayed, pt was 6/12. 10/1/19: 36-50 word paragraph: 11/12. 9/16/19: RBANS Paragraph recall and pt was 50% accurate for immediate.  9/13/19: 65-80 word paragraphs: pt was 100% accurate. 9/6/19: 50-65 word paragraphs: pt was 90% accurate.   -HG        Memory Goals    Patient and family will implement compensatory strategies to maximize patient’s Memory function so patient can continue to participate in daily activities  90%:;with cues  -HG     Status: Patient and family will implement compensatory strategies to maximize patient’s Memory function so patient can continue to participate in daily activities  Progressing as expected  -HG     Comments: Patient and family will implement compensatory strategies to maximize patient’s Memory function so patient can continue to participate in daily activities  11/12/19: RBANS Immediate recall score of 83 was improved from last re-assessment. 9/20/19: Immediate recall of Memory Puzzle Book and pt was 60% accurate.    -HG     Patient will demonstrate improved ability to recall information by immediately recalling a series of words  80%:;related;after delay;with cues  -HG     Status: Patient will demonstrate improved ability to recall information by immediately recalling a series of words  Progressing as expected  -HG     Comments: Patient will demonstrate improved ability to recall information by  immediately recalling a series of words  12/10/19: Memory for puzzles and pt was 70% accurate. 19: Immediate recall of memory book puzzle: pt was 70% accurate. 11/15/19: Immediate recall of Memory Puzzle Book and pt was 75% accurate. 19: ABC game for Immediate recall and pt was 70% accurate I'ly.  19: Recall of rules of familiar card game and pt was 80% accurate. 10/21/19: Recall from paragraph in an executive fxn task and pt was 50% accurate with mod cues. 10/18/19: Immediate recall for Memory Matches i pad game and pt was 70% accurate.  Fastest time memory matches and pt was impulsive but completed in a little 60 secs. 10/15/19: RBANS Immediate recall score dropped to a 73 from an 83 at last re-assessment. 10/4/19: Memory Recall for a picture scene and pt was 75% accurate. 19: Recalling Boxed Information for numbers: pt was 80% accurate. 19: Spaced Retrieval Method: pt was . 19: RBANS Immediate recall and pt improved score to an 83 up from 49 at time of eval. 19: Immediate recall of boxed information: pt was 85% accurate. 19: Immediate recall of shapes and figures (6) and pt was 75% accurate.  9/3/19: Immediate recall of shapes and figures (5) and pt improved to 80% accurate.  19: Pt was able to recall 5 related words with accuracy, 6 words, pt was 5/6. 19: 50-65 words: pt was 6/6 x 3 for immediate story recall. 19: 36-50 word paragraphs for immediate recall: pt was 7/8. Immediate recall for storytellin/3, using chunking: 3/3,   -HG     Patient’s memory skills will be enhanced as reported by patient by utilizing internal memory strategies to recall up to 3 pieces of information after a 5- minute delay  80%:;with cues  -HG     Status: Patient’s memory skills will be enhanced as reported by patient by utilizing internal memory strategies to recall up to 3 pieces of information after a 5- minute delay  Progressing as expected  -HG     Comments: Patient’s  memory skills will be enhanced as reported by patient by utilizing internal memory strategies to recall up to 3 pieces of information after a 5- minute delay  12/17/19: Delayed recall of 4 un-related words from hmwk and pt was 4/4 x 2. 5 un-related words and pt was 3/5 I'ly and with increased delay, pt was 4/5 I'ly. 12/10/19: Delayed recall of 8 related words and pt was 100% accurate. 4 un-related words, pt was 2/4, 3/4, 4/4.  12/5/19: Delayed recall of 8 related words and pt was 8/8 x 2. 11/15/19: Delayed recall of 7 un-related words from hmwk and pt was 7/7 x 3. 11/12/19: RBANS Delayed recall score of 95 was improved from last re-assessment. 11/5/19: Delayed recall of 7 un-related words from homework and previous sessions and pt was 7/7. 11/1/19: Delayed recall of 6 un-related words: pt was 6/6 and added 2 more words and pt was 6/7 and then 6/8.  10/25/19: Delayed recall of 4 un-related words, pt was 4/4 with min cues, Independently got it 4/4 x 2.  10/21/19: Delayed recall for 7 un-related words from hwmk and pt was 6/7. With review and increased delay, pt was 7/7.  10/18/19: Delayed recall for 5 un-related word from homework and pt was 5/5 x 3. Added word 10/15/19: RBANS Delayed recall score of 78 dropped slightly from an 81 at previous re-assessment. 10/1/19:Delayed recall of paragraph information and pt was 2/3 for topics. 9/27/19: Delayed recall of 12 groupable pictures and pt was 12/12 x 2. 9/24/19: Delayed recall of 16 groupable words and pt was 14/16. Increased delay of 16 groupable words: pt was 10/16.  9/20/19: Delayed recall of words from SRT and pt was 3/12. 9/16/19: RBANS Delayed recall score of 81 down from 87 at time of eval.- remains in low average range.  9/3/19: Delayed recall of 8 un-related words and pt was 8/8 x 2. 8/30/19: Delayed recall of 7 un-related pictures, pt was 7/7 x 2. 8/23/19: Delayed recall of 6 un-related words: pt was 6/6 x 3- gave 7 for hmwk. 8/20/19: Delayed recall of 5  un-related words and pt was 5/5 x 2. 6 un-related words: pt was 4/6.  8/16/19: Delayed recall of 8 related words, pt was 8/8 x 2. 4 un-related words, pt was 4/4 x 2. 8/9/19: Delayed recall of 7 words: pt was 6/7 x 2, 7/7 x 2 8/6/19: Delayed recall of 5 related words and pt was 5/5, 6 related words: pt was 6/6. 7 related words using alphabetical order and chunking according to starting letter and pt was 6/7 with min cue.  7/26/19: Delayed recall of 4 related words and pt was 3/4 I'ly; 4/4 x 2. Increased to 5 related words and pt was 4/5 and then 5/5.   -HG     Patient’s memory skills will be enhanced as reported by patient by using external memory aides  80%:;with cues  -HG     Status: Patient’s memory skills will be enhanced as reported by patient by using external memory aides  Progressing as expected  -HG     Comments: Patient’s memory skills will be enhanced as reported by patient by using external memory aides  11/7/19: Pt using his day planner for daily and monthly use. 10/25/19: Using planner for calendar with 100% accurate. Use of daily entries, pt was 80% accurate. 9/20/19: Pt is using planner 90% of the time with mild-moderate level of details. 9/6/19: Pt is making daily notes in his planner. 9/3/19: Pt using his calendar consistently however, pt not using the daily journaling section as much as SLP would like. 8/30/19: Pt required min cues to catch up on daily entries. 8/16/19: Pt continues to use his planner daily. 8/9/19: Pt with increased use of his planner with daily notes.  7/26/19: Fxnl use of planner: pt was writing down his TO DO LIST and not what he had done.   -HG        Attention/Orientation Goals    Patient will be able to execute all activities necessary to manage a home  Independently  -HG     Status: Patient will be able to execute all activities necessary to manage a home  Progressing as expected  -HG     Comments: Patient will be able to execute all activities necessary to manage a home   11/12/19: RBANS Attention score of 85 places pt in the low average range which is where pt was at last re-assessement. 10/15/19: RBANS Attention score remained an 88 since previous assessment. 9/24/19: 3 step/6 componenet oral directions and pt was 9/12.   -HG     Patient will improve attention skills by sustaining focus in order to actively hold and manipulate information provided (e.g., sequencing auditorily presented number series in ascending or descending order)  90%:;with cues  -HG     Status: Patient will improve attention skills by sustaining focus in order to actively hold and manipulate information provided (e.g., sequencing auditorily presented number series in ascending or descending order)  Progressing as expected  -HG     Comments: Patient will improve attention skills by sustaining focus in order to actively hold and manipulate information provided (e.g., sequencing auditorily presented number series in ascending or descending order)  12/5/19: Attention for card sorting and pt was 85% accurate. 11/1/19: Alternating Attention: pt was Above Average on the APT. 10/4/19: Mental Manipulation 4 word progression: pt was 60% accurate with repetition needed.  9/10/19: 4 word for category exclusion and pt was 90% accurate and for 5 words, pt was 60% accurate.  8/30/19: Mental Manipulation: pt was 80% accurate with re-read for ranking. 8/20/19: Mental Manipulation for worder order of alphabetical vs reversal and pt was 50% accurate.  8/6/19: Mental Manipulation for word placement and pt was 80% accurate for attribute and placement.   -HG     Patient will improve attention skills by alternating or shifting focus between two different tasks in order to complete both tasks  80%:;with cues  -HG     Status: Patient will improve attention skills by alternating or shifting focus between two different tasks in order to complete both tasks  Progressing as expected  -HG     Comments: Patient will improve attention skills  by alternating or shifting focus between two different tasks in order to complete both tasks  19: [place] in the Corner, pt was 80% accurate with min cues. 12/10/19: Attention for [place] in the Corner and pt was 80% accurate with mod cues. 11/15/19: Attention to detail for use of a chart and analyzing numbers- new task for pt and pt was 90% accurate. 19: Divided Attention APT score was Below Average. 10/21/19: While completing an executive function task, pt exhibited difficulty with attention and required mod cues and was 70% accurate. 19: While cards being turned over, pt had to tap table if the card played was lower than the previous one. Pt was 60% accurate. 9/10/19: Pt able to recall up to 7 digits. 19: Visual Scanning: Pt required mod cues for alternating between letters and was 80 % accurate.  19: Card game and pt was 80% accurate for new rules and watching increasing piles of cards for potential moves.  19: Sorting card by suit and  and pt was 70% accurate. 19: Sorting card by suit and  and pt required mod cues and repeated instructions and pt was 60% accurate.   -HG     Patient will improve attention skills by sustaining focus to high-level cognitive tasks in order to complete task  80%:;with cues  -HG     Status: Patient will improve attention skills by sustaining focus to high-level cognitive tasks in order to complete task  Progressing as expected  -HG     Comments: Patient will improve attention skills by sustaining focus to high-level cognitive tasks in order to complete task  12/10/19: One Pile Card Game: pt was 80% accurate with min cues. 19: One Pile Card Game and pt was 80% accurate with min cues. 10/25/19: Sustained Attention on APT and pt was 30/30 and for Complex Sustained Attention and pt was 24/30- above average.  19: ID of even numbers and pt was 90% accurate during a game of fast pace recognizition. 19: One Pile Card Game and pt was 75%  accurate this date with min cues needed t/o the game. 9/13/19: One pile card game, pt requires less cues than previous sessions. 9/10/19: One Pile Card Game and pt required mod cues and pt was 70% accurate. 8/30/19: One Pile Card game: Pt required max cues in order to recall the rules of the game and to play accordingly. 8/20/19: One Pile Card Game: pt was 70% accurate with min cues.  8/9/19: One Pile Card Game: pt was 70% accurate. 7/26/19: Recognizing odd numbers in suit of cards while tapping the table and pt was 70% accurate.   -HG        Other Goals    Other Adult Goal- 1  Pt will complete thought organization tasks with 80% accuracy.  -HG     Status: Other Adult Goal- 1  Progressing as expected  -HG     Comments: Other Adult Goal- 1  11/12/19: RBANS Language score remained in the average range. 11/1/19: Divergent abstract naming: pt was 10/10 x 2. 10/18/19: Divergent abstract naming: pt was 100% accurate for 6 sets. Convergent naming: pt was 21/25. 10/15/19: RBANS Language score improved to a 92 from an 88 at last re-assessment. 10/1/19: Divergent naming: pt was 80% accurate. 9/16/19: RBANS Language score of 88 remains the same as time of initial eval. 8/30/19: Abstract divergent naming, pt was 8/10. 8/9/19: Divergent concrete category naming and pt was 15/15 x 2. 7/26/19: Divergent naming: pt was 10/10.   -HG     Other Adult Goal- 2  --  -HG     Status: Other Adult Goal- 2  --  -HG     Comments: Other Adult Goal- 2  --  -HG     Other Adult Goal- 3  --  -HG     Status: Other Adult Goal- 3  --  -HG     Comments: Other Adult Goal- 3  --  -HG     Other Adult Goal- 4  --  -HG     Status: Other Adult Goal- 4  --  -HG     Comments: Other Adult Goal- 4  --  -HG     Other Adult Goal- 5  --  -HG     Status: Other Adult Goal- 5  --  -HG     Comments: Other Adult Goal- 5  --  -HG     Other Adult Goal- 6  --  -HG     Status: Other Adult Goal- 6  --  -HG     Comments: Other Adult Goal- 6  --  -HG     Other Adult Goal- 7   --  -HG     Status: Other Adult Goal- 7  --  -HG     Comments: Other Adult Goal- 7  --  -HG     Other Adult Goal- 8  --  -HG     Status: Other Adult Goal- 8  New  -HG     Comments: Other Adult Goal- 8  --  -HG        SLP Time Calculation    SLP Goal Re-Cert Due Date  01/09/20  -HG       User Key  (r) = Recorded By, (t) = Taken By, (c) = Cosigned By    Initials Name Provider Type    Anjali Cortes MS CCC-SLP Speech and Language Pathologist          OP SLP Education     Row Name 12/17/19 1300       Education    Education Comments  Hmwk for 6 un-related words.   -HG      User Key  (r) = Recorded By, (t) = Taken By, (c) = Cosigned By    Initials Name Effective Dates    Anjali Cortes MS CCC-SLP 06/22/15 -           OP SLP Assessment/Plan - 12/17/19 1300        SLP Plan    Plan Comments  Cont with Cog tx.    -HG      User Key  (r) = Recorded By, (t) = Taken By, (c) = Cosigned By    Initials Name Provider Type    Anjali Cortes MS CCC-SLP Speech and Language Pathologist                 Time Calculation:   SLP Start Time: 1300    Therapy Charges for Today     Code Description Service Date Service Provider Modifiers Qty    [id number] HC ST TREATMENT SPEECH 4 12/17/2019 Anjali Serrano MS CCC-SLP GN 1                   Anjali Serrano MS CCC-SLP  12/17/2019

## 2019-12-19 ENCOUNTER — APPOINTMENT (OUTPATIENT)
Dept: SPEECH THERAPY | Facility: HOSPITAL | Age: 79
End: 2019-12-19

## 2019-12-19 DIAGNOSIS — G47.52 REM SLEEP BEHAVIOR DISORDER: ICD-10-CM

## 2019-12-20 RX ORDER — CLONAZEPAM 1 MG/1
1 TABLET ORAL
Qty: 90 TABLET | OUTPATIENT
Start: 2019-12-20

## 2019-12-23 ENCOUNTER — TELEPHONE (OUTPATIENT)
Dept: SLEEP MEDICINE | Facility: HOSPITAL | Age: 79
End: 2019-12-23

## 2019-12-23 DIAGNOSIS — G47.52 REM SLEEP BEHAVIOR DISORDER: Primary | ICD-10-CM

## 2019-12-23 RX ORDER — CLONAZEPAM 1 MG/1
1 TABLET ORAL
Qty: 90 TABLET | Refills: 0 | Status: SHIPPED | OUTPATIENT
Start: 2019-12-23 | End: 2020-01-30 | Stop reason: SDUPTHER

## 2019-12-23 NOTE — PROGRESS NOTES
Patient is requesting a refill on his Klonopin.  His Marcio report was reviewed last month.  Jd Li MD [facility]  Sleep Medicine  Pulmonary and Critical Care Medicine

## 2019-12-23 NOTE — TELEPHONE ENCOUNTER
LVM ADVISING PATIENT THAT RX WAS SENT TO PHARMACY AND TO THAT IT IS OK TO TAKE PAZOSIN AND KLONOPIN.

## 2019-12-23 NOTE — TELEPHONE ENCOUNTER
PATIENT LOST MEDICATION BOTTLE DURING VACATION AND WOULD LIKE TO GET OK TO REFILL EARLY. IF SO, PLEASE CONTACT PHARMACY

## 2019-12-23 NOTE — TELEPHONE ENCOUNTER
PT STATES HE WAS TRAVELLING AND HAS LOST HIS MEDICATION, PTS WIFE STATES HE HAS FALLEN SEVERAL TIMES IN LAST FEW DAYS BECAUSE OF NOT HAVING MEDICATION AND FIGHTING IN HIS DREAMS. PT WOUD LIKE TO REQUEST NEW REFILL OF MEDICATION KLONOPIN

## 2019-12-24 ENCOUNTER — HOSPITAL ENCOUNTER (OUTPATIENT)
Dept: SPEECH THERAPY | Facility: HOSPITAL | Age: 79
Setting detail: THERAPIES SERIES
Discharge: HOME | End: 2019-12-24

## 2019-12-24 DIAGNOSIS — R41.3 MEMORY LOSS: Primary | ICD-10-CM

## 2019-12-24 PROCEDURE — 92507 TX SP LANG VOICE COMM INDIV: CPT

## 2019-12-24 NOTE — THERAPY DISCHARGE NOTE
Outpatient Speech Language Pathology   Adult Speech Language Cognitive Treatment Note/Discharge Summary  [facility]     Patient Name: Uche Polanco  : 1940  MRN: [id number]  Today's Date: 2019         Visit Date: 2019   Patient Active Problem List   Diagnosis   • Valvular heart disease   • TIA (transient ischemic attack)   • Severe obstructive sleep apnea   • Hypertension   • Hyperlipidemia   • Gout   • CAD (coronary artery disease)   • AV block   • REM sleep behavior disorder   • Memory loss          Visit Dx:    ICD-10-CM ICD-9-CM   1. Memory loss R41.3 780.93                       SLP OP Goals     Row Name 19 0900          Goal Type Needed    Goal Type Needed  Memory;Attention/Orientation;Other Adult Goals  -HG        Subjective Comments    Subjective Comments  Pt alert, cooperative, returned with homework.   -HG        Auditory Comprehension Goals    Patient will comprehend abstract and complex information presented in all social, avocational, or work settings  90%:;with cues  -HG     Status: Patient will comprehend abstract and complex information presented in all social, avocational, or work settings  New  -HG     Patient will improve comprehension of spoken language by following Multi-step directions  80%:;with cues  -HG     Status: Patient will improve comprehension of spoken language by following Multi-step directions  Progressing as expected  -HG     Comments: Patient will improve comprehension of spoken language by following Multi-step directions  10/25/19: 3 step/6 component questions and pt was 90% accurate completing while directions were being read. 19: 3 step/6 component questions: pt was 13/15 for written directions after studying for hmwk. 19:Gave for hmwk. 9/3/19: 2 step/4 component directions: pt was 70% accurate.   19: 2 step oral directions: pt was 100% accurate. 3 step oral directions: pt was 12/15. 19: 2 step oral directions: 70% accuracy.    -HG     Patient will demonstrate comprehension of spoken language by answering questions about a multi paragraph length content  80%:;with cues  -HG     Status: Patient will demonstrate comprehension of spoken language by answering questions about a multi paragraph length content  Achieved  -HG     Comments: Patient will demonstrate comprehension of spoken language by answering questions about a multi paragraph length content  12/24/19: Paragraph recall: pt was 80% accurate average. 11/12/19: RBANS Paragraph recall for immediate and pt was 15/24, delayed and pt was 6/12. 10/15/19: RBANS Paragraph recall for immediate: pt was 11/24 and for delayed, pt was 6/12. 10/1/19: 36-50 word paragraph: 11/12. 9/16/19: RBANS Paragraph recall and pt was 50% accurate for immediate.  9/13/19: 65-80 word paragraphs: pt was 100% accurate. 9/6/19: 50-65 word paragraphs: pt was 90% accurate.   -HG        Memory Goals    Patient and family will implement compensatory strategies to maximize patient’s Memory function so patient can continue to participate in daily activities  90%:;with cues  -HG     Status: Patient and family will implement compensatory strategies to maximize patient’s Memory function so patient can continue to participate in daily activities  Progressing as expected  -HG     Comments: Patient and family will implement compensatory strategies to maximize patient’s Memory function so patient can continue to participate in daily activities  11/12/19: RBANS Immediate recall score of 83 was improved from last re-assessment. 9/20/19: Immediate recall of Memory Puzzle Book and pt was 60% accurate.    -HG     Patient will demonstrate improved ability to recall information by immediately recalling a series of words  80%:;related;after delay;with cues  -HG     Status: Patient will demonstrate improved ability to recall information by immediately recalling a series of words  Progressing as expected  -HG     Comments: Patient will  demonstrate improved ability to recall information by immediately recalling a series of words  19: 50-65 word paragraphs for immediate recall and pt was on average 80% accurate. 12/10/19: Memory for puzzles and pt was 70% accurate. 19: Immediate recall of memory book puzzle: pt was 70% accurate. 11/15/19: Immediate recall of Memory Puzzle Book and pt was 75% accurate. 19: ABC game for Immediate recall and pt was 70% accurate I'ly.  19: Recall of rules of familiar card game and pt was 80% accurate. 10/21/19: Recall from paragraph in an executive fxn task and pt was 50% accurate with mod cues. 10/18/19: Immediate recall for Memory Matches i pad game and pt was 70% accurate.  Fastest time memory matches and pt was impulsive but completed in a little 60 secs. 10/15/19: RBANS Immediate recall score dropped to a 73 from an 83 at last re-assessment. 10/4/19: Memory Recall for a picture scene and pt was 75% accurate. 19: Recalling Boxed Information for numbers: pt was 80% accurate. 19: Spaced Retrieval Method: pt was . 19: RBANS Immediate recall and pt improved score to an 83 up from 49 at time of eval. 19: Immediate recall of boxed information: pt was 85% accurate. 19: Immediate recall of shapes and figures (6) and pt was 75% accurate.  9/3/19: Immediate recall of shapes and figures (5) and pt improved to 80% accurate.  19: Pt was able to recall 5 related words with accuracy, 6 words, pt was 5/6. 19: 50-65 words: pt was 6/6 x 3 for immediate story recall. 19: 36-50 word paragraphs for immediate recall: pt was 7/8. Immediate recall for storytellin/3, using chunking: 3/3,   -HG     Patient’s memory skills will be enhanced as reported by patient by utilizing internal memory strategies to recall up to 3 pieces of information after a 5- minute delay  80%:;with cues  -HG     Status: Patient’s memory skills will be enhanced as reported by patient by utilizing  internal memory strategies to recall up to 3 pieces of information after a 5- minute delay  Progressing as expected  -     Comments: Patient’s memory skills will be enhanced as reported by patient by utilizing internal memory strategies to recall up to 3 pieces of information after a 5- minute delay  12/24/19: Delayed recall of 6 un-related words and pt was 6/6. 12/17/19: Delayed recall of 4 un-related words from hmwk and pt was 4/4 x 2. 5 un-related words and pt was 3/5 I'ly and with increased delay, pt was 4/5 I'ly. 12/10/19: Delayed recall of 8 related words and pt was 100% accurate. 4 un-related words, pt was 2/4, 3/4, 4/4.  12/5/19: Delayed recall of 8 related words and pt was 8/8 x 2. 11/15/19: Delayed recall of 7 un-related words from hmwk and pt was 7/7 x 3. 11/12/19: RBANS Delayed recall score of 95 was improved from last re-assessment. 11/5/19: Delayed recall of 7 un-related words from homework and previous sessions and pt was 7/7. 11/1/19: Delayed recall of 6 un-related words: pt was 6/6 and added 2 more words and pt was 6/7 and then 6/8.  10/25/19: Delayed recall of 4 un-related words, pt was 4/4 with min cues, Independently got it 4/4 x 2.  10/21/19: Delayed recall for 7 un-related words from mk and pt was 6/7. With review and increased delay, pt was 7/7.  10/18/19: Delayed recall for 5 un-related word from homework and pt was 5/5 x 3. Added word 10/15/19: RBANS Delayed recall score of 78 dropped slightly from an 81 at previous re-assessment. 10/1/19:Delayed recall of paragraph information and pt was 2/3 for topics. 9/27/19: Delayed recall of 12 groupable pictures and pt was 12/12 x 2. 9/24/19: Delayed recall of 16 groupable words and pt was 14/16. Increased delay of 16 groupable words: pt was 10/16.  9/20/19: Delayed recall of words from SRT and pt was 3/12. 9/16/19: RBANS Delayed recall score of 81 down from 87 at time of eval.- remains in low average range.  9/3/19: Delayed recall of 8 un-related  words and pt was 8/8 x 2. 8/30/19: Delayed recall of 7 un-related pictures, pt was 7/7 x 2. 8/23/19: Delayed recall of 6 un-related words: pt was 6/6 x 3- gave 7 for hmwk. 8/20/19: Delayed recall of 5 un-related words and pt was 5/5 x 2. 6 un-related words: pt was 4/6.  8/16/19: Delayed recall of 8 related words, pt was 8/8 x 2. 4 un-related words, pt was 4/4 x 2. 8/9/19: Delayed recall of 7 words: pt was 6/7 x 2, 7/7 x 2 8/6/19: Delayed recall of 5 related words and pt was 5/5, 6 related words: pt was 6/6. 7 related words using alphabetical order and chunking according to starting letter and pt was 6/7 with min cue.  7/26/19: Delayed recall of 4 related words and pt was 3/4 I'ly; 4/4 x 2. Increased to 5 related words and pt was 4/5 and then 5/5.   -HG     Patient’s memory skills will be enhanced as reported by patient by using external memory aides  80%:;with cues  -HG     Status: Patient’s memory skills will be enhanced as reported by patient by using external memory aides  Achieved  -HG     Comments: Patient’s memory skills will be enhanced as reported by patient by using external memory aides  12/24/19: Pt continues to use his planner. 11/7/19: Pt using his day planner for daily and monthly use. 10/25/19: Using planner for calendar with 100% accurate. Use of daily entries, pt was 80% accurate. 9/20/19: Pt is using planner 90% of the time with mild-moderate level of details. 9/6/19: Pt is making daily notes in his planner. 9/3/19: Pt using his calendar consistently however, pt not using the daily journaling section as much as SLP would like. 8/30/19: Pt required min cues to catch up on daily entries. 8/16/19: Pt continues to use his planner daily. 8/9/19: Pt with increased use of his planner with daily notes.  7/26/19: Fxnl use of planner: pt was writing down his TO DO LIST and not what he had done.   -HG        Attention/Orientation Goals    Patient will be able to execute all activities necessary to manage a  home  Independently  -HG     Status: Patient will be able to execute all activities necessary to manage a home  Progressing as expected  -HG     Comments: Patient will be able to execute all activities necessary to manage a home  11/12/19: RBANS Attention score of 85 places pt in the low average range which is where pt was at last re-assessement. 10/15/19: RBANS Attention score remained an 88 since previous assessment. 9/24/19: 3 step/6 componenet oral directions and pt was 9/12.   -HG     Patient will improve attention skills by sustaining focus in order to actively hold and manipulate information provided (e.g., sequencing auditorily presented number series in ascending or descending order)  90%:;with cues  -HG     Status: Patient will improve attention skills by sustaining focus in order to actively hold and manipulate information provided (e.g., sequencing auditorily presented number series in ascending or descending order)  Progressing as expected  -HG     Comments: Patient will improve attention skills by sustaining focus in order to actively hold and manipulate information provided (e.g., sequencing auditorily presented number series in ascending or descending order)  12/5/19: Attention for card sorting and pt was 85% accurate. 11/1/19: Alternating Attention: pt was Above Average on the APT. 10/4/19: Mental Manipulation 4 word progression: pt was 60% accurate with repetition needed.  9/10/19: 4 word for category exclusion and pt was 90% accurate and for 5 words, pt was 60% accurate.  8/30/19: Mental Manipulation: pt was 80% accurate with re-read for ranking. 8/20/19: Mental Manipulation for worder order of alphabetical vs reversal and pt was 50% accurate.  8/6/19: Mental Manipulation for word placement and pt was 80% accurate for attribute and placement.   -HG     Patient will improve attention skills by alternating or shifting focus between two different tasks in order to complete both tasks  80%:;with cues   -HG     Status: Patient will improve attention skills by alternating or shifting focus between two different tasks in order to complete both tasks  Progressing as expected  -HG     Comments: Patient will improve attention skills by alternating or shifting focus between two different tasks in order to complete both tasks  19: [place] in the Corner, pt was 80% accurate with min cues. 12/10/19: Attention for [place] in the Corner and pt was 80% accurate with mod cues. 11/15/19: Attention to detail for use of a chart and analyzing numbers- new task for pt and pt was 90% accurate. 19: Divided Attention APT score was Below Average. 10/21/19: While completing an executive function task, pt exhibited difficulty with attention and required mod cues and was 70% accurate. 19: While cards being turned over, pt had to tap table if the card played was lower than the previous one. Pt was 60% accurate. 9/10/19: Pt able to recall up to 7 digits. 19: Visual Scanning: Pt required mod cues for alternating between letters and was 80 % accurate.  19: Card game and pt was 80% accurate for new rules and watching increasing piles of cards for potential moves.  19: Sorting card by suit and  and pt was 70% accurate. 19: Sorting card by katie and REJI and pt required mod cues and repeated instructions and pt was 60% accurate.   -HG     Patient will improve attention skills by sustaining focus to high-level cognitive tasks in order to complete task  80%:;with cues  -HG     Status: Patient will improve attention skills by sustaining focus to high-level cognitive tasks in order to complete task  Progressing as expected  -HG     Comments: Patient will improve attention skills by sustaining focus to high-level cognitive tasks in order to complete task  12/10/19: One Pile Card Game: pt was 80% accurate with min cues. 19: One Pile Card Game and pt was 80% accurate with min cues. 10/25/19: Sustained Attention on  APT and pt was 30/30 and for Complex Sustained Attention and pt was 24/30- above average.  9/27/19: ID of even numbers and pt was 90% accurate during a game of fast pace recognizition. 9/20/19: One Pile Card Game and pt was 75% accurate this date with min cues needed t/o the game. 9/13/19: One pile card game, pt requires less cues than previous sessions. 9/10/19: One Pile Card Game and pt required mod cues and pt was 70% accurate. 8/30/19: One Pile Card game: Pt required max cues in order to recall the rules of the game and to play accordingly. 8/20/19: One Pile Card Game: pt was 70% accurate with min cues.  8/9/19: One Pile Card Game: pt was 70% accurate. 7/26/19: Recognizing odd numbers in suit of cards while tapping the table and pt was 70% accurate.   -HG        Other Goals    Other Adult Goal- 1  Pt will complete thought organization tasks with 80% accuracy.  -HG     Status: Other Adult Goal- 1  Progressing as expected  -HG     Comments: Other Adult Goal- 1  12/24/19: Divergent naming: pt was 80% accurate. 11/12/19: RBANS Language score remained in the average range. 11/1/19: Divergent abstract naming: pt was 10/10 x 2. 10/18/19: Divergent abstract naming: pt was 100% accurate for 6 sets. Convergent naming: pt was 21/25. 10/15/19: RBANS Language score improved to a 92 from an 88 at last re-assessment. 10/1/19: Divergent naming: pt was 80% accurate. 9/16/19: RBANS Language score of 88 remains the same as time of initial eval. 8/30/19: Abstract divergent naming, pt was 8/10. 8/9/19: Divergent concrete category naming and pt was 15/15 x 2. 7/26/19: Divergent naming: pt was 10/10.   -HG     Other Adult Goal- 2  Pt will improve RBANS Total Score to at least 90 placing pt in the Average range.   -HG     Status: Other Adult Goal- 2  Progressing as expected  -HG     Comments: Other Adult Goal- 2  11/12/19: RBANS Total score of 87, slight increase from previous assessment. 10/15/19: RBANS Total score remained at 84 (Low  Average). 9/16/19: RBANS Total score improved to an 84- up from 79 at time of initial eval.   -        SLP Time Calculation    SLP Goal Re-Cert Due Date  01/09/20  -       User Key  (r) = Recorded By, (t) = Taken By, (c) = Cosigned By    Initials Name Provider Type    DALJIT Anjali Serrano MS CCC-SLP Speech and Language Pathologist          OP SLP Education     Row Name 12/24/19 0900       Education    Education Comments  Education completed for use of his daily planner/calendar. Active use of strategies in order to improve immediate recall.    -      User Key  (r) = Recorded By, (t) = Taken By, (c) = Cosigned By    Initials Name Effective Dates    Anjali Cortes MS CCC-SLP 06/22/15 -           OP SLP Assessment/Plan - 12/24/19 0900        SLP Assessment    Functional Problems  Speech Language- Adult/Cognition   -    Clinical Impression: Speech Language-Adult/Congnition  Minimal:;Mild:;Cognitive Communication Impairment   -    Functional Problems Comment  Pt fxnl for daily activities and continues to oversee finances.    -    SLP Diagnosis  Minimal to Mild Cognitive Impairment    -       SLP Plan    Plan Comments  D/C from skilled services at this time.    -      User Key  (r) = Recorded By, (t) = Taken By, (c) = Cosigned By    Initials Name Provider Type    Anjali Cortes MS CCC-SLP Speech and Language Pathologist                   Time Calculation:   SLP Start Time: 0900    Therapy Charges for Today     Code Description Service Date Service Provider Modifiers Qty    95121802191 HC ST TREATMENT SPEECH 5 12/24/2019 Anjali Serrano MS CCC-SLP GN 1                        Anjali Serrano MS CCC-SLP  12/24/2019

## 2020-01-06 ENCOUNTER — OFFICE VISIT (OUTPATIENT)
Dept: NEUROLOGY | Facility: CLINIC | Age: 80
End: 2020-01-06

## 2020-01-06 VITALS — WEIGHT: 208 LBS | HEIGHT: 73 IN | OXYGEN SATURATION: 95 % | BODY MASS INDEX: 27.57 KG/M2 | HEART RATE: 61 BPM

## 2020-01-06 DIAGNOSIS — R41.3 MEMORY LOSS: Primary | ICD-10-CM

## 2020-01-06 PROCEDURE — 99213 OFFICE O/P EST LOW 20 MIN: CPT | Performed by: PSYCHIATRY & NEUROLOGY

## 2020-01-06 NOTE — PROGRESS NOTES
"Subjective     Chief Complaint: memory loss         Uche Polanco is a 79 y.o. male who returns to clinic today with a history of possible MCI. He has noted symptoms for several years marked by forgetfulness and word-finding difficulties . This has gradually worsened  over time. There have been no associated symptoms. He denies  impairments in ADL's.      He has a history of dizziness in the past, which has been well managed most recently ENT.    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 This has been treated by Dr. Hansen with Klonopin.     Neuroimaging in the past has been notable only for an old left cerebellar infarct. Screening bloodwork has been normal in the past as well. He is currently taking donepezil. He has also tried cognitive rehabilitation in the past, which was quite beneficial.     Since his last visit on 6/12/19 he has done well, without significant decline cognitively. He has continued to work with Anjali Serrano (SLP).     I have reviewed and confirmed the past family, social and medical history as accurate on 1/6/20.     Review of Systems   Constitutional: Negative.        Objective     Pulse 61   Ht 185.2 cm (72.9\")   Wt 94.3 kg (208 lb)   SpO2 95%   BMI 27.52 kg/m²     General appearance today is normal.     Physical Exam   Constitutional: He is oriented to person, place, and time.   Neurological: He is oriented to person, place, and time. He has normal strength. He has a normal Finger-Nose-Finger Test.   Psychiatric: His speech is normal.        Neurologic Exam     Mental Status   Oriented to person, place, and time.   Registration: recalls 3 of 3 objects. Recall at 5 minutes: recalls 3 of 3 objects. Follows 3 step commands.   Attention: normal.   Speech: speech is normal   Level of consciousness: alert  Knowledge: good.   Able to name object. Able to read. Able to repeat. Able to write. Normal comprehension.     Cranial Nerves   Cranial nerves II through XII intact. "     Motor Exam   Muscle bulk: normal  Overall muscle tone: normal    Strength   Strength 5/5 throughout.     Sensory Exam   Light touch normal.     Gait, Coordination, and Reflexes     Coordination   Finger to nose coordination: normal        Results  MMSE=29      Assessment/Plan   Uche was seen today for memory loss and follow-up.    Diagnoses and all orders for this visit:    Memory loss        Discussion/Summary   Uche Polanco returns to clinic today with a history of possible Mild Cognitive Impairment (MCI). As he is doing well, I have simply recommended that he continue on his current regimen of donepezil 10 mg daily for now. He will then follow up in 6 months, or sooner if needed.     I spent 20 minutes face to face with the patient. I spent 11 minutes counseling and discussing diagnosis, current status and management.      Chiki Newton MD

## 2020-01-17 RX ORDER — DONEPEZIL HYDROCHLORIDE 10 MG/1
TABLET, FILM COATED ORAL
Qty: 30 TABLET | Refills: 2 | Status: SHIPPED | OUTPATIENT
Start: 2020-01-17 | End: 2020-06-24

## 2020-01-30 ENCOUNTER — OFFICE VISIT (OUTPATIENT)
Dept: SLEEP MEDICINE | Facility: HOSPITAL | Age: 80
End: 2020-01-30

## 2020-01-30 VITALS
DIASTOLIC BLOOD PRESSURE: 70 MMHG | SYSTOLIC BLOOD PRESSURE: 142 MMHG | HEART RATE: 62 BPM | OXYGEN SATURATION: 98 % | BODY MASS INDEX: 27.06 KG/M2 | WEIGHT: 204.2 LBS | HEIGHT: 73 IN

## 2020-01-30 DIAGNOSIS — G47.52 REM SLEEP BEHAVIOR DISORDER: ICD-10-CM

## 2020-01-30 DIAGNOSIS — G47.33 OSA (OBSTRUCTIVE SLEEP APNEA): Primary | ICD-10-CM

## 2020-01-30 DIAGNOSIS — G47.33 SEVERE OBSTRUCTIVE SLEEP APNEA: ICD-10-CM

## 2020-01-30 PROCEDURE — 99214 OFFICE O/P EST MOD 30 MIN: CPT | Performed by: INTERNAL MEDICINE

## 2020-01-30 RX ORDER — CLONAZEPAM 1 MG/1
1 TABLET ORAL
Qty: 90 TABLET | Refills: 0 | Status: SHIPPED | OUTPATIENT
Start: 2020-01-30 | End: 2020-03-16 | Stop reason: SDUPTHER

## 2020-01-30 NOTE — PROGRESS NOTES
Subjective:     Chief Complaint:   Chief Complaint   Patient presents with   • Follow-up       HPI:    Uche Polanco is a 79 y.o. male here for follow-up of obstructive sleep apnea and REM behavior disorder.    He has been followed in the sleep center over the long-term for REM behavior disorder and severe obstructive sleep apnea.  He has been on Klonopin over the long-term for REM behavior disorder.  Over the summer his wife became increasingly concerned about his memory loss and reduce the dose of Klonopin but this results in him having more violent behavior at night.  He was down to as low as 0.5 mg but is currently back up to 1 mg.  I suggested the use of melatonin, potentially in high doses but, according to the patient as his wife is not present today, this had very little effect.    He remains on auto CPAP therapy at a range of 12-20.  I do not have a download from today.  At the time of his last download in November he had an average of 5-6 hours of usage per night with a normal AHI with intermittent mask leak.  He has become frustrated with his DME provider, [employer], as he says he has been able to get supplies and wants to change providers.  He indicates he has been compliant with therapy.    Further details are as follows:    Since last visit sleep problem has: remained the same  Currently using PAP: yes Hours of usage during the night: 6    Amount of sleep per night : 7 hours  Average length of time it takes to fall asleep : 5 minutes  Number of awakenings per night : 1     He feels fatigue (tiredness, exhaustion, lethargy) in the daytime even when not sleepy? No  He feels sleepy (or struggles to stay awake) in the daytime? No    [place] Scale scored as 4/24.    Type of mask: full face mask      Overall, he is benefiting from PAP therapy.    Current medications are:   Current Outpatient Medications:   •  atorvastatin (LIPITOR) 20 MG tablet, TAKE 1 TABLET BY MOUTH DAILY, Disp: 90 tablet, Rfl: 2  •   azelastine (ASTEPRO) 0.15 % solution nasal spray, U 2 SPRAYS IEN BID, Disp: , Rfl: 1  •  clonazePAM (KlonoPIN) 1 MG tablet, Take 1 tablet by mouth every night at bedtime., Disp: 90 tablet, Rfl: 0  •  clopidogrel (PLAVIX) 75 MG tablet, Take 1 tablet by mouth Daily., Disp: 90 tablet, Rfl: 1  •  donepezil (ARICEPT) 10 MG tablet, TAKE 1 TABLET BY MOUTH DAILY, Disp: 30 tablet, Rfl: 2  •  meclizine (ANTIVERT) 12.5 MG tablet, TK 1 TO 2 TS PO Q 4 TO 6 H PRN DIZZINESS, Disp: , Rfl: 0  •  Multiple Vitamins-Minerals (PRESERVISION AREDS 2 PO), Take 1 tablet by mouth 2 (Two) Times a Day., Disp: , Rfl:   •  mupirocin (BACTROBAN) 2 % ointment, Apply 1 application topically to the appropriate area as directed As Needed., Disp: , Rfl:   •  prazosin (MINIPRESS) 1 MG capsule, Take 1 capsule by mouth Every Night., Disp: 30 capsule, Rfl: 3.    The patient's relevant past medical, surgical, family and social history were reviewed and updated in Epic as appropriate.     ROS:    Review of Systems   Constitutional: Negative for fatigue.   Respiratory: Positive for apnea.    Psychiatric/Behavioral: Positive for sleep disturbance.         Objective:    Physical Exam   Constitutional: He is oriented to person, place, and time. He appears well-developed and well-nourished.   HENT:   Head: Normocephalic and atraumatic.   Mouth/Throat: Oropharynx is clear and moist.   Class III airway   Neck: Neck supple. No thyromegaly present.   Cardiovascular: Normal rate and regular rhythm. Exam reveals no gallop and no friction rub.   No murmur heard.  Pulmonary/Chest: Effort normal. No respiratory distress. He has no wheezes. He has no rales.   Musculoskeletal: He exhibits no edema.   Neurological: He is alert and oriented to person, place, and time.   Skin: Skin is warm and dry.   Psychiatric: He has a normal mood and affect. His behavior is normal.   Vitals reviewed.      Data:        Assessment:    Problem List Items Addressed This Visit        Pulmonary  Problems    Severe obstructive sleep apnea    Overview     Auto CPAP 12-20            Other    REM sleep behavior disorder    Relevant Medications    clonazePAM (KlonoPIN) 1 MG tablet      Other Visit Diagnoses     TOM (obstructive sleep apnea)    -  Primary    Relevant Orders    CPAP Therapy          1. REM sleep behavior disorder: He remains on Klonopin 1 mg nightly.  He has been tried on lower doses as well as on melatonin with much less success.  2. Severe obstructive sleep apnea: Remains on auto CPAP 12-20 with a full facemask.  He is interested in changing DME companies and we will assist him with this  3. Mild cognitive impairment: Followed by Dr. Newton and on Aricept.      Plan:     1. I refilled his Klonopin.  He is eKasper compliant and a 3-month supply was ordered.  2. As above, reordered his CPAP supplies and he will change DME companies  3. Close sleep center follow-up      Discussed in detail with the patient.  He will call prior to his follow up visit for any new problems.    Level of Risk Moderate due to: two stable chronic illnesses and prescription drug management    Signed by  Reed Hansen MD

## 2020-02-19 ENCOUNTER — CLINICAL SUPPORT NO REQUIREMENTS (OUTPATIENT)
Dept: CARDIOLOGY | Facility: CLINIC | Age: 80
End: 2020-02-19

## 2020-02-19 DIAGNOSIS — I44.30 AV BLOCK: ICD-10-CM

## 2020-02-19 PROCEDURE — 93294 REM INTERROG EVL PM/LDLS PM: CPT | Performed by: INTERNAL MEDICINE

## 2020-02-19 PROCEDURE — 93296 REM INTERROG EVL PM/IDS: CPT | Performed by: INTERNAL MEDICINE

## 2020-02-28 ENCOUNTER — TELEPHONE (OUTPATIENT)
Dept: CARDIOLOGY | Facility: CLINIC | Age: 80
End: 2020-02-28

## 2020-02-28 NOTE — TELEPHONE ENCOUNTER
Spoke with pt. He has been trying to call to make an appt to see JWL for bruising that has occurred while on Plavix primarily on his hands. He has had recent labs with PCP (requested). Pt reassured bruising is normal with Plavix and age. Would continue. He has no other issues/symptoms that need addressed. He is not scheduled for follow up in 6 months as recommended at last office visit. Will have scheduling make and mail to pt. Due in 4/2020. Pt aware and agreeable. KH

## 2020-03-16 DIAGNOSIS — G47.52 REM SLEEP BEHAVIOR DISORDER: ICD-10-CM

## 2020-03-16 RX ORDER — CLONAZEPAM 1 MG/1
1 TABLET ORAL
Qty: 90 TABLET | Refills: 0 | Status: SHIPPED | OUTPATIENT
Start: 2020-03-16 | End: 2020-06-24

## 2020-04-23 ENCOUNTER — TELEPHONE (OUTPATIENT)
Dept: SLEEP MEDICINE | Facility: HOSPITAL | Age: 80
End: 2020-04-23

## 2020-04-23 NOTE — TELEPHONE ENCOUNTER
PATIENT LVM ASKING ABOUT PRAZOSIN. I ADVISED PATIENT THAT THIS WAS PRESCRIBED AND DISCUSSED AT HIS APPOINTMENT ON 11/5/19. HE STATES THAT HE DOESN'T REMEMBER DISCUSSING IT SINCE IT WAS THAT LONG AGO HE DID PICK IT UP AND WILL USE IT AS NEEDED FOR NIGHT TERRORS. I ADVISED HIM TO GIVE US A CALL WITH ANY OTHER QUESTIONS OR CONCERNS.

## 2020-05-08 ENCOUNTER — OFFICE VISIT (OUTPATIENT)
Dept: CARDIOLOGY | Facility: CLINIC | Age: 80
End: 2020-05-08

## 2020-05-08 VITALS
SYSTOLIC BLOOD PRESSURE: 140 MMHG | HEART RATE: 70 BPM | DIASTOLIC BLOOD PRESSURE: 62 MMHG | WEIGHT: 201 LBS | TEMPERATURE: 94 F | BODY MASS INDEX: 26.64 KG/M2 | HEIGHT: 73 IN

## 2020-05-08 DIAGNOSIS — I10 ESSENTIAL HYPERTENSION: ICD-10-CM

## 2020-05-08 DIAGNOSIS — I25.10 CORONARY ARTERY DISEASE INVOLVING NATIVE CORONARY ARTERY OF NATIVE HEART WITHOUT ANGINA PECTORIS: Primary | ICD-10-CM

## 2020-05-08 DIAGNOSIS — E78.2 MIXED HYPERLIPIDEMIA: ICD-10-CM

## 2020-05-08 DIAGNOSIS — I38 VALVULAR HEART DISEASE: ICD-10-CM

## 2020-05-08 DIAGNOSIS — I44.30 AV BLOCK: ICD-10-CM

## 2020-05-08 PROCEDURE — 99214 OFFICE O/P EST MOD 30 MIN: CPT | Performed by: INTERNAL MEDICINE

## 2020-05-08 PROCEDURE — 93280 PM DEVICE PROGR EVAL DUAL: CPT | Performed by: INTERNAL MEDICINE

## 2020-05-08 RX ORDER — CARVEDILOL 3.12 MG/1
3.12 TABLET ORAL 2 TIMES DAILY
Qty: 60 TABLET | Refills: 11 | Status: SHIPPED | OUTPATIENT
Start: 2020-05-08 | End: 2020-05-27 | Stop reason: DRUGHIGH

## 2020-05-08 NOTE — PROGRESS NOTES
[facility]  Office visit  Uche Polanco  1940  [phone]  VISIT DATE:  5/8/2020      PCP: Jd Tapia MD  [id number]  [facility] [address]    CC:  Chief Complaint   Patient presents with   • Coronary Artery Disease   • valvular heart disease       PROBLEM LIST:  1. Valvular heart disease:  a. History of mitral valve repair at [facility] 2005, records pending.  b. Echocardiogram, August 2014: EF normal at 50% to 55%, moderate AR, mild MR, left atrium at 3.8 cm.   c. Echo, January 2017: EF 50%, moderate aortic insufficiency  2. Recent TIA:  a. Status post Medtronic link loop recorder implantation, November 2014.  b. Recent interrogation revealing AV block, pauses, symptomatic with dizziness and lightheadedness.  3. Obstructive sleep apnea requiring CPAP.   4. Benign hypertension.   5. Gout.   6. Coronary artery disease by report, data insufficient.   7. REM disorder.   8. Dyslipidemia.   9. Family history of hypertension.   10. AV block, symptomatic with lightheadedness, status post pacemaker implantation, 04/17/2015, Dr. Robert Gerber: Medtronic Advisa  [id number].   11. Surgical history:   a. Hemorrhoidectomy.   b. Basal cell carcinoma removed.   c. Valvular repair (mitral valve).      Cardiac studies and procedures:  February 2019 echo  · Left ventricular systolic function is moderately decreased.  · Estimated EF appears to be in the range of 36 - 40%.  · The following left ventricular wall segments are dyskinetic: apical lateral, apical inferior, apical septal and apex. The following left ventricular wall segments are akinetic: apical anterior.  · Left ventricular wall thickness is consistent with mild concentric hypertrophy.  · Mild-to-moderate mitral valve stenosis is present  · Mild to moderate aortic valve regurgitation is present.    March 2019 myocardial perfusion imaging  · Left ventricular ejection fraction is normal (Calculated  EF = 54%).  · Fixed defect consistent with moderate size apical infarct with extension into the inferior wall, associated moderate hypokinesis.  · No ischemia visualized  · Impressions are consistent with an intermediate risk study.    December 2019 echo  · Left ventricular systolic function is mildly decreased.  · Estimated EF appears to be in the range of 41 - 45%.  · The following left ventricular wall segments are akinetic: apical anterior, apical inferior, apical septal and apex. Diastolic wall thinning consistent with apical infarction.    ASSESSMENT:   Diagnosis Plan   1. Coronary artery disease involving native coronary artery of native heart without angina pectoris     2. AV block     3. Mixed hyperlipidemia     4. Essential hypertension     5. Valvular heart disease       Device interrogation:  Medtronic dual-chamber pacemaker  Normal device interrogation, see scan.    PLAN:  Cardiomyopathy: Unclear whether interval development of apical regional wall motion abnormality and perfusion defect is due to chronic RV apical pacing or interval development of obstructive coronary disease.  He is currently asymptomatic and stable, [place] heart class I.  Gradually titrating medical therapy with serial echocardiographic surveillance of LV systolic function.  If he has worsening regional wall motion abnormalities, declining functional capacity, or worsening EF will proceed with definitive evaluation of coronary anatomy with left heart catheterization.  Stage III chronic kidney disease.  Would then need to consider device upgrade to biV pacing.  Starting carvedilol 3.125 mg p.o. twice daily.  Continue aspirin and statin to treat any underlying occult coronary disease.    Mitral valve prolapse status post mitral valve repair: Stable on exam today.  Continue routine clinical follow-up and intermittent echocardiographic surveillance    Aortic insufficiency: Mild-Moderate and stable.  Continue clinical follow-up and  "surveillance echocardiographic assessment.    History of TIA: No evidence of atrial arrhythmia.  Continue Plavix for stroke prophylaxis.  Continue statin.  Afterload controlled, goal less than 130/80 mmHg.    Hypertension: Goal less than 130/80 mmHg.  Continue current medical therapy.    Symptomatic high-grade AV block: Device dependent.  Currently asymptomatic, continue routine follow-up in device clinic.    Hyperlipidemia: Goal LDL less than 70, continue statin.      Subjective  Reports stable functional capacity.  Still exercising on a regular basis without difficulty.  Has not been trending recent blood pressures.  Tolerating statin without myalgias.  Denies bleeding complications on Plavix, mild intermittent bruising.  Denies chest pain, palpitations or dyspnea on exertion.  compliant with medical therapy.  Discussed trend of recent heart function and potential diagnosis of unrecognized myocardial infarction on recent echo and myocardial perfusion imaging evaluations.    PHYSICAL EXAMINATION:  Vitals:    05/08/20 1136   BP: 140/62   BP Location: Right arm   Patient Position: Sitting   Pulse: 70   Temp: 94 °F (34.4 °C)   Weight: 91.2 kg (201 lb)   Height: 185.4 cm (73\")     General Appearance:    Alert, cooperative, no distress, appears stated age   Head:    Normocephalic, without obvious abnormality, atraumatic   Eyes:    conjunctiva/corneas clear   Nose:   Nares normal, septum midline, mucosa normal, no drainage   Throat:   Lips, teeth and gums normal   Neck:   Supple, symmetrical, trachea midline, no carotid    bruit or JVD   Lungs:     Clear to auscultation bilaterally, respirations unlabored   Chest Wall:    No tenderness or deformity    Heart:    Regular rate and rhythm, S1 and S2 normal, no murmur, rub   or gallop, normal carotid impulse bilaterally without bruit.   Abdomen:     Soft, non-tender   Extremities:   Extremities normal, atraumatic, no cyanosis or edema   Pulses:   2+ and symmetric all " extremities   Skin:   Skin color, texture, turgor normal, no rashes or lesions       Diagnostic Data:  Procedures  Lab Results   Component Value Date    CHLPL 190 08/22/2014    TRIG 189 (H) 08/22/2014    HDL 31 (L) 08/22/2014     Lab Results   Component Value Date    GLUCOSE 79 12/12/2016    BUN 16 12/12/2016    CREATININE 1.40 (H) 12/12/2016     12/12/2016    K 4.2 12/12/2016     12/12/2016    CO2 29.0 12/12/2016     Lab Results   Component Value Date    HGBA1C 5.6 04/08/2015     Lab Results   Component Value Date    WBC 7.58 12/12/2016    HGB 14.0 12/12/2016    HCT 42.6 12/12/2016     12/12/2016       Allergies  No Known Allergies    Current Medications    Current Outpatient Medications:   •  atorvastatin (LIPITOR) 20 MG tablet, TAKE 1 TABLET BY MOUTH DAILY, Disp: 90 tablet, Rfl: 2  •  azelastine (ASTEPRO) 0.15 % solution nasal spray, U 2 SPRAYS IEN BID, Disp: , Rfl: 1  •  clonazePAM (KlonoPIN) 1 MG tablet, Take 1 tablet by mouth every night at bedtime., Disp: 90 tablet, Rfl: 0  •  clopidogrel (PLAVIX) 75 MG tablet, Take 1 tablet by mouth Daily., Disp: 90 tablet, Rfl: 1  •  donepezil (ARICEPT) 10 MG tablet, TAKE 1 TABLET BY MOUTH DAILY, Disp: 30 tablet, Rfl: 2  •  meclizine (ANTIVERT) 12.5 MG tablet, TK 1 TO 2 TS PO Q 4 TO 6 H PRN DIZZINESS, Disp: , Rfl: 0  •  Multiple Vitamins-Minerals (PRESERVISION AREDS 2 PO), Take 1 tablet by mouth 2 (Two) Times a Day., Disp: , Rfl:   •  mupirocin (BACTROBAN) 2 % ointment, Apply 1 application topically to the appropriate area as directed As Needed., Disp: , Rfl:           ROS  Review of Systems   Cardiovascular: Negative for chest pain, dyspnea on exertion, irregular heartbeat and palpitations.   Respiratory: Negative for cough, shortness of breath and sleep disturbances due to breathing.    Neurological: Positive for light-headedness.         SOCIAL HX  Social History     Socioeconomic History   • Marital status:      Spouse name: Not on file   •  Number of children: Not on file   • Years of education: Not on file   • Highest education level: Not on file   Tobacco Use   • Smoking status: Never Smoker   • Smokeless tobacco: Never Used   Substance and Sexual Activity   • Alcohol use: Yes     Alcohol/week: 1.0 - 4.0 standard drinks     Types: 1 - 4 Glasses of wine per week     Comment: week with dinner   • Drug use: No   • Sexual activity: Defer       FAMILY HX  Family History   Problem Relation Age of Onset   • Ovarian cancer Mother    • Lymphoma Father    • Cancer Father    • Hypertension Other              Jd Limon III, MD, FACC

## 2020-05-13 ENCOUNTER — TELEPHONE (OUTPATIENT)
Dept: CARDIOLOGY | Facility: CLINIC | Age: 80
End: 2020-05-13

## 2020-05-13 NOTE — TELEPHONE ENCOUNTER
Called and discussed plan of care with patient's wife.  She is currently keeping a home blood pressure log, will increase carvedilol to 6.25 mg p.o. twice daily in 2 weeks if blood pressures are consistently running higher than 130/80 mmHg.    ----- Message from Dori Duncan RN sent at 5/13/2020 11:59 AM EDT -----  Hiral-Spouse was unable to come to his office visit. She has numerous questions and would like you to call her at your convenience. # [phone].      Thanks

## 2020-05-22 NOTE — TELEPHONE ENCOUNTER
Patient called with BP. Patient BP averaging 122/66. Patient advised to continue current plan of care.

## 2020-05-27 RX ORDER — CARVEDILOL 6.25 MG/1
6.25 TABLET ORAL 2 TIMES DAILY
Qty: 60 TABLET | Refills: 5 | Status: SHIPPED | OUTPATIENT
Start: 2020-05-27 | End: 2020-06-04 | Stop reason: DRUGHIGH

## 2020-05-27 NOTE — TELEPHONE ENCOUNTER
B/P readings :    5/24/2020 am 133/77 HR 63  Pm 149/82 HR 63    5/25/2020 am 137/72 HR 61 /66 HR 63    5/26/2020 am 130/61 HR 61 pm 115/60 HR 60    5/27/2020 /61 HR 62     Do you want the carvedilol increased to 6.25 mg twice a day? Please advise.

## 2020-06-04 ENCOUNTER — TELEPHONE (OUTPATIENT)
Dept: CARDIOLOGY | Facility: CLINIC | Age: 80
End: 2020-06-04

## 2020-06-04 RX ORDER — CARVEDILOL 12.5 MG/1
12.5 TABLET ORAL 2 TIMES DAILY
Qty: 60 TABLET | Refills: 3 | Status: SHIPPED | OUTPATIENT
Start: 2020-06-04 | End: 2020-08-11

## 2020-06-04 NOTE — TELEPHONE ENCOUNTER
B/P readings with Coreg increased to 6.25 mg twice a day    5/28/2020 /71  HR 60 /68 HR 61    5/29/2020 /61 HR 63 /75 HR 69    5/30/2020 AM  No Reading /66 HR 62    5/31/2020 No Reading /62    6/1/2020 /71 HR 64 /67 HR 63    6/2/2020 /72 HR 61 /86 HR 61    6/3/2020 /73 HR 61 /65 HR 60    6/4/2020 /71 HR 62  /69 HR 65

## 2020-06-04 NOTE — TELEPHONE ENCOUNTER
Patient notified to increase Carvedilol to 12.5 mg by mouth twice a day. Verbalized understanding.

## 2020-06-12 ENCOUNTER — TRANSCRIBE ORDERS (OUTPATIENT)
Dept: ADMINISTRATIVE | Facility: HOSPITAL | Age: 80
End: 2020-06-12

## 2020-06-12 DIAGNOSIS — R13.10 DYSPHAGIA, UNSPECIFIED TYPE: Primary | ICD-10-CM

## 2020-06-15 ENCOUNTER — APPOINTMENT (OUTPATIENT)
Dept: PREADMISSION TESTING | Facility: HOSPITAL | Age: 80
End: 2020-06-15

## 2020-06-15 PROCEDURE — U0004 COV-19 TEST NON-CDC HGH THRU: HCPCS

## 2020-06-15 PROCEDURE — C9803 HOPD COVID-19 SPEC COLLECT: HCPCS

## 2020-06-15 PROCEDURE — U0002 COVID-19 LAB TEST NON-CDC: HCPCS

## 2020-06-16 LAB
REF LAB TEST METHOD: NORMAL
SARS-COV-2 RNA RESP QL NAA+PROBE: NOT DETECTED

## 2020-06-18 ENCOUNTER — HOSPITAL ENCOUNTER (OUTPATIENT)
Dept: GENERAL RADIOLOGY | Facility: HOSPITAL | Age: 80
Discharge: HOME | End: 2020-06-18
Admitting: FAMILY MEDICINE

## 2020-06-18 DIAGNOSIS — R13.10 DYSPHAGIA, UNSPECIFIED TYPE: ICD-10-CM

## 2020-06-18 PROCEDURE — 63710000001 BARIUM SULFATE 96 % RECONSTITUTED SUSPENSION: Performed by: FAMILY MEDICINE

## 2020-06-18 PROCEDURE — A9270 NON-COVERED ITEM OR SERVICE: HCPCS | Performed by: FAMILY MEDICINE

## 2020-06-18 PROCEDURE — 74220 X-RAY XM ESOPHAGUS 1CNTRST: CPT

## 2020-06-18 RX ADMIN — BARIUM SULFATE 183 ML: 960 POWDER, FOR SUSPENSION ORAL at 08:46

## 2020-06-24 DIAGNOSIS — G47.52 REM SLEEP BEHAVIOR DISORDER: ICD-10-CM

## 2020-06-24 RX ORDER — DONEPEZIL HYDROCHLORIDE 10 MG/1
TABLET, FILM COATED ORAL
Qty: 30 TABLET | Refills: 2 | Status: SHIPPED | OUTPATIENT
Start: 2020-06-24 | End: 2020-09-28

## 2020-06-24 RX ORDER — CLONAZEPAM 1 MG/1
1 TABLET ORAL
Qty: 90 TABLET | Refills: 0 | Status: SHIPPED | OUTPATIENT
Start: 2020-06-24 | End: 2020-07-30 | Stop reason: SDUPTHER

## 2020-07-07 ENCOUNTER — TELEPHONE (OUTPATIENT)
Dept: CARDIOLOGY | Facility: CLINIC | Age: 80
End: 2020-07-07

## 2020-07-07 NOTE — TELEPHONE ENCOUNTER
Spouse notified that it would be ok to hold Plavix for 7 days prior to procedure. Verbalized understanding.

## 2020-07-07 NOTE — TELEPHONE ENCOUNTER
Having an EGD on 7/16/2020 with . Spouse wanted to check with you as they are requesting he hold his Plavix 7 days prior to procedure. Please advise.

## 2020-07-08 ENCOUNTER — OFFICE VISIT (OUTPATIENT)
Dept: NEUROLOGY | Facility: CLINIC | Age: 80
End: 2020-07-08

## 2020-07-08 VITALS
DIASTOLIC BLOOD PRESSURE: 60 MMHG | SYSTOLIC BLOOD PRESSURE: 138 MMHG | WEIGHT: 199 LBS | HEIGHT: 73 IN | BODY MASS INDEX: 26.37 KG/M2 | HEART RATE: 59 BPM | OXYGEN SATURATION: 97 %

## 2020-07-08 DIAGNOSIS — R41.3 MEMORY LOSS: Primary | ICD-10-CM

## 2020-07-08 PROCEDURE — 99214 OFFICE O/P EST MOD 30 MIN: CPT | Performed by: PSYCHIATRY & NEUROLOGY

## 2020-07-08 NOTE — PROGRESS NOTES
"Subjective     Chief Complaint: memory loss         Uche Polanco is a 80 y.o. male who returns to clinic today with a history of possible MCI. He has noted symptoms for several years marked by forgetfulness and word-finding difficulties . This has gradually worsened over time. There have been no associated symptoms and he has denied impairments in ADL's.      He has a history of dizziness in the past, which has been well managed most recently ENT.    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 This has been treated by Dr. Hansen with Klonopin.     Neuroimaging in the past has been notable only for an old left cerebellar infarct. Screening bloodwork has been normal in the past as well. He is currently taking donepezil. He has also worked with Anjali Serrano (cognitive rehabilitation) in the past, which was quite beneficial.     Since his last visit on 1/6/20 he feels his memory has declined modestly. He at time feels foggy. His family agrees.      I have reviewed and confirmed the past family, social and medical history as accurate on 7/8/20.     Review of Systems   Constitutional: Negative.        Objective     /60   Pulse 59   Ht 185.4 cm (73\")   Wt 90.3 kg (199 lb)   SpO2 97%   BMI 26.25 kg/m²     General appearance today is normal.     Physical Exam   Constitutional: He is oriented to person, place, and time.   Neurological: He is oriented to person, place, and time. He has a normal Finger-Nose-Finger Test.   Psychiatric: His speech is normal.        Neurologic Exam     Mental Status   Oriented to person, place, and time.   Registration: recalls 3 of 3 objects. Recall at 5 minutes: recalls 3 of 3 objects. Follows 3 step commands.   Attention: normal.   Speech: speech is normal   Level of consciousness: alert  Knowledge: good.   Able to name object. Able to read. Able to repeat. Able to write. Normal comprehension.     Cranial Nerves   Cranial nerves II through XII intact.     Gait, " Coordination, and Reflexes     Coordination   Finger to nose coordination: normal        Results  MMSE=30      Assessment/Plan   Uche was seen today for memory loss and follow-up.    Diagnoses and all orders for this visit:    Memory loss        Discussion/Summary   Uche Polanco returns to clinic today with a history of possible Mild Cognitive Impairment (MCI). His neurologic examination remains reassuring. I reviewed this diagnosis, potential course and management again, and have recommended only that he continue on donepezil 10 mg daily. I also discussed potential referral to PT for balance impairment, which he will consider. He will then follow up in 6 months, or sooner if needed.     I spent 25 minutes face to face with the patient and family. I spent 15 minutes counseling and discussing diagnosis, current status and management.      Chiki Newton MD

## 2020-07-22 ENCOUNTER — HOSPITAL ENCOUNTER (OUTPATIENT)
Facility: HOSPITAL | Age: 80
Setting detail: OBSERVATION
Discharge: HOME | End: 2020-07-24
Attending: EMERGENCY MEDICINE | Admitting: HOSPITALIST

## 2020-07-22 ENCOUNTER — APPOINTMENT (OUTPATIENT)
Dept: CT IMAGING | Facility: HOSPITAL | Age: 80
End: 2020-07-22

## 2020-07-22 ENCOUNTER — APPOINTMENT (OUTPATIENT)
Dept: GENERAL RADIOLOGY | Facility: HOSPITAL | Age: 80
End: 2020-07-22

## 2020-07-22 DIAGNOSIS — G45.9 TIA (TRANSIENT ISCHEMIC ATTACK): Primary | ICD-10-CM

## 2020-07-22 DIAGNOSIS — R41.841 COGNITIVE COMMUNICATION DEFICIT: ICD-10-CM

## 2020-07-22 DIAGNOSIS — N28.9 RENAL INSUFFICIENCY: ICD-10-CM

## 2020-07-22 DIAGNOSIS — R55 NEAR SYNCOPE: ICD-10-CM

## 2020-07-22 DIAGNOSIS — R42 DIZZINESS: ICD-10-CM

## 2020-07-22 LAB
ALBUMIN SERPL-MCNC: 4.2 G/DL (ref 3.5–5.2)
ALBUMIN/GLOB SERPL: 1.7 G/DL
ALP SERPL-CCNC: 57 U/L (ref 39–117)
ALT SERPL W P-5'-P-CCNC: 12 U/L (ref 1–41)
ANION GAP SERPL CALCULATED.3IONS-SCNC: 6 MMOL/L (ref 5–15)
AST SERPL-CCNC: 15 U/L (ref 1–40)
BACTERIA UR QL AUTO: ABNORMAL /HPF
BASOPHILS # BLD AUTO: 0.06 10*3/MM3 (ref 0–0.2)
BASOPHILS NFR BLD AUTO: 1 % (ref 0–1.5)
BILIRUB SERPL-MCNC: 0.7 MG/DL (ref 0–1.2)
BILIRUB UR QL STRIP: NEGATIVE
BUN SERPL-MCNC: 19 MG/DL (ref 8–23)
BUN/CREAT SERPL: 12.9 (ref 7–25)
CALCIUM SPEC-SCNC: 9.4 MG/DL (ref 8.6–10.5)
CHLORIDE SERPL-SCNC: 106 MMOL/L (ref 98–107)
CLARITY UR: ABNORMAL
CO2 SERPL-SCNC: 29 MMOL/L (ref 22–29)
COLOR UR: YELLOW
CREAT SERPL-MCNC: 1.47 MG/DL (ref 0.76–1.27)
DEPRECATED RDW RBC AUTO: 45.1 FL (ref 37–54)
EOSINOPHIL # BLD AUTO: 0.18 10*3/MM3 (ref 0–0.4)
EOSINOPHIL NFR BLD AUTO: 2.9 % (ref 0.3–6.2)
ERYTHROCYTE [DISTWIDTH] IN BLOOD BY AUTOMATED COUNT: 12.8 % (ref 12.3–15.4)
GFR SERPL CREATININE-BSD FRML MDRD: 46 ML/MIN/1.73
GLOBULIN UR ELPH-MCNC: 2.5 GM/DL
GLUCOSE BLDC GLUCOMTR-MCNC: 120 MG/DL (ref 70–130)
GLUCOSE SERPL-MCNC: 108 MG/DL (ref 65–99)
GLUCOSE UR STRIP-MCNC: NEGATIVE MG/DL
HBA1C MFR BLD: 5.3 % (ref 4.8–5.6)
HCT VFR BLD AUTO: 41.8 % (ref 37.5–51)
HGB BLD-MCNC: 13.7 G/DL (ref 13–17.7)
HGB UR QL STRIP.AUTO: NEGATIVE
HOLD SPECIMEN: NORMAL
HOLD SPECIMEN: NORMAL
HYALINE CASTS UR QL AUTO: ABNORMAL /LPF
IMM GRANULOCYTES # BLD AUTO: 0.01 10*3/MM3 (ref 0–0.05)
IMM GRANULOCYTES NFR BLD AUTO: 0.2 % (ref 0–0.5)
KETONES UR QL STRIP: ABNORMAL
LEUKOCYTE ESTERASE UR QL STRIP.AUTO: ABNORMAL
LYMPHOCYTES # BLD AUTO: 1.25 10*3/MM3 (ref 0.7–3.1)
LYMPHOCYTES NFR BLD AUTO: 20 % (ref 19.6–45.3)
MAGNESIUM SERPL-MCNC: 2.1 MG/DL (ref 1.6–2.4)
MCH RBC QN AUTO: 31.4 PG (ref 26.6–33)
MCHC RBC AUTO-ENTMCNC: 32.8 G/DL (ref 31.5–35.7)
MCV RBC AUTO: 95.7 FL (ref 79–97)
MONOCYTES # BLD AUTO: 0.61 10*3/MM3 (ref 0.1–0.9)
MONOCYTES NFR BLD AUTO: 9.8 % (ref 5–12)
NEUTROPHILS NFR BLD AUTO: 4.13 10*3/MM3 (ref 1.7–7)
NEUTROPHILS NFR BLD AUTO: 66.1 % (ref 42.7–76)
NITRITE UR QL STRIP: NEGATIVE
NRBC BLD AUTO-RTO: 0 /100 WBC (ref 0–0.2)
PH UR STRIP.AUTO: 6.5 [PH] (ref 5–8)
PLATELET # BLD AUTO: 197 10*3/MM3 (ref 140–450)
PMV BLD AUTO: 10.4 FL (ref 6–12)
POTASSIUM SERPL-SCNC: 4.6 MMOL/L (ref 3.5–5.2)
PROT SERPL-MCNC: 6.7 G/DL (ref 6–8.5)
PROT UR QL STRIP: ABNORMAL
RBC # BLD AUTO: 4.37 10*6/MM3 (ref 4.14–5.8)
RBC # UR: ABNORMAL /HPF
REF LAB TEST METHOD: ABNORMAL
SODIUM SERPL-SCNC: 141 MMOL/L (ref 136–145)
SP GR UR STRIP: 1.02 (ref 1–1.03)
SQUAMOUS #/AREA URNS HPF: ABNORMAL /HPF
T4 FREE SERPL-MCNC: 1.15 NG/DL (ref 0.93–1.7)
TROPONIN T SERPL-MCNC: <0.01 NG/ML (ref 0–0.03)
TROPONIN T SERPL-MCNC: <0.01 NG/ML (ref 0–0.03)
TSH SERPL DL<=0.05 MIU/L-ACNC: 2 UIU/ML (ref 0.27–4.2)
UROBILINOGEN UR QL STRIP: ABNORMAL
WBC # BLD AUTO: 6.24 10*3/MM3 (ref 3.4–10.8)
WBC UR QL AUTO: ABNORMAL /HPF
WHOLE BLOOD HOLD SPECIMEN: NORMAL
WHOLE BLOOD HOLD SPECIMEN: NORMAL

## 2020-07-22 PROCEDURE — G0378 HOSPITAL OBSERVATION PER HR: HCPCS

## 2020-07-22 PROCEDURE — 0 IOPAMIDOL PER 1 ML: Performed by: EMERGENCY MEDICINE

## 2020-07-22 PROCEDURE — 82962 GLUCOSE BLOOD TEST: CPT

## 2020-07-22 PROCEDURE — 70498 CT ANGIOGRAPHY NECK: CPT

## 2020-07-22 PROCEDURE — 71045 X-RAY EXAM CHEST 1 VIEW: CPT

## 2020-07-22 PROCEDURE — 84484 ASSAY OF TROPONIN QUANT: CPT | Performed by: EMERGENCY MEDICINE

## 2020-07-22 PROCEDURE — 84439 ASSAY OF FREE THYROXINE: CPT | Performed by: NURSE PRACTITIONER

## 2020-07-22 PROCEDURE — 96361 HYDRATE IV INFUSION ADD-ON: CPT

## 2020-07-22 PROCEDURE — 84443 ASSAY THYROID STIM HORMONE: CPT | Performed by: NURSE PRACTITIONER

## 2020-07-22 PROCEDURE — 80053 COMPREHEN METABOLIC PANEL: CPT | Performed by: EMERGENCY MEDICINE

## 2020-07-22 PROCEDURE — 85025 COMPLETE CBC W/AUTO DIFF WBC: CPT | Performed by: EMERGENCY MEDICINE

## 2020-07-22 PROCEDURE — 96360 HYDRATION IV INFUSION INIT: CPT

## 2020-07-22 PROCEDURE — 70450 CT HEAD/BRAIN W/O DYE: CPT

## 2020-07-22 PROCEDURE — 99285 EMERGENCY DEPT VISIT HI MDM: CPT

## 2020-07-22 PROCEDURE — 81001 URINALYSIS AUTO W/SCOPE: CPT | Performed by: EMERGENCY MEDICINE

## 2020-07-22 PROCEDURE — 83735 ASSAY OF MAGNESIUM: CPT | Performed by: EMERGENCY MEDICINE

## 2020-07-22 PROCEDURE — 99215 OFFICE O/P EST HI 40 MIN: CPT | Performed by: PSYCHIATRY & NEUROLOGY

## 2020-07-22 PROCEDURE — 99220 PR INITIAL OBSERVATION CARE/DAY 70 MINUTES: CPT | Performed by: FAMILY MEDICINE

## 2020-07-22 PROCEDURE — 70496 CT ANGIOGRAPHY HEAD: CPT

## 2020-07-22 PROCEDURE — 83036 HEMOGLOBIN GLYCOSYLATED A1C: CPT | Performed by: NURSE PRACTITIONER

## 2020-07-22 PROCEDURE — 93005 ELECTROCARDIOGRAM TRACING: CPT | Performed by: EMERGENCY MEDICINE

## 2020-07-22 RX ORDER — ASPIRIN 300 MG/1
300 SUPPOSITORY RECTAL DAILY
Status: DISCONTINUED | OUTPATIENT
Start: 2020-07-23 | End: 2020-07-24 | Stop reason: HOSPADM

## 2020-07-22 RX ORDER — DONEPEZIL HYDROCHLORIDE 10 MG/1
10 TABLET, FILM COATED ORAL DAILY
Status: DISCONTINUED | OUTPATIENT
Start: 2020-07-22 | End: 2020-07-24 | Stop reason: HOSPADM

## 2020-07-22 RX ORDER — CLOPIDOGREL BISULFATE 75 MG/1
75 TABLET ORAL DAILY
Status: DISCONTINUED | OUTPATIENT
Start: 2020-07-22 | End: 2020-07-24 | Stop reason: HOSPADM

## 2020-07-22 RX ORDER — SODIUM CHLORIDE 9 MG/ML
125 INJECTION, SOLUTION INTRAVENOUS CONTINUOUS
Status: DISCONTINUED | OUTPATIENT
Start: 2020-07-22 | End: 2020-07-23

## 2020-07-22 RX ORDER — ALUMINA, MAGNESIA, AND SIMETHICONE 2400; 2400; 240 MG/30ML; MG/30ML; MG/30ML
15 SUSPENSION ORAL EVERY 6 HOURS PRN
Status: DISCONTINUED | OUTPATIENT
Start: 2020-07-22 | End: 2020-07-24 | Stop reason: HOSPADM

## 2020-07-22 RX ORDER — CARVEDILOL 3.12 MG/1
6.25 TABLET ORAL 2 TIMES DAILY
Status: DISCONTINUED | OUTPATIENT
Start: 2020-07-22 | End: 2020-07-22

## 2020-07-22 RX ORDER — ATORVASTATIN CALCIUM 20 MG/1
20 TABLET, FILM COATED ORAL DAILY
COMMUNITY
End: 2021-04-07 | Stop reason: SDUPTHER

## 2020-07-22 RX ORDER — AZELASTINE 1 MG/ML
2 SPRAY, METERED NASAL DAILY
Status: DISCONTINUED | OUTPATIENT
Start: 2020-07-22 | End: 2020-07-23

## 2020-07-22 RX ORDER — SODIUM CHLORIDE 0.9 % (FLUSH) 0.9 %
10 SYRINGE (ML) INJECTION AS NEEDED
Status: DISCONTINUED | OUTPATIENT
Start: 2020-07-22 | End: 2020-07-24 | Stop reason: HOSPADM

## 2020-07-22 RX ORDER — ONDANSETRON 2 MG/ML
4 INJECTION INTRAMUSCULAR; INTRAVENOUS EVERY 6 HOURS PRN
Status: DISCONTINUED | OUTPATIENT
Start: 2020-07-22 | End: 2020-07-24 | Stop reason: HOSPADM

## 2020-07-22 RX ORDER — AMOXICILLIN 250 MG
2 CAPSULE ORAL 2 TIMES DAILY
Status: DISCONTINUED | OUTPATIENT
Start: 2020-07-22 | End: 2020-07-24 | Stop reason: HOSPADM

## 2020-07-22 RX ORDER — PRAZOSIN HYDROCHLORIDE 1 MG/1
1 CAPSULE ORAL NIGHTLY
COMMUNITY
End: 2020-07-24 | Stop reason: HOSPADM

## 2020-07-22 RX ORDER — DEXLANSOPRAZOLE 60 MG/1
60 CAPSULE, DELAYED RELEASE ORAL DAILY
COMMUNITY
End: 2020-10-02

## 2020-07-22 RX ORDER — MULTIPLE VITAMINS W/ MINERALS TAB 9MG-400MCG
1 TAB ORAL DAILY
Status: DISCONTINUED | OUTPATIENT
Start: 2020-07-22 | End: 2020-07-24 | Stop reason: HOSPADM

## 2020-07-22 RX ORDER — ASPIRIN 81 MG/1
81 TABLET, CHEWABLE ORAL DAILY
Status: DISCONTINUED | OUTPATIENT
Start: 2020-07-23 | End: 2020-07-24 | Stop reason: HOSPADM

## 2020-07-22 RX ORDER — ATORVASTATIN CALCIUM 40 MG/1
80 TABLET, FILM COATED ORAL NIGHTLY
Status: DISCONTINUED | OUTPATIENT
Start: 2020-07-22 | End: 2020-07-24 | Stop reason: HOSPADM

## 2020-07-22 RX ORDER — CLONAZEPAM 1 MG/1
1 TABLET ORAL NIGHTLY
Status: DISCONTINUED | OUTPATIENT
Start: 2020-07-22 | End: 2020-07-24 | Stop reason: HOSPADM

## 2020-07-22 RX ORDER — CALCIUM CARBONATE 750 MG/1
1500 TABLET, CHEWABLE ORAL 2 TIMES DAILY PRN
Status: DISCONTINUED | OUTPATIENT
Start: 2020-07-22 | End: 2020-07-24 | Stop reason: HOSPADM

## 2020-07-22 RX ORDER — ACETAMINOPHEN 325 MG/1
650 TABLET ORAL EVERY 4 HOURS PRN
Status: DISCONTINUED | OUTPATIENT
Start: 2020-07-22 | End: 2020-07-24 | Stop reason: HOSPADM

## 2020-07-22 RX ORDER — SODIUM CHLORIDE 0.9 % (FLUSH) 0.9 %
10 SYRINGE (ML) INJECTION EVERY 12 HOURS SCHEDULED
Status: DISCONTINUED | OUTPATIENT
Start: 2020-07-22 | End: 2020-07-24 | Stop reason: HOSPADM

## 2020-07-22 RX ORDER — ONDANSETRON 4 MG/1
4 TABLET, FILM COATED ORAL EVERY 6 HOURS PRN
Status: DISCONTINUED | OUTPATIENT
Start: 2020-07-22 | End: 2020-07-24 | Stop reason: HOSPADM

## 2020-07-22 RX ORDER — ACETAMINOPHEN 650 MG/1
650 SUPPOSITORY RECTAL EVERY 4 HOURS PRN
Status: DISCONTINUED | OUTPATIENT
Start: 2020-07-22 | End: 2020-07-24 | Stop reason: HOSPADM

## 2020-07-22 RX ORDER — BISACODYL 10 MG
10 SUPPOSITORY, RECTAL RECTAL DAILY PRN
Status: DISCONTINUED | OUTPATIENT
Start: 2020-07-22 | End: 2020-07-24 | Stop reason: HOSPADM

## 2020-07-22 RX ORDER — ASPIRIN 325 MG
325 TABLET ORAL ONCE
Status: COMPLETED | OUTPATIENT
Start: 2020-07-22 | End: 2020-07-22

## 2020-07-22 RX ADMIN — SODIUM CHLORIDE 500 ML: 9 INJECTION, SOLUTION INTRAVENOUS at 11:58

## 2020-07-22 RX ADMIN — MULTIPLE VITAMINS W/ MINERALS TAB 1 TABLET: TAB at 18:39

## 2020-07-22 RX ADMIN — CLOPIDOGREL BISULFATE 75 MG: 75 TABLET ORAL at 18:39

## 2020-07-22 RX ADMIN — ATORVASTATIN CALCIUM 80 MG: 40 TABLET, FILM COATED ORAL at 20:41

## 2020-07-22 RX ADMIN — DONEPEZIL HYDROCHLORIDE 10 MG: 10 TABLET, FILM COATED ORAL at 18:39

## 2020-07-22 RX ADMIN — SODIUM CHLORIDE 125 ML/HR: 9 INJECTION, SOLUTION INTRAVENOUS at 13:05

## 2020-07-22 RX ADMIN — SODIUM CHLORIDE, PRESERVATIVE FREE 10 ML: 5 INJECTION INTRAVENOUS at 20:42

## 2020-07-22 RX ADMIN — ASPIRIN 325 MG ORAL TABLET 325 MG: 325 PILL ORAL at 17:23

## 2020-07-22 RX ADMIN — SODIUM CHLORIDE, PRESERVATIVE FREE 10 ML: 5 INJECTION INTRAVENOUS at 13:06

## 2020-07-22 RX ADMIN — IOPAMIDOL 75 ML: 755 INJECTION, SOLUTION INTRAVENOUS at 12:31

## 2020-07-22 RX ADMIN — SODIUM CHLORIDE 125 ML/HR: 9 INJECTION, SOLUTION INTRAVENOUS at 18:39

## 2020-07-22 NOTE — PROGRESS NOTES
Discharge Planning Assessment  [facility]     Patient Name: Uche Polanco  MRN: [id number]  Today's Date: 7/22/2020    Admit Date: 7/22/2020    Discharge Needs Assessment     Row Name 07/22/20 1056       Living Environment    Lives With  spouse    Name(s) of Who Lives With Patient  Hiral    Current Living Arrangements  home/apartment/condo    Primary Care Provided by  self    Provides Primary Care For  no one    Family Caregiver if Needed  spouse    Family Caregiver Names  Hiral    Quality of Family Relationships  unable to assess       Resource/Environmental Concerns    Resource/Environmental Concerns  none    Transportation Concerns  car, none       Transition Planning    Patient/Family Anticipates Transition to  home with family    Patient/Family Anticipated Services at Transition  none    Transportation Anticipated  family or friend will provide       Discharge Needs Assessment    Readmission Within the Last 30 Days  no previous admission in last 30 days    Concerns to be Addressed  no discharge needs identified    Equipment Currently Used at Home  bipap/cpap    Anticipated Changes Related to Illness  none    Equipment Needed After Discharge  none    Provided Post Acute Provider List?  N/A    N/A Provider List Comment  home        Discharge Plan     Row Name 07/22/20 9787       Plan    Plan  initial    Plan Comments  Pt lives in [facility] with his wife. He states he is independent with care and uses a CPAP at night. Jd Tapia is PCP. No DME/HH/O2/Rehab    Final Discharge Disposition Code  01 - home or self-care        Destination      Coordination has not been started for this encounter.      Durable Medical Equipment      Coordination has not been started for this encounter.      Dialysis/Infusion      Coordination has not been started for this encounter.      Home Medical Care      Coordination has not been started for this encounter.      Therapy      Coordination has not been started for this  encounter.      Community Resources      Coordination has not been started for this encounter.          Demographic Summary    No documentation.       Functional Status     Row Name 07/22/20 1646       Functional Status    Usual Activity Tolerance  good    Current Activity Tolerance  good       Functional Status, IADL    Medications  independent    Meal Preparation  independent    Housekeeping  independent    Laundry  independent    Shopping  independent       Mental Status    General Appearance WDL  WDL       Mental Status Summary    Recent Changes in Mental Status/Cognitive Functioning  no changes       Employment/    Employment Status  retired        Psychosocial    No documentation.       Abuse/Neglect    No documentation.       Legal    No documentation.       Substance Abuse    No documentation.       Patient Forms    No documentation.           Penny Liu RN

## 2020-07-22 NOTE — CONSULTS
Referring Provider: Maite  Reason for Consultation: dizziness/presyncope    Patient Care Team:  Jd Tapia MD as PCP - General  Jd Tapia MD as PCP - Claims [facility]  Jere De Jesus MD as Consulting Physician (Gastroenterology)    Chief complaint dizziness/presyncope     Subjective .     History of present illness:    Mr Uche Polanco is a very pleasant 80-year-old man with a history of prior TIA, hypertension, hyperlipidemia, coronary artery disease, prior MI, mild cognitive impairment, AV block with pacemaker, valvular heart disease, REM sleep disorder, obstructive sleep apnea on CPAP.  Patient presented to [facility] for presyncope symptoms.    Patient has been in his normal state of health, very active walking several times a week with his wife.  He has had no recent illness or change in medication.  He takes his blood pressure several times throughout the day per his cardiologist recommendations.  This morning he woke and felt well and went to his daughter's home to help with some home repair.  He then walked into the garage and suddenly had severe unsteadiness and a sensation that he was about to pass out.  This lasted approximately 5 minutes and his wife took him home.  His blood pressure which is normally in the 120s systolic was in the 80s.  He reported to [facility] for evaluation.            Review of Systems  No fevers, chills, respiratory tract infection symptoms, nausea, vomiting, diarrhea, skin changes, trauma, weakness or numbness, difficulty with speech or swallow, remainder review of systems negative except as noted in HPI  History  Past Medical History:   Diagnosis Date   • AV block    • CAD (coronary artery disease)    • Cognitive impairment, mild, so stated     Assessed By: Chiki Newton (Neurology); Last Assessed: 11 Sep 2014   • Family history of hypertension    • Gout    • Hyperlipidemia    • Hypertension    • TOM (obstructive sleep  "apnea)    • REM  disorder    • Skin cancer 10/22/2019    left cheek   • TIA (transient ischemic attack)    • Valvular heart disease    ,   Past Surgical History:   Procedure Laterality Date   • HEMORRHOIDECTOMY     • HERNIA REPAIR     • MITRAL VALVE REPAIR/REPLACEMENT     • OTHER SURGICAL HISTORY  11/2014    Medtronic link loop recorder   • PACEMAKER IMPLANTATION     • SKIN SURGERY  Month ago    Cell removed from head    • TONSILLECTOMY     ,   Family History   Problem Relation Age of Onset   • Ovarian cancer Mother    • Lymphoma Father    • Cancer Father    • Hypertension Other    ,   Social History     Tobacco Use   • Smoking status: Never Smoker   • Smokeless tobacco: Never Used   Substance Use Topics   • Alcohol use: Yes     Alcohol/week: 1.0 - 4.0 standard drinks     Types: 1 - 4 Glasses of wine per week     Comment: week with dinner   • Drug use: No   ,   (Not in a hospital admission), Scheduled Meds:   , Continuous Infusions:    sodium chloride 125 mL/hr Last Rate: 125 mL/hr (07/22/20 1305)   , PRN Meds:  •  sodium chloride and Allergies:  Sulfa antibiotics    Objective     Vital Signs   Blood pressure 151/75, pulse 62, temperature 97.8 °F (36.6 °C), temperature source Oral, resp. rate 18, height 185.4 cm (73\"), weight 88.5 kg (195 lb), SpO2 98 %.    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, sensation intact to light touch, " tongue midline, palate elevates symmetrically, hearing intact, head turn and shoulder shrug intact, EOMI, no nystagmus  Mental status-he is oriented to person place and time  Cognitive status-he attends to me well, gives a detailed history and has normal concentration and fund of knowledge  Motor-normal bulk and tone, no resting tremor, no focal weakness, no drift and no orbiting  Sensation-intact to light touch and temperature  Cerebellar- mild dysmetria left finger-nose-finger, heel-knee-shin no sawing, no nystagmus  Reflexes- 1+ patellar bilaterally, 1+ bicep bilaterally  Gait-he has a slightly wide-based gait and is a little unsteady initially, he cannot tandem walk, almost falls to the side     NIHSS-2, 1 4 chronic left facial weakness, 1 for ataxia 1 limb    results Review:  UA with trace leukocytes, negative nitrate, only a couple white blood cells  CMP notable for creatinine 1.47 which is stable compared to his December labs of 1.4, estimated GFR 46.  CBC no leukocytosis, no anemia, essentially normal study  Personally reviewed CT head without contrast, no acute abnormality  Personally reviewed CTA head and neck, there is mild intra-and extracranial plaque at the bilateral internal carotid arteries, but no significant stenosis      Assessment/Plan     Presyncope versus TIA/stroke    Patient had an episode of feeling dizzy but no spinning sensation earlier today lasting about 5 minutes in the setting of low blood pressure.  Given his findings on exam of mild ataxia, which could be indicative of a cerebellar stroke which would be difficult to appreciate on initial head CT, I think it is prudent that we admit this gentleman for 24-hour observation.  At the same time we can interrogate his pacemaker and repeat 2D echo to be sure that this episode today was not cardiogenic in nature.  Patient agrees to admission overnight.    Plan-  -TIA/stroke order set  -permissive htn  -adding asa to home plavix for 90  days  -statin, would increase home dose to 80 if he can tolerate  -check LDL, A1c  -2d echo  -interrogate pacemaker  -orthostatic vitals   -PT for ataxic gait   -repeat head ct at 24hrs, cannot get MRI due to pacemaker  -monitor on telemetry    Will continue to follow     I discussed the patient's findings and my recommendations with patient, Dr. Maite Peterson MD  07/22/20  14:56

## 2020-07-22 NOTE — H&P
[facility] Medicine Services  HISTORY AND PHYSICAL    Patient Name: Uche Polanco  : 1940  MRN: [id number]  Primary Care Physician: Jd Tapia MD  Date of admission: 2020    Subjective   Subjective   Chief Complaint:  Dizziness    HPI:  Uche Polanco is a 80 y.o. male with PMH significant for CAD, hypertension, hyperlipidemia, TOM, skin cancer, previous TIA, valvular heart disease who presented to [facility] ED on 2020 with complaints of dizziness.  Patient apparently went to his daughter's house and while he was there, he had sudden onset dizziness characterized vertigo lasting 5 minutes with complete resolution.  Patient states he has had these episodes before and has been diagnosed  as TIA.  Patient states he is also had dizziness and has been prescribed meclizine.  Patient denies any dizziness currently, along with, chest pain, abdominal pain, nausea, vomiting, diarrhea, shortness of air, diaphoresis, fever, chills, dysuria, flank pain, change in taste or smell, cough.  Patient also states that he has not traveled outside of [place].  Patient has a pacemaker and cannot have an MRI until cleared by cardiology.  Patient's labs were within normal limits except for GFR 46 and CR 1.47.  CT head, CT angio brain and CT angio neck showed no acute process.  Neurology was consulted by ED and saw the patient.  Neurology will continue to follow.  Patient throughout his stay.-    Review of Systems   Gen- No fevers, chills  CV- No chest pain, palpitations  Resp- No cough, dyspnea  GI- No N/V/D, abd pain  Nuero- +dizziness (resolved); no unilateral weakness  All other systems have been reviewed and the pertinent positives and negatives are listed above in the HPI and ROS  Personal History     Past Medical History:   Diagnosis Date   • AV block    • CAD (coronary artery disease)    • Cognitive impairment, mild, so stated     Assessed By: Chiki Newton (Neurology); Last  Assessed: 11 Sep 2014   • Family history of hypertension    • Gout    • Hyperlipidemia    • Hypertension    • TOM (obstructive sleep apnea)    • REM  disorder    • Skin cancer 10/22/2019    left cheek   • TIA (transient ischemic attack)    • Valvular heart disease        Past Surgical History:   Procedure Laterality Date   • HEMORRHOIDECTOMY     • HERNIA REPAIR     • MITRAL VALVE REPAIR/REPLACEMENT     • OTHER SURGICAL HISTORY  11/2014    Medtronic link loop recorder   • PACEMAKER IMPLANTATION     • SKIN SURGERY  Month ago    Cell removed from head    • TONSILLECTOMY         Family History: family history includes Cancer in his father; Hypertension in an other family member; Lymphoma in his father; Ovarian cancer in his mother. Otherwise pertinent FHx was reviewed and unremarkable.     Social History:  reports that he has never smoked. He has never used smokeless tobacco. He reports that he drinks about 1.0 - 4.0 standard drinks of alcohol per week. He reports that he does not use drugs.  Social History     Social History Narrative   • Not on file     Medications:  Available home medication information reviewed.  No current facility-administered medications on file prior to encounter.      Current Outpatient Medications on File Prior to Encounter   Medication Sig   • atorvastatin (LIPITOR) 20 MG tablet Take 20 mg by mouth Daily.   • azelastine (ASTEPRO) 0.15 % solution nasal spray U 2 SPRAYS IEN BID   • carvedilol (COREG) 12.5 MG tablet Take 1 tablet by mouth 2 (Two) Times a Day.   • clonazePAM (KlonoPIN) 1 MG tablet TAKE 1 TABLET BY MOUTH EVERY NIGHT AT BEDTIME   • clopidogrel (PLAVIX) 75 MG tablet Take 1 tablet by mouth Daily.   • dexlansoprazole (DEXILANT) 60 MG capsule Take 60 mg by mouth Daily.   • donepezil (ARICEPT) 10 MG tablet TAKE 1 TABLET BY MOUTH DAILY   • meclizine (ANTIVERT) 12.5 MG tablet TK 1 TO 2 TS PO Q 4 TO 6 H PRN DIZZINESS   • Multiple Vitamins-Minerals (PRESERVISION AREDS 2 PO) Take 1 tablet by  mouth 2 (Two) Times a Day.   • mupirocin (BACTROBAN) 2 % ointment Apply 1 application topically to the appropriate area as directed As Needed.   • [DISCONTINUED] atorvastatin (LIPITOR) 20 MG tablet TAKE 1 TABLET BY MOUTH DAILY       Allergies   Allergen Reactions   • Sulfa Antibiotics Rash     Objective   Objective   Vital Signs:   Temp:  [97.8 °F (36.6 °C)] 97.8 °F (36.6 °C)  Heart Rate:  [60-72] 64  Resp:  [18] 18  BP: (103-161)/(56-81) 142/73   Total (NIH Stroke Scale): 0  Physical Exam   Constitutional: Alert, WD, WN male in NAD  Eyes: EOMI, sclerae anicteric, no conjunctival injection  Head: NCAT  ENT: [place], moist mucous membranes   Respiratory: Non-labored, symmetrical chest expansion, CTAB  Cardiovascular: RRR, no M/R/G, +DP pulses bilaterally  Gastrointestinal: Soft, NT, ND +BS  Musculoskeletal: BANKS; no LE edema bilaterally  Neurologic: Oriented x4, strength symmetric in all extremities, follows all commands, speech clear, left facial droop (baseline from Bell's Palsy),  strength equal, no drift.  Skin: No rashes on exposed skin  Psychiatric: Pleasant and cooperative; normal affect    Results Reviewed:  I have personally reviewed current lab and radiology data.  Results from last 7 days   Lab Units 07/22/20  1041   WBC 10*3/mm3 6.24   HEMOGLOBIN g/dL 13.7   HEMATOCRIT % 41.8   PLATELETS 10*3/mm3 197     Results from last 7 days   Lab Units 07/22/20  1354 07/22/20  1041   SODIUM mmol/L  --  141   POTASSIUM mmol/L  --  4.6   CHLORIDE mmol/L  --  106   CO2 mmol/L  --  29.0   BUN mg/dL  --  19   CREATININE mg/dL  --  1.47*   GLUCOSE mg/dL  --  108*   CALCIUM mg/dL  --  9.4   ALT (SGPT) U/L  --  12   AST (SGOT) U/L  --  15   TROPONIN T ng/mL <0.010 <0.010     Estimated Creatinine Clearance: 50.2 mL/min (A) (by C-G formula based on SCr of 1.47 mg/dL (H)).  Brief Urine Lab Results  (Last result in the past 365 days)      Color   Clarity   Blood   Leuk Est   Nitrite   Protein   CREAT   Urine HCG        07/22/20  1118 Yellow Cloudy Negative Trace Negative 30 mg/dL (1+)             Imaging Results (Last 24 Hours)     Procedure Component Value Units Date/Time    CT Angiogram Head [[id number]] Collected:  07/22/20 1247     Updated:  07/22/20 1306    Narrative:       EXAMINATION: CT ANGIOGRAM HEAD-, CT ANGIOGRAM NECK-      INDICATION: Recent MRI, dizziness, syncope.     TECHNIQUE: Multiple axial CT imaging was obtained of the head and neck  following the administration of intravenous contrast according to the CT  angio protocol. Three-D reformatted images were submitted to further  facilitate diagnostic accuracy and treatment planning.     Stenosis measurement was performed by the NASCET or similar method.     The radiation dose reduction device was turned on for each scan per the  ALARA (As Low as Reasonably Achievable) protocol.     COMPARISON: None.     FINDINGS: Thyroid is homogeneous in appearance. Lung apices are clear.  The thoracic aortic arch is unremarkable. There is no evidence of  significant stenosis seen of the common carotid arteries. The vertebral  arteries reveal slight dominance on the right. No significant stenosis  identified of the vertebral arteries. There are degenerative changes  seen within the spine. The soft tissue neck is unremarkable. No bulky  adenopathy. No abnormal mass or fluid collection is seen in the soft  tissues of the neck.     The internal carotid arteries reveal no significant stenosis with  atherosclerotic disease identified at both the carotid bifurcations  bilaterally right greater than left. There is no internal carotid artery  stenosis with vascular calcification seen of the intracranial internal  carotid arteries. The anterior circulation is intact. Both M1 and M2  branches are patent bilaterally. The anterior cerebral arteries are  unremarkable. The posterior cerebral arteries are within normal limits.  No significant stenosis. The basilar artery is normal in caliber. No  evidence  of significant stenosis, aneurysmal dilatation or vascular  malformation of the posterior circulation.       Impression:       There is atherosclerotic disease of the carotid bifurcations  bilaterally left greater than right with no significant stenosis seen of  the common carotid arteries or internal coronary arteries. Slight  dominance of the right vertebral artery with no significant stenosis  seen of the vertebral arteries or posterior circulation. Anterior  circulation is intact and unremarkable.     D:  07/22/2020  E:  07/22/2020       CT Angiogram Neck [[id number]] Collected:  07/22/20 1247     Updated:  07/22/20 1306    Narrative:       EXAMINATION: CT ANGIOGRAM HEAD-, CT ANGIOGRAM NECK-      INDICATION: Recent MRI, dizziness, syncope.     TECHNIQUE: Multiple axial CT imaging was obtained of the head and neck  following the administration of intravenous contrast according to the CT  angio protocol. Three-D reformatted images were submitted to further  facilitate diagnostic accuracy and treatment planning.     Stenosis measurement was performed by the NASCET or similar method.     The radiation dose reduction device was turned on for each scan per the  ALARA (As Low as Reasonably Achievable) protocol.     COMPARISON: None.     FINDINGS: Thyroid is homogeneous in appearance. Lung apices are clear.  The thoracic aortic arch is unremarkable. There is no evidence of  significant stenosis seen of the common carotid arteries. The vertebral  arteries reveal slight dominance on the right. No significant stenosis  identified of the vertebral arteries. There are degenerative changes  seen within the spine. The soft tissue neck is unremarkable. No bulky  adenopathy. No abnormal mass or fluid collection is seen in the soft  tissues of the neck.     The internal carotid arteries reveal no significant stenosis with  atherosclerotic disease identified at both the carotid bifurcations  bilaterally right greater than left.  There is no internal carotid artery  stenosis with vascular calcification seen of the intracranial internal  carotid arteries. The anterior circulation is intact. Both M1 and M2  branches are patent bilaterally. The anterior cerebral arteries are  unremarkable. The posterior cerebral arteries are within normal limits.  No significant stenosis. The basilar artery is normal in caliber. No  evidence of significant stenosis, aneurysmal dilatation or vascular  malformation of the posterior circulation.       Impression:       There is atherosclerotic disease of the carotid bifurcations  bilaterally left greater than right with no significant stenosis seen of  the common carotid arteries or internal coronary arteries. Slight  dominance of the right vertebral artery with no significant stenosis  seen of the vertebral arteries or posterior circulation. Anterior  circulation is intact and unremarkable.     D:  07/22/2020  E:  07/22/2020       CT Head Without Contrast [[id number]] Collected:  07/22/20 1246     Updated:  07/22/20 1303    Narrative:       EXAMINATION: CT HEAD WO CONTRAST-      INDICATION: Recent MI, stroke symptoms.     TECHNIQUE: Multiple axial CT imaging was obtained of the head from the  skull base to the skull vertex without the administration of intravenous  contrast.     The radiation dose reduction device was turned on for each scan per the  ALARA (As Low as Reasonably Achievable) protocol.     COMPARISON: None.     FINDINGS: The brain parenchyma is unremarkable in appearance. No  hemorrhage or hydrocephalus. No mass, mass effect, or midline shift.  Physiologic calcification is seen in the basal ganglia bilaterally. No  abnormal extraaxial fluid collection is identified. Bony structures  reveal no evidence of osseous abnormality. The visualized paranasal  sinuses are clear. The mastoid air cells are patent.       Impression:       No acute intracranial normality. Mild chronic changes are  seen within the  brain.     Examination was performed 07/22/2020 at 12:27 PM and examination results  were given to the emergency room physician 07/22/2020 at 12:47 PM.      D:  07/22/2020  E:  07/22/2020       XR Chest 1 View [[id number]] Collected:  07/22/20 1135     Updated:  07/22/20 1144    Narrative:       EXAMINATION: XR CHEST 1 VW- 07/22/2020     INDICATION: Weak/Dizzy/AMS triage protocol      COMPARISON: 12/12/2016     FINDINGS: Portable chest reveals cardiac and mediastinal silhouettes  within normal limits. Pacer leads in satisfactory position. Patient is  status post median sternotomy. The lung fields are grossly clear. No  focal parenchymal opacification present. No pleural effusion or  pneumothorax. Pulmonary vascularity is within normal limits.           Impression:       No acute cardiopulmonary disease.        D:  07/22/2020  E:  07/22/2020           Results for orders placed during the hospital encounter of 12/06/19   Adult Transthoracic Echo Limited W/ Cont if Necessary Per Protocol    Narrative · Left ventricular systolic function is mildly decreased.  · Estimated EF appears to be in the range of 41 - 45%.  · The following left ventricular wall segments are akinetic: apical   anterior, apical inferior, apical septal and apex. Diastolic wall thinning   consistent with apical infarction.          Assessment/Plan   Assessment & Plan     Active Hospital Problems    Diagnosis POA   • **TIA (transient ischemic attack) [G45.9] Yes     A.  Status post Medtronic link loop recorder implantation, November 2014.  B.  Recent interrogation revealing AV block, pauses, symptomatic with dizziness and lightheadedness.     • Hyperlipidemia [E78.5] Yes   • Hypertension [I10] Yes   • CAD (coronary artery disease) [I25.10] Yes     A.  by report, data insufficient.     • Valvular heart disease [I38] Yes     A.  History of mitral valve repair at [facility] 2005, records pending.  B.  Echocardiogram, August 2014: EF normal at 50% to 55%,  moderate AR, mild MR, left atrium at 3.8 cm.        Plan:  Dizziness  --CT angio head, CT head, CT angiogram neck completed  --Echo ordered  --SLP consulted  --PT/OT consulted  --Neurology following  --NIHSS  --Neuro checks  --Bedside swallow clear; ok for cardiac diet  --Lipid profile, TSH, Free T4, A1c  --Increase lipitor to 80mg  --AM labs  --Pacemaker interrogation  --Pt follows with Dr. Limon, will ask him to see for pacemaker interrogation and possible MRI (if pacemaker compatible)     HTN  HLD  CAD  --continue home dose ASA, coreg, plavix    DVT prophylaxis:  Mechanical    CODE STATUS:    Code Status and Medical Interventions:   Ordered at: 07/22/20 1600     Level Of Support Discussed With:    Patient     Code Status:    CPR     Medical Interventions (Level of Support Prior to Arrest):    Full     Admission Status:  I believe this patient meets OBSERVATION status, however if further evaluation or treatment plans warrant, status may change.  Based upon current information, I predict patient's care encounter to be less than or equal to 2 midnights.    Electronically signed by JONATHAN Boles, 07/22/20, 4:01 PM.      Attending   Admission Attestation       I have seen and examined the patient, performing an independent face-to-face diagnostic evaluation with plan of care reviewed and developed with the advanced practice clinician (APC).      Brief Summary Statement:   Uche Polanco is a 80 y.o. male with PMH significant for CAD, hypertension, hyperlipidemia, TOM, skin cancer, previous TIA, valvular heart disease who presented to [facility] ED on 7/22/2020 with complaints of dizziness.  Patient states he went to visit his granddaughter's house today and had an episode of dizziness that lasted approximately 5 minutes.  He denies any chest pain or palpitations or shortness of breath during the dizziness event.  He states he has had some dizziness since admission.  Patient does have a pacemaker.  States he follows  with Dr. Limon.  Hospitalist service contacted for admission to rule out TIA/CVA.    Remainder of detailed HPI is as noted by APC and has been reviewed and/or edited by me for completeness.    Attending Physical Exam:  Constitutional: Awake, alert, NAD  Eyes: PERRLA, sclerae anicteric, no conjunctival injection  HENT: NCAT, mucous membranes moist  Neck: Supple, no thyromegaly, no lymphadenopathy, trachea midline  Respiratory: Clear to auscultation bilaterally, nonlabored respirations   Cardiovascular: RRR, no murmurs, rubs, or gallops, palpable pedal pulses bilaterally  Gastrointestinal: Positive bowel sounds, soft, nontender, nondistended  Musculoskeletal: No bilateral ankle edema, no clubbing or cyanosis to extremities  Psychiatric: Appropriate affect, cooperative  Neurologic: Oriented x 3, strength symmetric in all extremities, Cranial Nerves grossly intact to confrontation, speech clear  Skin: No rashes    Brief Assessment/Plan :  See detailed assessment and plan developed with APC which I have reviewed and/or edited for completeness.    Electronically signed by Yadira Mendez DO, 07/22/20, 9:47 PM.

## 2020-07-22 NOTE — ED PROVIDER NOTES
Subjective   80-year-old white male presents emerged part with dizziness    Patient reports that he went to his daughter's house.  While there he had a sudden onset episode of dizziness characterized as vertigo lasting 5 minutes with complete resolution and without recurrence.  He denies any change in vision speech or cognition.  He had hold on with walking during that 5-minute episode but is returned to his baseline since then.  He denies any ear pain.  He has had no tinnitus or hearing changes otherwise.  He denies any cough congestion URI symptoms facial pain headache.  He has had no fevers chills loss of taste or smell or coronavirus exposure.  He was nauseated during the event but has no nausea vomiting diarrhea GI symptoms chest pain palpitations or abdominal pain.  He denies any dysuria frequency urgency hematuria BRBPR or melena.  He is suffered no recent trauma.    He has a history of Bell's palsy in the past with some facial dissymmetry that he believes is unchanged.  He is completely asymptomatic on evaluation now      History provided by:  Patient  Dizziness   Quality:  Lightheadedness, head spinning and room spinning  Severity:  Severe  Onset quality:  Sudden  Duration: 5 minutes.  Timing:  Constant  Progression:  Resolved  Chronicity:  New  Context: physical activity and standing up    Context: not when bending over, not with bowel movement, not with inactivity and not with loss of consciousness    Relieved by:  Nothing (Spontaneous resolution)  Worsened by:  Movement  Associated symptoms: no blood in stool, no chest pain, no diarrhea, no headaches, no nausea, no palpitations, no shortness of breath, no vomiting and no weakness    Risk factors: heart disease        Review of Systems   Constitutional: Negative.  Negative for chills and fever.   HENT: Negative.  Negative for congestion, rhinorrhea and sore throat.    Eyes: Negative.    Respiratory: Negative.  Negative for cough and shortness of breath.     Cardiovascular: Negative.  Negative for chest pain, palpitations and leg swelling.   Gastrointestinal: Negative.  Negative for abdominal pain, blood in stool, diarrhea, nausea and vomiting.   Genitourinary: Negative.  Negative for dysuria and hematuria.   Skin: Negative.    Neurological: Positive for dizziness. Negative for syncope, weakness, numbness and headaches.   Psychiatric/Behavioral: Negative for confusion.   All other systems reviewed and are negative.      Past Medical History:   Diagnosis Date   • AV block    • CAD (coronary artery disease)    • Cognitive impairment, mild, so stated     Assessed By: Chiki Newton (Neurology); Last Assessed: 11 Sep 2014   • Family history of hypertension    • Gout    • Hyperlipidemia    • Hypertension    • TOM (obstructive sleep apnea)    • REM  disorder    • Skin cancer 10/22/2019    left cheek   • TIA (transient ischemic attack)    • Valvular heart disease        Allergies   Allergen Reactions   • Sulfa Antibiotics Rash       Past Surgical History:   Procedure Laterality Date   • HEMORRHOIDECTOMY     • HERNIA REPAIR     • MITRAL VALVE REPAIR/REPLACEMENT     • OTHER SURGICAL HISTORY  11/2014    [employer] link loop recorder   • PACEMAKER IMPLANTATION     • SKIN SURGERY  Month ago    Cell removed from head    • TONSILLECTOMY         Family History   Problem Relation Age of Onset   • Ovarian cancer Mother    • Lymphoma Father    • Cancer Father    • Hypertension Other        Social History     Socioeconomic History   • Marital status:      Spouse name: Not on file   • Number of children: Not on file   • Years of education: Not on file   • Highest education level: Not on file   Tobacco Use   • Smoking status: Never Smoker   • Smokeless tobacco: Never Used   Substance and Sexual Activity   • Alcohol use: Yes     Alcohol/week: 1.0 - 4.0 standard drinks     Types: 1 - 4 Glasses of wine per week     Comment: week with dinner   • Drug use: No   • Sexual activity: Defer            Objective   Physical Exam   Constitutional: He is oriented to person, place, and time. No distress.   HENT:   Head: Normocephalic and atraumatic.   Nose: Nose normal.   Mouth/Throat: Oropharynx is clear and moist.   Eyes: Conjunctivae are normal.   Neck: Normal range of motion. Neck supple.   Cardiovascular: Normal rate, regular rhythm, normal heart sounds and intact distal pulses.   No murmur heard.  Pulmonary/Chest: Effort normal and breath sounds normal. No respiratory distress. He has no wheezes. He has no rales. He exhibits no tenderness.   Abdominal: Soft. Bowel sounds are normal. He exhibits no distension. There is no tenderness.   Musculoskeletal: Normal range of motion. He exhibits no edema or tenderness.   Lymphadenopathy:     He has no cervical adenopathy.   Neurological: He is alert and oriented to person, place, and time. He displays normal reflexes. No sensory deficit. He exhibits normal muscle tone. Coordination normal.   Mild facial dissymmetry with otherwise normal movement.  Baseline dissymmetry post Bell's palsy per patient.  CN II through XII are otherwise intact.  Visual fields are intact.  Finger-to-nose is intact.  DONTE is intact.  There is no dysarthria aphasia or ataxia.  DTRs strength and sensation are intact.  Patient can heel and toe walk.  Neurologic examination is nonfocal.  No nystagmus   Skin: Skin is warm and dry. No rash noted.   Psychiatric: He has a normal mood and affect. His behavior is normal. Thought content normal.   Nursing note and vitals reviewed.      Procedures           ED Course  ED Course as of Jul 22 1536   Wed Jul 22, 2020   1521 Patient is again reevaluated.  He is seen by neurology with consultation in the ED.  Neurologist is recommended admission for definitive inpatient care.  She is requested he be treated with an aspirin.Patient is agreeable with the plan of care    []   1532 Discussed case with Dr. Mendez who will admit for definitive inpatient  care in coordination with neurologic evaluation by Dr. Arun Aden, who is already seen the patient    [HH]      ED Course User Index  [] Aydin Arora MD                                           [facility]    Final diagnoses:   TIA (transient ischemic attack)   Renal insufficiency   Dizziness   Near syncope            Aydin Arora MD  07/22/20 1532

## 2020-07-23 ENCOUNTER — APPOINTMENT (OUTPATIENT)
Dept: CARDIOLOGY | Facility: HOSPITAL | Age: 80
End: 2020-07-23

## 2020-07-23 LAB
ANION GAP SERPL CALCULATED.3IONS-SCNC: 7 MMOL/L (ref 5–15)
BUN SERPL-MCNC: 18 MG/DL (ref 8–23)
BUN/CREAT SERPL: 17.5 (ref 7–25)
CALCIUM SPEC-SCNC: 8.6 MG/DL (ref 8.6–10.5)
CHLORIDE SERPL-SCNC: 103 MMOL/L (ref 98–107)
CHOLEST SERPL-MCNC: 102 MG/DL (ref 0–200)
CO2 SERPL-SCNC: 26 MMOL/L (ref 22–29)
CREAT SERPL-MCNC: 1.03 MG/DL (ref 0.76–1.27)
DEPRECATED RDW RBC AUTO: 44.7 FL (ref 37–54)
ERYTHROCYTE [DISTWIDTH] IN BLOOD BY AUTOMATED COUNT: 12.6 % (ref 12.3–15.4)
GFR SERPL CREATININE-BSD FRML MDRD: 69 ML/MIN/1.73
GLUCOSE BLDC GLUCOMTR-MCNC: 96 MG/DL (ref 70–130)
GLUCOSE SERPL-MCNC: 91 MG/DL (ref 65–99)
HCT VFR BLD AUTO: 37.4 % (ref 37.5–51)
HDLC SERPL-MCNC: 34 MG/DL (ref 40–60)
HGB BLD-MCNC: 12.1 G/DL (ref 13–17.7)
LDLC SERPL CALC-MCNC: 49 MG/DL (ref 0–100)
LDLC/HDLC SERPL: 1.45 {RATIO}
MCH RBC QN AUTO: 31.4 PG (ref 26.6–33)
MCHC RBC AUTO-ENTMCNC: 32.4 G/DL (ref 31.5–35.7)
MCV RBC AUTO: 97.1 FL (ref 79–97)
PLATELET # BLD AUTO: 146 10*3/MM3 (ref 140–450)
PMV BLD AUTO: 10.4 FL (ref 6–12)
POTASSIUM SERPL-SCNC: 3.8 MMOL/L (ref 3.5–5.2)
RBC # BLD AUTO: 3.85 10*6/MM3 (ref 4.14–5.8)
SODIUM SERPL-SCNC: 136 MMOL/L (ref 136–145)
TRIGL SERPL-MCNC: 93 MG/DL (ref 0–150)
VLDLC SERPL-MCNC: 18.6 MG/DL
WBC # BLD AUTO: 5.14 10*3/MM3 (ref 3.4–10.8)

## 2020-07-23 PROCEDURE — 99226 PR SBSQ OBSERVATION CARE/DAY 35 MINUTES: CPT | Performed by: HOSPITALIST

## 2020-07-23 PROCEDURE — 80048 BASIC METABOLIC PNL TOTAL CA: CPT | Performed by: NURSE PRACTITIONER

## 2020-07-23 PROCEDURE — 97161 PT EVAL LOW COMPLEX 20 MIN: CPT

## 2020-07-23 PROCEDURE — 80061 LIPID PANEL: CPT | Performed by: NURSE PRACTITIONER

## 2020-07-23 PROCEDURE — 82962 GLUCOSE BLOOD TEST: CPT

## 2020-07-23 PROCEDURE — G0378 HOSPITAL OBSERVATION PER HR: HCPCS

## 2020-07-23 PROCEDURE — 97165 OT EVAL LOW COMPLEX 30 MIN: CPT

## 2020-07-23 PROCEDURE — 99213 OFFICE O/P EST LOW 20 MIN: CPT | Performed by: PSYCHIATRY & NEUROLOGY

## 2020-07-23 PROCEDURE — 93306 TTE W/DOPPLER COMPLETE: CPT | Performed by: INTERNAL MEDICINE

## 2020-07-23 PROCEDURE — 97535 SELF CARE MNGMENT TRAINING: CPT

## 2020-07-23 PROCEDURE — 93306 TTE W/DOPPLER COMPLETE: CPT

## 2020-07-23 PROCEDURE — 85027 COMPLETE CBC AUTOMATED: CPT | Performed by: NURSE PRACTITIONER

## 2020-07-23 PROCEDURE — 92523 SPEECH SOUND LANG COMPREHEN: CPT

## 2020-07-23 PROCEDURE — 96361 HYDRATE IV INFUSION ADD-ON: CPT

## 2020-07-23 RX ADMIN — ACETAMINOPHEN 650 MG: 325 TABLET, FILM COATED ORAL at 20:21

## 2020-07-23 RX ADMIN — SODIUM CHLORIDE, PRESERVATIVE FREE 10 ML: 5 INJECTION INTRAVENOUS at 20:13

## 2020-07-23 RX ADMIN — SODIUM CHLORIDE 125 ML/HR: 9 INJECTION, SOLUTION INTRAVENOUS at 01:00

## 2020-07-23 RX ADMIN — ATORVASTATIN CALCIUM 80 MG: 40 TABLET, FILM COATED ORAL at 20:12

## 2020-07-23 RX ADMIN — MULTIPLE VITAMINS W/ MINERALS TAB 1 TABLET: TAB at 10:26

## 2020-07-23 RX ADMIN — CLOPIDOGREL BISULFATE 75 MG: 75 TABLET ORAL at 10:26

## 2020-07-23 RX ADMIN — DONEPEZIL HYDROCHLORIDE 10 MG: 10 TABLET, FILM COATED ORAL at 10:26

## 2020-07-23 RX ADMIN — ASPIRIN 81 MG: 81 TABLET, CHEWABLE ORAL at 10:26

## 2020-07-23 RX ADMIN — CLONAZEPAM 1 MG: 1 TABLET ORAL at 20:12

## 2020-07-23 RX ADMIN — DOCUSATE SODIUM 50MG AND SENNOSIDES 8.6MG 2 TABLET: 8.6; 5 TABLET, FILM COATED ORAL at 10:26

## 2020-07-23 NOTE — PLAN OF CARE
Problem: Patient Care Overview  Goal: Plan of Care Review  Outcome: Ongoing (interventions implemented as appropriate)  Flowsheets (Taken 7/23/2020 1125 by Delores Lares, PT)  Plan of Care Reviewed With: patient;spouse  Note:   SLP evaluation completed. Will address cognitive communication in treatment. Please see note for further details and recommendations.

## 2020-07-23 NOTE — PLAN OF CARE
Problem: Patient Care Overview  Goal: Plan of Care Review  Flowsheets (Taken 7/23/2020 112)  Plan of Care Reviewed With: patient;spouse  Outcome Summary: GOALS NOT ESTABLISHED AS PT IS AT BASELINE WITH FUNCTIONAL MOBILITY. NO MOTOR, SPEECH, COORDINATION, SENSATION OR BALANCE DEFICITS NOTED. PT REPORTS DIZZINESS HAS RESOLVED. AMBULATED 500 FEET INDEPENDENTLY. RECOMMEND HOME WITH WIFE.  D/C P.T.

## 2020-07-23 NOTE — PROGRESS NOTES
Psychiatric Medicine Services  PROGRESS NOTE    Patient Name: Uche Polanco  : 1940  MRN: [id number]    Date of Admission: 2020  Primary Care Physician: Jd Tapia MD    Subjective   Subjective     CC:  Follow-up TIA/presyncope    HPI:  Patient is sitting up in chair, eating lunch during my visit.  He reports a mild sensation of tingling to the top of his head but no active lightheadedness or presyncope.  No room spinning.  No vision changes.  No peripheral weakness.  Wife at bedside.    Review of Systems  Gen- No fevers, chills  CV- No chest pain, palpitations  Resp- No cough, dyspnea  GI- No N/V/D, abd pain    Objective   Objective     Vital Signs:   Temp:  [96.9 °F (36.1 °C)-98.4 °F (36.9 °C)] 97.8 °F (36.6 °C)  Heart Rate:  [59-74] 74  Resp:  [16-18] 18  BP: (105-157)/(66-75) 131/66  Total (NIH Stroke Scale): 0     Physical Exam:  Constitutional: No acute distress, awake, alert  HENT: NCAT, mucous membranes moist  Respiratory: Clear to auscultation bilaterally, respiratory effort normal   Cardiovascular: RRR, no murmurs, rubs, or gallops, palpable pedal pulses bilaterally, atrial paced rhythm on telemetry, left upper chest pacemaker  Gastrointestinal: Positive bowel sounds, soft, nontender, nondistended  Musculoskeletal: No bilateral ankle edema  Psychiatric: Appropriate affect, cooperative  Neurologic: Oriented x 3, strength symmetric in all extremities, left facial weakness, cranial nerves otherwise grossly intact to confrontation  Skin: No rashes      Results Reviewed:  Results from last 7 days   Lab Units 20  0629 20  1041   WBC 10*3/mm3 5.14 6.24   HEMOGLOBIN g/dL 12.1* 13.7   HEMATOCRIT % 37.4* 41.8   PLATELETS 10*3/mm3 146 197     Results from last 7 days   Lab Units 20  0629 20  1354 20  1041   SODIUM mmol/L 136  --  141   POTASSIUM mmol/L 3.8  --  4.6   CHLORIDE mmol/L 103  --  106   CO2 mmol/L 26.0  --  29.0   BUN mg/dL 18  --   19   CREATININE mg/dL 1.03  --  1.47*   GLUCOSE mg/dL 91  --  108*   CALCIUM mg/dL 8.6  --  9.4   ALT (SGPT) U/L  --   --  12   AST (SGOT) U/L  --   --  15   TROPONIN T ng/mL  --  <0.010 <0.010     Estimated Creatinine Clearance: 71.6 mL/min (by C-G formula based on SCr of 1.03 mg/dL).    Microbiology Results Abnormal     None          Imaging Results (Last 24 Hours)     ** No results found for the last 24 hours. **          Results for orders placed during the hospital encounter of 12/06/19   Adult Transthoracic Echo Limited W/ Cont if Necessary Per Protocol    Narrative · Left ventricular systolic function is mildly decreased.  · Estimated EF appears to be in the range of 41 - 45%.  · The following left ventricular wall segments are akinetic: apical   anterior, apical inferior, apical septal and apex. Diastolic wall thinning   consistent with apical infarction.          I have reviewed the medications:  Scheduled Meds:  aspirin 81 mg Oral Daily   Or      aspirin 300 mg Rectal Daily   atorvastatin 80 mg Oral Nightly   clonazePAM 1 mg Oral Nightly   clopidogrel 75 mg Oral Daily   donepezil 10 mg Oral Daily   multivitamin with minerals 1 tablet Oral Daily   sennosides-docusate 2 tablet Oral BID   sodium chloride 10 mL Intravenous Q12H     Continuous Infusions:   PRN Meds:.•  acetaminophen **OR** acetaminophen  •  aluminum-magnesium hydroxide-simethicone  •  bisacodyl  •  calcium carbonate EX  •  ondansetron **OR** ondansetron  •  sodium chloride  •  sodium chloride    Assessment/Plan   Assessment & Plan     Active Hospital Problems    Diagnosis  POA   • **TIA (transient ischemic attack) [G45.9]  Yes   • Hyperlipidemia [E78.5]  Yes   • Hypertension [I10]  Yes   • CAD (coronary artery disease) [I25.10]  Yes   • Valvular heart disease [I38]  Yes      Resolved Hospital Problems   No resolved problems to display.        Brief Hospital Course to date:  Mr. Polanco is an 80-year-old male with a past medical history of  hypertension, hyperlipidemia, CAD, gout, severe obstructive sleep apnea, heart block status post PPM, and prior TIA who presented to the [facility] emergency department on 7/22 with dizziness for 5 minutes.  Symptoms were orthostatic.  Prior episodes of the same were attributed to TIA. BP was 89/50 at time of presyncope    TIA versus presyncope  -CT head negative, CT angiogram head and neck with no hemodynamically significant atherosclerotic disease  -Hemoglobin A1c 5.3%, LDL 49  -Thyroid function tests normal,  -ECG x2 reviewed, atrial paced rhythm  -Continue home medications of statin and Plavix, plan for 90 days of aspirin  -Pacemaker nurse has approved patient to get an MRI of the brain.  I have canceled CT head and ordered MRI. The authorization is scanned into the media part of the chart in Epic. D/w Dr. Limon.  -Check orthostatic blood pressure  - discussed plan of care with Dr. MARTINEZ    CKDII/III  -Creatinine improved overnight with IV fluids but renal function is approximately at baseline     Hypertension, home BP log reviewed  Hyperlipidemia, continue statin  CAD, continue aspirin, carvedilol, and Plavix  REM Sleep Disorder, on qhs clonazepam    DVT Prophylaxis:  SCDs  Disposition: I expect the patient to be discharged home, as early as this evening pending results of studies.    CODE STATUS:   Code Status and Medical Interventions:   Ordered at: 07/22/20 1600     Level Of Support Discussed With:    Patient     Code Status:    CPR     Medical Interventions (Level of Support Prior to Arrest):    Full     I spent approximately 40 minutes on care with 25 minutes at bedside in face-to-face counseling of patient and wife on results of work-up and plan of care moving forward.    Electronically signed by Chiki Cornejo MD, 07/23/20, 14:22.

## 2020-07-23 NOTE — THERAPY DISCHARGE NOTE
Patient Name: Uche Polanco  : 1940    MRN: [id number]                              Today's Date: 2020       Admit Date: 2020    Visit Dx:     ICD-10-CM ICD-9-CM   1. TIA (transient ischemic attack) G45.9 435.9   2. Renal insufficiency N28.9 593.9   3. Dizziness R42 780.4   4. 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     Assessed By: Chiki Newton (Neurology); Last Assessed: 11 Sep 2014   • Family history of hypertension    • Gout    • Hyperlipidemia    • Hypertension    • TOM (obstructive sleep apnea)    • REM  disorder    • Skin cancer 10/22/2019    left cheek   • TIA (transient ischemic attack)    • Valvular heart disease      Past Surgical History:   Procedure Laterality Date   • HEMORRHOIDECTOMY     • HERNIA REPAIR     • MITRAL VALVE REPAIR/REPLACEMENT     • OTHER SURGICAL HISTORY  2014    [employer] link loop recorder   • PACEMAKER IMPLANTATION     • SKIN SURGERY  Month ago    Cell removed from head    • TONSILLECTOMY       General Information     Row Name 20 1119          PT Evaluation Time/Intention    Document Type  discharge evaluation/summary  -CD     Mode of Treatment  physical therapy  -CD     Row Name 20 1119          General Information    Patient Profile Reviewed?  yes  -CD     Prior Level of Function  independent:;all household mobility;community mobility;ADL's;driving  -CD     Existing Precautions/Restrictions  no known precautions/restrictions PT REPORTS CURRENTLY DIZZINESS HAS RESOLVED. NO EPISODES DURING P.T. EVAL.   -CD     Barriers to Rehab  none identified  -CD      Row Name 07/23/20 1119          Relationship/Environment    Lives With  spouse  -CD     Row Name 07/23/20 1119          Resource/Environmental Concerns    Current Living Arrangements  home/apartment/condo  -CD     Row Name 07/23/20 1119          Home Main Entrance    Number of Stairs, Main Entrance  three  -CD     Stair Railings, Main Entrance  railings safe and in good condition  -CD     Row Name 07/23/20 1119          Stairs Within Home, Primary    Number of Stairs, Within Home, Primary  none  -CD     Row Name 07/23/20 1119          Cognitive Assessment/Intervention- PT/OT    Orientation Status (Cognition)  oriented x 4  -CD     Cognitive Assessment/Intervention Comment  PT LOOKS TO WIFE OCCASIONALLY TO ANSWER QUESTIONS RE: RECENT MEDICAL HISTORY RELATED TO VERTIGO/DIZZINESS EPISODES.   -CD       User Key  (r) = Recorded By, (t) = Taken By, (c) = Cosigned By    Initials Name Provider Type    CD Delores Lares, PT Physical Therapist        Mobility     Row Name 07/23/20 1122          Bed Mobility Assessment/Treatment    Comment (Bed Mobility)  PT UIC.   -CD     Row Name 07/23/20 1122          Transfer Assessment/Treatment    Comment (Transfers)     -CD     Row Name 07/23/20 1122          Sit-Stand Transfer    Sit-Stand [place] (Transfers)  independent  -CD     Row Name 07/23/20 1122          Gait/Stairs Assessment/Training    Gait/Stairs Assessment/Training  gait/ambulation independence  -CD     [place] Level (Gait)  independent  -CD     Distance in Feet (Gait)  500 FEET.   -CD     Comment (Gait/Stairs)  NO LOB OR DIFFICULTY WITH BACKWARDS GAIT, OR TURNING 360 DEGREES.   -CD       User Key  (r) = Recorded By, (t) = Taken By, (c) = Cosigned By    Initials Name Provider Type    CD Delores Lares PT Physical Therapist        Obj/Interventions     Row Name 07/23/20 1123          General ROM    GENERAL ROM COMMENTS  B LE AROM WFL'S   -CD     Row Name 07/23/20 1123          MMT (Manual Muscle Testing)     General MMT Comments  B LE GROSSLY 5/5.   -CD     Row Name 07/23/20 1123          Static Sitting Balance    Level of [place] (Unsupported Sitting, Static Balance)  independent  -CD     Row Name 07/23/20 1123          Dynamic Sitting Balance    Level of [place], Reaches Outside Midline (Sitting, Dynamic Balance)  independent  -CD     Row Name 07/23/20 1123          Static Standing Balance    Level of [place] (Supported Standing, Static Balance)  independent  -CD     Row Name 07/23/20 1123          Dynamic Standing Balance    Level of [place], Reaches Outside Midline (Standing, Dynamic Balance)  independent  -CD     Comment, Reaches Outside Midline (Standing, Dynamic Balance)  GAIT IN ROTHMAN. SEE GAIT NOTE.   -CD     Row Name 07/23/20 1123          Sensory Assessment/Intervention    Sensory General Assessment  no sensation deficits identified B LE'S   -CD       User Key  (r) = Recorded By, (t) = Taken By, (c) = Cosigned By    Initials Name Provider Type    CD Delores Lares, PT Physical Therapist        Goals/Plan    No documentation.       Clinical Impression     Row Name 07/23/20 1125          Pain Scale: Numbers Pre/Post-Treatment    Pain Scale: Numbers, Pretreatment  0/10 - no pain  -CD     Pain Scale: Numbers, Post-Treatment  0/10 - no pain  -CD     Row Name 07/23/20 1125          Plan of Care Review    Plan of Care Reviewed With  patient;spouse  -CD     Outcome Summary  GOALS NOT ESTABLISHED AS PT IS AT BASELINE WITH FUNCTIONAL MOBILITY. NO MOTOR, SPEECH, COORDINATION, SENSATION OR BALANCE DEFICITS NOTED. PT REPORTS DIZZINESS HAS RESOLVED. RECOMMEND HOME WITH WIFE.  D/C P.T.   -CD     Row Name 07/23/20 1125          Physical Therapy Clinical Impression    Patient/Family Goals Statement (PT Clinical Impression)  TO GO HOME. TO RESOLVE EPISODES OF DIZZINESS.   -CD     Criteria for Skilled Interventions Met (PT Clinical Impression)  yes  -CD     Rehab Potential (PT Clinical Summary)  good, to  achieve stated therapy goals  -CD     Row Name 07/23/20 1125          Vital Signs    Intra Systolic BP Rehab  104  -CD     Intra Treatment Diastolic BP  62  -CD     Post Systolic BP Rehab  131  -CD     Post Treatment Diastolic BP  66  -CD     O2 Delivery Pre Treatment  room air  -CD     O2 Delivery Intra Treatment  room air  -CD     O2 Delivery Post Treatment  room air  -CD     Intra Patient Position  Sitting  -CD     Post Patient Position  Standing  -CD     Row Name 07/23/20 1125          Positioning and Restraints    Pre-Treatment Position  sitting in chair/recliner  -CD     Post Treatment Position  chair  -CD       User Key  (r) = Recorded By, (t) = Taken By, (c) = Cosigned By    Initials Name Provider Type    CD Delores Lares, PT Physical Therapist        Outcome Measures     Row Name 07/23/20 1128          How much help from another person do you currently need...    Turning from your back to your side while in flat bed without using bedrails?  4  -CD     Moving from lying on back to sitting on the side of a flat bed without bedrails?  4  -CD     Moving to and from a bed to a chair (including a wheelchair)?  4  -CD     Standing up from a chair using your arms (e.g., wheelchair, bedside chair)?  4  -CD     Climbing 3-5 steps with a railing?  4  -CD     To walk in hospital room?  4  -CD     AM-PAC 6 Clicks Score (PT)  24  -CD     Row Name 07/23/20 1128          Modified [place] Scale    Modified [place] Scale  0 - No Symptoms at all.  -CD     Row Name 07/23/20 1128          Functional Assessment    Outcome Measure Options  AM-PAC 6 Clicks Basic Mobility (PT);Modified [place]  -CD       User Key  (r) = Recorded By, (t) = Taken By, (c) = Cosigned By    Initials Name Provider Type    CD Delores Lares, PT Physical Therapist        Physical Therapy Education                 Title: PT OT SLP Therapies (Done)     Topic: Physical Therapy (Done)     Point: Precautions (Done)     Description:   Instruct learner(s) on  prescribed precautions during mobility and gait tasks              Learning Progress Summary           Patient Acceptance, E, VU by CD at 7/23/2020 1128    Comment:  BENEFITS OF OOB ACTIVITY, SAFETY WITH MOBILITY.   Significant Other Acceptance, E, VU by CD at 7/23/2020 1128    Comment:  BENEFITS OF OOB ACTIVITY, SAFETY WITH MOBILITY.                               User Key     Initials Effective Dates Name Provider Type Discipline    CD 06/19/15 -  Delores Lares, PT Physical Therapist PT              PT Recommendation and Plan     Outcome Summary/Treatment Plan (PT)  Anticipated Discharge Disposition (PT): home  Plan of Care Reviewed With: patient, spouse  Outcome Summary: GOALS NOT ESTABLISHED AS PT IS AT BASELINE WITH FUNCTIONAL MOBILITY. NO MOTOR, SPEECH, COORDINATION, SENSATION OR BALANCE DEFICITS NOTED. PT REPORTS DIZZINESS HAS RESOLVED. RECOMMEND HOME WITH WIFE.  D/C P.T.      Time Calculation:   PT Charges     Row Name 07/23/20 1129             Time Calculation    Start Time  1051  -CD      PT Received On  07/23/20  -CD        User Key  (r) = Recorded By, (t) = Taken By, (c) = Cosigned By    Initials Name Provider Type     Delores Lares PT Physical Therapist        Therapy Charges for Today     Code Description Service Date Service Provider Modifiers Qty    [id number]  PT EVAL LOW COMPLEXITY 3 7/23/2020 Delores Lares, PT GP 1          PT G-Codes  Outcome Measure Options: AM-PAC 6 Clicks Basic Mobility (PT), Modified [place]  AM-PAC 6 Clicks Score (PT): 24  AM-PAC 6 Clicks Score (OT): 24  Modified [place] Scale: 0 - No Symptoms at all.    PT Discharge Summary  Anticipated Discharge Disposition (PT): home    Delores Lares PT  7/23/2020

## 2020-07-23 NOTE — CONSULTS
Patient does not meet diabetes education order criteria, therefore patient was not seen for diabetes education at this time.  Current A1C 5.3% Please re consult as needed.

## 2020-07-23 NOTE — PLAN OF CARE
Problem: Patient Care Overview  Goal: Plan of Care Review  Outcome: Ongoing (interventions implemented as appropriate)  Flowsheets (Taken 7/23/2020 0403)  Progress: improving  Plan of Care Reviewed With: patient  Outcome Summary: Pt A&Ox4. VSS on RA; CPAP while sleeping. No c/o pain/discomfort. NIH: 0. AV Paced. Will continue to monitor.

## 2020-07-23 NOTE — PROGRESS NOTES
"Neurology       Patient Care Team:  Jd Tapia MD as PCP - General  Jd Tapia MD as PCP - Claims Jere Medellin MD as Consulting Physician (Gastroenterology)    Chief complaint presyncope/dizziness      Subjective .     History:    Wife is at bedside.   She was present yesterday for his spell. She saw him sitting on a ladder and asked what was wrong but he shrugged it off. Then he went into the garage and had a look like he might pass out. He was holding onto a fridge to keep from falling down.   She relates that the patient had a prior episode of presyncope one week ago. They had been on a walk and he suddenly felt like he was going to go down. She was worried about how to help him down safely. He recovered and was able to walk home.  She did not think his blood pressure was as low on first event.   She notes patient has history of mild dizziness that started after he was put on carvedilol in early May 2020. He had been titrated up to 12.5 and then back to 6.25mg bid and dizziness had resolved until the episode last week.     Objective     Vital Signs   Blood pressure 131/66, pulse 74, temperature 97.8 °F (36.6 °C), temperature source Oral, resp. rate 18, height 185.4 cm (73\"), weight 88.5 kg (195 lb), SpO2 98 %.    Physical Exam:  Awake, alert, no distress.   Oriented to person place time.   Normal FNF bilaterally today  Gait is much improved/still some mild difficulty with tandem but no leaning or near fall  Motor tone/bulk/strength nml       Results Review:  LDL 49  A1c 5.3    Assessment/Plan      Presyncope versus TIA/stroke     Patient had an episode of feeling dizzy but no spinning sensation earlier today lasting about 5 minutes in the setting of low blood pressure.  Given his findings on exam of mild ataxia, which could be indicative of a cerebellar stroke which would be difficult to appreciate on initial head CT, I think it is prudent that we admit this gentleman for 24-hour " observation.  At the same time we can interrogate his pacemaker and repeat 2D echo to be sure that this episode today was not cardiogenic in nature.  Patient agrees to admission overnight.    7/23  No further episodes. Additional information from wife of a similar episode of presyncope/dizziness on week prior. He had been started on new blood pressure medication in May but no recent changes in the dose. Echo, pacemaker interrogation and MRI brain (pacemaker is compatible), are all pending.      Plan-  -TIA/stroke order set  -permissive htn  -asa/plavix 90 days and then just plavix  -2d echo pending  -interrogate pacemaker pending   -MRI brain w/o contrast tomorrow as pacemaker is compatible  -monitor on telemetry    If studies are normal, can discharge home with plans to followup with PCP.     I discussed the patients findings and my recommendations with patient, wife, Dr Chantal Peterson MD  07/23/20  13:44

## 2020-07-23 NOTE — THERAPY EVALUATION
Acute Care - Speech Language Pathology Initial Evaluation  [facility]   Cognitive-Communication Evaluation       Patient Name: Uche Polanco  : 1940  MRN: [id number]  Today's Date: 2020               Admit Date: 2020     Visit Dx:    ICD-10-CM ICD-9-CM   1. TIA (transient ischemic attack) G45.9 435.9   2. Renal insufficiency N28.9 593.9   3. Dizziness R42 780.4   4. Near syncope R55 780.2   5. 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     Assessed By: Chiki Newton (Neurology); Last Assessed: 11 Sep 2014   • Family history of hypertension    • Gout    • Hyperlipidemia    • Hypertension    • TOM (obstructive sleep apnea)    • REM  disorder    • Skin cancer 10/22/2019    left cheek   • TIA (transient ischemic attack)    • Valvular heart disease      Past Surgical History:   Procedure Laterality Date   • HEMORRHOIDECTOMY     • HERNIA REPAIR     • MITRAL VALVE REPAIR/REPLACEMENT     • OTHER SURGICAL HISTORY  2014    Medtronic link loop recorder   • PACEMAKER IMPLANTATION     • SKIN SURGERY  Month ago    Cell removed from head    • TONSILLECTOMY          SLP EVALUATION (last 72 hours)      SLP SLC Evaluation     Row Name 20 1020                   Communication Assessment/Intervention    Document Type  evaluation  -CH        Subjective Information  no complaints  -CH        Patient Observations  alert;cooperative;agree to therapy  -CH        Patient Effort  excellent  -CH           General Information    Patient Profile Reviewed  yes  -CH        Pertinent History Of Current  Problem  80-year-old male with a past medical history of hypertension, hyperlipidemia, CAD, gout, severe obstructive sleep apnea, heart block status post PPM, and prior TIA who presented to the [facility] emergency department on 7/22 with dizziness for 5 minutes. CSE completed per stroke protocol. MRI pending. Pt c/o word finding difficulties  -        Precautions/Limitations, Vision  WFL;for purposes of eval  -CH        Precautions/Limitations, Hearing  WFL;for purposes of eval  -CH        Prior Level of Function-Communication  WFL  -        Plans/Goals Discussed with  patient;spouse/S.O.;agreed upon  -        Barriers to Rehab  none identified  -        Patient's Goals for Discharge  patient did not state  -           Pain Assessment    Additional Documentation  Pain Scale: FACES Pre/Post-Treatment (Group);Pain Scale: Numbers Pre/Post-Treatment (Group)  -           Pain Scale: Numbers Pre/Post-Treatment    Pain Scale: Numbers, Pretreatment  0/10 - no pain  -CH        Pain Scale: Numbers, Post-Treatment  0/10 - no pain  -           Pain Scale: FACES Pre/Post-Treatment    Pain: FACES Scale, Pretreatment  0-->no hurt  -        Pain: FACES Scale, Post-Treatment  0-->no hurt  -           Comprehension Assessment/Intervention    Comprehension Assessment/Intervention  Auditory Comprehension;Reading Comprehension  -           Auditory Comprehension Assessment/Intervention    Auditory Comprehension (Communication)  WFL  -        Able to Identify Objects/Pictures (Communication)  familiar objects;pictures of common objects;WNL  -        Answers Questions (Communication)  yes/no;wh questions;personal;simple;concrete;mulit-unit;complex;abstract;WFL  -        Able to Follow Commands (Communication)  1-step;2-step;WFL;3-step;multi-step;mild impairment  -        Narrative Discourse  conversational level;WFL  -        Successful Auditory Strategies (Communication)  repetition  -            Reading Comprehension Assessment/Intervention    Reading Comprehension (Communication)  WNL  -        Functional Reading Tasks  WNL  -           Expression Assessment/Intervention    Expression Assessment/Intervention  verbal expression  -CH           Verbal Expression Assessment/Intervention    Verbal Expression  mild impairment  -CH        Automatic Speech (Communication)  response to greeting;WNL  -        Repetition  sentences;WNL  -CH        Phrase Completion  unpredictable;mild impairment  -CH        Responsive Naming  complex;mild impairment  -CH        Confrontational Naming  high frequency;low frequency;WNL  -        Spontaneous/Functional Words  simple;complex;WNL  -CH        Sentence Formulation  complex;WNL  -CH        Conversational Discourse/Fluency  WFL  -           Oral Motor Structure and Function    Oral Motor Structure and Function  WNL  -        Dentition Assessment  natural, present and adequate  -        Mucosal Quality  moist, healthy  -CH           Motor Speech Assessment/Intervention    Motor Speech Function  WNL  -           Cursory Voice Assessment/Intervention    Quality and Resonance (Voice)  WNL  -           Cognitive Assessment Intervention- SLP    Cognitive Function (Cognition)  mild impairment  -CH        Orientation Status (Cognition)  awareness of basic personal information;person;place;time;situation;WNL  -CH        Memory (Cognitive)  immediate;long-term;WNL;short-term;delayed;mild impairment  -CH        Attention (Cognitive)  selective;sustained;alternating;divided;attention to detail;WNL  -        Thought Organization (Cognitive)  concrete divergent;abstract divergent;concrete convergent;abstract convergent;drawing conclusions;verbal sequencing;WNL  -        Reasoning (Cognitive)  simple;deductive;WNL  -        Problem Solving (Cognitive)  simple;divergent;WNL  -        Functional Math (Cognitive)  simple;word problems;WNL  -        Executive  Function (Cognition)  WNL  -        Pragmatics (Communication)  WNL  -           SLP Clinical Impressions    SLP Diagnosis  Patient presents with a mild cognitive linguistic impairment as evidenced by this evaluation. He waws noted to have mild word finding and recall deficits.  -        Rehab Potential/Prognosis  excellent  -[facility] Criteria for Skilled Therapy Interventions Met  yes  -CH        Functional Impact  functional impact in social situations;functional impact in ADLs  -           Recommendations    Therapy Frequency (SLP SLC)  5 days per week  -        Predicted Duration Therapy Intervention (Days)  until discharge  -        Anticipated Dischage Disposition (SLP)  home  -           SLP Discharge Summary    Discharge Destination  unknown;anticipated therapy at next level of care  -          User Key  (r) = Recorded By, (t) = Taken By, (c) = Cosigned By    Initials Name Effective Dates    CH Marleny Huff MS CCC-SLP 02/14/19 -              EDUCATION  The patient has been educated in the following areas:     Cognitive Impairment Communication Impairment.    SLP Recommendation and Plan  SLP Diagnosis: Patient presents with a mild cognitive linguistic impairment as evidenced by this evaluation. He waws noted to have mild word finding and recall deficits.     SLC Criteria for Skilled Therapy Interventions Met: yes  Anticipated Dischage Disposition (SLP): home     Predicted Duration Therapy Intervention (Days): until discharge           SLP GOALS     Row Name 07/23/20 1020             Word Retrieval Skills Goal 1 (SLP)    Improve Word Retrieval Skills By Goal 1 (SLP)  low frequency;responsive naming task;supplying an antonym;supplying a synonym;completing a divergent task;80%;with minimal cues (75-90%)  -      Time Frame (Word Retrieval Goal 1, SLP)  by discharge  -         Memory Skills Goal 1 (SLP)    Improve Memory Skills Through Goal 1 (SLP)  recalling related word lists  immediately;recalling related word lists with an imposed delay;recalling unrelated word lists immediately;recalling unrelated word lists with an imposed delay;repeat list in sequential order;use memory strategies;90%;with minimal cues (75-90%)  -      Time Frame (Memory Skills Goal 1, SLP)  by discharge  -         Additional Goal 1 (SLP)    Additional Goal 1, SLP  LTG: Pt will improve cognitive linguistic skills to WFL to improve safety and independence at the next level of care.   -      Time Frame (Additional Goal 1, SLP)  by discharge  -        User Key  (r) = Recorded By, (t) = Taken By, (c) = Cosigned By    Initials Name Provider Type    Marleny Mojica MS CCC-SLP Speech and Language Pathologist                  Time Calculation:     Time Calculation- SLP     Row Name 07/23/20 1443             Time Calculation- SLP    SLP Start Time  1020  -      SLP Received On  07/23/20  -        User Key  (r) = Recorded By, (t) = Taken By, (c) = Cosigned By    Initials Name Provider Type    Marleny Mojica MS CCC-SLP Speech and Language Pathologist          Therapy Charges for Today     Code Description Service Date Service Provider Modifiers Qty    95361962150 HC ST EVAL SPEECH AND PROD W LANG  4 7/23/2020 Marleny Huff MS CCC-SLP GN 1                     Marleny Huff MS CCC-JOSSE  7/23/2020

## 2020-07-23 NOTE — THERAPY DISCHARGE NOTE
Acute Care - Occupational Therapy Initial Eval/Discharge  [facility]     Patient Name: Uche Polanco  : 1940  MRN: [id number]  Today's Date: 2020               Admit Date: 2020       ICD-10-CM ICD-9-CM   1. TIA (transient ischemic attack) G45.9 435.9   2. Renal insufficiency N28.9 593.9   3. Dizziness R42 780.4   4. 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     Assessed By: Chiki Newton (Neurology); Last Assessed: 11 Sep 2014   • Family history of hypertension    • Gout    • Hyperlipidemia    • Hypertension    • TOM (obstructive sleep apnea)    • REM  disorder    • Skin cancer 10/22/2019    left cheek   • TIA (transient ischemic attack)    • Valvular heart disease      Past Surgical History:   Procedure Laterality Date   • HEMORRHOIDECTOMY     • HERNIA REPAIR     • MITRAL VALVE REPAIR/REPLACEMENT     • OTHER SURGICAL HISTORY  2014    Medtronic link loop recorder   • PACEMAKER IMPLANTATION     • SKIN SURGERY  Month ago    Cell removed from head    • TONSILLECTOMY            OT ASSESSMENT FLOWSHEET (last 12 hours)      Occupational Therapy Evaluation     Row Name 20 0815                   OT Evaluation Time/Intention    Subjective Information  no complaints  -SW        Document Type  evaluation;discharge evaluation/summary  -SW        Mode of Treatment  occupational therapy  -SW        Patient Effort  excellent  -SW           General Information    Patient Profile Reviewed?  yes  -SW        Patient Observations  alert;cooperative  -SW        Prior Level of Function  independent:;all household  mobility;ADL's;driving  -        Equipment Currently Used at Home  none  -SW        Existing Precautions/Restrictions  no known precautions/restrictions  -           Relationship/Environment    Primary Source of Support/Comfort  spouse  -SW        Lives With  spouse  -SW        Family Caregiver if Needed  spouse  -SW           Resource/Environmental Concerns    Current Living Arrangements  home/apartment/condo  -           Home Main Entrance    Number of Stairs, Main Entrance  three  -SW           Cognitive Assessment/Interventions    Additional Documentation  Cognitive Assessment/Intervention (Group)  -SW           Cognitive Assessment/Intervention- PT/OT    Affect/Mental Status (Cognitive)  WFL  -SW        Orientation Status (Cognition)  oriented x 4  -SW        Follows Commands (Cognition)  WNL  -SW        Cognitive Function (Cognitive)  WNL  -           Functional Mobility    Functional Mobility- Ind. Level  independent  -        Functional Mobility-Distance (Feet)  25  -SW           Transfer Assessment/Treatment    Transfer Assessment/Treatment  sit-stand transfer;stand-sit transfer;toilet transfer  -           Sit-Stand Transfer    Sit-Stand [place] (Transfers)  independent  -           Stand-Sit Transfer    Stand-Sit [place] (Transfers)  independent  -           Toilet Transfer    Type (Toilet Transfer)  sit-stand;stand-sit  -        [place] Level (Toilet Transfer)  independent  -           ADL Assessment/Intervention    02891 - OT Self Care/Mgmt Minutes  8  -SW        BADL Assessment/Intervention  toileting;lower body dressing  -        Additional Documentation  Comment, IADL Assessment/Training (Row)  -           Lower Body Dressing Assessment/Training    Lower Body Dressing [place] Level  lower body dressing skills;independent  -           Toileting Assessment/Training    [place] Level (Toileting)  toileting skills;independent  -        Assistive Devices  (Toileting)  raised toilet seat  -SW        Toileting Position  unsupported sitting  -SW           General ROM    GENERAL ROM COMMENTS  BUEs WFL  -SW           MMT (Manual Muscle Testing)    General MMT Comments  [facility] WFL  -SW           Motor Assessment/Interventions    Additional Documentation  Balance (Group)  -SW           Balance    Balance  static sitting balance;static standing balance;dynamic sitting balance  -SW           Static Sitting Balance    Level of [place] (Unsupported Sitting, Static Balance)  independent  -SW        Sitting Position (Unsupported Sitting, Static Balance)  sitting in chair  -SW        Time Able to Maintain Position (Unsupported Sitting, Static Balance)  more than 5 minutes  -SW           Dynamic Sitting Balance    Level of [place], Reaches Outside Midline (Sitting, Dynamic Balance)  independent  -SW        Sitting Position, Reaches Outside Midline (Sitting, Dynamic Balance)  sitting in chair  -SW           Static Standing Balance    Level of [place] (Supported Standing, Static Balance)  independent  -SW        Time Able to Maintain Position (Supported Standing, Static Balance)  2 to 3 minutes  -SW           Sensory Assessment/Intervention    Sensory General Assessment  no sensation deficits identified  -SW           Positioning and Restraints    Pre-Treatment Position  bathroom  -SW        Post Treatment Position  chair  -SW        In Chair  notified nsg;sitting;call light within reach;encouraged to call for assist  -SW           Pain Assessment    Additional Documentation  Pain Scale: Numbers Pre/Post-Treatment (Group)  -SW           Pain Scale: Numbers Pre/Post-Treatment    Pain Scale: Numbers, Pretreatment  0/10 - no pain  -SW        Pain Scale: Numbers, Post-Treatment  0/10 - no pain  -SW           Coping    Observed Emotional State  calm;cooperative;pleasant;accepting  -SW        Verbalized Emotional State  acceptance  -SW           Plan of Care Review    Plan of  Care Reviewed With  patient  -SW           Clinical Impression (OT)    Criteria for Skilled Therapeutic Interventions Met (OT Eval)  yes;current level of function same as previous level of function  -SW        Therapy Frequency (OT Eval)  evaluation only  -SW           Vital Signs    Pre Systolic BP Rehab  150  -SW        Pre Treatment Diastolic BP  70  -SW        Post Systolic BP Rehab  144  -SW        Post Treatment Diastolic BP  69  -SW        Pretreatment Heart Rate (beats/min)  64  -SW        Posttreatment Heart Rate (beats/min)  59  -SW        O2 Delivery Pre Treatment  room air  -SW        O2 Delivery Intra Treatment  room air  -SW        O2 Delivery Post Treatment  room air  -SW        Pre Patient Position  Standing  -SW        Intra Patient Position  Sitting  -SW        Post Patient Position  Sitting  -SW           OT Goals    Additional Documentation  Caregiver Training Goal Selection (OT) (Row)  -SW           Patient Education Goal (OT)    Activity (Patient Education Goal, OT)  Pt will demonstrate ability to recall HEP and safety considerations to promote strength and endurance and decrease risk of falling.  -SW        Sixes/Cues/Accuracy (Memory Goal 2, OT)  demonstrates adequately  -SW        Time Frame (Patient Education Goal, OT)  long term goal (LTG);by discharge  -SW        Progress/Outcome (Patient Education Goal, OT)  goal met  -SW           Discharge Summary (Occupational Therapy)    Additional Documentation  Discharge Summary, OT Eval (Group)  -SW           Discharge Summary, OT Eval    Reason for Discharge (OT Discharge Summary)  patient met all goals and outcomes  -SW        Outcomes Achieved Upon Discharge (OT Discharge Summary)  able to achieve all goals within established timeline  -SW           Living Environment    Home Accessibility  stairs to enter home  -SW          User Key  (r) = Recorded By, (t) = Taken By, (c) = Cosigned By    Initials Name Effective Dates    Dori Raines,  OT 01/29/20 -           Occupational Therapy Education                 Title: PT OT SLP Therapies (Done)     Topic: Occupational Therapy (Done)     Point: Home exercise program (Done)     Description:   Instruct learner(s) on appropriate technique for monitoring, assisting and/or progressing therapeutic exercises/activities.              Learning Progress Summary           Patient Acceptance, E, VU by  at 7/23/2020 0815                   Point: Precautions (Done)     Description:   Instruct learner(s) on prescribed precautions during self-care and functional transfers.              Learning Progress Summary           Patient Acceptance, E, VU by  at 7/23/2020 0815                               User Key     Initials Effective Dates Name Provider Type Discipline     01/29/20 -  Dori Castro, OT Occupational Therapist OT                OT Recommendation and Plan  Outcome Summary/Treatment Plan (OT)  Reason for Discharge (OT Discharge Summary): patient met all goals and outcomes  Therapy Frequency (OT Eval): evaluation only  Plan of Care Review  Plan of Care Reviewed With: patient  Plan of Care Reviewed With: patient  Outcome Summary: OT evaluation complete.  Pt demonstrates good balance, no dizziness during eval and had no LOB during tx.  Pt demonstrates [place] in toileting and LBD.  Discussed HEP with pt and no further OT services are warranted at this time.     Rehab Goal Summary     Row Name 07/23/20 0815             Patient Education Goal (OT)    Activity (Patient Education Goal, OT)  Pt will demonstrate ability to recall HEP and safety considerations to promote strength and endurance and decrease risk of falling.  -SW      [place]/Cues/Accuracy (Memory Goal 2, OT)  demonstrates adequately  -SW      Time Frame (Patient Education Goal, OT)  long term goal (LTG);by discharge  -SW      Progress/Outcome (Patient Education Goal, OT)  goal met  -SW        User Key  (r) = Recorded By, (t) = Taken By, (c) =  Cosigned By    Initials Name Provider Type Discipline    Dori Raines OT Occupational Therapist OT          Outcome Measures     Row Name 07/23/20 0815             How much help from another is currently needed...    Putting on and taking off regular lower body clothing?  4  -SW      Bathing (including washing, rinsing, and drying)  4  -SW      Toileting (which includes using toilet bed pan or urinal)  4  -SW      Putting on and taking off regular upper body clothing  4  -SW      Taking care of personal grooming (such as brushing teeth)  4  -SW      Eating meals  4  -SW      AM-PAC 6 Clicks Score (OT)  24  -SW         Modified [place] Scale    Pre-Stroke Modified [place] Scale  0 - No Symptoms at all.  -SW      Modified [place] Scale  0 - No Symptoms at all.  -SW         Functional Assessment    Outcome Measure Options  AM-PAC 6 Clicks Daily Activity (OT);Modified [place]  -SW        User Key  (r) = Recorded By, (t) = Taken By, (c) = Cosigned By    Initials Name Provider Type    Dori Raines OT Occupational Therapist          Time Calculation:   Time Calculation- OT     Row Name 07/23/20 0815             Time Calculation- OT    OT Start Time  0815  -      Total Timed Code Minutes- OT  8 minute(s)  -SW      OT Received On  07/23/20  -SW         Timed Charges    56093 - OT Self Care/Mgmt Minutes  8  -SW        User Key  (r) = Recorded By, (t) = Taken By, (c) = Cosigned By    Initials Name Provider Type    Dori Raines OT Occupational Therapist        Therapy Suggested Charges     Code   Minutes Charges    93907 (CPT®) Hc Ot Neuromusc Re Education Ea 15 Min      68120 (CPT®) Hc Ot Ther Proc Ea 15 Min      17586 (CPT®) Hc Ot Therapeutic Act Ea 15 Min      84209 (CPT®) Hc Ot Manual Therapy Ea 15 Min      71583 (CPT®) Hc Ot Iontophoresis Ea 15 Min      11244 (CPT®) Hc Ot Elec Stim Ea-Per 15 Min      54210 (CPT®) Hc Ot Ultrasound Ea 15 Min      54746 (CPT®) Hc Ot Self Care/Mgmt/Train Ea 15 Min 8 1    Total  8 1         Therapy Charges for Today     Code Description Service Date Service Provider Modifiers Qty    94064193833  OT SELF CARE/MGMT/TRAIN EA 15 MIN 7/23/2020 Dori Castro OT GO 1    [id number]  OT EVAL LOW COMPLEXITY 3 7/23/2020 Dori Castro OT GO 1                    Dori Castro OT  7/23/2020

## 2020-07-23 NOTE — PLAN OF CARE
Problem: Patient Care Overview  Goal: Plan of Care Review  Flowsheets (Taken 7/23/2020 1325)  Outcome Summary: OT evaluation complete.  Pt demonstrates good balance, no dizziness during eval and had no LOB during tx.  Pt demonstrates [place] in toileting and LBD.  Discussed HEP with pt and no further OT services are warranted at this time.

## 2020-07-24 ENCOUNTER — APPOINTMENT (OUTPATIENT)
Dept: MRI IMAGING | Facility: HOSPITAL | Age: 80
End: 2020-07-24

## 2020-07-24 VITALS
WEIGHT: 195 LBS | BODY MASS INDEX: 25.84 KG/M2 | RESPIRATION RATE: 18 BRPM | SYSTOLIC BLOOD PRESSURE: 145 MMHG | HEIGHT: 73 IN | OXYGEN SATURATION: 97 % | DIASTOLIC BLOOD PRESSURE: 72 MMHG | TEMPERATURE: 98.2 F | HEART RATE: 84 BPM

## 2020-07-24 PROBLEM — R42 POSTURAL DIZZINESS WITH PRESYNCOPE: Status: ACTIVE | Noted: 2020-07-24

## 2020-07-24 PROBLEM — R55 POSTURAL DIZZINESS WITH PRESYNCOPE: Status: RESOLVED | Noted: 2020-07-24 | Resolved: 2020-07-24

## 2020-07-24 PROBLEM — R55 POSTURAL DIZZINESS WITH PRESYNCOPE: Status: ACTIVE | Noted: 2020-07-24

## 2020-07-24 PROBLEM — R42 POSTURAL DIZZINESS WITH PRESYNCOPE: Status: RESOLVED | Noted: 2020-07-24 | Resolved: 2020-07-24

## 2020-07-24 LAB
BH CV ECHO MEAS - AI DEC SLOPE: 262.2 CM/SEC^2
BH CV ECHO MEAS - AI MAX PG: 65 MMHG
BH CV ECHO MEAS - AI MAX VEL: 402 CM/SEC
BH CV ECHO MEAS - AI P1/2T: 449.1 MSEC
BH CV ECHO MEAS - AO MAX PG (FULL): 1.9 MMHG
BH CV ECHO MEAS - AO MAX PG: 5.3 MMHG
BH CV ECHO MEAS - AO ROOT AREA (BSA CORRECTED): 1.7
BH CV ECHO MEAS - AO ROOT AREA: 10 CM^2
BH CV ECHO MEAS - AO ROOT DIAM: 3.6 CM
BH CV ECHO MEAS - AO V2 MAX: 114.8 CM/SEC
BH CV ECHO MEAS - AVA(V,A): 2.8 CM^2
BH CV ECHO MEAS - AVA(V,D): 2.8 CM^2
BH CV ECHO MEAS - BSA(HAYCOCK): 2.1 M^2
BH CV ECHO MEAS - BSA: 2.1 M^2
BH CV ECHO MEAS - BZI_BMI: 25.7 KILOGRAMS/M^2
BH CV ECHO MEAS - BZI_METRIC_HEIGHT: 185.4 CM
BH CV ECHO MEAS - BZI_METRIC_WEIGHT: 88.5 KG
BH CV ECHO MEAS - EDV(CUBED): 170.8 ML
BH CV ECHO MEAS - EDV(TEICH): 150.4 ML
BH CV ECHO MEAS - EF(CUBED): 66.2 %
BH CV ECHO MEAS - EF(TEICH): 57.1 %
BH CV ECHO MEAS - ESV(CUBED): 57.8 ML
BH CV ECHO MEAS - ESV(TEICH): 64.5 ML
BH CV ECHO MEAS - FS: 30.3 %
BH CV ECHO MEAS - IVS/LVPW: 0.97
BH CV ECHO MEAS - IVSD: 0.98 CM
BH CV ECHO MEAS - LA DIMENSION: 3.6 CM
BH CV ECHO MEAS - LA/AO: 1
BH CV ECHO MEAS - LAD MAJOR: 5.8 CM
BH CV ECHO MEAS - LV MASS(C)D: 216.8 GRAMS
BH CV ECHO MEAS - LV MASS(C)DI: 101.8 GRAMS/M^2
BH CV ECHO MEAS - LV MAX PG: 3.3 MMHG
BH CV ECHO MEAS - LV MEAN PG: 1.6 MMHG
BH CV ECHO MEAS - LV V1 MAX: 91.3 CM/SEC
BH CV ECHO MEAS - LV V1 MEAN: 59.9 CM/SEC
BH CV ECHO MEAS - LV V1 VTI: 17.7 CM
BH CV ECHO MEAS - LVIDD: 5.5 CM
BH CV ECHO MEAS - LVIDS: 3.9 CM
BH CV ECHO MEAS - LVOT AREA (M): 3.5 CM^2
BH CV ECHO MEAS - LVOT AREA: 3.5 CM^2
BH CV ECHO MEAS - LVOT DIAM: 2.1 CM
BH CV ECHO MEAS - LVPWD: 1 CM
BH CV ECHO MEAS - MV MAX PG: 7.5 MMHG
BH CV ECHO MEAS - MV MEAN PG: 3.6 MMHG
BH CV ECHO MEAS - MV V2 MAX: 136.7 CM/SEC
BH CV ECHO MEAS - MV V2 MEAN: 88.9 CM/SEC
BH CV ECHO MEAS - MV V2 VTI: 46 CM
BH CV ECHO MEAS - MVA(VTI): 1.3 CM^2
BH CV ECHO MEAS - PA MAX PG: 2.3 MMHG
BH CV ECHO MEAS - PA V2 MAX: 75.3 CM/SEC
BH CV ECHO MEAS - RAP SYSTOLE: 3 MMHG
BH CV ECHO MEAS - RVSP: 22 MMHG
BH CV ECHO MEAS - SI(CUBED): 53.1 ML/M^2
BH CV ECHO MEAS - SI(LVOT): 29 ML/M^2
BH CV ECHO MEAS - SI(TEICH): 40.3 ML/M^2
BH CV ECHO MEAS - SV(CUBED): 113 ML
BH CV ECHO MEAS - SV(LVOT): 61.8 ML
BH CV ECHO MEAS - SV(TEICH): 85.9 ML
BH CV ECHO MEAS - TAPSE (>1.6): 1.8 CM2
BH CV ECHO MEAS - TR MAX PG: 19 MMHG
BH CV ECHO MEAS - TR MAX VEL: 215.5 CM/SEC
BH CV VAS BP LEFT ARM: NORMAL MMHG
BH CV XLRA - RV BASE: 3.4 CM
BH CV XLRA - RV LENGTH: 8.2 CM
BH CV XLRA - RV MID: 3.2 CM
BH CV XLRA - TDI S': 8.55 CM/SEC
LEFT ATRIUM VOLUME INDEX: 41.8 ML/M^2
LEFT ATRIUM VOLUME: 89 ML
MAXIMAL PREDICTED HEART RATE: 140 BPM
STRESS TARGET HR: 119 BPM

## 2020-07-24 PROCEDURE — 70551 MRI BRAIN STEM W/O DYE: CPT

## 2020-07-24 PROCEDURE — 99217 PR OBSERVATION CARE DISCHARGE MANAGEMENT: CPT | Performed by: HOSPITALIST

## 2020-07-24 PROCEDURE — 99213 OFFICE O/P EST LOW 20 MIN: CPT | Performed by: PSYCHIATRY & NEUROLOGY

## 2020-07-24 PROCEDURE — G0378 HOSPITAL OBSERVATION PER HR: HCPCS

## 2020-07-24 RX ORDER — ASPIRIN 81 MG/1
81 TABLET, CHEWABLE ORAL DAILY
Qty: 30 TABLET | Refills: 2
Start: 2020-07-25 | End: 2020-10-02

## 2020-07-24 RX ADMIN — SODIUM CHLORIDE, PRESERVATIVE FREE 10 ML: 5 INJECTION INTRAVENOUS at 10:43

## 2020-07-24 RX ADMIN — MULTIPLE VITAMINS W/ MINERALS TAB 1 TABLET: TAB at 10:43

## 2020-07-24 RX ADMIN — ASPIRIN 81 MG: 81 TABLET, CHEWABLE ORAL at 10:43

## 2020-07-24 RX ADMIN — DONEPEZIL HYDROCHLORIDE 10 MG: 10 TABLET, FILM COATED ORAL at 10:43

## 2020-07-24 RX ADMIN — CLOPIDOGREL BISULFATE 75 MG: 75 TABLET ORAL at 10:43

## 2020-07-24 NOTE — PLAN OF CARE
Problem: Patient Care Overview  Goal: Plan of Care Review  Outcome: Ongoing (interventions implemented as appropriate)  Flowsheets (Taken 7/24/2020 0305)  Progress: improving  Plan of Care Reviewed With: patient  Outcome Summary: Pt A&Ox4. VSS on RA; CPAP while sleeping. C/o generalized head ache relieved by Tylenol. AV Paced on tele. NIH: 0. MRI planned for today. Possible d/c home today. Will continue to monitor.

## 2020-07-24 NOTE — PROGRESS NOTES
"Neurology       Patient Care Team:  Jd Tapia MD as PCP - General  Jd Tapia MD as PCP - Claims Attributed  Jere De Jesus MD as Consulting Physician (Gastroenterology)    Chief complaint  Presyncope/dizziness    Subjective .     History:  No further episodes of presyncope or dizziness  2D echo and pacemaker interrogation complete  Brain MRI without acute stroke or hemorrhage  Patient did have a prolonged episode of hallucination today.  He said his he looked at the ground he could see [place] is and then he felt as if he were flying his plane and looking down at a map.  Wife said this is something that he sometimes sees at home after he wakes up but this was more prolonged.  Episode resolved.      Objective     Vital Signs   Blood pressure 145/72, pulse 84, temperature 98.2 °F (36.8 °C), temperature source Oral, resp. rate 18, height 185.4 cm (72.99\"), weight 88.5 kg (195 lb), SpO2 97 %.    Physical Exam:  Elderly man, sitting in chair, comfortable and relaxed.  He is oriented to person and time.  At 1 time he gave me the wrong hospital name but quickly corrected himself and said C 30 ago.  He attends to me well and follows commands.  He has no focal weakness, no ataxia with finger-nose-finger, and gait is normal.    Results Review:  Personally reviewed brain MRI without contrast that did not show any evidence of acute stroke or hemorrhage, there is some mild T2 hyperintensities that are nonsignificant    Echo-  · Left ventricular systolic function is normal.  · Estimated EF appears to be in the range of 56 - 60%  · The following left ventricular wall segments are hypokinetic: apical septal and apex hypokinetic.  · Left atrial cavity size is mild-to-moderately dilated.  · Saline test results are negative for right to left atrial level shunt.  · Moderate aortic valve regurgitation is present.        Assessment/Plan     Presyncope versus TIA/stroke     Patient had an episode of feeling dizzy but " no spinning sensation earlier today lasting about 5 minutes in the setting of low blood pressure.  Given his findings on exam of mild ataxia, which could be indicative of a cerebellar stroke which would be difficult to appreciate on initial head CT, I think it is prudent that we admit this gentleman for 24-hour observation.  At the same time we can interrogate his pacemaker and repeat 2D echo to be sure that this episode today was not cardiogenic in nature.  Patient agrees to admission overnight.     7/23  No further episodes. Additional information from wife of a similar episode of presyncope/dizziness on week prior. He had been started on new blood pressure medication in May but no recent changes in the dose. Echo, pacemaker interrogation and MRI brain (pacemaker is compatible), are all pending.     7/24  Brain MRI negative for acute stroke.  He has had no further episodes of presyncope or dizziness.  This might likely have been medication related.  2D echo stable findings.  There is report that the pacemaker interrogation was normal but I have not seen a copy on the chart yet.    Patient has a history of mild cognitive impairment and REM sleep disorder that started about 6 years ago.  He is also having hallucinations sometimes at home as soon as he wakes up.  He had another episode today while awake that lasted an hour and 1/2 to 2 hours.  Believe this is just prolonged hallucination in the setting of hospitalization.  I believe he is safe to go home with close follow-up with Dr. Newton in neurology for ? Lewy body dementia.     From a neurology standpoint, patient is safe to discharge.  I did send a request to Dr. Newton to see if patient can have an appointment sooner than February 2021.    I discussed the patients findings and my recommendations with patient, wife, Dr Chantal Peterson MD  07/24/20  15:23

## 2020-07-24 NOTE — DISCHARGE SUMMARY
[facility] Medicine Services  DISCHARGE SUMMARY    Patient Name: Uche Polanco  : 1940  MRN: [id number]    Date of Admission: 2020 10:28 AM  Date of Discharge:  20    Primary Care Physician: Jd Tapia MD  Consulting Neurologist: Dr. Peterson    Consults     No orders found from 2020 to 2020.        Hospital Course     Presenting Problem:   TIA (transient ischemic attack) [G45.9]  TIA (transient ischemic attack) [G45.9]    Active Hospital Problems   No active problems to display.      Resolved Hospital Problems    Diagnosis Date Resolved POA   • **TIA (transient ischemic attack) [G45.9] 2020 Yes   • Postural dizziness with presyncope [R42, R55] 2020 Yes   • Hyperlipidemia [E78.5] 2020 Yes   • Hypertension [I10] 2020 Yes   • CAD (coronary artery disease) [I25.10] 2020 Yes   • Valvular heart disease [I38] 2020 Yes      Hospital Course:  Uche Polanco is a 80 y.o. male with a past medical history of hypertension, hyperlipidemia, VHD status post mitral valve replacement, heart block status post PPM, obstructive sleep apnea, and REM sleep disorder who presented to the [facility] emergency department on  for evaluation of 2 episodes of lightheadedness/dizziness over the past week.  No preceding medication changes.  There was a postural component to the symptoms and episode prior to evaluation was associated with a blood pressure of 89/50.  I reviewed all of his blood pressure readings for the past several weeks to months and they are typically normal and were so during the hospitalization. Orthostats here were negative. His blood pressure normalized with volume expansion and there is no clear explanation for why he was hypotensive during the spell outside of possible dehydration.  TIA is in the differential for these episodes and neurology was consulted with evaluation including a negative CT  head, no hemodynamically significant atherosclerotic disease on CT angiogram of the head and neck, negative MRI of the brain, normal TFTs, normal hemoglobin A1c, and acceptable LDL on chronic statin therapy.    Patient will be discharged on baby aspirin for 90 days in addition to his chronic Plavix therapy.  After completing the 90 days of aspirin, he will resume Plavix monotherapy.    Echocardiogram obtained during stay showed preserved ejection fraction and chronic wall motion abnormalities.  His ejection fraction had actually improved from his prior echo in December 2019.  He also has moderate aortic regurgitation.  He will follow-up with Dr. Limon in August who was aware of his hospitalization.    Observation stay was prolonged due to need to obtain clearance for MRI in the setting of patient's [employer] pacemaker.  His pacemaker was also interrogated prior to discharge and it showed no abnormalities.      Discharge Follow Up Recommendations for outpatient labs/diagnostics:  1.  Follow-up with PCP in 1 week  2.  Follow-up with Dr. Francisco Newton, [facility] in 4-6 weeks.  3.  Follow-up with Dr. Jd Limon as scheduled in August, 2020.    Day of Discharge     HPI:   Follow-up spell of dizziness    Patient feels well this morning, seen following MRI.  He is walking in room and denies active lightheadedness, vision changes, headache, or focal numbness/tingling/weakness.    Review of Systems  Gen- No fevers, chills  CV- No chest pain, palpitations  Resp- No cough, dyspnea  GI- No N/V/D, abd pain    Vital Signs:   Temp:  [97.5 °F (36.4 °C)-98.2 °F (36.8 °C)] 98.2 °F (36.8 °C)  Heart Rate:  [59-84] 84  Resp:  [16-18] 18  BP: (111-148)/() 145/72     Physical Exam:  Constitutional: No acute distress, awake, alert, ambulatory in room  HENT: NCAT, mucous membranes moist  Respiratory: Clear to auscultation bilaterally, respiratory effort normal   Cardiovascular: RRR, no murmurs, rubs, or gallops, palpable pedal  pulses bilaterally  Gastrointestinal: Positive bowel sounds, soft, nontender, nondistended  Musculoskeletal: No bilateral ankle edema  Psychiatric: Appropriate affect, cooperative  Neurologic: Oriented x 3, strength symmetric in all extremities, no cranial nerve deficits, steady gait, clear speech      Pertinent  and/or Most Recent Results     Results from last 7 days   Lab Units 07/23/20  0629 07/22/20  1041   WBC 10*3/mm3 5.14 6.24   HEMOGLOBIN g/dL 12.1* 13.7   HEMATOCRIT % 37.4* 41.8   PLATELETS 10*3/mm3 146 197   SODIUM mmol/L 136 141   POTASSIUM mmol/L 3.8 4.6   CHLORIDE mmol/L 103 106   CO2 mmol/L 26.0 29.0   BUN mg/dL 18 19   CREATININE mg/dL 1.03 1.47*   GLUCOSE mg/dL 91 108*   CALCIUM mg/dL 8.6 9.4     Results from last 7 days   Lab Units 07/22/20  1041   BILIRUBIN mg/dL 0.7   ALK PHOS U/L 57   ALT (SGPT) U/L 12   AST (SGOT) U/L 15     Results from last 7 days   Lab Units 07/23/20  0629   CHOLESTEROL mg/dL 102   TRIGLYCERIDES mg/dL 93   HDL CHOL mg/dL 34*     Results from last 7 days   Lab Units 07/22/20  1354 07/22/20  1041   TSH uIU/mL 2.000  --    HEMOGLOBIN A1C %  --  5.30   TROPONIN T ng/mL <0.010 <0.010       Brief Urine Lab Results  (Last result in the past 365 days)      Color   Clarity   Blood   Leuk Est   Nitrite   Protein   CREAT   Urine HCG        07/22/20 1118 Yellow Cloudy Negative Trace Negative 30 mg/dL (1+)               Microbiology Results Abnormal     None          Imaging Results (All)     Procedure Component Value Units Date/Time    MRI Brain Without Contrast [[id number]] Collected:  07/24/20 1009     Updated:  07/24/20 1029    Narrative:       EXAMINATION: MRI BRAIN WO CONTRAST-07/24/2020:     INDICATION: Ataxia, stroke suspected as etiology; G45.9-Transient  cerebral ischemic attack, unspecified; N28.9-Disorder of kidney and  ureter, unspecified; R42-Dizziness and giddiness; R55-Syncope and  collapse; R41.841-Cognitive communication deficit.      TECHNIQUE: Multiplanar MRI of the  brain without intravenous contrast.     COMPARISON: MRI brain dated 07/11/2014.     FINDINGS: No restriction on diffusion-weighted sequences. The midline  structures are symmetric without evidence of mass, mass effect or  midline shift. Ventricles and sulci within normal limits. Mild-  to-moderate increased signal findings in the periventricular and deep  white matter, similar to prior comparison 2014. Pituitary and sella  within normal limits. Cervicomedullary junction widely patent. No  susceptibility artifact on susceptibility-weighted imaging. Globes and  orbits retain normal T2 signal characteristics. The visualized paranasal  sinuses and mastoid air cells are grossly clear and well pneumatized. No  cerebellopontine angle mass lesion.       Impression:       1.  No acute infarction.  2.  Mild-to-moderate increased signal findings in the periventricular  and deep white matter on T2 and FLAIR of indeterminate significance, but  likely chronic small vessel ischemic disease as this is similar to 2014  findings.  3.  No mastoid effusion.     D:  07/24/2020  E:  07/24/2020              CT Angiogram Head [[id number]] Collected:  07/22/20 1247     Updated:  07/23/20 1602    Narrative:       EXAMINATION: CT ANGIOGRAM HEAD-, CT ANGIOGRAM NECK-      INDICATION: Recent MRI, dizziness, syncope.     TECHNIQUE: Multiple axial CT imaging was obtained of the head and neck  following the administration of intravenous contrast according to the CT  angio protocol. Three-D reformatted images were submitted to further  facilitate diagnostic accuracy and treatment planning.     Stenosis measurement was performed by the NASCET or similar method.     The radiation dose reduction device was turned on for each scan per the  ALARA (As Low as Reasonably Achievable) protocol.     COMPARISON: None.     FINDINGS: Thyroid is homogeneous in appearance. Lung apices are clear.  The thoracic aortic arch is unremarkable. There is no evidence  of  significant stenosis seen of the common carotid arteries. The vertebral  arteries reveal slight dominance on the right. No significant stenosis  identified of the vertebral arteries. There are degenerative changes  seen within the spine. The soft tissue neck is unremarkable. No bulky  adenopathy. No abnormal mass or fluid collection is seen in the soft  tissues of the neck.     The internal carotid arteries reveal no significant stenosis with  atherosclerotic disease identified at both the carotid bifurcations  bilaterally right greater than left. There is no internal carotid artery  stenosis with vascular calcification seen of the intracranial internal  carotid arteries. The anterior circulation is intact. Both M1 and M2  branches are patent bilaterally. The anterior cerebral arteries are  unremarkable. The posterior cerebral arteries are within normal limits.  No significant stenosis. The basilar artery is normal in caliber. No  evidence of significant stenosis, aneurysmal dilatation or vascular  malformation of the posterior circulation.       Impression:       There is atherosclerotic disease of the carotid bifurcations  bilaterally left greater than right with no significant stenosis seen of  the common carotid arteries or internal coronary arteries. Slight  dominance of the right vertebral artery with no significant stenosis  seen of the vertebral arteries or posterior circulation. Anterior  circulation is intact and unremarkable.     D:  07/22/2020  E:  07/22/2020     This report was finalized on 7/23/2020 3:59 PM by Dr. Dee Dee Slaughter MD.       CT Angiogram Neck [[id number]] Collected:  07/22/20 1247     Updated:  07/23/20 1602    Narrative:       EXAMINATION: CT ANGIOGRAM HEAD-, CT ANGIOGRAM NECK-      INDICATION: Recent MRI, dizziness, syncope.     TECHNIQUE: Multiple axial CT imaging was obtained of the head and neck  following the administration of intravenous contrast according to the CT  angio  protocol. Three-D reformatted images were submitted to further  facilitate diagnostic accuracy and treatment planning.     Stenosis measurement was performed by the NASCET or similar method.     The radiation dose reduction device was turned on for each scan per the  ALARA (As Low as Reasonably Achievable) protocol.     COMPARISON: None.     FINDINGS: Thyroid is homogeneous in appearance. Lung apices are clear.  The thoracic aortic arch is unremarkable. There is no evidence of  significant stenosis seen of the common carotid arteries. The vertebral  arteries reveal slight dominance on the right. No significant stenosis  identified of the vertebral arteries. There are degenerative changes  seen within the spine. The soft tissue neck is unremarkable. No bulky  adenopathy. No abnormal mass or fluid collection is seen in the soft  tissues of the neck.     The internal carotid arteries reveal no significant stenosis with  atherosclerotic disease identified at both the carotid bifurcations  bilaterally right greater than left. There is no internal carotid artery  stenosis with vascular calcification seen of the intracranial internal  carotid arteries. The anterior circulation is intact. Both M1 and M2  branches are patent bilaterally. The anterior cerebral arteries are  unremarkable. The posterior cerebral arteries are within normal limits.  No significant stenosis. The basilar artery is normal in caliber. No  evidence of significant stenosis, aneurysmal dilatation or vascular  malformation of the posterior circulation.       Impression:       There is atherosclerotic disease of the carotid bifurcations  bilaterally left greater than right with no significant stenosis seen of  the common carotid arteries or internal coronary arteries. Slight  dominance of the right vertebral artery with no significant stenosis  seen of the vertebral arteries or posterior circulation. Anterior  circulation is intact and unremarkable.        D:  07/22/2020  E:  07/22/2020     This report was finalized on 7/23/2020 3:59 PM by Dr. Dee Dee Slaughter MD.       CT Head Without Contrast [[id number]] Collected:  07/22/20 1246     Updated:  07/23/20 1602    Narrative:       EXAMINATION: CT HEAD WO CONTRAST-      INDICATION: Recent MI, stroke symptoms.     TECHNIQUE: Multiple axial CT imaging was obtained of the head from the  skull base to the skull vertex without the administration of intravenous  contrast.     The radiation dose reduction device was turned on for each scan per the  ALARA (As Low as Reasonably Achievable) protocol.     COMPARISON: None.     FINDINGS: The brain parenchyma is unremarkable in appearance. No  hemorrhage or hydrocephalus. No mass, mass effect, or midline shift.  Physiologic calcification is seen in the basal ganglia bilaterally. No  abnormal extraaxial fluid collection is identified. Bony structures  reveal no evidence of osseous abnormality. The visualized paranasal  sinuses are clear. The mastoid air cells are patent.       Impression:       No acute intracranial normality. Mild chronic changes are  seen within the brain.     Examination was performed 07/22/2020 at 12:27 PM and examination results  were given to the emergency room physician 07/22/2020 at 12:47 PM.      D:  07/22/2020  E:  07/22/2020     This report was finalized on 7/23/2020 3:59 PM by Dr. Dee Dee Slaughter MD.       XR Chest 1 View [[id number]] Collected:  07/22/20 1135     Updated:  07/23/20 1602    Narrative:       EXAMINATION: XR CHEST 1 VW- 07/22/2020     INDICATION: Weak/Dizzy/AMS triage protocol      COMPARISON: 12/12/2016     FINDINGS: Portable chest reveals cardiac and mediastinal silhouettes  within normal limits. Pacer leads in satisfactory position. Patient is  status post median sternotomy. The lung fields are grossly clear. No  focal parenchymal opacification present. No pleural effusion or  pneumothorax. Pulmonary vascularity is within normal  limits.           Impression:       No acute cardiopulmonary disease.        D:  07/22/2020  E:  07/22/2020     This report was finalized on 7/23/2020 3:58 PM by Dr. Dee Dee Slaughter MD.                       Results for orders placed during the hospital encounter of 07/22/20   Adult Transthoracic Echo Complete W/ Cont if Necessary Per Protocol (With Agitated Saline)    Narrative · Left ventricular systolic function is normal.  · Estimated EF appears to be in the range of 56 - 60%  · The following left ventricular wall segments are hypokinetic: apical   septal and apex hypokinetic.  · Left atrial cavity size is mild-to-moderately dilated.  · Saline test results are negative for right to left atrial level shunt.  · Moderate aortic valve regurgitation is present.            Discharge Details        Discharge Medications      New Medications      Instructions Start Date   aspirin 81 MG chewable tablet   81 mg, Oral, Daily, Then stop   Start Date:  July 25, 2020        Continue These Medications      Instructions Start Date   atorvastatin 20 MG tablet  Commonly known as:  LIPITOR   20 mg, Oral, Daily      carvedilol 12.5 MG tablet  Commonly known as:  COREG   12.5 mg, Oral, 2 Times Daily      clonazePAM 1 MG tablet  Commonly known as:  KlonoPIN   1 mg, Oral, Every Night at Bedtime      clopidogrel 75 MG tablet  Commonly known as:  PLAVIX   75 mg, Oral, Daily      dexlansoprazole 60 MG capsule  Commonly known as:  DEXILANT   60 mg, Oral, Daily      donepezil 10 MG tablet  Commonly known as:  ARICEPT   TAKE 1 TABLET BY MOUTH DAILY      meclizine 12.5 MG tablet  Commonly known as:  ANTIVERT   12.5 mg, Oral, 3 Times Daily PRN      PRESERVISION AREDS 2 PO   1 tablet, Oral, 2 Times Daily         Stop These Medications    prazosin 1 MG capsule  Commonly known as:  MINIPRESS            Allergies   Allergen Reactions   • Sulfa Antibiotics Rash         Discharge Disposition:  Home or Self Care    Diet:  Hospital:  Diet Order      Procedures   • Diet Regular; Cardiac       Activity:  Activity Instructions     Activity as Tolerated               CODE STATUS:    Code Status and Medical Interventions:   Ordered at: 07/22/20 1600     Level Of Support Discussed With:    Patient     Code Status:    CPR     Medical Interventions (Level of Support Prior to Arrest):    Full       Future Appointments   Date Time Provider Department Center   7/30/2020  1:15 PM Reed Hansen MD [facility] None   8/11/2020  9:15 AM Jd Limon III, MD [facility] None   2/17/2021  9:30 AM Chiki Newton MD [facility]       Additional Instructions for the Follow-ups that You Need to Schedule     Discharge Follow-up with PCP   As directed       Currently Documented PCP:    Jd Tapia MD    PCP Phone Number:    [phone]     Follow Up Details:  in 1 week         Discharge Follow-up with Specified Provider: Dr. Francisco Newton, [facility] in 4-6 weeks   As directed      To:  Dr. Francisco Newton, [facility] in 4-6 weeks                 Electronically signed by Chiki Cornejo MD, 07/24/20, 11:44 AM.      Time Spent on Discharge:  I spent  35  minutes on this discharge activity which included: face-to-face encounter with the patient, reviewing the data in the system, coordination of the care with the nursing staff as well as consultants, documentation, and entering orders.

## 2020-07-24 NOTE — NURSING NOTE
Pt's spouse to nurse's station reporting that patient was seeing zebras on the floor in addition to maps of cities, road signs, and roadways. Pt is completely awake, sitting up in chair, alert and oriented. Spouse stated pt does this at home, but it's upon awakening and seldom happens. Contacted Dr Cornejo via secure text and he is contacting Dr POON to come see him before discharge. Pt's pacemaker was interrogated during MRI of brain with no issues identified according to the rep from [employer] that I spoke with.

## 2020-07-24 NOTE — NURSING NOTE
DR POON IN TO SEE PATIENT AND HE IS OKAY TO BE DISCHARGED. PT WILL BE FOLLOWING UP WITH DR FISHER AS OUTPATIENT.

## 2020-07-28 ENCOUNTER — TELEPHONE (OUTPATIENT)
Dept: NEUROLOGY | Facility: CLINIC | Age: 80
End: 2020-07-28

## 2020-07-28 NOTE — TELEPHONE ENCOUNTER
PT CALLED IN REGARDING HIS HOSPITAL STAY WITH  FROM 7/22-7/24 FOR TIA. HE WAS TOLD TO FOLLOW UP WITH DR. FISHER IN 4/6 WEEKS. PT IS CURRENTLY ON THE SCHEDULE TO SEE DR. FISHER ON 9/25 FOR shelter AS WELL AS 2/17. INFORMED PT I WOULD SEND OVER A MESSAGE SINCE 9/25 VISIT IS ON THE BOOKS TO SEE IF HOSPITAL STAY IS SOMETHING THAT WILL BE DISCUSSED WITH DR. FISHER ON NEXT SCHEDULED APPT. PT DISCONNECTED THE CALL. PLEASE ADVISE

## 2020-07-29 NOTE — TELEPHONE ENCOUNTER
Called and spoke to pt informing per Dr. Oswald advice. Pt wanted earlier appt and scheduled. Informed pt of fu appt and pt stated verbal understanding. Thanks.

## 2020-07-29 NOTE — TELEPHONE ENCOUNTER
Dr. Newton,     Would you like for me to add to end of the day this Friday or next Monday? Please advise. Thanks.

## 2020-07-30 ENCOUNTER — OFFICE VISIT (OUTPATIENT)
Dept: SLEEP MEDICINE | Facility: HOSPITAL | Age: 80
End: 2020-07-30

## 2020-07-30 VITALS
HEART RATE: 63 BPM | SYSTOLIC BLOOD PRESSURE: 137 MMHG | HEIGHT: 73 IN | BODY MASS INDEX: 26.27 KG/M2 | OXYGEN SATURATION: 96 % | WEIGHT: 198.2 LBS | DIASTOLIC BLOOD PRESSURE: 70 MMHG

## 2020-07-30 DIAGNOSIS — G47.52 REM SLEEP BEHAVIOR DISORDER: ICD-10-CM

## 2020-07-30 DIAGNOSIS — G47.33 SEVERE OBSTRUCTIVE SLEEP APNEA: ICD-10-CM

## 2020-07-30 DIAGNOSIS — G47.33 OSA (OBSTRUCTIVE SLEEP APNEA): Primary | ICD-10-CM

## 2020-07-30 PROCEDURE — 99214 OFFICE O/P EST MOD 30 MIN: CPT | Performed by: INTERNAL MEDICINE

## 2020-07-30 RX ORDER — CLONAZEPAM 1 MG/1
1 TABLET ORAL
Qty: 90 TABLET | Refills: 0 | Status: SHIPPED | OUTPATIENT
Start: 2020-07-30 | End: 2020-12-21 | Stop reason: SDUPTHER

## 2020-07-30 NOTE — PROGRESS NOTES
Subjective:     Chief Complaint:   Chief Complaint   Patient presents with   • Follow-up       HPI:    Uche Polanco is a 80 y.o. male here for follow-up of obstructive sleep apnea and REM behavior disorder.    He has a longstanding history of REM behavior disorder and severe obstructive sleep apnea.  He has been on clonazepam over the long-term for treatment of his RBD.  He has had problems with memory loss and has been followed by Dr. Newton for mild cognitive impairment.  In the past his wife has attempted to decrease his clonazepam dose in the hopes that it might improve his cognition but this invariably resulted in more violent behavior at night.  We have tried high-dose melatonin with little effect.    He also has severe obstructive sleep apnea.  He is treated with auto CPAP at a range of 12-20 cm H2O.    His most recent download shows significant mask leak.  When he changed his mask for  This improved significantly.  His AHI is also higher than normal and is mildly elevated overall at 9.0 but this may be a result of significant mask leak.  His compliance is also somewhat lower but again he is bothered by the mask leak.  This is now improved.    He was recently admitted to the hospital with what appeared to be near syncope and some relative hypotension.  A neurologic and cardiac work-up was negative.  Sounds if he did have an episode of delirium while in the hospital that was distressing to his wife.    Further details are as follows:    Since last visit sleep problem has: remained the same  Currently using PAP: yes Hours of usage during the night: 5    Amount of sleep per night : 6 hours  Average length of time it takes to fall asleep : 15 minutes  Number of awakenings per night : Several    He feels fatigue (tiredness, exhaustion, lethargy) in the daytime even when not sleepy? Occasionally   He feels sleepy (or struggles to stay awake) in the daytime? Occasionally     Type of mask: full face mask    I  reviewed his PAP download:  Average pressure: 13  Average AHI:  9  Average minutes in large leak per night: Increased    Overall, he is benefiting from PAP therapy.    Current medications are:   Current Outpatient Medications:   •  aspirin 81 MG chewable tablet, Chew 1 tablet Daily for 90 days. Then stop, Disp: 30 tablet, Rfl: 2  •  atorvastatin (LIPITOR) 20 MG tablet, Take 20 mg by mouth Daily., Disp: , Rfl:   •  carvedilol (COREG) 12.5 MG tablet, Take 1 tablet by mouth 2 (Two) Times a Day., Disp: 60 tablet, Rfl: 3  •  clonazePAM (KlonoPIN) 1 MG tablet, Take 1 tablet by mouth every night at bedtime., Disp: 90 tablet, Rfl: 0  •  clopidogrel (PLAVIX) 75 MG tablet, Take 1 tablet by mouth Daily., Disp: 90 tablet, Rfl: 1  •  dexlansoprazole (DEXILANT) 60 MG capsule, Take 60 mg by mouth Daily., Disp: , Rfl:   •  donepezil (ARICEPT) 10 MG tablet, TAKE 1 TABLET BY MOUTH DAILY, Disp: 30 tablet, Rfl: 2  •  meclizine (ANTIVERT) 12.5 MG tablet, Take 12.5 mg by mouth 3 (Three) Times a Day As Needed., Disp: , Rfl: 0  •  Multiple Vitamins-Minerals (PRESERVISION AREDS 2 PO), Take 1 tablet by mouth 2 (Two) Times a Day., Disp: , Rfl: .    The patient's relevant past medical, surgical, family and social history were reviewed and updated in Epic as appropriate.     ROS:    Review of Systems      Objective:    Physical Exam   Constitutional: He is oriented to person, place, and time. He appears well-developed and well-nourished.   HENT:   Head: Normocephalic and atraumatic.   Mouth/Throat: Oropharynx is clear and moist.   Class III airway   Neck: Neck supple. No thyromegaly present.   Cardiovascular: Normal rate and regular rhythm. Exam reveals no gallop and no friction rub.   No murmur heard.  Pulmonary/Chest: Effort normal. No respiratory distress. He has no wheezes. He has no rales.   Musculoskeletal: He exhibits no edema.   Neurological: He is alert and oriented to person, place, and time.   Skin: Skin is warm and dry.    Psychiatric: He has a normal mood and affect. His behavior is normal.   Vitals reviewed.      Data:    Patient's PAP download was personally reviewed including raw data and results.    Assessment:    Problem List Items Addressed This Visit        Pulmonary Problems    Severe obstructive sleep apnea    Overview     Auto CPAP 12-20            Other    REM sleep behavior disorder    Relevant Medications    clonazePAM (KlonoPIN) 1 MG tablet      Other Visit Diagnoses     TOM (obstructive sleep apnea)    -  Primary    Relevant Orders    CPAP Therapy          80-year-old male with known RBD treated over the long-term with Klonopin with good effect.  Is currently using 1 mg nightly.  He did not respond to high-dose melatonin therapy.  He is also treated for obstructive sleep apnea with auto CPAP therapy.  Recently he has had significant mask leak and diminished compliance and an elevated AHI likely as a result of this.    Plan:     1. I do not think the clonazepam has anything to do with his recent dizziness.  Any attempt to reduce this dose in the past has resulted in more violent behavior while asleep due to his RBD.  2. I did refill his clonazepam after reviewing his eKasper  3. He needs to address his mask leak and he tells me that this is much improved with his new mask  4. I will reorder his CPAP supplies.  No change in settings.  5. Discussed my impression and plan in detail with the patient and his wife, who was present today.      Discussed in detail with the patient and his wife.  He will call prior to his follow up visit for any new problems.    Level of Risk Moderate due to: two stable chronic illnesses and prescription drug management    Signed by  Reed Hansen MD

## 2020-07-31 ENCOUNTER — OFFICE VISIT (OUTPATIENT)
Dept: NEUROLOGY | Facility: CLINIC | Age: 80
End: 2020-07-31

## 2020-07-31 VITALS
DIASTOLIC BLOOD PRESSURE: 60 MMHG | WEIGHT: 199 LBS | HEIGHT: 73 IN | OXYGEN SATURATION: 95 % | SYSTOLIC BLOOD PRESSURE: 128 MMHG | HEART RATE: 59 BPM | BODY MASS INDEX: 26.37 KG/M2

## 2020-07-31 DIAGNOSIS — R41.3 MEMORY LOSS: Primary | ICD-10-CM

## 2020-07-31 DIAGNOSIS — R42 POSTURAL DIZZINESS WITH PRESYNCOPE: ICD-10-CM

## 2020-07-31 DIAGNOSIS — R55 POSTURAL DIZZINESS WITH PRESYNCOPE: ICD-10-CM

## 2020-07-31 PROCEDURE — 99213 OFFICE O/P EST LOW 20 MIN: CPT | Performed by: PSYCHIATRY & NEUROLOGY

## 2020-07-31 NOTE — PROGRESS NOTES
"Subjective     Chief Complaint: memory loss         Uche Polanco is a 80 y.o. male who returns to clinic today with a history of possible MCI. He has noted symptoms for several years marked by forgetfulness and word-finding difficulties . This has gradually worsened over time. There have been no associated symptoms and he has denied impairments in ADL's.      He has a history of dizziness in the past, which has been well managed most recently ENT.    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 This has been treated by Dr. Hansen with Klonopin.     Neuroimaging in the past has been notable only for an old left cerebellar infarct. Screening bloodwork has been normal in the past as well. He is currently taking donepezil. He has also worked with Anjali Serrano (cognitive rehabilitation) in the past, which was quite beneficial.     Since his last visit on 7/8/20 he was hospitalized with a suspected TIA and this appointment was requested as follow-up. His symptom was presyncope and a SBP in the 80's was reported. Evaluation including MRI/CTA's and ECHO were notable only for apical hypokinesis. He was treated with DAPT and statin. He is to continue follow-up with cardiology. He also reports now that he had hallucinations while in the hospital, but that these have now resolved.     I have reviewed and confirmed the past family, social and medical history as accurate on 7/31/20.     Review of Systems   Constitutional: Negative.        Objective     /60   Pulse 59   Ht 185.4 cm (73\")   Wt 90.3 kg (199 lb)   SpO2 95%   BMI 26.25 kg/m²     General appearance today is normal.     Physical Exam   Constitutional: He is oriented to person, place, and time.   Neurological: He is oriented to person, place, and time. He has normal strength. He has a normal Finger-Nose-Finger Test.   Reflex Scores:       Bicep reflexes are 2+ on the right side and 2+ on the left side.       Brachioradialis reflexes are 2+ on the " right side and 2+ on the left side.  Psychiatric: His speech is normal.        Neurologic Exam     Mental Status   Oriented to person, place, and time.   Registration: recalls 3 of 3 objects. Recall at 5 minutes: recalls 3 of 3 objects. Follows 3 step commands.   Attention: normal.   Speech: speech is normal   Level of consciousness: alert  Knowledge: good.   Able to name object. Able to read. Able to repeat. Able to write. Normal comprehension.     Cranial Nerves   Cranial nerves II through XII intact.     Motor Exam   Muscle bulk: normal  Overall muscle tone: normal    Strength   Strength 5/5 throughout.     Sensory Exam   Light touch normal.     Gait, Coordination, and Reflexes     Coordination   Finger to nose coordination: normal    Reflexes   Right brachioradialis: 2+  Left brachioradialis: 2+  Right biceps: 2+  Left biceps: 2+        Results  MMSE=30 (unchanged)      Assessment/Plan   Uche was seen today for memory loss and follow-up.    Diagnoses and all orders for this visit:    Memory loss    Postural dizziness with presyncope        Discussion/Summary   Uche Polanco returns to clinic today with a history of possible Mild Cognitive Impairment (MCI). His neurologic examination remains reassuring. I'm unconvinced of TIA, but think his workup has been reasonable and it is reasonable to continue DAPT for about 21 days. He can then continue on Plavix/statin. I am doubtful that donepezil has caused his symptoms, though did discuss a possible taper of this medication. He will then follow up in 6 months, or sooner if needed.     I spent 15 minutes face to face with the patient and family. I spent 10 minutes counseling and discussing diagnosis, current status and management.      Chiki Newton MD

## 2020-08-11 ENCOUNTER — OFFICE VISIT (OUTPATIENT)
Dept: CARDIOLOGY | Facility: CLINIC | Age: 80
End: 2020-08-11

## 2020-08-11 VITALS
WEIGHT: 189 LBS | DIASTOLIC BLOOD PRESSURE: 68 MMHG | SYSTOLIC BLOOD PRESSURE: 144 MMHG | HEART RATE: 60 BPM | OXYGEN SATURATION: 99 % | BODY MASS INDEX: 25.6 KG/M2 | HEIGHT: 72 IN

## 2020-08-11 DIAGNOSIS — I50.22 CHRONIC SYSTOLIC CONGESTIVE HEART FAILURE (HCC): ICD-10-CM

## 2020-08-11 DIAGNOSIS — I44.30 AV BLOCK: Primary | ICD-10-CM

## 2020-08-11 DIAGNOSIS — I35.1 NONRHEUMATIC AORTIC VALVE INSUFFICIENCY: ICD-10-CM

## 2020-08-11 PROCEDURE — 99214 OFFICE O/P EST MOD 30 MIN: CPT | Performed by: INTERNAL MEDICINE

## 2020-08-11 RX ORDER — BISOPROLOL FUMARATE 5 MG/1
2.5 TABLET, FILM COATED ORAL NIGHTLY
Qty: 45 TABLET | Refills: 1 | Status: SHIPPED | OUTPATIENT
Start: 2020-08-11 | End: 2020-12-03 | Stop reason: SINTOL

## 2020-08-11 NOTE — PROGRESS NOTES
[facility] at [facility]  Office visit  Uche Polanco  1940  [phone]  VISIT DATE:  8/11/2020      PCP: Jd Tapia MD  [address]  [place] [address]    CC:  Chief Complaint   Patient presents with   • Coronary Artery Disease       PROBLEM LIST:  1. Valvular heart disease:  a. History of mitral valve repair at [facility] 2005, records pending.  b. Echocardiogram, August 2014: EF normal at 50% to 55%, moderate AR, mild MR, left atrium at 3.8 cm.   c. Echo, January 2017: EF 50%, moderate aortic insufficiency  2. Recent TIA:  a. Status post Medtronic link loop recorder implantation, November 2014.  b. Recent interrogation revealing AV block, pauses, symptomatic with dizziness and lightheadedness.  3. Obstructive sleep apnea requiring CPAP.   4. Benign hypertension.   5. Gout.   6. Coronary artery disease by report, data insufficient.   7. REM disorder.   8. Dyslipidemia.   9. Family history of hypertension.   10. AV block, symptomatic with lightheadedness, status post pacemaker implantation, 04/17/2015, Dr. Robert Gerber: Medtronic Advisa  [id number] DR01.   11. Surgical history:   a. Hemorrhoidectomy.   b. Basal cell carcinoma removed.   c. Valvular repair (mitral valve).      Cardiac studies and procedures:  February 2019 echo  · Left ventricular systolic function is moderately decreased.  · Estimated EF appears to be in the range of 36 - 40%.  · The following left ventricular wall segments are dyskinetic: apical lateral, apical inferior, apical septal and apex. The following left ventricular wall segments are akinetic: apical anterior.  · Left ventricular wall thickness is consistent with mild concentric hypertrophy.  · Mild-to-moderate mitral valve stenosis is present  · Mild to moderate aortic valve regurgitation is present.    March 2019 myocardial perfusion imaging  · Left ventricular ejection fraction is normal (Calculated EF = 54%).  · Fixed defect  consistent with moderate size apical infarct with extension into the inferior wall, associated moderate hypokinesis.  · No ischemia visualized  · Impressions are consistent with an intermediate risk study.    December 2019 echo  · Left ventricular systolic function is mildly decreased.  · Estimated EF appears to be in the range of 41 - 45%.  · The following left ventricular wall segments are akinetic: apical anterior, apical inferior, apical septal and apex. Diastolic wall thinning consistent with apical infarction.    June 2020 echo  · Estimated EF appears to be in the range of 56 - 60%  · The following left ventricular wall segments are hypokinetic: apical septal and apex hypokinetic.  · Left atrial cavity size is mild-to-moderately dilated.  · Saline test results are negative for right to left atrial level shunt.  · Moderate aortic valve regurgitation is present.    ASSESSMENT:   Diagnosis Plan   1. AV block     2. Chronic systolic congestive heart failure (CMS/HCC)     3. Nonrheumatic aortic valve insufficiency       Device interrogation:  Medtronic dual-chamber pacemaker    PLAN:  Cardiomyopathy:  I really feel fairly confident that his apical regional wall motion abnormalities are due to either previous small infarct or high-grade distal LAD disease.  Currently asymptomatic.  Improving EF on medical therapy.  If he has worsening regional wall motion abnormalities, declining functional capacity, or worsening EF will proceed with definitive evaluation of coronary anatomy with left heart catheterization.  Stage III chronic kidney disease.  Having episodes of symptomatic hypotension and lightheadedness on carvedilol, switching to bisoprolol 2.5 mg p.o. Nightly.    Mitral valve prolapse status post mitral valve repair: Stable on exam today.  Continue routine clinical follow-up and intermittent echocardiographic surveillance    Aortic insufficiency: Moderate and stable.  Continue clinical follow-up and surveillance  "echocardiographic assessment.    History of TIA: No evidence of atrial arrhythmia.  Continue Plavix for stroke prophylaxis.  Patient was instructed to use low-dose aspirin for 3 to 4 weeks after recent admission and then discontinue.  Continue statin.  Afterload controlled, goal less than 130/80 mmHg.    Hypertension: Goal less than 130/80 mmHg.  Continue current medical therapy.    Symptomatic high-grade AV block: Device dependent.  Currently asymptomatic, continue routine follow-up in device clinic.    Hyperlipidemia: Goal LDL less than 70, continue statin.      Subjective  Recent admission for symptomatic hypotension and lightheadedness.  Evaluation for potential TIA stroke was unremarkable.  Continue course of low-dose aspirin was added to his regimen.  Lightheadedness and intermittent hypotension worsened when he was on carvedilol 12.5 mg p.o. twice daily, he is still having intermittent lightheadedness and fatigue at the 6-1/4 mg dose twice daily.  He is taking on a full stomach.  Still exercising on a regular basis without difficulty.  Blood pressures probably running less than 140/90 mmHg.  Tolerating statin without myalgias.  Denies bleeding complications on Plavix, mild intermittent bruising.  Denies chest pain, palpitations or dyspnea on exertion.  compliant with medical therapy.      PHYSICAL EXAMINATION:  Vitals:    08/11/20 0905   BP: 144/68   BP Location: Right arm   Patient Position: Sitting   Pulse: 60   SpO2: 99%   Weight: 85.7 kg (189 lb)   Height: 182.9 cm (72\")     General Appearance:    Alert, cooperative, no distress, appears stated age   Head:    Normocephalic, without obvious abnormality, atraumatic   Eyes:    conjunctiva/corneas clear   Nose:   Nares normal, septum midline, mucosa normal, no drainage   Throat:   Lips, teeth and gums normal   Neck:   Supple, symmetrical, trachea midline, no carotid    bruit or JVD   Lungs:     Clear to auscultation bilaterally, respirations unlabored   Chest " Wall:    No tenderness or deformity    Heart:    Regular rate and rhythm, S1 and S2 normal, no murmur, rub   or gallop, normal carotid impulse bilaterally without bruit.   Abdomen:     Soft, non-tender   Extremities:   Extremities normal, atraumatic, no cyanosis or edema   Pulses:   2+ and symmetric all extremities   Skin:   Skin color, texture, turgor normal, no rashes or lesions       Diagnostic Data:  Procedures  Lab Results   Component Value Date    CHLPL 190 08/22/2014    TRIG 93 07/23/2020    HDL 34 (L) 07/23/2020     Lab Results   Component Value Date    GLUCOSE 91 07/23/2020    BUN 18 07/23/2020    CREATININE 1.03 07/23/2020     07/23/2020    K 3.8 07/23/2020     07/23/2020    CO2 26.0 07/23/2020     Lab Results   Component Value Date    HGBA1C 5.30 07/22/2020     Lab Results   Component Value Date    WBC 5.14 07/23/2020    HGB 12.1 (L) 07/23/2020    HCT 37.4 (L) 07/23/2020     07/23/2020       Allergies  Allergies   Allergen Reactions   • Sulfa Antibiotics Rash       Current Medications    Current Outpatient Medications:   •  aspirin 81 MG chewable tablet, Chew 1 tablet Daily for 90 days. Then stop, Disp: 30 tablet, Rfl: 2  •  atorvastatin (LIPITOR) 20 MG tablet, Take 20 mg by mouth Daily., Disp: , Rfl:   •  carvedilol (COREG) 12.5 MG tablet, Take 1 tablet by mouth 2 (Two) Times a Day. (Patient taking differently: Take 6.25 mg by mouth 2 (Two) Times a Day.), Disp: 60 tablet, Rfl: 3  •  clonazePAM (KlonoPIN) 1 MG tablet, Take 1 tablet by mouth every night at bedtime., Disp: 90 tablet, Rfl: 0  •  clopidogrel (PLAVIX) 75 MG tablet, Take 1 tablet by mouth Daily., Disp: 90 tablet, Rfl: 1  •  dexlansoprazole (DEXILANT) 60 MG capsule, Take 60 mg by mouth Daily., Disp: , Rfl:   •  donepezil (ARICEPT) 10 MG tablet, TAKE 1 TABLET BY MOUTH DAILY, Disp: 30 tablet, Rfl: 2  •  meclizine (ANTIVERT) 12.5 MG tablet, Take 12.5 mg by mouth 3 (Three) Times a Day As Needed., Disp: , Rfl: 0  •  Multiple  Vitamins-Minerals (PRESERVISION AREDS 2 PO), Take 1 tablet by mouth 2 (Two) Times a Day., Disp: , Rfl:           ROS  Review of Systems   Cardiovascular: Negative for chest pain, dyspnea on exertion, irregular heartbeat and palpitations.   Respiratory: Negative for cough, shortness of breath and sleep disturbances due to breathing.    Neurological: Positive for light-headedness.         SOCIAL HX  Social History     Socioeconomic History   • Marital status:      Spouse name: Not on file   • Number of children: Not on file   • Years of education: Not on file   • Highest education level: Not on file   Tobacco Use   • Smoking status: Never Smoker   • Smokeless tobacco: Never Used   Substance and Sexual Activity   • Alcohol use: Yes     Alcohol/week: 1.0 - 4.0 standard drinks     Types: 1 - 4 Glasses of wine per week     Comment: week with dinner   • Drug use: No   • Sexual activity: Defer       FAMILY HX  Family History   Problem Relation Age of Onset   • Ovarian cancer Mother    • Lymphoma Father    • Cancer Father    • Hypertension Other              Jd Limon III, MD, FACC

## 2020-09-28 RX ORDER — DONEPEZIL HYDROCHLORIDE 10 MG/1
TABLET, FILM COATED ORAL
Qty: 30 TABLET | Refills: 2 | Status: SHIPPED | OUTPATIENT
Start: 2020-09-28 | End: 2021-01-14 | Stop reason: SDUPTHER

## 2020-10-02 ENCOUNTER — TELEPHONE (OUTPATIENT)
Dept: NEUROLOGY | Facility: CLINIC | Age: 80
End: 2020-10-02

## 2020-10-02 ENCOUNTER — OFFICE VISIT (OUTPATIENT)
Dept: NEUROLOGY | Facility: CLINIC | Age: 80
End: 2020-10-02

## 2020-10-02 VITALS — TEMPERATURE: 95.4 F

## 2020-10-02 DIAGNOSIS — R41.3 MEMORY LOSS: Primary | ICD-10-CM

## 2020-10-02 PROCEDURE — 99213 OFFICE O/P EST LOW 20 MIN: CPT | Performed by: PSYCHIATRY & NEUROLOGY

## 2020-10-02 RX ORDER — ESCITALOPRAM OXALATE 10 MG/1
10 TABLET ORAL DAILY
Qty: 30 TABLET | Refills: 2 | Status: SHIPPED | OUTPATIENT
Start: 2020-10-02 | End: 2020-12-22

## 2020-10-02 NOTE — TELEPHONE ENCOUNTER
PT CALLED IN SAYING DR. FISHER MENTIONED SENDING AN RX OF LEXAPRO OVER TO THE [facility] ON PT'S FILE AND THEY HAVEN'T RECEIVED THE MEDICATION. PLEASE ADVISE PT AND SEND RX AT PT'S REQUEST      CALL BACK- [phone]

## 2020-10-02 NOTE — PROGRESS NOTES
Subjective     Chief Complaint: memory loss         Uche Polanco is a 80 y.o. male who returns to clinic today with a history of possible MCI. He has noted symptoms for several years marked by forgetfulness and word-finding difficulties . This has gradually worsened subjectively over time. There have been no associated symptoms and he has denied impairments in ADL's.      He has a history of dizziness in the past, which has been well managed most recently ENT.    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 This has been treated by Dr. Hansen with Klonopin.     Neuroimaging in the past has been notable only for an old left cerebellar infarct. Screening bloodwork has been normal in the past as well. He is currently taking donepezil. He has also worked with Anjali Serrano (cognitive rehabilitation) in the past, which was quite beneficial.     Since his last visit on 7/31/20 he feels that he is slipping cognitively. He also endorses significant depression, and feels that this might be impacting his cognition.    I have reviewed and confirmed the past family, social and medical history as accurate on 10/2/20.     Review of Systems   Constitutional: Negative.        Objective     Temp 95.4 °F (35.2 °C)     General appearance today is normal.     Physical Exam   Constitutional: He is oriented to person, place, and time.   Neurological: He is oriented to person, place, and time. He has a normal Finger-Nose-Finger Test.   Psychiatric: His speech is normal.        Neurologic Exam     Mental Status   Oriented to person, place, and time.   Registration: recalls 3 of 3 objects. Recall at 5 minutes: recalls 2 of 3 objects. Follows 3 step commands.   Attention: normal.   Speech: speech is normal   Level of consciousness: alert  Knowledge: good.   Able to name object. Able to read. Able to repeat. Able to write. Normal comprehension.     Cranial Nerves   Cranial nerves II through XII intact.     Gait, Coordination, and  Reflexes     Coordination   Finger to nose coordination: normal        Results  MMSE=27      Assessment/Plan   Uche was seen today for memory loss.    Diagnoses and all orders for this visit:    Memory loss        Discussion/Summary   Uche Polanco returns to clinic today with a history of possible Mild Cognitive Impairment (MCI). His neurologic examination remains reassuring. I agree that depression and anxiety may be exacerbating his symptoms, and I discussed options including a trial of Lexapro or referral to behavioral health. He also asked about referral back to [facility]. He will then follow in 2/21 as previously scheduled.    I spent 15 minutes face to face with the patient. I spent 10 minutes counseling and discussing diagnosis, current status and management.      Chiki Newton MD

## 2020-11-10 ENCOUNTER — OFFICE VISIT (OUTPATIENT)
Dept: CARDIOLOGY | Facility: CLINIC | Age: 80
End: 2020-11-10

## 2020-11-10 VITALS
HEART RATE: 60 BPM | SYSTOLIC BLOOD PRESSURE: 132 MMHG | WEIGHT: 200 LBS | DIASTOLIC BLOOD PRESSURE: 70 MMHG | OXYGEN SATURATION: 98 % | BODY MASS INDEX: 27.09 KG/M2 | HEIGHT: 72 IN

## 2020-11-10 DIAGNOSIS — I35.1 NONRHEUMATIC AORTIC VALVE INSUFFICIENCY: ICD-10-CM

## 2020-11-10 DIAGNOSIS — I50.22 CHRONIC SYSTOLIC CONGESTIVE HEART FAILURE (HCC): Primary | ICD-10-CM

## 2020-11-10 PROCEDURE — 99214 OFFICE O/P EST MOD 30 MIN: CPT | Performed by: INTERNAL MEDICINE

## 2020-11-10 NOTE — PROGRESS NOTES
[facility] at [facility]  Office visit  Uche Polanco  1940  [phone]  VISIT DATE:  11/10/2020      PCP: Jd Tapia MD  [address] [place] [address]  [facility] [address]    CC:  Chief Complaint   Patient presents with   • AV block   • Coronary Artery Disease       PROBLEM LIST:  1. Valvular heart disease:  a. History of mitral valve repair at [facility] 2005, records pending.  b. Echocardiogram, August 2014: EF normal at 50% to 55%, moderate AR, mild MR, left atrium at 3.8 cm.   c. Echo, January 2017: EF 50%, moderate aortic insufficiency  2. Recent TIA:  a. Status post Medtronic link loop recorder implantation, November 2014.  b. Recent interrogation revealing AV block, pauses, symptomatic with dizziness and lightheadedness.  3. Obstructive sleep apnea requiring CPAP.   4. Benign hypertension.   5. Gout.   6. Coronary artery disease by report, data insufficient.   7. REM disorder.   8. Dyslipidemia.   9. Family history of hypertension.   10. AV block, symptomatic with lightheadedness, status post pacemaker implantation, 04/17/2015, Dr. Roebrt Gerber: Medtronic Advisa  [id number] DR01.   11. Surgical history:   a. Hemorrhoidectomy.   b. Basal cell carcinoma removed.   c. Valvular repair (mitral valve).      Cardiac studies and procedures:  February 2019 echo  · Left ventricular systolic function is moderately decreased.  · Estimated EF appears to be in the range of 36 - 40%.  · The following left ventricular wall segments are dyskinetic: apical lateral, apical inferior, apical septal and apex. The following left ventricular wall segments are akinetic: apical anterior.  · Left ventricular wall thickness is consistent with mild concentric hypertrophy.  · Mild-to-moderate mitral valve stenosis is present  · Mild to moderate aortic valve regurgitation is present.    March 2019 myocardial perfusion imaging  · Left ventricular ejection fraction is normal (Calculated EF =  54%).  · Fixed defect consistent with moderate size apical infarct with extension into the inferior wall, associated moderate hypokinesis.  · No ischemia visualized  · Impressions are consistent with an intermediate risk study.    December 2019 echo  · Left ventricular systolic function is mildly decreased.  · Estimated EF appears to be in the range of 41 - 45%.  · The following left ventricular wall segments are akinetic: apical anterior, apical inferior, apical septal and apex. Diastolic wall thinning consistent with apical infarction.    June 2020 echo  · Estimated EF appears to be in the range of 56 - 60%  · The following left ventricular wall segments are hypokinetic: apical septal and apex hypokinetic.  · Left atrial cavity size is mild-to-moderately dilated.  · Saline test results are negative for right to left atrial level shunt.  · Moderate aortic valve regurgitation is present.    ASSESSMENT:   Diagnosis Plan   1. Chronic systolic congestive heart failure (CMS/HCC)     2. Nonrheumatic aortic valve insufficiency       Device interrogation:  Medtronic dual-chamber pacemaker    PLAN:  Cardiomyopathy:  I really feel fairly confident that his apical regional wall motion abnormalities are due to either previous small infarct or high-grade distal LAD disease.  Currently asymptomatic.  Improving EF on medical therapy.  If he has worsening regional wall motion abnormalities, declining functional capacity, or worsening EF will proceed with definitive evaluation of coronary anatomy with left heart catheterization.  Stage III chronic kidney disease.  Developed episodes of symptomatic hypotension and lightheadedness on carvedilol, tolerating bisoprolol 2.5 mg p.o. Nightly.  Gradually titrating medical therapy as blood pressure tolerates.    Mitral valve prolapse status post mitral valve repair: Stable on exam today.  Continue routine clinical follow-up and intermittent echocardiographic surveillance    Aortic  "insufficiency: Moderate and stable.  Continue clinical follow-up and surveillance echocardiographic assessment.    History of TIA: No evidence of atrial arrhythmia.  Continue Plavix for stroke prophylaxis.  Continue statin.  Afterload controlled, goal less than 130/80 mmHg.    Hypertension: Goal less than 130/80 mmHg.  Continue current medical therapy.    Symptomatic high-grade AV block: Device dependent.  Currently asymptomatic, continue routine follow-up in device clinic.    Hyperlipidemia: Goal LDL less than 70, continue statin.      Subjective  No change in baseline functional capacity.  Has been diagnosed with mild cognitive impairment and recently started on Aricept.  Walking outside on nice days without difficulty.  Blood pressures probably running less than 130/80 mmHg.  Tolerating statin without myalgias.  Denies bleeding complications on Plavix, mild intermittent bruising.  Denies chest pain, palpitations or dyspnea on exertion.  compliant with medical therapy.      PHYSICAL EXAMINATION:  Vitals:    11/10/20 0944   Pulse: 60   SpO2: 98%   Weight: 90.7 kg (200 lb)   Height: 182.9 cm (72\")     General Appearance:    Alert, cooperative, no distress, appears stated age   Head:    Normocephalic, without obvious abnormality, atraumatic   Eyes:    conjunctiva/corneas clear   Nose:   Nares normal, septum midline, mucosa normal, no drainage   Throat:   Lips, teeth and gums normal   Neck:   Supple, symmetrical, trachea midline, no carotid    bruit or JVD   Lungs:     Clear to auscultation bilaterally, respirations unlabored   Chest Wall:    No tenderness or deformity    Heart:    Regular rate and rhythm, S1 and S2 normal, no murmur, rub   or gallop, normal carotid impulse bilaterally without bruit.   Abdomen:     Soft, non-tender   Extremities:   Extremities normal, atraumatic, no cyanosis or edema   Pulses:   2+ and symmetric all extremities   Skin:   Skin color, texture, turgor normal, no rashes or lesions "       Diagnostic Data:  Procedures  Lab Results   Component Value Date    CHLPL 190 08/22/2014    TRIG 93 07/23/2020    HDL 34 (L) 07/23/2020     Lab Results   Component Value Date    GLUCOSE 91 07/23/2020    BUN 18 07/23/2020    CREATININE 1.03 07/23/2020     07/23/2020    K 3.8 07/23/2020     07/23/2020    CO2 26.0 07/23/2020     Lab Results   Component Value Date    HGBA1C 5.30 07/22/2020     Lab Results   Component Value Date    WBC 5.14 07/23/2020    HGB 12.1 (L) 07/23/2020    HCT 37.4 (L) 07/23/2020     07/23/2020       Allergies  Allergies   Allergen Reactions   • Sulfa Antibiotics Rash       Current Medications    Current Outpatient Medications:   •  atorvastatin (LIPITOR) 20 MG tablet, Take 20 mg by mouth Daily., Disp: , Rfl:   •  bisoprolol (ZEBeta) 5 MG tablet, Take 0.5 tablets by mouth Every Night., Disp: 45 tablet, Rfl: 1  •  clonazePAM (KlonoPIN) 1 MG tablet, Take 1 tablet by mouth every night at bedtime., Disp: 90 tablet, Rfl: 0  •  clopidogrel (PLAVIX) 75 MG tablet, Take 1 tablet by mouth Daily., Disp: 90 tablet, Rfl: 1  •  donepezil (ARICEPT) 10 MG tablet, TAKE 1 TABLET BY MOUTH DAILY, Disp: 30 tablet, Rfl: 2  •  escitalopram (Lexapro) 10 MG tablet, Take 1 tablet by mouth Daily., Disp: 30 tablet, Rfl: 2  •  meclizine (ANTIVERT) 12.5 MG tablet, Take 12.5 mg by mouth As Needed., Disp: , Rfl: 0  •  Multiple Vitamins-Minerals (PRESERVISION AREDS 2 PO), Take 1 tablet by mouth 2 (Two) Times a Day., Disp: , Rfl:   •  mupirocin (BACTROBAN) 2 % ointment, APPLY A THIN LAYER TO SURGICAL SITE QD AT BANDAGE CHANGE, Disp: , Rfl:           ROS  Review of Systems   Cardiovascular: Negative for chest pain, dyspnea on exertion, irregular heartbeat and palpitations.   Respiratory: Negative for cough, shortness of breath and sleep disturbances due to breathing.    Neurological: Positive for light-headedness.         SOCIAL HX  Social History     Socioeconomic History   • Marital status:       Spouse name: Not on file   • Number of children: Not on file   • Years of education: Not on file   • Highest education level: Not on file   Tobacco Use   • Smoking status: Never Smoker   • Smokeless tobacco: Never Used   Substance and Sexual Activity   • Alcohol use: Yes     Alcohol/week: 1.0 - 4.0 standard drinks     Types: 1 - 4 Glasses of wine per week     Comment: week with dinner   • Drug use: No   • Sexual activity: Defer       FAMILY HX  Family History   Problem Relation Age of Onset   • Ovarian cancer Mother    • Lymphoma Father    • Cancer Father    • Hypertension Other              Jd Limon III, MD, FACC

## 2020-11-20 ENCOUNTER — TELEPHONE (OUTPATIENT)
Dept: NEUROLOGY | Facility: CLINIC | Age: 80
End: 2020-11-20

## 2020-11-20 DIAGNOSIS — R41.3 MEMORY LOSS: Primary | ICD-10-CM

## 2020-11-24 ENCOUNTER — HOSPITAL ENCOUNTER (OUTPATIENT)
Dept: SPEECH THERAPY | Facility: HOSPITAL | Age: 80
Setting detail: THERAPIES SERIES
Discharge: HOME | End: 2020-11-24

## 2020-11-24 DIAGNOSIS — R41.3 MEMORY LOSS: Primary | ICD-10-CM

## 2020-11-24 PROCEDURE — 92523 SPEECH SOUND LANG COMPREHEN: CPT

## 2020-11-24 NOTE — THERAPY EVALUATION
Outpatient Speech Language Pathology   Adult Speech Language Cognitive Initial Evaluation  [facility]     Patient Name: Uche Polanco  : 1940  MRN: [id number]  Today's Date: 2020        Visit Date: 2020   Patient Active Problem List   Diagnosis   • Severe obstructive sleep apnea   • Gout   • AV block   • REM sleep behavior disorder   • Memory loss   • Postural dizziness with presyncope   • Chronic systolic congestive heart failure (CMS/HCC)   • Nonrheumatic aortic valve insufficiency        Past Medical History:   Diagnosis Date   • AV block    • CAD (coronary artery disease)    • Cognitive impairment, mild, so stated     Assessed By: Chiki Newton (Neurology); Last Assessed: 11 Sep 2014   • Family history of hypertension    • Gout    • Hyperlipidemia    • Hypertension    • TOM (obstructive sleep apnea)    • REM  disorder    • Skin cancer 10/22/2019    left cheek   • TIA (transient ischemic attack)    • Valvular heart disease         Past Surgical History:   Procedure Laterality Date   • HEMORRHOIDECTOMY     • HERNIA REPAIR     • MITRAL VALVE REPAIR/REPLACEMENT     • OTHER SURGICAL HISTORY  2014    [employer] link loop recorder   • PACEMAKER IMPLANTATION     • SKIN SURGERY  Month ago    Cell removed from head    • SQUAMOUS CELL CARCINOMA EXCISION  10/2020    right ear   • TONSILLECTOMY           Visit Dx:    ICD-10-CM ICD-9-CM   1. Memory loss  R41.3 780.93           SLP SLC Evaluation - 20 0900        Communication Assessment/Intervention    Document Type  evaluation   -HG    Subjective Information  no complaints   -HG    Patient Observations  alert;cooperative;agree to therapy   -HG    Care Plan Review  evaluation/treatment results reviewed   -HG    Patient Effort  excellent   -HG    Symptoms Noted During/After Treatment  none   -HG       General Information    Patient Profile Reviewed  yes   -HG    Pertinent History Of Current Problem  Pt is an 80 year old male with hx of MCI  "and is followed by Neurology. Pt with recent hx of suspect TIA in July of 2020 with hx of TIA . Pt followed by Neurology and felt his cognition \"was slipping\"   -HG    Precautions/Limitations, Vision  WFL with corrective lenses   -HG    Precautions/Limitations, Hearing  WFL;for purposes of eval   -HG    Patient Level of Education  High School, served in .   -HG    Prior Level of Function-Communication  cognitive-linguistic impairment   -HG    Plans/Goals Discussed with  patient   -HG    Barriers to Rehab  none identified   -HG    Patient's Goals for Discharge  functional cognition   -HG    Standardized Assessment Used  RBANS   -HG       Auditory Comprehension Assessment/Intervention    Auditory Comprehension (Communication)  WFL   -HG       Verbal Expression Assessment/Intervention    Verbal Expression  WFL   -HG       Graphic Expression Assessment/Intervention    Graphic Expression  WFL   -HG       Oral Motor Structure and Function    Oral Motor Structure and Function  WFL   -HG       Oral Musculature and Cranial Nerve Assessment    Oral Motor General Assessment  WFL   -HG       Motor Speech Assessment/Intervention    Motor Speech Function  WFL   -HG       Cursory Voice Assessment/Intervention    Quality and Resonance (Voice)  WFL   -HG       Cognitive Assessment Intervention- SLP    Cognitive Function (Cognition)  mild impairment;moderate impairment   -HG    Orientation Status (Cognition)  WFL   -HG    Memory (Cognitive)  immediate;severe impairment;short-term;delayed;moderate impairment   -HG    Attention (Cognitive)  moderate impairment   -HG    Thought Organization (Cognitive)  mild impairment   -HG       RBANS- Repeatable Battery for the Assessment of Neuropsychological Status    Immediate Memory Index Score  61   -HG    Immediate Memory Percentile  1 %   -HG    Immediate Memory Qualitative Description  extremely low   -HG    Visuospatial Index Score  136   -HG    Visuospatial Percentile  99 %   -HG    " "Visuospatial Qualitative Description  very superior   -HG    Language Index Score  89   -HG    Language Percentile  23 %   -HG    Language Qualitative Description  low average   -HG    Attention Index Score  79   -HG    Attention Percentile  8 %   -HG    Attention Qualitative Description  borderline   -HG    Delayed Memory Index Score  78   -HG    Delayed Memory Percentile  7 %   -HG    Delayed Memory Qualitative Description  borderline   -HG    Total Index Score  443   -HG    Total Percentile  14 %   -HG    Total Qualitative Description  low average   -HG      User Key  (r) = Recorded By, (t) = Taken By, (c) = Cosigned By    Initials Name Provider Type    Anjali Cortes MS CCC-SLP Speech and Language Pathologist                         OP SLP Education     Row Name 11/24/20 0900       Education    Barriers to Learning  No barriers identified  -HG    Education Provided  Patient demonstrated recommended strategies;Patient requires further education on strategies, risks  -    Assessed  Learning needs;Learning motivation;Learning preferences;Learning readiness  -    Learning Motivation  Strong  -    Learning Method  Explanation;Demonstration;Teach back;Written materials  -    Teaching Response  Verbalized understanding;Demonstrated understanding;Reinforcement needed  -    Education Comments  Education for use ongoing strategies   -      User Key  (r) = Recorded By, (t) = Taken By, (c) = Cosigned By    Initials Name Effective Dates     Anjali Serrano MS CCC-SLP 08/09/20 -           SLP OP Goals     Row Name 11/24/20 0900          Goal Type Needed    Goal Type Needed  Memory;Attention/Orientation;Other Adult Goals  -        Subjective Comments    Subjective Comments  Pt alert, cooperative, notices that he isn't \"as sharp as he used to be.\"   -        Memory Goals    Patient and family will implement compensatory strategies to maximize patient’s Memory function so patient can continue to " participate in daily activities  90%:;with cues  -HG     Status: Patient and family will implement compensatory strategies to maximize patient’s Memory function so patient can continue to participate in daily activities  New  -HG     Patient will demonstrate improved ability to recall information by immediately recalling a series of words  80%:;related;after delay;with cues  -HG     Status: Patient will demonstrate improved ability to recall information by immediately recalling a series of words  New  -HG     Patient’s memory skills will be enhanced as reported by patient by utilizing internal memory strategies to recall up to 3 pieces of information after a 5- minute delay  80%:;with cues  -HG     Status: Patient’s memory skills will be enhanced as reported by patient by utilizing internal memory strategies to recall up to 3 pieces of information after a 5- minute delay  New  -HG     Patient’s memory skills will be enhanced as reported by patient by using external memory aides  80%:;with cues  -HG     Status: Patient’s memory skills will be enhanced as reported by patient by using external memory aides  New  -HG        Attention/Orientation Goals    Patient will be able to participate in the community safely  With Supervision  -HG     Status: Patient will be able to participate in the community safely  New  -HG     Patient will improve attention skills by sustaining focus and participation to conversation/task  with cues;10 minute task  -HG     Status: Patient will improve attention skills by sustaining focus and participation to conversation/task  New  -HG     Patient will improve attention skills by sustaining focus in order to actively hold and manipulate information provided (e.g., sequencing auditorily presented number series in ascending or descending order)  80%:;with cues  -HG     Status: Patient will improve attention skills by sustaining focus in order to actively hold and manipulate information provided  (e.g., sequencing auditorily presented number series in ascending or descending order)  New  -HG     Patient will improve attention skills by focusing to selective target/task when presented with competing stimuli or in a distracting environment in order to complete task  80%:;with cues  -HG     Status: Patient will improve attention skills by focusing to selective target/task when presented with competing stimuli or in a distracting environment in order to complete task  New  -HG     Patient will improve attention skills by alternating or shifting focus between two different tasks in order to complete both tasks  80%:;with cues  -HG     Status: Patient will improve attention skills by alternating or shifting focus between two different tasks in order to complete both tasks  New  -HG        Other Goals    Other Adult Goal- 1  Pt will complete high level thought organization tasks with 80% accuracy.   -HG     Status: Other Adult Goal- 1  New  -HG     Other Adult Goal- 2  Pt will complete reasoning and problem solving assessment with goals to follow as indicated.  -HG     Status: Other Adult Goal- 2  New  -HG     Other Adult Goal- 3  Pt will improve RBANS score to at least a 90 placing pt in the Average range.  -HG     Status: Other Adult Goal- 3  New  -HG        SLP Time Calculation    SLP Goal Re-Cert Due Date  12/24/20  -HG       User Key  (r) = Recorded By, (t) = Taken By, (c) = Cosigned By    Initials Name Provider Type    Anjali Cortes MS CCC-SLP Speech and Language Pathologist          OP SLP Assessment/Plan - 11/24/20 0900        SLP Assessment    Functional Problems  Speech Language- Adult/Cognition   -HG    Impact on Function: Adult Speech Language/Cognition  Restrictions in personal and social life;Difficulty sequencing thoughts to express complex messages;Trouble learning or remembering new information;Poor attention to task   -HG    Clinical Impression: Speech Language-Adult/Congnition   Moderate:;Cognitive Communication Impairment   -HG    Functional Problems Comment  Pt notes difficulty with recallin information like he used to.   -HG    Clinical Impression Comments  RBANS Total Score of 84 places pt in the Low Average range.   -HG    Please refer to paper survey for additional self-reported information  Yes   -HG    Please refer to items scanned into chart for additional diagnostic informaiton and handouts as provided by clinician  Yes   -HG    SLP Diagnosis  Moderate cognitive Communication impairment   -HG    Prognosis  Excellent (comment)   -HG    Patient/caregiver participated in establishment of treatment plan and goals  Yes   -HG    Patient would benefit from skilled therapy intervention  Yes   -HG       SLP Plan    Frequency  1-2x/week   -HG    Duration  12 weeks   -HG    Planned CPT's?  SLP SPEECH & LANGUAGE EVAL: 39761;SLP INDIVIDUAL SPEECH THERAPY: 61631   -HG    Expected Duration of Therapy Session (SLP Eval)  60   -HG    Plan Comments  Initiate tx.    -HG      User Key  (r) = Recorded By, (t) = Taken By, (c) = Cosigned By    Initials Name Provider Type    HG Anjali Serrano MS CCC-SLP Speech and Language Pathologist                 Time Calculation:   SLP Start Time: 0900    Therapy Charges for Today     Code Description Service Date Service Provider Modifiers Qty    [id number] [facility] EVAL SPEECH AND PROD W LANG  4 11/24/2020 Anjali Serrano, MS CCC-SLP GN 1                   Anjali Serrano MS CCC-SLP  11/24/2020

## 2020-12-03 ENCOUNTER — TELEPHONE (OUTPATIENT)
Dept: CARDIOLOGY | Facility: CLINIC | Age: 80
End: 2020-12-03

## 2020-12-03 NOTE — TELEPHONE ENCOUNTER
Spouse concerned. Yesterday he started to feel dizzy. Today staggering and felt like he was going to pass out. B/P 105/53 . Has MDT device. Going to have device clinic do a remote. Please advise if any further instructions.

## 2020-12-09 ENCOUNTER — TELEPHONE (OUTPATIENT)
Dept: CARDIOLOGY | Facility: CLINIC | Age: 80
End: 2020-12-09

## 2020-12-09 NOTE — TELEPHONE ENCOUNTER
B/P log since stopping his Bisoprolol    12/4/2020 am B/P 108/54 HR 61 /50 HR 61    12//5/2020 /59 HR 62 PM  103/56 HR 62    12/6/2020 /59 HR 61    12/7/2020 /71 HR 61 /62 HR 61    12/8/2020 /67 HR 64    12/9/2020 /55 HR 64    States he is having delusions all the time. Instructed her to reach out to his Neurologist. Please advise if any further instructions.

## 2020-12-10 ENCOUNTER — DOCUMENTATION (OUTPATIENT)
Dept: SPEECH THERAPY | Facility: HOSPITAL | Age: 80
End: 2020-12-10

## 2020-12-10 DIAGNOSIS — R41.3 MEMORY LOSS: Primary | ICD-10-CM

## 2020-12-10 NOTE — THERAPY DISCHARGE NOTE
Speech Language Pathology Discharge Summary         Patient Name: Uche Polanco  : 1940  MRN: [id number]    Today's Date: 12/10/2020      SLP OP Goals     Row Name 12/10/20 0800          Goal Type Needed    Goal Type Needed  Memory;Attention/Orientation;Other Adult Goals  -HG        Subjective Comments    Subjective Comments  Pt seen for evaluation only. Pt wishes to D/C at this time due to the COVID surge.    -HG        Memory Goals    Patient and family will implement compensatory strategies to maximize patient’s Memory function so patient can continue to participate in daily activities  90%:;with cues  -HG     Status: Patient and family will implement compensatory strategies to maximize patient’s Memory function so patient can continue to participate in daily activities  New  -HG     Comments: Patient and family will implement compensatory strategies to maximize patient’s Memory function so patient can continue to participate in daily activities  --  -HG     Patient will demonstrate improved ability to recall information by immediately recalling a series of words  80%:;related;after delay;with cues  -HG     Status: Patient will demonstrate improved ability to recall information by immediately recalling a series of words  New  -HG     Comments: Patient will demonstrate improved ability to recall information by immediately recalling a series of words  --  -HG     Patient’s memory skills will be enhanced as reported by patient by utilizing internal memory strategies to recall up to 3 pieces of information after a 5- minute delay  80%:;with cues  -HG     Status: Patient’s memory skills will be enhanced as reported by patient by utilizing internal memory strategies to recall up to 3 pieces of information after a 5- minute delay  New  -HG     Comments: Patient’s memory skills will be enhanced as reported by patient by utilizing internal memory strategies to recall up to 3 pieces of information after a 5-  minute delay  --  -HG     Patient’s memory skills will be enhanced as reported by patient by using external memory aides  80%:;with cues  -HG     Status: Patient’s memory skills will be enhanced as reported by patient by using external memory aides  New  -HG     Comments: Patient’s memory skills will be enhanced as reported by patient by using external memory aides  --  -HG        Attention/Orientation Goals    Patient will be able to participate in the community safely  With Supervision  -HG     Status: Patient will be able to participate in the community safely  New  -HG     Patient will improve attention skills by sustaining focus and participation to conversation/task  with cues;10 minute task  -HG     Status: Patient will improve attention skills by sustaining focus and participation to conversation/task  New  -HG     Patient will improve attention skills by sustaining focus in order to actively hold and manipulate information provided (e.g., sequencing auditorily presented number series in ascending or descending order)  80%:;with cues  -HG     Status: Patient will improve attention skills by sustaining focus in order to actively hold and manipulate information provided (e.g., sequencing auditorily presented number series in ascending or descending order)  New  -HG     Comments: Patient will improve attention skills by sustaining focus in order to actively hold and manipulate information provided (e.g., sequencing auditorily presented number series in ascending or descending order)  --  -HG     Patient will improve attention skills by focusing to selective target/task when presented with competing stimuli or in a distracting environment in order to complete task  80%:;with cues  -HG     Status: Patient will improve attention skills by focusing to selective target/task when presented with competing stimuli or in a distracting environment in order to complete task  New  -HG     Patient will improve attention  skills by alternating or shifting focus between two different tasks in order to complete both tasks  80%:;with cues  -HG     Status: Patient will improve attention skills by alternating or shifting focus between two different tasks in order to complete both tasks  New  -HG     Comments: Patient will improve attention skills by alternating or shifting focus between two different tasks in order to complete both tasks  --  -HG        Other Goals    Other Adult Goal- 1  Pt will complete high level thought organization tasks with 80% accuracy.   -HG     Status: Other Adult Goal- 1  New  -HG     Comments: Other Adult Goal- 1  --  -HG     Other Adult Goal- 2  Pt will complete reasoning and problem solving assessment with goals to follow as indicated.  -HG     Status: Other Adult Goal- 2  New  -HG     Comments: Other Adult Goal- 2  --  -HG     Other Adult Goal- 3  Pt will improve RBANS score to at least a 90 placing pt in the Average range.  -HG     Status: Other Adult Goal- 3  New  -HG     Comments: Other Adult Goal- 3  --  -HG     Other Adult Goal- 4  --  -HG     Status: Other Adult Goal- 4  --  -HG     Comments: Other Adult Goal- 4  --  -HG     Other Adult Goal- 5  --  -HG     Status: Other Adult Goal- 5  --  -HG     Comments: Other Adult Goal- 5  --  -HG     Other Adult Goal- 6  --  -HG     Status: Other Adult Goal- 6  --  -HG     Comments: Other Adult Goal- 6  --  -HG     Other Adult Goal- 7  --  -HG     Status: Other Adult Goal- 7  --  -HG     Comments: Other Adult Goal- 7  --  -HG     Other Adult Goal- 8  --  -HG     Status: Other Adult Goal- 8  --  -HG     Comments: Other Adult Goal- 8  --  -HG        SLP Time Calculation    SLP Goal Re-Cert Due Date  12/24/20  -HG       User Key  (r) = Recorded By, (t) = Taken By, (c) = Cosigned By    Initials Name Provider Type    Anjali Cortes MS CCC-SLP Speech and Language Pathologist                 Time Calculation:                    Anjali Serrano MS  CCC-SLP  12/10/2020

## 2020-12-11 ENCOUNTER — APPOINTMENT (OUTPATIENT)
Dept: SPEECH THERAPY | Facility: HOSPITAL | Age: 80
End: 2020-12-11

## 2020-12-14 ENCOUNTER — APPOINTMENT (OUTPATIENT)
Dept: SPEECH THERAPY | Facility: HOSPITAL | Age: 80
End: 2020-12-14

## 2020-12-18 ENCOUNTER — APPOINTMENT (OUTPATIENT)
Dept: SPEECH THERAPY | Facility: HOSPITAL | Age: 80
End: 2020-12-18

## 2020-12-21 DIAGNOSIS — R41.3 MEMORY LOSS: Primary | ICD-10-CM

## 2020-12-21 DIAGNOSIS — G47.52 REM SLEEP BEHAVIOR DISORDER: ICD-10-CM

## 2020-12-21 RX ORDER — CLONAZEPAM 1 MG/1
1 TABLET ORAL
Qty: 90 TABLET | Refills: 0 | Status: SHIPPED | OUTPATIENT
Start: 2020-12-21 | End: 2021-01-28 | Stop reason: SDUPTHER

## 2020-12-22 RX ORDER — ESCITALOPRAM OXALATE 10 MG/1
10 TABLET ORAL DAILY
Qty: 30 TABLET | Refills: 2 | Status: SHIPPED | OUTPATIENT
Start: 2020-12-22 | End: 2021-03-22 | Stop reason: SDUPTHER

## 2020-12-24 ENCOUNTER — APPOINTMENT (OUTPATIENT)
Dept: SPEECH THERAPY | Facility: HOSPITAL | Age: 80
End: 2020-12-24

## 2021-01-14 RX ORDER — DONEPEZIL HYDROCHLORIDE 10 MG/1
10 TABLET, FILM COATED ORAL DAILY
Qty: 30 TABLET | Refills: 2 | Status: SHIPPED | OUTPATIENT
Start: 2021-01-14 | End: 2021-04-14

## 2021-01-14 NOTE — TELEPHONE ENCOUNTER
Caller: PT    Relationship: SELF    Best call back number: [phone]    Medication needed:   Requested Prescriptions     Pending Prescriptions Disp Refills   • donepezil (ARICEPT) 10 MG tablet 30 tablet 2     Sig: Take 1 tablet by mouth Daily.       When do you need the refill by: ASAP    What details did the patient provide when requesting the medication: PT IS OUT OF MEDICATION    Does the patient have less than a 3 day supply:  [x] Yes  [] No    What is the patient's preferred pharmacy:   [facility]

## 2021-01-20 ENCOUNTER — TELEPHONE (OUTPATIENT)
Dept: CARDIOLOGY | Facility: CLINIC | Age: 81
End: 2021-01-20

## 2021-01-21 DIAGNOSIS — G47.52 REM SLEEP BEHAVIOR DISORDER: ICD-10-CM

## 2021-01-26 RX ORDER — CLONAZEPAM 1 MG/1
1 TABLET ORAL
Qty: 90 TABLET | OUTPATIENT
Start: 2021-01-26

## 2021-01-26 NOTE — TELEPHONE ENCOUNTER
Called patient to see if he picked up rx from December 2020. He will have his wife give me a call back to clarify.

## 2021-01-28 ENCOUNTER — OFFICE VISIT (OUTPATIENT)
Dept: SLEEP MEDICINE | Facility: HOSPITAL | Age: 81
End: 2021-01-28

## 2021-01-28 VITALS
DIASTOLIC BLOOD PRESSURE: 63 MMHG | SYSTOLIC BLOOD PRESSURE: 149 MMHG | BODY MASS INDEX: 26.41 KG/M2 | HEIGHT: 72 IN | HEART RATE: 61 BPM | WEIGHT: 195 LBS | OXYGEN SATURATION: 93 %

## 2021-01-28 DIAGNOSIS — G47.52 REM SLEEP BEHAVIOR DISORDER: ICD-10-CM

## 2021-01-28 DIAGNOSIS — G47.33 OSA (OBSTRUCTIVE SLEEP APNEA): Primary | ICD-10-CM

## 2021-01-28 DIAGNOSIS — G47.33 SEVERE OBSTRUCTIVE SLEEP APNEA: ICD-10-CM

## 2021-01-28 PROCEDURE — 99214 OFFICE O/P EST MOD 30 MIN: CPT | Performed by: INTERNAL MEDICINE

## 2021-01-28 RX ORDER — BISOPROLOL FUMARATE 5 MG/1
2.5 TABLET, FILM COATED ORAL
COMMUNITY
End: 2021-02-01

## 2021-01-28 RX ORDER — CLONAZEPAM 1 MG/1
1 TABLET ORAL
Qty: 90 TABLET | Refills: 0 | Status: SHIPPED | OUTPATIENT
Start: 2021-01-28 | End: 2021-04-07 | Stop reason: SDUPTHER

## 2021-01-28 NOTE — PROGRESS NOTES
Subjective:     Chief Complaint:   Chief Complaint   Patient presents with   • Follow-up       HPI:    Uche Polanco is a 80 y.o. male here for follow-up of obstructive sleep apnea and REM behavior disorder.    He has a longstanding history of REM behavior disorder and severe obstructive sleep apnea.  He has been on clonazepam over the long-term for treatment of his RBD.  He has had problems with memory loss and has been followed by Dr. Newton for mild cognitive impairment.  In the past his wife has attempted to decrease his clonazepam dose in the hopes that it might improve his cognition but this invariably resulted in more violent behavior at night.  We have tried high-dose melatonin with little effect.    He also has severe obstructive sleep apnea.  He is treated with auto CPAP at a range of 12-20 cm H2O.    He has had problems with mask leak and over the years we have discovered that any increasing pressures to control a mildly elevated AHI results in more mask leak and a decrease in pressures results in a higher AHI.  His AHI on his download today is again about average for him    He describes having some mild hallucinations at night.  These usually involve color streaks following his hand movements etc.  His wife says his violent behavior at night is relatively quiet.    Type of mask: full face mask    I reviewed his PAP download:  Average pressure: 13  Average AHI:  9  Average minutes in large leak per night: Intermittently increased.    Overall, he is benefiting from PAP therapy.    Current medications are:   Current Outpatient Medications:   •  atorvastatin (LIPITOR) 20 MG tablet, Take 20 mg by mouth Daily., Disp: , Rfl:   •  bisoprolol (ZEBeta) 5 MG tablet, Take 2.5 mg by mouth., Disp: , Rfl:   •  clonazePAM (KlonoPIN) 1 MG tablet, Take 1 tablet by mouth every night at bedtime., Disp: 90 tablet, Rfl: 0  •  clopidogrel (PLAVIX) 75 MG tablet, Take 1 tablet by mouth Daily., Disp: 90 tablet, Rfl: 1  •   donepezil (ARICEPT) 10 MG tablet, Take 1 tablet by mouth Daily., Disp: 30 tablet, Rfl: 2  •  escitalopram (LEXAPRO) 10 MG tablet, TAKE 1 TABLET BY MOUTH DAILY, Disp: 30 tablet, Rfl: 2  •  Multiple Vitamins-Minerals (PRESERVISION AREDS 2 PO), Take 1 tablet by mouth 2 (Two) Times a Day., Disp: , Rfl:   •  mupirocin (BACTROBAN) 2 % ointment, APPLY A THIN LAYER TO SURGICAL SITE QD AT BANDAGE CHANGE, Disp: , Rfl: .    The patient's relevant past medical, surgical, family and social history were reviewed and updated in Epic as appropriate.     ROS:    Review of Systems      Objective:    Physical Exam   Constitutional: He is oriented to person, place, and time. He appears well-developed.   HENT:   Head: Normocephalic and atraumatic.   Neck: Neck supple. No thyromegaly present.   Cardiovascular: Normal rate and regular rhythm. Exam reveals no gallop and no friction rub.   No murmur heard.  Pulmonary/Chest: Effort normal. No respiratory distress. He has no wheezes. He has no rales.   Neurological: He is alert and oriented to person, place, and time.   Skin: Skin is warm and dry.   Psychiatric: His behavior is normal.   Vitals reviewed.      Data:    Patient's PAP download was personally reviewed including raw data and results.    Assessment:    Problem List Items Addressed This Visit        Pulmonary Problems    Severe obstructive sleep apnea    Overview     Auto CPAP 12-20            Other    REM sleep behavior disorder      Other Visit Diagnoses     TOM (obstructive sleep apnea)    -  Primary          1. RBD: Controlled with clonazepam 1 mg nightly.  He has failed high-dose melatonin in the past and lower doses of Klonopin have typically resulted in more violent behavior.  He does have some mild hallucinations at night and I think they can try a half a milligram again to see if that helps.  I do not want to go down on the dose too much as he may injure himself or his wife more significantly from his violent  behavior.  2. Severe obstructive sleep apnea: I think his current AHI in single digits is about as good as we can hope for.  He has an intermittent mask leak.  I went over how to clean his mask and maintain his mask again with the patient and his wife.  He is actually doing pretty well with this.    Plan:     1. I refilled his Klonopin prescription after checking his eKasper.  Refills for 3 months  2. CPAP supplies for the next year  3. As above  4. 6-month follow-up      Discussed in detail with the patient and his wife.  He will call prior to his follow up visit for any new problems.    Level of Risk Moderate due to: two stable chronic illnesses and prescription drug management    Signed by  Reed Hansen MD

## 2021-02-01 RX ORDER — BISOPROLOL FUMARATE 5 MG/1
TABLET, FILM COATED ORAL
Qty: 45 TABLET | Refills: 1 | Status: SHIPPED | OUTPATIENT
Start: 2021-02-01 | End: 2021-05-11

## 2021-02-02 RX ORDER — CLOPIDOGREL BISULFATE 75 MG/1
75 TABLET ORAL DAILY
Qty: 90 TABLET | Refills: 0 | Status: SHIPPED | OUTPATIENT
Start: 2021-02-02 | End: 2021-05-11 | Stop reason: SDUPTHER

## 2021-02-26 ENCOUNTER — TELEPHONE (OUTPATIENT)
Dept: NEUROLOGY | Facility: CLINIC | Age: 81
End: 2021-02-26

## 2021-02-27 PROCEDURE — 93294 REM INTERROG EVL PM/LDLS PM: CPT | Performed by: INTERNAL MEDICINE

## 2021-02-27 PROCEDURE — 93296 REM INTERROG EVL PM/IDS: CPT | Performed by: INTERNAL MEDICINE

## 2021-03-01 DIAGNOSIS — R26.89 IMPAIRMENT OF BALANCE: Primary | ICD-10-CM

## 2021-03-22 DIAGNOSIS — R41.3 MEMORY LOSS: ICD-10-CM

## 2021-03-22 RX ORDER — ESCITALOPRAM OXALATE 10 MG/1
10 TABLET ORAL DAILY
Qty: 30 TABLET | Refills: 2 | Status: SHIPPED | OUTPATIENT
Start: 2021-03-22 | End: 2021-03-23 | Stop reason: SDUPTHER

## 2021-03-22 NOTE — TELEPHONE ENCOUNTER
Caller: WILLIS     Relationship: WIFE    Best call back number: [phone]    Medication needed:   Requested Prescriptions     Pending Prescriptions Disp Refills   • escitalopram (LEXAPRO) 10 MG tablet 30 tablet 2     Sig: Take 1 tablet by mouth Daily.       When do you need the refill by: PATIENT HAS 1 WEEK LEFT     What additional details did the patient provide when requesting the medication: PATIENTS WIFE REQUESTING THIS AS 90 DAY IF POSSIBLE     Does the patient have less than a 3 day supply:  [] Yes  [x] No    What is the patient's preferred pharmacy:  [facility]

## 2021-03-23 DIAGNOSIS — R41.3 MEMORY LOSS: ICD-10-CM

## 2021-03-23 RX ORDER — ESCITALOPRAM OXALATE 10 MG/1
10 TABLET ORAL DAILY
Qty: 30 TABLET | Refills: 2 | Status: SHIPPED | OUTPATIENT
Start: 2021-03-23 | End: 2021-04-14

## 2021-04-07 DIAGNOSIS — G47.52 REM SLEEP BEHAVIOR DISORDER: ICD-10-CM

## 2021-04-07 RX ORDER — CLONAZEPAM 1 MG/1
1 TABLET ORAL
Qty: 90 TABLET | Refills: 0 | Status: SHIPPED | OUTPATIENT
Start: 2021-04-07 | End: 2021-07-27 | Stop reason: SDUPTHER

## 2021-04-07 RX ORDER — ATORVASTATIN CALCIUM 20 MG/1
20 TABLET, FILM COATED ORAL DAILY
Qty: 30 TABLET | Refills: 1 | Status: SHIPPED | OUTPATIENT
Start: 2021-04-07 | End: 2021-05-17 | Stop reason: SDUPTHER

## 2021-04-14 ENCOUNTER — OFFICE VISIT (OUTPATIENT)
Dept: NEUROLOGY | Facility: CLINIC | Age: 81
End: 2021-04-14

## 2021-04-14 VITALS
BODY MASS INDEX: 26.82 KG/M2 | HEART RATE: 64 BPM | DIASTOLIC BLOOD PRESSURE: 70 MMHG | OXYGEN SATURATION: 94 % | TEMPERATURE: 98.1 F | HEIGHT: 72 IN | SYSTOLIC BLOOD PRESSURE: 130 MMHG | WEIGHT: 198 LBS

## 2021-04-14 DIAGNOSIS — R41.3 MEMORY LOSS: Primary | ICD-10-CM

## 2021-04-14 PROCEDURE — 99214 OFFICE O/P EST MOD 30 MIN: CPT | Performed by: NURSE PRACTITIONER

## 2021-04-14 RX ORDER — MEMANTINE HYDROCHLORIDE 10 MG/1
TABLET ORAL
Qty: 180 TABLET | Refills: 1 | Status: SHIPPED | OUTPATIENT
Start: 2021-04-14 | End: 2021-12-27

## 2021-04-14 RX ORDER — DONEPEZIL HYDROCHLORIDE 10 MG/1
10 TABLET, FILM COATED ORAL NIGHTLY
Qty: 90 TABLET | Refills: 3 | Status: SHIPPED | OUTPATIENT
Start: 2021-04-14 | End: 2021-04-15 | Stop reason: SDUPTHER

## 2021-04-14 RX ORDER — ESCITALOPRAM OXALATE 10 MG/1
10 TABLET ORAL DAILY
Qty: 90 TABLET | Refills: 3 | Status: SHIPPED | OUTPATIENT
Start: 2021-04-14 | End: 2022-04-06

## 2021-04-14 NOTE — PROGRESS NOTES
Subjective:     Patient ID: Uche Polanco is a 80 y.o. male.    CC:   Chief Complaint   Patient presents with   • Memory Loss       HPI:   History of Present Illness   Uche Polanco is an 80 y.o. male who returns to clinic today for 6 month follow up on suspected MCI. He has noted symptoms for several years marked by forgetfulness and word-finding difficulties . This has gradually worsened subjectively over time. There have been no associated symptoms and he has denied impairments in ADL's.      He has a history of dizziness in the past, which has been well managed most recently with ENT.     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 This has been treated by Dr. Hansen with Klonopin.     Neuroimaging in the past has been notable only for an old left cerebellar infarct. Screening bloodwork has been normal in the past as well. He is currently taking donepezil. He has also worked with Anjali Serrano (cognitive rehabilitation) in the past, which was quite beneficial.      4/14/2021-today he is accompanied by his wife for follow-up.  She tells me that he has had declining function with difficulty in focus, attention, keeping up with appointments in his calendar.  He has difficulty with his medications.  He has had some difficulty with balance and actually fell down 14 stairs about 5 to 6 weeks ago and had 4 fractures in his left arm.  He is in a cast today.  He was evaluated at  [facility] with a CT of the head which per his wife she tells me this looked okay overall.  We do not have those records today.  He tells me essentially he has a REM sleep disorder and he woke up confused having a terrible dream and fell down the stairs after he thought he was hearing someone at the doorbell.  He has had hallucinations, seeing animals in the bedroom and has heard the doorbell ringing in the middle of the night on multiple occasions.  The symptoms do fluctuate off and on but have significantly worsened and become more  frequent since last visit.  He tells me while he is watching TV he will see a rain drop fall in front of him and he will reach to the floor to see if it is wet that it is not.  He is scheduled to follow-up with Dr. Childs his orthopedic specialist soon and they are planning to do some physical therapy.  He place cognitive rehab on hold due to COVID-19 pandemic but he and his wife have both had both Covid vaccines and he is willing to restart cognitive therapy services.  He has been taking donepezil 10 mg.  He has been on the Lexapro.  He and his wife are wondering about additional memory medications.  They do feel like she has declined overall since last visit.    The following portions of the patient's history were reviewed and updated as appropriate: allergies, current medications, past family history, past medical history, past social history, past surgical history and problem list.    Past Medical History:   Diagnosis Date   • AV block    • CAD (coronary artery disease)    • Cognitive impairment, mild, so stated     Assessed By: Chiki Newton (Neurology); Last Assessed: 11 Sep 2014   • Family history of hypertension    • Gout    • Hyperlipidemia    • Hypertension    • TOM (obstructive sleep apnea)    • REM  disorder    • Skin cancer 10/22/2019    left cheek   • TIA (transient ischemic attack)    • Valvular heart disease        Past Surgical History:   Procedure Laterality Date   • HEMORRHOIDECTOMY     • HERNIA REPAIR     • MITRAL VALVE REPAIR/REPLACEMENT     • OTHER SURGICAL HISTORY  11/2014    Medtronic link loop recorder   • PACEMAKER IMPLANTATION     • SKIN SURGERY  Month ago    Cell removed from head    • SQUAMOUS CELL CARCINOMA EXCISION  10/2020    right ear   • TONSILLECTOMY         Social History     Socioeconomic History   • Marital status:      Spouse name: Not on file   • Number of children: Not on file   • Years of education: Not on file   • Highest education level: Not on file   Tobacco Use  "  • Smoking status: Never Smoker   • Smokeless tobacco: Never Used   Vaping Use   • Vaping Use: Never used   Substance and Sexual Activity   • Alcohol use: Yes     Alcohol/week: 1.0 - 4.0 standard drinks     Types: 1 - 4 Glasses of wine per week     Comment: week with dinner   • Drug use: No   • Sexual activity: Defer       Family History   Problem Relation Age of Onset   • Ovarian cancer Mother    • Lymphoma Father    • Cancer Father    • Hypertension Other         Review of Systems   Constitutional: Negative for chills, fatigue, fever and unexpected weight change.   HENT: Negative for ear pain, hearing loss, nosebleeds, rhinorrhea and sore throat.    Eyes: Negative for photophobia, pain, discharge, itching and visual disturbance.   Respiratory: Negative for cough, chest tightness, shortness of breath and wheezing.    Cardiovascular: Negative for chest pain, palpitations and leg swelling.   Gastrointestinal: Negative for abdominal pain, blood in stool, constipation, diarrhea, nausea and vomiting.   Genitourinary: Negative for dysuria, frequency, hematuria and urgency.   Musculoskeletal: Negative for arthralgias, back pain, gait problem, joint swelling, myalgias, neck pain and neck stiffness.   Skin: Negative for rash and wound.   Allergic/Immunologic: Negative for environmental allergies and food allergies.   Neurological: Negative for dizziness, tremors, seizures, syncope, speech difficulty, weakness, light-headedness, numbness and headaches.   Hematological: Negative for adenopathy. Does not bruise/bleed easily.   Psychiatric/Behavioral: Negative for agitation, confusion, decreased concentration, hallucinations, sleep disturbance and suicidal ideas. The patient is not nervous/anxious.    All other systems reviewed and are negative.       Objective:  /70   Pulse 64   Temp 98.1 °F (36.7 °C)   Ht 182.9 cm (72\")   Wt 89.8 kg (198 lb)   SpO2 94%   BMI 26.85 kg/m²     Neurologic Exam     Mental Status "   Oriented to person, place, and time.   Speech: speech is normal   Level of consciousness: alert    Cranial Nerves     CN II   Visual fields full to confrontation.     CN III, IV, VI   Pupils are equal, round, and reactive to light.  Extraocular motions are normal.     CN V   Facial sensation intact.     CN VII   Right facial weakness: central (mild, chronic)  Left facial weakness: none    CN VIII   CN VIII normal.     CN IX, X   CN IX normal.   CN X normal.     CN XI   CN XI normal.     CN XII   CN XII normal.     Motor Exam   Muscle bulk: normal  Overall muscle tone: normal    Strength   Strength 5/5 except as noted.   Left arm in cast with immobilizer     Gait, Coordination, and Reflexes     Gait  Gait: normal    Coordination   Finger to nose coordination: normal    Tremor   Resting tremor: absent  Intention tremor: absent  Action tremor: absent      Physical Exam  Constitutional:       Appearance: Normal appearance.   Eyes:      Extraocular Movements: EOM normal.      Pupils: Pupils are equal, round, and reactive to light.   Neurological:      Mental Status: He is alert and oriented to person, place, and time.      Coordination: Finger-Nose-Finger Test normal.      Gait: Gait is intact.   Psychiatric:         Mood and Affect: Mood is depressed.         Speech: Speech normal.         Behavior: Behavior is slowed.         Thought Content: Thought content normal.         Cognition and Memory: He exhibits impaired recent memory.         Judgment: Judgment normal.         MMSE 28 out of 30 today, 27 out of 30 prior visit.    Assessment/Plan:       Diagnoses and all orders for this visit:    1. Memory loss (Primary)  -     NeuroPsych Testing  -     Ambulatory Referral to Speech Therapy  -     memantine (NAMENDA) 10 MG tablet; Take 1/2 tab daily x 2 wks, then 1/2 tab bid x 2 wks, then 1/2 tab am and 1 tab pm x 2 wks then 1 tab bid and continue  Dispense: 180 tablet; Refill: 1  -     donepezil (ARICEPT) 10 MG tablet;  Take 1 tablet by mouth Every Night.  Dispense: 90 tablet; Refill: 3  -     escitalopram (LEXAPRO) 10 MG tablet; Take 1 tablet by mouth Daily.  Dispense: 90 tablet; Refill: 3           He is going to continue his donepezil and Lexapro.  We have also discussed the option of adding memantine to see if this helps with his hallucinations, delusions and overall functioning and maintaining independence as much as possible.  With his essentially normal cognitive testing in our clinic it was opted to complete [facility] neuropsych testing referral for more definitive diagnosis and additional guidance on treatment moving forward.  Again at this time he is meeting criteria for mild cognitive impairment and there is a concern of possible mild Alzheimer's.  We will follow-up again in 6 months or sooner if needed.  Continue with sleep specialist for REM sleep disorder.  Falls prevention discussed.  Reviewed medications, potential side effects and signs and symptoms to report. Discussed risk versus benefits of treatment plan with patient and/or family-including medications, labs and radiology that may be ordered. Addressed questions and concerns during visit. Patient and/or family verbalized understanding and agree with plan.    AS THE PROVIDER, I PERSONALLY WORE PPE DURING ENTIRE FACE TO FACE ENCOUNTER IN CLINIC WITH THE PATIENT. PATIENT ALSO WORE PPE DURING ENTIRE FACE TO FACE ENCOUNTER EXCEPT FOR A MAX OF 30 SECONDS DURING NEUROLOGICAL EVALUATION OF CRANIAL NERVES AND THEN MASK WAS PLACED BACK OVER PATIENT FACE FOR REMAINDER OF VISIT. I WASHED MY HANDS BEFORE AND AFTER VISIT.    During this visit the following were done:  Labs Reviewed []    Labs Ordered []    Radiology Reports Reviewed []    Radiology Ordered []    PCP Records Reviewed []    Referring Provider Records Reviewed []    [facility] Records Reviewed []    Hospital Records Reviewed []    History Obtained From Family []    Radiology Images Reviewed [x]    Other Reviewed [x]     Records Requested [x]  ER records requested for review     Veronica Moreland, APRN  4/14/2021

## 2021-04-15 RX ORDER — DONEPEZIL HYDROCHLORIDE 10 MG/1
10 TABLET, FILM COATED ORAL NIGHTLY
Qty: 90 TABLET | Refills: 3 | Status: SHIPPED | OUTPATIENT
Start: 2021-04-15 | End: 2022-04-06

## 2021-04-26 ENCOUNTER — HOSPITAL ENCOUNTER (OUTPATIENT)
Dept: SPEECH THERAPY | Facility: HOSPITAL | Age: 81
Setting detail: THERAPIES SERIES
Discharge: HOME | End: 2021-04-26

## 2021-04-26 DIAGNOSIS — R41.3 MEMORY LOSS: Primary | ICD-10-CM

## 2021-04-26 PROCEDURE — 92523 SPEECH SOUND LANG COMPREHEN: CPT

## 2021-04-26 NOTE — THERAPY EVALUATION
Outpatient Speech Language Pathology   Adult Speech Language Cognitive Initial Evaluation  [facility]     Patient Name: Uche Polanco  : 1940  MRN: [id number]  Today's Date: 2021        Visit Date: 2021   Patient Active Problem List   Diagnosis   • Severe obstructive sleep apnea   • Gout   • AV block   • REM sleep behavior disorder   • Memory loss   • Postural dizziness with presyncope   • Chronic systolic congestive heart failure (CMS/HCC)   • Nonrheumatic aortic valve insufficiency        Past Medical History:   Diagnosis Date   • AV block    • CAD (coronary artery disease)    • Cognitive impairment, mild, so stated     Assessed By: Chiki Newton (Neurology); Last Assessed: 11 Sep 2014   • Family history of hypertension    • Gout    • Hyperlipidemia    • Hypertension    • TOM (obstructive sleep apnea)    • REM  disorder    • Skin cancer 10/22/2019    left cheek   • TIA (transient ischemic attack)    • Valvular heart disease         Past Surgical History:   Procedure Laterality Date   • HEMORRHOIDECTOMY     • HERNIA REPAIR     • MITRAL VALVE REPAIR/REPLACEMENT     • OTHER SURGICAL HISTORY  2014    [employer] link loop recorder   • PACEMAKER IMPLANTATION     • SKIN SURGERY  Month ago    Cell removed from head    • SQUAMOUS CELL CARCINOMA EXCISION  10/2020    right ear   • TONSILLECTOMY           Visit Dx:    ICD-10-CM ICD-9-CM   1. Memory loss  R41.3 780.93           SLP SLC Evaluation - 21 1300        Communication Assessment/Intervention    Document Type  evaluation  (Pended)    -LB    Subjective Information  no complaints  (Pended)    -LB    Patient Observations  alert;cooperative;agree to therapy  (Pended)    -LB    Patient/Family/Caregiver Comments/Observations  No family present  (Pended)    -LB    Care Plan Review  evaluation/treatment results reviewed  (Pended)    -LB    Patient Effort  good  (Pended)    -LB    Symptoms Noted During/After Treatment  none  (Pended)    -LB        General Information    Patient Profile Reviewed  yes  (Pended)    -LB    Precautions/Limitations, Vision  WFL with corrective lenses;for purposes of eval  (Pended)    -LB    Precautions/Limitations, Hearing  WFL;for purposes of eval  (Pended)    -LB    Patient Level of Education  College sophomore   (Pended)    -LB    Prior Level of Function-Communication  cognitive-linguistic impairment;other (see comments)  (Pended)     Hx of mild cognitive impairment  -LB    Plans/Goals Discussed with  patient  (Pended)    -LB    Barriers to Rehab  none identified  (Pended)    -LB    Patient's Goals for Discharge  functional communication;functional cognition  (Pended)    -LB    Standardized Assessment Used  RBANS  (Pended)    -LB       Comprehension Assessment/Intervention    Comprehension Assessment/Intervention  Auditory Comprehension  (Pended)    -LB       Auditory Comprehension Assessment/Intervention    Auditory Comprehension (Communication)  WFL  (Pended)    -LB       Expression Assessment/Intervention    Expression Assessment/Intervention  verbal expression  (Pended)    -LB       Verbal Expression Assessment/Intervention    Verbal Expression  WFL  (Pended)    -LB       Cursory Voice Assessment/Intervention    Quality and Resonance (Voice)  WFL  (Pended)    -LB       Cognitive Assessment Intervention- SLP    Cognitive Function (Cognition)  mild impairment  (Pended)    -LB    Memory (Cognitive)  mild impairment  (Pended)    -LB    Attention (Cognitive)  mild impairment  (Pended)    -LB    Thought Organization (Cognitive)  concrete divergent;mild impairment  (Pended)    -LB       Standardized Tests    Cognitive/Memory Tests  RBANS: Repeatable Battery for the Assessment of Neuropsychological Status  (Pended)    -LB       RBANS- Repeatable Battery for the Assessment of Neuropsychological Status    Immediate Memory Index Score  78  (Pended)    -LB    Immediate Memory Percentile  7 %  (Pended)    -LB    Immediate Memory  Qualitative Description  borderline  (Pended)    -LB    Visuospatial Index Score  96  (Pended)    -LB    Visuospatial Percentile  39 %  (Pended)    -LB    Visuospatial Qualitative Description  average  (Pended)    -LB    Language Index Score  86  (Pended)    -LB    Language Percentile  18 %  (Pended)    -LB    Language Qualitative Description  low average  (Pended)    -LB    Attention Index Score  82  (Pended)    -LB    Attention Percentile  12 %  (Pended)    -LB    Attention Qualitative Description  low average  (Pended)    -LB    Delayed Memory Index Score  94  (Pended)    -LB    Delayed Memory Percentile  34 %  (Pended)    -LB    Delayed Memory Qualitative Description  average  (Pended)    -LB    Total Index Score  436  (Pended)    -LB    Total Percentile  13 %  (Pended)    -LB    Total Qualitative Description  low average  (Pended)    -LB       SLP Clinical Impressions    SLP Diagnosis  Mild cognitive communication impairment.  (Pended)    -LB    Rehab Potential/Prognosis  good  (Pended)    -LB    SLC Criteria for Skilled Therapy Interventions Met  yes  (Pended)    -LB    Plan for Continued Treatment (SLP)  Initiate cognitive communication treatment.  (Pended)    -LB      User Key  (r) = Recorded By, (t) = Taken By, (c) = Cosigned By    Initials Name Provider Type    LB Gilberto Araiza, Speech Therapy Student Speech Therapy Student                         OP SLP Education     Row Name 04/26/21 1641       Education    Barriers to Learning  No barriers identified  (Pended)   -LB    Education Provided  Patient requires further education on strategies, risks  (Pended)   -LB    Assessed  Learning needs;Learning motivation;Learning preferences;Learning readiness  (Pended)   -LB    Learning Motivation  Strong  (Pended)   -LB    Learning Method  Explanation  (Pended)   -LB    Teaching Response  Reinforcement needed  (Pended)   -LB    Education Comments  Education regarding plan for treatment given.  (Pended)   -LB       User Key  (r) = Recorded By, (t) = Taken By, (c) = Cosigned By    Initials Name Effective Dates    LB Gilberto Araiza, Speech Therapy Student 03/09/21 -         SLP OP Goals     Row Name 04/26/21 1500 04/26/21 1300       Goal Type Needed    Goal Type Needed  --  (Pended)   -LB  Verbal Expression;Memory;Attention/Orientation;Other Adult Goals  (Pended)   -LB       Subjective Comments    Subjective Comments  --  Pt was alert and cooperative. Pt recognizes memory deficits.  (Pended)   -LB       Verbal Expression Goals    Verbal Expression LTG's  --  Patient will be able to use verbal expressive language skills to communicate effectively in social/avocational/work setting  (Pended)   -LB    Patient will be able to use verbal expressive language skills to communicate effectively in social/avocational/work setting  --  80%:;with cues  (Pended)   -LB    Status: Patient will be able to use verbal expressive language skills to communicate effectively in social/avocational/work setting  --  New  (Pended)   -LB    Verbal Expression STG's  --  Patient will improve verbal expressive language skills by completing divergent naming tasks;Patient will improve verbal expressive language skills by listing words associated to target word provided  (Pended)   -LB    Patient will improve verbal expressive language skills by completing divergent naming tasks  --  80%:;with cues  (Pended)   -LB    Status: Patient will improve verbal expressive language skills by completing divergent naming tasks  --  New  (Pended)   -LB    Patient will improve verbal expressive language skills by listing words associated to target word provided  --  80%  (Pended)   -LB    Status: Patient will improve verbal expressive language skills by listing words associated to target word provided  --  New  (Pended)   -LB       Memory Goals    Memory LTG's  --  Patient will be able to remember  information needed to participate in activities of daily living  (Pended)    -LB    Patient will be able to remember  information needed to participate in activities of daily living  --  80% with cues  (Pended)   -LB    Status: Patient will be able to remember  information needed to participate in activities of daily living  --  New  (Pended)   -LB    Memory STG's  --  Patient’s memory skills will be enhanced as reported by patient by utilizing internal memory strategies to recall up to 3 pieces of information after a 5- minute delay;Patient’s memory skills will be enhanced as reported by patient by using external memory aides  (Pended)   -LB    Patient’s memory skills will be enhanced as reported by patient by utilizing internal memory strategies to recall up to 3 pieces of information after a 5- minute delay  --  80%:;with cues  (Pended)   -LB    Status: Patient’s memory skills will be enhanced as reported by patient by utilizing internal memory strategies to recall up to 3 pieces of information after a 5- minute delay  --  New  (Pended)   -LB    Patient’s memory skills will be enhanced as reported by patient by using external memory aides  --  80%:;with cues  (Pended)   -LB    Status: Patient’s memory skills will be enhanced as reported by patient by using external memory aides  --  New  (Pended)   -LB       Attention/Orientation Goals    Attention/Orientation LTG's  --  Patient will be able to participate in the community safely  (Pended)   -LB    Patient will be able to participate in the community safely  --  Independently  (Pended)   -LB    Status: Patient will be able to participate in the community safely  --  New  (Pended)   -LB    Attention/Orientation STG's  --  Patient will improve attention skills by sustaining consistent behavioral response during continuous and repetitive activity (e.g., listening for target words, auditory/reading comprehension tasks);Patient will improve attention skills by focusing to selective target/task when presented with competing stimuli or in a  distracting environment in order to complete task;Patient will improve attention skills by alternating or shifting focus between two different tasks in order to complete both tasks;Patient will improve attention skills by dividing focus and responding simultaneously to multiple tasks or in order to complete task  (Pended)   -LB    Patient will improve attention skills by sustaining consistent behavioral response during continuous and repetitive activity (e.g., listening for target words, auditory/reading comprehension tasks)  --  with cues;5 minute task  (Pended)   -LB    Status: Patient will improve attention skills by sustaining consistent behavioral response during continuous and repetitive activity (e.g., listening for target words, auditory/reading comprehension tasks)  --  New  (Pended)   -LB    Patient will improve attention skills by focusing to selective target/task when presented with competing stimuli or in a distracting environment in order to complete task  --  80%:;with cues  (Pended)   -LB    Status: Patient will improve attention skills by focusing to selective target/task when presented with competing stimuli or in a distracting environment in order to complete task  --  New  (Pended)   -LB    Patient will improve attention skills by alternating or shifting focus between two different tasks in order to complete both tasks  --  80%:;with cues  (Pended)   -LB    Status: Patient will improve attention skills by alternating or shifting focus between two different tasks in order to complete both tasks  --  New  (Pended)   -LB    Patient will improve attention skills by dividing focus and responding simultaneously to multiple tasks or in order to complete task  --  80%:;with cues  (Pended)   -LB    Status: Patient will improve attention skills by dividing focus and responding simultaneously to multiple tasks or in order to complete task  --  New  (Pended)   -LB       Other Goals    Other Adult Goal- 1  --   Pt will complete high level thought organization tasks with 80% accuracy.   (Pended)   -LB    Status: Other Adult Goal- 1  --  New  (Pended)   -LB    Other Adult Goal- 2  --  Pt will complete reasoning and problem solving assessment with goals to follow as indicated.  (Pended)   -LB    Status: Other Adult Goal- 2  --  New  (Pended)   -LB    Other Adult Goal- 3  --  Pt will improve RBANS score to at least a 90 placing pt in the Average range.  (Pended)   -LB    Status: Other Adult Goal- 3  --  New  (Pended)   -LB       SLP Time Calculation    SLP Goal Re-Cert Due Date  --  (Pended)   -LB  05/26/21  (Pended)   -LB      User Key  (r) = Recorded By, (t) = Taken By, (c) = Cosigned By    Initials Name Provider Type    Gilberto Mares Speech Therapy Student Speech Therapy Student        OP SLP Assessment/Plan - 04/26/21 1526        SLP Assessment    Functional Problems  --  (Pended)    -LB    Clinical Impression: Speech Language-Adult/Congnition  --  (Pended)    -LB    Functional Problems Comment  --  (Pended)    -LB    Clinical Impression Comments  --  (Pended)    -LB    Please refer to paper survey for additional self-reported information  --  (Pended)    -LB    Please refer to items scanned into chart for additional diagnostic informaiton and handouts as provided by clinician  --  (Pended)    -LB    SLP Diagnosis  --  (Pended)    -LB    Prognosis  --  (Pended)    -LB    Patient/caregiver participated in establishment of treatment plan and goals  --  (Pended)    -LB    Patient would benefit from skilled therapy intervention  --  (Pended)    -LB       SLP Plan    Frequency  --  (Pended)    -LB    Duration  --  (Pended)    -LB    Planned CPT's?  --  (Pended)    -LB    Expected Duration of Therapy Session (SLP Eval)  --  (Pended)    -LB    Plan Comments  --  (Pended)    -LB      User Key  (r) = Recorded By, (t) = Taken By, (c) = Cosigned By    Initials Name Provider Type    Gilberto Mares, Speech Therapy Student  Speech Therapy Student               Time Calculation:   SLP Start Time: (P) 1300  Untimed Charges  SLP Eval/Re-eval : (P) ST Eval Speech and Production w/ Language - 44424  [id number]-FT Eval Speech and Production w/ Language Minutes: (P) 90  Total Minutes  Untimed Charges Total Minutes: (P) 90   Total Minutes: (P) 90    Therapy Charges for Today     Code Description Service Date Service Provider Modifiers Qty    [id number]  ST EVAL SPEECH AND PROD W LANG  6 4/26/2021 Gilberto Araiza, Speech Therapy Student GN 1                   Gilberto Araiza, Speech Therapy Student  4/26/2021

## 2021-04-29 ENCOUNTER — HOSPITAL ENCOUNTER (OUTPATIENT)
Dept: SPEECH THERAPY | Facility: HOSPITAL | Age: 81
Setting detail: THERAPIES SERIES
Discharge: HOME | End: 2021-04-29

## 2021-04-29 DIAGNOSIS — R41.3 MEMORY LOSS: Primary | ICD-10-CM

## 2021-04-29 PROCEDURE — 92507 TX SP LANG VOICE COMM INDIV: CPT

## 2021-04-29 NOTE — THERAPY TREATMENT NOTE
Outpatient Speech Language Pathology   Adult Speech Language Cognitive Treatment Note  [facility]     Patient Name: Uche Polanco  : 1940  MRN: [id number]  Today's Date: 2021         Visit Date: 2021   Patient Active Problem List   Diagnosis   • Severe obstructive sleep apnea   • Gout   • AV block   • REM sleep behavior disorder   • Memory loss   • Postural dizziness with presyncope   • Chronic systolic congestive heart failure (CMS/HCC)   • Nonrheumatic aortic valve insufficiency          Visit Dx:    ICD-10-CM ICD-9-CM   1. Memory loss  R41.3 780.93                         SLP OP Goals     Row Name 21 0800          Goal Type Needed    Goal Type Needed  Verbal Expression;Memory;Attention/Orientation;Other Adult Goals  (Pended)   -LB        Subjective Comments    Subjective Comments  Pt was alert and cooperative. Pt discussed RBANS results.  (Pended)   -LB        Verbal Expression Goals    Verbal Expression LTG's  Patient will be able to use verbal expressive language skills to communicate effectively in social/avocational/work setting  (Pended)   -LB     Patient will be able to use verbal expressive language skills to communicate effectively in social/avocational/work setting  80%:;with cues  (Pended)   -LB     Status: Patient will be able to use verbal expressive language skills to communicate effectively in social/avocational/work setting  New  (Pended)   -LB     Verbal Expression STG's  Patient will improve verbal expressive language skills by completing divergent naming tasks;Patient will improve verbal expressive language skills by listing words associated to target word provided  (Pended)   -LB     Patient will improve verbal expressive language skills by completing divergent naming tasks  80%:;with cues  (Pended)   -LB     Status: Patient will improve verbal expressive language skills by completing divergent naming tasks  New  (Pended)   -LB     Patient will improve verbal  expressive language skills by listing words associated to target word provided  80%  (Pended)   -LB     Status: Patient will improve verbal expressive language skills by listing words associated to target word provided  New  (Pended)   -LB        Memory Goals    Memory LTG's  Patient will be able to remember  information needed to participate in activities of daily living  (Pended)   -LB     Patient will be able to remember  information needed to participate in activities of daily living  80% with cues  (Pended)   -LB     Status: Patient will be able to remember  information needed to participate in activities of daily living  Progressing as expected  (Pended)   -LB     Comments: Patient will be able to remember  information needed to participate in activities of daily living  4/29/21: Pt states that he has difficulties with watching TV and reading books due to forgetting what has happened. Pt states he has forgotten where items such as his car keys or mask are because he places them in places that he normally would not place them.  (Pended)   -LB     Memory STG's  Patient’s memory skills will be enhanced as reported by patient by utilizing internal memory strategies to recall up to 3 pieces of information after a 5- minute delay;Patient’s memory skills will be enhanced as reported by patient by using external memory aides  (Pended)   -LB     Patient’s memory skills will be enhanced as reported by patient by utilizing internal memory strategies to recall up to 3 pieces of information after a 5- minute delay  80%:;with cues  (Pended)   -LB     Status: Patient’s memory skills will be enhanced as reported by patient by utilizing internal memory strategies to recall up to 3 pieces of information after a 5- minute delay  Progressing as expected  (Pended)   -LB     Comments: Patient’s memory skills will be enhanced as reported by patient by utilizing internal memory strategies to recall up to 3 pieces of information after  a 5- minute delay  4/29/21: Pt recall of 5 paired words was 100% independently. 12/24/19: Delayed recall of 6 un-related words and pt was 6/6. 12/17/19: Delayed recall of 4 un-related words from hmwk and pt was 4/4 x 2. 5 un-related words and pt was 3/5 I'ly and with increased delay, pt was 4/5 I'ly. 12/10/19: Delayed recall of 8 related words and pt was 100% accurate. 4 un-related words, pt was 2/4, 3/4, 4/4.  12/5/19: Delayed recall of 8 related words and pt was 8/8 x 2. 11/15/19: Delayed recall of 7 un-related words from hmwk and pt was 7/7 x 3. 11/12/19: RBANS Delayed recall score of 95 was improved from last re-assessment. 11/5/19: Delayed recall of 7 un-related words from homework and previous sessions and pt was 7/7. 11/1/19: Delayed recall of 6 un-related words: pt was 6/6 and added 2 more words and pt was 6/7 and then 6/8.  10/25/19: Delayed recall of 4 un-related words, pt was 4/4 with min cues, Independently got it 4/4 x 2.  10/21/19: Delayed recall for 7 un-related words from mk and pt was 6/7. With review and increased delay, pt was 7/7.  10/18/19: Delayed recall for 5 un-related word from homework and pt was 5/5 x 3. Added word 10/15/19: RBANS Delayed recall score of 78 dropped slightly from an 81 at previous re-assessment. 10/1/19:Delayed recall of paragraph information and pt was 2/3 for topics. 9/27/19: Delayed recall of 12 groupable pictures and pt was 12/12 x 2. 9/24/19: Delayed recall of 16 groupable words and pt was 14/16. Increased delay of 16 groupable words: pt was 10/16.  9/20/19: Delayed recall of words from SRT and pt was 3/12. 9/16/19: RBANS Delayed recall score of 81 down from 87 at time of eval.- remains in low average range.  9/3/19: Delayed recall of 8 un-related words and pt was 8/8 x 2. 8/30/19: Delayed recall of 7 un-related pictures, pt was 7/7 x 2. 8/23/19: Delayed recall of 6 un-related words: pt was 6/6 x 3- gave 7 for hmwk. 8/20/19: Delayed recall of 5 un-related words and  pt was 5/5 x 2. 6 un-related words: pt was 4/6.  8/16/19: Delayed recall of 8 related words, pt was 8/8 x 2. 4 un-related words, pt was 4/4 x 2. 8/9/19: Delayed recall of 7 words: pt was 6/7 x 2, 7/7 x 2 8/6/19: Delayed recall of 5 related words and pt was 5/5, 6 related words: pt was 6/6. 7 related words using alphabetical order and chunking according to starting letter and pt was 6/7 with min cue.  7/26/19: Delayed recall of 4 related words and pt was 3/4 I'ly; 4/4 x 2. Increased to 5 related words and pt was 4/5 and then 5/5.   (Pended)   -LB     Patient’s memory skills will be enhanced as reported by patient by using external memory aides  80%:;with cues  (Pended)   -LB     Status: Patient’s memory skills will be enhanced as reported by patient by using external memory aides  Progressing as expected  (Pended)   -LB     Comments: Patient’s memory skills will be enhanced as reported by patient by using external memory aides  4/29/21: Pt continues to use his planner and states it is very helpful remembering everyday tasks. 12/24/19: Pt continues to use his planner. 11/7/19: Pt using his day planner for daily and monthly use. 10/25/19: Using planner for calendar with 100% accurate. Use of daily entries, pt was 80% accurate. 9/20/19: Pt is using planner 90% of the time with mild-moderate level of details. 9/6/19: Pt is making daily notes in his planner. 9/3/19: Pt using his calendar consistently however, pt not using the daily journaling section as much as SLP would like. 8/30/19: Pt required min cues to catch up on daily entries. 8/16/19: Pt continues to use his planner daily. 8/9/19: Pt with increased use of his planner with daily notes.  7/26/19: Fxnl use of planner: pt was writing down his TO DO LIST and not what he had done.   (Pended)   -LB        Attention/Orientation Goals    Attention/Orientation LTG's  Patient will be able to participate in the community safely  (Pended)   -LB     Patient will be able to  participate in the community safely  Independently  (Pended)   -LB     Status: Patient will be able to participate in the community safely  Progressing as expected  (Pended)   -LB     Comments: Patient will be able to participate in the community safely  4/29/21: Pt states he does not have many difficulties with going to the grocery store. Pt states he can remember a few items w/o external aids but will use lists to keep track of larger grocery runs.  (Pended)   -LB     Attention/Orientation STG's  Patient will improve attention skills by sustaining consistent behavioral response during continuous and repetitive activity (e.g., listening for target words, auditory/reading comprehension tasks);Patient will improve attention skills by focusing to selective target/task when presented with competing stimuli or in a distracting environment in order to complete task;Patient will improve attention skills by alternating or shifting focus between two different tasks in order to complete both tasks;Patient will improve attention skills by dividing focus and responding simultaneously to multiple tasks or in order to complete task  (Pended)   -LB     Patient will improve attention skills by sustaining consistent behavioral response during continuous and repetitive activity (e.g., listening for target words, auditory/reading comprehension tasks)  with cues;5 minute task  (Pended)   -LB     Status: Patient will improve attention skills by sustaining consistent behavioral response during continuous and repetitive activity (e.g., listening for target words, auditory/reading comprehension tasks)  New  (Pended)   -LB     Patient will improve attention skills by focusing to selective target/task when presented with competing stimuli or in a distracting environment in order to complete task  80%:;with cues  (Pended)   -LB     Status: Patient will improve attention skills by focusing to selective target/task when presented with  competing stimuli or in a distracting environment in order to complete task  New  (Pended)   -LB     Patient will improve attention skills by alternating or shifting focus between two different tasks in order to complete both tasks  80%:;with cues  (Pended)   -LB     Status: Patient will improve attention skills by alternating or shifting focus between two different tasks in order to complete both tasks  Progressing as expected  (Pended)   -LB     Comments: Patient will improve attention skills by alternating or shifting focus between two different tasks in order to complete both tasks  21: Pt completed Checkerboard card game with 100% accuracy with moderate cues. 19: [place], pt was 80% accurate with min cues. 12/10/19: Attention for [place] and pt was 80% accurate with mod cues. 11/15/19: Attention to detail for use of a chart and analyzing numbers- new task for pt and pt was 90% accurate. 19: Divided Attention APT score was Below Average. 10/21/19: While completing an executive function task, pt exhibited difficulty with attention and required mod cues and was 70% accurate. 19: While cards being turned over, pt had to tap table if the card played was lower than the previous one. Pt was 60% accurate. 9/10/19: Pt able to recall up to 7 digits. 19: Visual Scanning: Pt required mod cues for alternating between letters and was 80 % accurate.  19: Card game and pt was 80% accurate for new rules and watching increasing piles of cards for potential moves.  19: Sorting card by suit and  and pt was 70% accurate. 19: Sorting card by suit and  and pt required mod cues and repeated instructions and pt was 60% accurate.   (Pended)   -LB     Patient will improve attention skills by dividing focus and responding simultaneously to multiple tasks or in order to complete task  80%:;with cues  (Pended)   -LB     Status: Patient will improve attention skills by  dividing focus and responding simultaneously to multiple tasks or in order to complete task  New  (Pended)   -LB        Other Goals    Other Adult Goal- 1  --  (Pended)   -LB     Status: Other Adult Goal- 1  --  (Pended)   -LB     Comments: Other Adult Goal- 1  --  (Pended)   -LB     Other Adult Goal- 2  --  (Pended)   -LB     Status: Other Adult Goal- 2  --  (Pended)   -LB     Comments: Other Adult Goal- 2  --  (Pended)   -LB     Other Adult Goal- 3  --  (Pended)   -LB     Status: Other Adult Goal- 3  --  (Pended)   -LB     Comments: Other Adult Goal- 3  --  (Pended)   -LB     Other Adult Goal- 4  --  (Pended)   -LB     Status: Other Adult Goal- 4  --  (Pended)   -LB     Comments: Other Adult Goal- 4  --  (Pended)   -LB     Other Adult Goal- 5  --  (Pended)   -LB     Status: Other Adult Goal- 5  --  (Pended)   -LB     Comments: Other Adult Goal- 5  --  (Pended)   -LB     Other Adult Goal- 6  --  (Pended)   -LB     Status: Other Adult Goal- 6  --  (Pended)   -LB     Comments: Other Adult Goal- 6  --  (Pended)   -LB     Other Adult Goal- 7  --  (Pended)   -LB     Status: Other Adult Goal- 7  --  (Pended)   -LB     Comments: Other Adult Goal- 7  --  (Pended)   -LB     Other Adult Goal- 8  --  (Pended)   -LB     Status: Other Adult Goal- 8  --  (Pended)   -LB     Comments: Other Adult Goal- 8  --  (Pended)   -LB        SLP Time Calculation    SLP Goal Re-Cert Due Date  05/26/21  (Pended)   -LB       User Key  (r) = Recorded By, (t) = Taken By, (c) = Cosigned By    Initials Name Provider Type    Gilberto Mares Speech Therapy Student Speech Therapy Student        OP SLP Education     Row Name 04/29/21 0800       Education    Education Comments  Education regarding RBANS results. Hmwk for name to face assosciation.  (Pended)   -LB      User Key  (r) = Recorded By, (t) = Taken By, (c) = Cosigned By    Initials Name Effective Dates    LB Araiza, Gilberto, Speech Therapy Student 03/09/21 -         OP SLP Assessment/Plan -  04/29/21 0800        SLP Plan    Plan Comments  Cont cog communication tx  (Pended)    -[facility]      User Key  (r) = Recorded By, (t) = Taken By, (c) = Cosigned By    Initials Name Provider Type    Gilberto Mares, Speech Therapy Student Speech Therapy Student               Time Calculation:   SLP Start Time: (P) 0800  Untimed Charges  61359-EM Treatment/ST Modification Prosth Aug Alter : (P) 65  Total Minutes  Untimed Charges Total Minutes: (P) 65   Total Minutes: (P) 65    Therapy Charges for Today     Code Description Service Date Service Provider Modifiers Qty    [id number]  ST TREATMENT SPEECH 4 4/29/2021 Gilberto Araiza, Speech Therapy Student GN 1                   Gilberto Araiza Speech Therapy Student  4/29/2021

## 2021-05-03 ENCOUNTER — HOSPITAL ENCOUNTER (OUTPATIENT)
Dept: SPEECH THERAPY | Facility: HOSPITAL | Age: 81
Setting detail: THERAPIES SERIES
Discharge: HOME | End: 2021-05-03

## 2021-05-03 DIAGNOSIS — R41.3 MEMORY LOSS: Primary | ICD-10-CM

## 2021-05-03 PROCEDURE — 92507 TX SP LANG VOICE COMM INDIV: CPT

## 2021-05-03 NOTE — THERAPY TREATMENT NOTE
Outpatient Speech Language Pathology   Adult Speech Language Cognitive Treatment Note  [facility]     Patient Name: Uche Polanco  : 1940  MRN: [id number]  Today's Date: 5/3/2021         Visit Date: 2021   Patient Active Problem List   Diagnosis   • Severe obstructive sleep apnea   • Gout   • AV block   • REM sleep behavior disorder   • Memory loss   • Postural dizziness with presyncope   • Chronic systolic congestive heart failure (CMS/HCC)   • Nonrheumatic aortic valve insufficiency          Visit Dx:    ICD-10-CM ICD-9-CM   1. Memory loss  R41.3 780.93                         SLP OP Goals     Row Name 21 0900          Goal Type Needed    Goal Type Needed  Verbal Expression;Memory;Attention/Orientation;Other Adult Goals  (Pended)   -LB        Subjective Comments    Subjective Comments  Pt was alert and cooperative.  (Pended)   -LB        Verbal Expression Goals    Verbal Expression LTG's  Patient will be able to use verbal expressive language skills to communicate effectively in social/avocational/work setting  (Pended)   -LB     Patient will be able to use verbal expressive language skills to communicate effectively in social/avocational/work setting  80%:;with cues  (Pended)   -LB     Status: Patient will be able to use verbal expressive language skills to communicate effectively in social/avocational/work setting  New  (Pended)   -LB     Verbal Expression STG's  Patient will improve verbal expressive language skills by completing divergent naming tasks;Patient will improve verbal expressive language skills by listing words associated to target word provided  (Pended)   -LB     Patient will improve verbal expressive language skills by completing divergent naming tasks  80%:;with cues  (Pended)   -LB     Status: Patient will improve verbal expressive language skills by completing divergent naming tasks  New  (Pended)   -LB     Patient will improve verbal expressive language skills by  listing words associated to target word provided  80%  (Pended)   -LB     Status: Patient will improve verbal expressive language skills by listing words associated to target word provided  New  (Pended)   -LB        Memory Goals    Memory LTG's  Patient will be able to remember  information needed to participate in activities of daily living  (Pended)   -LB     Patient will be able to remember  information needed to participate in activities of daily living  80% with cues  (Pended)   -LB     Status: Patient will be able to remember  information needed to participate in activities of daily living  New  (Pended)   -LB     Comments: Patient will be able to remember  information needed to participate in activities of daily living  4/29/21: Pt states that he has difficulties with watching TV and reading books due to forgetting what has happened. Pt states he has forgotten where items such as his car keys or mask are because he places them in places that he normally would not place them.  (Pended)   -LB     Memory STG's  Patient’s memory skills will be enhanced as reported by patient by utilizing internal memory strategies to recall up to 3 pieces of information after a 5- minute delay;Patient’s memory skills will be enhanced as reported by patient by using external memory aides;Patient will demonstrate improved ability to recall information by immediately recalling a series of words  (Pended)   -LB     Patient will demonstrate improved ability to recall information by immediately recalling a series of words  80%:;related;unrelated;with cues  (Pended)   -LB     Status: Patient will demonstrate improved ability to recall information by immediately recalling a series of words  New  (Pended)   -LB     Comments: Patient will demonstrate improved ability to recall information by immediately recalling a series of words  5/3/21: Pt played card game Forget Me Not and was 20% accurate with moderate cues.12/24/19: 50-65 word  paragraphs for immediate recall and pt was on average 80% accurate. 12/10/19: Memory for puzzles and pt was 70% accurate. 19: Immediate recall of memory book puzzle: pt was 70% accurate. 11/15/19: Immediate recall of Memory Puzzle Book and pt was 75% accurate. 19: ABC game for Immediate recall and pt was 70% accurate I'ly.  19: Recall of rules of familiar card game and pt was 80% accurate. 10/21/19: Recall from paragraph in an executive fxn task and pt was 50% accurate with mod cues. 10/18/19: Immediate recall for Memory Matches i pad game and pt was 70% accurate.  Fastest time memory matches and pt was impulsive but completed in a little 60 secs. 10/15/19: RBANS Immediate recall score dropped to a 73 from an 83 at last re-assessment. 10/4/19: Memory Recall for a picture scene and pt was 75% accurate. 19: Recalling Boxed Information for numbers: pt was 80% accurate. 19: Spaced Retrieval Method: pt was . 19: RBANS Immediate recall and pt improved score to an 83 up from 49 at time of eval. 19: Immediate recall of boxed information: pt was 85% accurate. 19: Immediate recall of shapes and figures (6) and pt was 75% accurate.  9/3/19: Immediate recall of shapes and figures (5) and pt improved to 80% accurate.  19: Pt was able to recall 5 related words with accuracy, 6 words, pt was 5/6. 19: 50-65 words: pt was 6/6 x 3 for immediate story recall. 19: 36-50 word paragraphs for immediate recall: pt was 7/8. Immediate recall for storytellin/3, using chunking: 3/3,   (Pended)   -LB     Patient’s memory skills will be enhanced as reported by patient by utilizing internal memory strategies to recall up to 3 pieces of information after a 5- minute delay  80%:;with cues  (Pended)   -LB     Status: Patient’s memory skills will be enhanced as reported by patient by utilizing internal memory strategies to recall up to 3 pieces of information after a 5- minute delay  New   (Pended)   -LB     Comments: Patient’s memory skills will be enhanced as reported by patient by utilizing internal memory strategies to recall up to 3 pieces of information after a 5- minute delay  5/3/21: Pt recall of 5 names associated with faces was 100% independently. 12/24/19: Delayed recall of 6 un-related words and pt was 6/6. 12/17/19: Delayed recall of 4 un-related words from hmwk and pt was 4/4 x 2. 5 un-related words and pt was 3/5 I'ly and with increased delay, pt was 4/5 I'ly. 12/10/19: Delayed recall of 8 related words and pt was 100% accurate. 4 un-related words, pt was 2/4, 3/4, 4/4.  12/5/19: Delayed recall of 8 related words and pt was 8/8 x 2. 11/15/19: Delayed recall of 7 un-related words from hmwk and pt was 7/7 x 3. 11/12/19: RBANS Delayed recall score of 95 was improved from last re-assessment. 11/5/19: Delayed recall of 7 un-related words from homework and previous sessions and pt was 7/7. 11/1/19: Delayed recall of 6 un-related words: pt was 6/6 and added 2 more words and pt was 6/7 and then 6/8.  10/25/19: Delayed recall of 4 un-related words, pt was 4/4 with min cues, Independently got it 4/4 x 2.  10/21/19: Delayed recall for 7 un-related words from mk and pt was 6/7. With review and increased delay, pt was 7/7.  10/18/19: Delayed recall for 5 un-related word from homework and pt was 5/5 x 3. Added word 10/15/19: RBANS Delayed recall score of 78 dropped slightly from an 81 at previous re-assessment. 10/1/19:Delayed recall of paragraph information and pt was 2/3 for topics. 9/27/19: Delayed recall of 12 groupable pictures and pt was 12/12 x 2. 9/24/19: Delayed recall of 16 groupable words and pt was 14/16. Increased delay of 16 groupable words: pt was 10/16.  9/20/19: Delayed recall of words from SRT and pt was 3/12. 9/16/19: RBANS Delayed recall score of 81 down from 87 at time of eval.- remains in low average range.  9/3/19: Delayed recall of 8 un-related words and pt was 8/8 x 2.  8/30/19: Delayed recall of 7 un-related pictures, pt was 7/7 x 2. 8/23/19: Delayed recall of 6 un-related words: pt was 6/6 x 3- gave 7 for hmwk. 8/20/19: Delayed recall of 5 un-related words and pt was 5/5 x 2. 6 un-related words: pt was 4/6.  8/16/19: Delayed recall of 8 related words, pt was 8/8 x 2. 4 un-related words, pt was 4/4 x 2. 8/9/19: Delayed recall of 7 words: pt was 6/7 x 2, 7/7 x 2 8/6/19: Delayed recall of 5 related words and pt was 5/5, 6 related words: pt was 6/6. 7 related words using alphabetical order and chunking according to starting letter and pt was 6/7 with min cue.  7/26/19: Delayed recall of 4 related words and pt was 3/4 I'ly; 4/4 x 2. Increased to 5 related words and pt was 4/5 and then 5/5.   (Pended)   -LB     Patient’s memory skills will be enhanced as reported by patient by using external memory aides  80%:;with cues  (Pended)   -LB     Status: Patient’s memory skills will be enhanced as reported by patient by using external memory aides  New  (Pended)   -LB     Comments: Patient’s memory skills will be enhanced as reported by patient by using external memory aides  5/3/21: Pt continues to use his planner to remember dates/plans. 12/24/19: Pt continues to use his planner. 11/7/19: Pt using his day planner for daily and monthly use. 10/25/19: Using planner for calendar with 100% accurate. Use of daily entries, pt was 80% accurate. 9/20/19: Pt is using planner 90% of the time with mild-moderate level of details. 9/6/19: Pt is making daily notes in his planner. 9/3/19: Pt using his calendar consistently however, pt not using the daily journaling section as much as SLP would like. 8/30/19: Pt required min cues to catch up on daily entries. 8/16/19: Pt continues to use his planner daily. 8/9/19: Pt with increased use of his planner with daily notes.  7/26/19: Fxnl use of planner: pt was writing down his TO DO LIST and not what he had done.   (Pended)   -LB        Attention/Orientation  Goals    Attention/Orientation LTG's  Patient will be able to participate in the community safely  (Pended)   -LB     Patient will be able to participate in the community safely  Independently  (Pended)   -LB     Status: Patient will be able to participate in the community safely  New  (Pended)   -LB     Attention/Orientation STG's  Patient will improve attention skills by sustaining consistent behavioral response during continuous and repetitive activity (e.g., listening for target words, auditory/reading comprehension tasks);Patient will improve attention skills by focusing to selective target/task when presented with competing stimuli or in a distracting environment in order to complete task;Patient will improve attention skills by alternating or shifting focus between two different tasks in order to complete both tasks;Patient will improve attention skills by dividing focus and responding simultaneously to multiple tasks or in order to complete task  (Pended)   -LB     Patient will improve attention skills by sustaining consistent behavioral response during continuous and repetitive activity (e.g., listening for target words, auditory/reading comprehension tasks)  with cues;5 minute task  (Pended)   -LB     Status: Patient will improve attention skills by sustaining consistent behavioral response during continuous and repetitive activity (e.g., listening for target words, auditory/reading comprehension tasks)  New  (Pended)   -LB     Patient will improve attention skills by focusing to selective target/task when presented with competing stimuli or in a distracting environment in order to complete task  80%:;with cues  (Pended)   -LB     Status: Patient will improve attention skills by focusing to selective target/task when presented with competing stimuli or in a distracting environment in order to complete task  New  (Pended)   -LB     Comments: Patient will improve attention skills by focusing to selective  target/task when presented with competing stimuli or in a distracting environment in order to complete task  5/3/21: Pt played Forget Me Not card game with field of 21, 12, and 4 cards 20% accuracy and moderate cues.  (Pended)   -LB     Patient will improve attention skills by alternating or shifting focus between two different tasks in order to complete both tasks  80%:;with cues  (Pended)   -LB     Status: Patient will improve attention skills by alternating or shifting focus between two different tasks in order to complete both tasks  New  (Pended)   -LB     Comments: Patient will improve attention skills by alternating or shifting focus between two different tasks in order to complete both tasks  19: [place], pt was 80% accurate with min cues. 12/10/19: Attention for [place] and pt was 80% accurate with mod cues. 11/15/19: Attention to detail for use of a chart and analyzing numbers- new task for pt and pt was 90% accurate. 19: Divided Attention APT score was Below Average. 10/21/19: While completing an executive function task, pt exhibited difficulty with attention and required mod cues and was 70% accurate. 19: While cards being turned over, pt had to tap table if the card played was lower than the previous one. Pt was 60% accurate. 9/10/19: Pt able to recall up to 7 digits. 19: Visual Scanning: Pt required mod cues for alternating between letters and was 80 % accurate.  19: Card game and pt was 80% accurate for new rules and watching increasing piles of cards for potential moves.  19: Sorting card by suit and  and pt was 70% accurate. 19: Sorting card by suit and  and pt required mod cues and repeated instructions and pt was 60% accurate.   (Pended)   -LB     Patient will improve attention skills by dividing focus and responding simultaneously to multiple tasks or in order to complete task  80%:;with cues  (Pended)   -LB     Status: Patient will  improve attention skills by dividing focus and responding simultaneously to multiple tasks or in order to complete task  New  (Pended)   -LB     Comments: Patient will improve attention skills by dividing focus and responding simultaneously to multiple tasks or in order to complete task  5/3/21: Pt completed Which Way card game with 90% accuracy independently.  (Pended)   -LB        Other Goals    Other Adult Goal- 1  Pt will complete high level thought organization tasks with 80% accuracy.   (Pended)   -LB     Status: Other Adult Goal- 1  New  (Pended)   -LB     Comments: Other Adult Goal- 1  12/24/19: Divergent naming: pt was 80% accurate. 11/12/19: RBANS Language score remained in the average range. 11/1/19: Divergent abstract naming: pt was 10/10 x 2. 10/18/19: Divergent abstract naming: pt was 100% accurate for 6 sets. Convergent naming: pt was 21/25. 10/15/19: RBANS Language score improved to a 92 from an 88 at last re-assessment. 10/1/19: Divergent naming: pt was 80% accurate. 9/16/19: RBANS Language score of 88 remains the same as time of initial eval. 8/30/19: Abstract divergent naming, pt was 8/10. 8/9/19: Divergent concrete category naming and pt was 15/15 x 2. 7/26/19: Divergent naming: pt was 10/10.   (Pended)   -LB     Other Adult Goal- 2  Pt will complete reasoning and problem solving assessment with goals to follow as indicated.  (Pended)   -LB     Status: Other Adult Goal- 2  New  (Pended)   -LB     Comments: Other Adult Goal- 2  11/12/19: RBANS Total score of 87, slight increase from previous assessment. 10/15/19: RBANS Total score remained at 84 (Low Average). 9/16/19: RBANS Total score improved to an 84- up from 79 at time of initial eval.   (Pended)   -LB     Other Adult Goal- 3  Pt will improve RBANS score to at least a 90 placing pt in the Average range.  (Pended)   -LB     Status: Other Adult Goal- 3  New  (Pended)   -LB     Comments: Other Adult Goal- 3  4/14/17: 10/10 for abstract  categories. 3/24/17: 11/10 for concrete categories.   Pt able to name 16 items in a sixty second period of time on the RBANS. 3/17/17: For abstract category naming, pt was 100% accurate. Pt was 10/15 for abstract items. 2/23: Pt was on average 10-13/15. 2/27: Abstract items: pt was at least 10 items with 2 minute time frame allowed. 3/3: Pt was 15/15 for abstract categories  3/6/17: For abstract naming in one minute: T1:10/10 T2:8/10  (Pended)   -LB     Other Adult Goal- 4  Patient will perform executive functioning task with 70% accuracy when provided prompts only in order to improve strategic thinking.  (Pended)   -LB     Status: Other Adult Goal- 4  Progressing as expected  (Pended)   -LB     Comments: Other Adult Goal- 4  4/14/17: Pt currently selling a house, managing finances, moving arrangments, traveling out of town, booking airfares, etc. 3/27/17: For recall of 16 objects and w/o review, pt was 50% accurate and with review ? 3/24/17: visuospatial recall, pt performed in the average range on the RBANS. 3/17/17: Pt given a task that involved visual recall as well as reasoning and problem solving and pt was ?Divided atten task while asked to perform a prospective memory task. Pt required mod cues this date. 2/23: Pt was 50-75% accurate this date with a mental flexibilty task and this was given a homework. 3/3: Card game played that was taught several weeks ago and pt was 70% accurate.  (Pended)   -LB     Other Adult Goal- 5  Patient will improve cognition as evidenced by STGs met. (LTG)  (Pended)   -LB     Status: Other Adult Goal- 5  Progressing as expected  (Pended)   -LB     Comments: Other Adult Goal- 5   4/14/17: For delayed storytelling, pt was 80% accuracy with no review strategies. 3/27/17: For delayed recall of names and pictures, pt was 80% accuracy.  3/24/17: For story re-telling, immediate and delayed, pt was borderline. 3/17/17: Pt had to recall 16 items with no review from almost 2 weeks ago and  pt was 80% accurate. Pt using visualization and grouping in order to recall verbally presented information. 2/27: For visual recall of pictures, pt was 80% accurate.   (Pended)   -LB     Other Adult Goal- 6  Patient will perform alternating attention task with 80% accuracy across two trials in order to enhance mental flexibility and attention.  (Pended)   -LB     Status: Other Adult Goal- 6  Progressing as expected  (Pended)   -LB     Comments: Other Adult Goal- 6  3/27/17: For mental manipulation of three words forward and three words backward: pt was 100% accurate with 3 words and 80% accurate with four words. 3/24/17: For attention on the RBANS, pt was in the average range. Card game and pt was 80% accurate. 2/23: For answering questions given a list of four words, pt was 80% accurate. 2/27: For mental manipulation with numbers, pt was 100% accurate. 3/3: While playing cards and naming items from a delayed recall list activity, pt was 75% accurate. 3/6/17: While corssing out numbers, pt had to switch with background music provided: pt was 90% accurate.   (Pended)   -LB     Other Adult Goal- 7  Patient will perform divided attention task with 70% accuracy across two trials independently in order to improve multitasking abilities.  (Pended)   -LB     Status: Other Adult Goal- 7  Progressing as expected  (Pended)   -LB     Comments: Other Adult Goal- 7  4/14/17: For high level mult-tasking, pt was 80-90% accurate independently. 3/17/17: Pt required min cues for completing exec fxn task in the correct order. Pt to complete a maze while listening to music and pt was 50% accurate this date. 2/16: More of a prospective memory task thrown in the midst of a cognitive task and pt was 90% accurate.  2/23: For divided attention, pt was 70% accurate with min verbal cues. 3/3:While listening to music and recalling a word list, pt was 70% accurate.   (Pended)   -LB     Other Adult Goal- 8  Pt will recall short paragraph  material presentally orally using strategies with 90% accuracy in order to improve immediate recall skills.  (Pended)   -LB     Status: Other Adult Goal- 8  New  (Pended)   -LB     Comments: Other Adult Goal- 8   3/27/17: For 22-36 word paragraphs, 85% accurate. For 36-50 words, 90% accurate.  pt was 3/6/17: Using a who what when where why strategy, pt was 80% accurate; without, pt was 60% accurate.   (Pended)   -LB        SLP Time Calculation    SLP Goal Re-Cert Due Date  05/26/21  (Pended)   -LB       User Key  (r) = Recorded By, (t) = Taken By, (c) = Cosigned By    Initials Name Provider Type    Gilberto Mares Speech Therapy Student Speech Therapy Student        OP SLP Education     Row Name 05/03/21 0900       Education    Education Comments  Hmwk for 4 word visuospatial task.   (Pended)   -LB      User Key  (r) = Recorded By, (t) = Taken By, (c) = Cosigned By    Initials Name Effective Dates    Gilberto Mares Speech Therapy Student 03/09/21 -         OP SLP Assessment/Plan - 05/03/21 0900        SLP Plan    Plan Comments  Cont cog tx  (Pended)    -LB      User Key  (r) = Recorded By, (t) = Taken By, (c) = Cosigned By    Initials Name Provider Type    Gilberto Mares Speech Therapy Student Speech Therapy Student               Time Calculation:   SLP Start Time: (P) 0900  Untimed Charges  23215-XF Eval Speech and Production w/ Language Minutes: (P) 70  Total Minutes  Untimed Charges Total Minutes: (P) 70   Total Minutes: (P) 70    Therapy Charges for Today     Code Description Service Date Service Provider Modifiers Qty    [id number] HC ST TREATMENT SPEECH 5 5/3/2021 Gilberto Araiza Speech Therapy Student GN 1                   Gilberto Araiza Speech Therapy Student  5/3/2021

## 2021-05-06 ENCOUNTER — HOSPITAL ENCOUNTER (OUTPATIENT)
Dept: SPEECH THERAPY | Facility: HOSPITAL | Age: 81
Setting detail: THERAPIES SERIES
Discharge: HOME | End: 2021-05-06

## 2021-05-06 DIAGNOSIS — R41.3 MEMORY LOSS: Primary | ICD-10-CM

## 2021-05-06 PROCEDURE — 92507 TX SP LANG VOICE COMM INDIV: CPT

## 2021-05-06 NOTE — THERAPY TREATMENT NOTE
Outpatient Speech Language Pathology   Adult Speech Language Cognitive Treatment Note  [facility]     Patient Name: Uche Polanco  : 1940  MRN: [id number]  Today's Date: 2021         Visit Date: 2021   Patient Active Problem List   Diagnosis   • Severe obstructive sleep apnea   • Gout   • AV block   • REM sleep behavior disorder   • Memory loss   • Postural dizziness with presyncope   • Chronic systolic congestive heart failure (CMS/HCC)   • Nonrheumatic aortic valve insufficiency          Visit Dx:    ICD-10-CM ICD-9-CM   1. Memory loss  R41.3 780.93                         SLP OP Goals     Row Name 21 1000          Goal Type Needed    Goal Type Needed  Verbal Expression;Memory;Attention/Orientation;Other Adult Goals  (Pended)   -LB        Subjective Comments    Subjective Comments  Pt alert, cooperative, and returned with homework.  (Pended)   -LB        Verbal Expression Goals    Verbal Expression LTG's  Patient will be able to use verbal expressive language skills to communicate effectively in social/avocational/work setting  (Pended)   -LB     Patient will be able to use verbal expressive language skills to communicate effectively in social/avocational/work setting  80%:;with cues  (Pended)   -LB     Status: Patient will be able to use verbal expressive language skills to communicate effectively in social/avocational/work setting  New  (Pended)   -LB     Verbal Expression STG's  Patient will improve verbal expressive language skills by completing divergent naming tasks;Patient will improve verbal expressive language skills by listing words associated to target word provided  (Pended)   -LB     Patient will improve verbal expressive language skills by completing divergent naming tasks  80%:;with cues  (Pended)   -LB     Status: Patient will improve verbal expressive language skills by completing divergent naming tasks  New  (Pended)   -LB     Patient will improve verbal expressive  language skills by listing words associated to target word provided  80%  (Pended)   -LB     Status: Patient will improve verbal expressive language skills by listing words associated to target word provided  New  (Pended)   -LB        Memory Goals    Memory LTG's  Patient will be able to remember  information needed to participate in activities of daily living  (Pended)   -LB     Patient will be able to remember  information needed to participate in activities of daily living  80% with cues  (Pended)   -LB     Status: Patient will be able to remember  information needed to participate in activities of daily living  New  (Pended)   -LB     Comments: Patient will be able to remember  information needed to participate in activities of daily living  4/29/21: Pt states that he has difficulties with watching TV and reading books due to forgetting what has happened. Pt states he has forgotten where items such as his car keys or mask are because he places them in places that he normally would not place them.  (Pended)   -LB     Memory STG's  Patient’s memory skills will be enhanced as reported by patient by utilizing internal memory strategies to recall up to 3 pieces of information after a 5- minute delay;Patient’s memory skills will be enhanced as reported by patient by using external memory aides;Patient will demonstrate improved ability to recall information by immediately recalling a series of words  (Pended)   -LB     Patient will demonstrate improved ability to recall information by immediately recalling a series of words  80%:;related;unrelated;with cues  (Pended)   -LB     Status: Patient will demonstrate improved ability to recall information by immediately recalling a series of words  New  (Pended)   -LB     Comments: Patient will demonstrate improved ability to recall information by immediately recalling a series of words  5/6/21: Pt recall of 4 word array was 40% with minimal support. 5/3/21: Pt played card  game Forget Me Not and was 20% accurate with moderate cues.19: 50-65 word paragraphs for immediate recall and pt was on average 80% accurate. 12/10/19: Memory for puzzles and pt was 70% accurate. 19: Immediate recall of memory book puzzle: pt was 70% accurate. 11/15/19: Immediate recall of Memory Puzzle Book and pt was 75% accurate. 19: ABC game for Immediate recall and pt was 70% accurate I'ly.  19: Recall of rules of familiar card game and pt was 80% accurate. 10/21/19: Recall from paragraph in an executive fxn task and pt was 50% accurate with mod cues. 10/18/19: Immediate recall for Memory Matches i pad game and pt was 70% accurate.  Fastest time memory matches and pt was impulsive but completed in a little 60 secs. 10/15/19: RBANS Immediate recall score dropped to a 73 from an 83 at last re-assessment. 10/4/19: Memory Recall for a picture scene and pt was 75% accurate. 19: Recalling Boxed Information for numbers: pt was 80% accurate. 19: Spaced Retrieval Method: pt was . 19: RBANS Immediate recall and pt improved score to an 83 up from 49 at time of eval. 19: Immediate recall of boxed information: pt was 85% accurate. 19: Immediate recall of shapes and figures (6) and pt was 75% accurate.  9/3/19: Immediate recall of shapes and figures (5) and pt improved to 80% accurate.  19: Pt was able to recall 5 related words with accuracy, 6 words, pt was 5/6. 19: 50-65 words: pt was 6/6 x 3 for immediate story recall. 19: 36-50 word paragraphs for immediate recall: pt was 7/8. Immediate recall for storytellin/3, using chunking: 3/3,   (Pended)   -LB     Patient’s memory skills will be enhanced as reported by patient by utilizing internal memory strategies to recall up to 3 pieces of information after a 5- minute delay  80%:;with cues  (Pended)   -LB     Status: Patient’s memory skills will be enhanced as reported by patient by utilizing internal memory  strategies to recall up to 3 pieces of information after a 5- minute delay  New  (Pended)   -LB     Comments: Patient’s memory skills will be enhanced as reported by patient by utilizing internal memory strategies to recall up to 3 pieces of information after a 5- minute delay  5/3/21: Pt recall of 5 names associated with faces was 100% independently. 12/24/19: Delayed recall of 6 un-related words and pt was 6/6. 12/17/19: Delayed recall of 4 un-related words from hmwk and pt was 4/4 x 2. 5 un-related words and pt was 3/5 I'ly and with increased delay, pt was 4/5 I'ly. 12/10/19: Delayed recall of 8 related words and pt was 100% accurate. 4 un-related words, pt was 2/4, 3/4, 4/4.  12/5/19: Delayed recall of 8 related words and pt was 8/8 x 2. 11/15/19: Delayed recall of 7 un-related words from hmwk and pt was 7/7 x 3. 11/12/19: RBANS Delayed recall score of 95 was improved from last re-assessment. 11/5/19: Delayed recall of 7 un-related words from homework and previous sessions and pt was 7/7. 11/1/19: Delayed recall of 6 un-related words: pt was 6/6 and added 2 more words and pt was 6/7 and then 6/8.  10/25/19: Delayed recall of 4 un-related words, pt was 4/4 with min cues, Independently got it 4/4 x 2.  10/21/19: Delayed recall for 7 un-related words from hwmk and pt was 6/7. With review and increased delay, pt was 7/7.  10/18/19: Delayed recall for 5 un-related word from homework and pt was 5/5 x 3. Added word 10/15/19: RBANS Delayed recall score of 78 dropped slightly from an 81 at previous re-assessment. 10/1/19:Delayed recall of paragraph information and pt was 2/3 for topics. 9/27/19: Delayed recall of 12 groupable pictures and pt was 12/12 x 2. 9/24/19: Delayed recall of 16 groupable words and pt was 14/16. Increased delay of 16 groupable words: pt was 10/16.  9/20/19: Delayed recall of words from SRT and pt was 3/12. 9/16/19: RBANS Delayed recall score of 81 down from 87 at time of eval.- remains in low  average range.  9/3/19: Delayed recall of 8 un-related words and pt was 8/8 x 2. 8/30/19: Delayed recall of 7 un-related pictures, pt was 7/7 x 2. 8/23/19: Delayed recall of 6 un-related words: pt was 6/6 x 3- gave 7 for hmwk. 8/20/19: Delayed recall of 5 un-related words and pt was 5/5 x 2. 6 un-related words: pt was 4/6.  8/16/19: Delayed recall of 8 related words, pt was 8/8 x 2. 4 un-related words, pt was 4/4 x 2. 8/9/19: Delayed recall of 7 words: pt was 6/7 x 2, 7/7 x 2 8/6/19: Delayed recall of 5 related words and pt was 5/5, 6 related words: pt was 6/6. 7 related words using alphabetical order and chunking according to starting letter and pt was 6/7 with min cue.  7/26/19: Delayed recall of 4 related words and pt was 3/4 I'ly; 4/4 x 2. Increased to 5 related words and pt was 4/5 and then 5/5.   (Pended)   -LB     Patient’s memory skills will be enhanced as reported by patient by using external memory aides  80%:;with cues  (Pended)   -LB     Status: Patient’s memory skills will be enhanced as reported by patient by using external memory aides  New  (Pended)   -LB     Comments: Patient’s memory skills will be enhanced as reported by patient by using external memory aides  5/3/21: Pt continues to use his planner to remember dates/plans. 12/24/19: Pt continues to use his planner. 11/7/19: Pt using his day planner for daily and monthly use. 10/25/19: Using planner for calendar with 100% accurate. Use of daily entries, pt was 80% accurate. 9/20/19: Pt is using planner 90% of the time with mild-moderate level of details. 9/6/19: Pt is making daily notes in his planner. 9/3/19: Pt using his calendar consistently however, pt not using the daily journaling section as much as SLP would like. 8/30/19: Pt required min cues to catch up on daily entries. 8/16/19: Pt continues to use his planner daily. 8/9/19: Pt with increased use of his planner with daily notes.  7/26/19: Fxnl use of planner: pt was writing down his TO  DO LIST and not what he had done.   (Pended)   -LB        Attention/Orientation Goals    Attention/Orientation LTG's  Patient will be able to participate in the community safely  (Pended)   -LB     Patient will be able to participate in the community safely  Independently  (Pended)   -LB     Status: Patient will be able to participate in the community safely  New  (Pended)   -LB     Comments: Patient will be able to participate in the community safely  5/6/21: Pt states he attended his son in laws birthday party, Sikhism, and went driving around town over the past week.  (Pended)   -LB     Attention/Orientation STG's  Patient will improve attention skills by sustaining consistent behavioral response during continuous and repetitive activity (e.g., listening for target words, auditory/reading comprehension tasks);Patient will improve attention skills by focusing to selective target/task when presented with competing stimuli or in a distracting environment in order to complete task;Patient will improve attention skills by alternating or shifting focus between two different tasks in order to complete both tasks;Patient will improve attention skills by dividing focus and responding simultaneously to multiple tasks or in order to complete task  (Pended)   -LB     Patient will improve attention skills by sustaining consistent behavioral response during continuous and repetitive activity (e.g., listening for target words, auditory/reading comprehension tasks)  with cues;5 minute task  (Pended)   -LB     Status: Patient will improve attention skills by sustaining consistent behavioral response during continuous and repetitive activity (e.g., listening for target words, auditory/reading comprehension tasks)  New  (Pended)   -LB     Comments: Patient will improve attention skills by sustaining consistent behavioral response during continuous and repetitive activity (e.g., listening for target words, auditory/reading  comprehension tasks)  5/6/21: Pt completed paragraph inferencing task with 60% accuracy with minimal cues.  (Pended)   -LB     Patient will improve attention skills by focusing to selective target/task when presented with competing stimuli or in a distracting environment in order to complete task  80%:;with cues  (Pended)   -LB     Status: Patient will improve attention skills by focusing to selective target/task when presented with competing stimuli or in a distracting environment in order to complete task  New  (Pended)   -LB     Comments: Patient will improve attention skills by focusing to selective target/task when presented with competing stimuli or in a distracting environment in order to complete task  5/6/21: Pt completed visual scanning activity with 100% accuracy independently. 5/3/21: Pt played Forget Me Not card game with field of 21, 12, and 4 cards 20% accuracy and moderate cues.  (Pended)   -LB     Patient will improve attention skills by alternating or shifting focus between two different tasks in order to complete both tasks  80%:;with cues  (Pended)   -LB     Status: Patient will improve attention skills by alternating or shifting focus between two different tasks in order to complete both tasks  New  (Pended)   -LB     Comments: Patient will improve attention skills by alternating or shifting focus between two different tasks in order to complete both tasks  12/17/19: [place], pt was 80% accurate with min cues. 12/10/19: Attention for [place] and pt was 80% accurate with mod cues. 11/15/19: Attention to detail for use of a chart and analyzing numbers- new task for pt and pt was 90% accurate. 11/1/19: Divided Attention APT score was Below Average. 10/21/19: While completing an executive function task, pt exhibited difficulty with attention and required mod cues and was 70% accurate. 9/27/19: While cards being turned over, pt had to tap table if the card played was lower  than the previous one. Pt was 60% accurate. 9/10/19: Pt able to recall up to 7 digits. 19: Visual Scanning: Pt required mod cues for alternating between letters and was 80 % accurate.  19: Card game and pt was 80% accurate for new rules and watching increasing piles of cards for potential moves.  19: Sorting card by suit and  and pt was 70% accurate. 19: Sorting card by suit and  and pt required mod cues and repeated instructions and pt was 60% accurate.   (Pended)   -LB     Patient will improve attention skills by dividing focus and responding simultaneously to multiple tasks or in order to complete task  80%:;with cues  (Pended)   -LB     Status: Patient will improve attention skills by dividing focus and responding simultaneously to multiple tasks or in order to complete task  New  (Pended)   -LB     Comments: Patient will improve attention skills by dividing focus and responding simultaneously to multiple tasks or in order to complete task  21: Pt recall of 4 words and answering question regarding words was 50%. 5/3/21: Pt completed Which Way card game with 90% accuracy independently.  (Pended)   -LB        Other Goals    Other Adult Goal- 1  Pt will complete high level thought organization tasks with 80% accuracy.   (Pended)   -LB     Status: Other Adult Goal- 1  Progressing as expected  (Pended)   -LB     Comments: Other Adult Goal- 1  21: Pt completed paragraph inferencing task with 60% accuracy and minimcal cues. 19: Divergent naming: pt was 80% accurate. 19: RBANS Language score remained in the average range. 19: Divergent abstract naming: pt was 10/10 x 2. 10/18/19: Divergent abstract naming: pt was 100% accurate for 6 sets. Convergent naming: pt was . 10/15/19: RBANS Language score improved to a 92 from an 88 at last re-assessment. 10/1/19: Divergent naming: pt was 80% accurate. 19: RBANS Language score of 88 remains the same as time of initial  eval. 8/30/19: Abstract divergent naming, pt was 8/10. 8/9/19: Divergent concrete category naming and pt was 15/15 x 2. 7/26/19: Divergent naming: pt was 10/10.   (Pended)   -LB     Other Adult Goal- 2  Pt will complete reasoning and problem solving assessment with goals to follow as indicated.  (Pended)   -LB     Status: Other Adult Goal- 2  New  (Pended)   -LB     Comments: Other Adult Goal- 2  11/12/19: RBANS Total score of 87, slight increase from previous assessment. 10/15/19: RBANS Total score remained at 84 (Low Average). 9/16/19: RBANS Total score improved to an 84- up from 79 at time of initial eval.   (Pended)   -LB     Other Adult Goal- 3  Pt will improve RBANS score to at least a 90 placing pt in the Average range.  (Pended)   -LB     Status: Other Adult Goal- 3  New  (Pended)   -LB     Comments: Other Adult Goal- 3  4/14/17: 10/10 for abstract categories. 3/24/17: 11/10 for concrete categories.   Pt able to name 16 items in a sixty second period of time on the RBANS. 3/17/17: For abstract category naming, pt was 100% accurate. Pt was 10/15 for abstract items. 2/23: Pt was on average 10-13/15. 2/27: Abstract items: pt was at least 10 items with 2 minute time frame allowed. 3/3: Pt was 15/15 for abstract categories  3/6/17: For abstract naming in one minute: T1:10/10 T2:8/10  (Pended)   -LB     Other Adult Goal- 4  Patient will perform executive functioning task with 70% accuracy when provided prompts only in order to improve strategic thinking.  (Pended)   -LB     Status: Other Adult Goal- 4  Progressing as expected  (Pended)   -LB     Comments: Other Adult Goal- 4  4/14/17: Pt currently selling a house, managing finances, moving arrangments, traveling out of town, booking airfares, etc. 3/27/17: For recall of 16 objects and w/o review, pt was 50% accurate and with review ? 3/24/17: visuospatial recall, pt performed in the average range on the RBANS. 3/17/17: Pt given a task that involved visual recall  as well as reasoning and problem solving and pt was ?Divided atten task while asked to perform a prospective memory task. Pt required mod cues this date. 2/23: Pt was 50-75% accurate this date with a mental flexibilty task and this was given a homework. 3/3: Card game played that was taught several weeks ago and pt was 70% accurate.  (Pended)   -LB     Other Adult Goal- 5  Patient will improve cognition as evidenced by STGs met. (LTG)  (Pended)   -LB     Status: Other Adult Goal- 5  Progressing as expected  (Pended)   -LB     Comments: Other Adult Goal- 5   4/14/17: For delayed storytelling, pt was 80% accuracy with no review strategies. 3/27/17: For delayed recall of names and pictures, pt was 80% accuracy.  3/24/17: For story re-telling, immediate and delayed, pt was borderline. 3/17/17: Pt had to recall 16 items with no review from almost 2 weeks ago and pt was 80% accurate. Pt using visualization and grouping in order to recall verbally presented information. 2/27: For visual recall of pictures, pt was 80% accurate.   (Pended)   -LB     Other Adult Goal- 6  Patient will perform alternating attention task with 80% accuracy across two trials in order to enhance mental flexibility and attention.  (Pended)   -LB     Status: Other Adult Goal- 6  Progressing as expected  (Pended)   -LB     Comments: Other Adult Goal- 6  3/27/17: For mental manipulation of three words forward and three words backward: pt was 100% accurate with 3 words and 80% accurate with four words. 3/24/17: For attention on the RBANS, pt was in the average range. Card game and pt was 80% accurate. 2/23: For answering questions given a list of four words, pt was 80% accurate. 2/27: For mental manipulation with numbers, pt was 100% accurate. 3/3: While playing cards and naming items from a delayed recall list activity, pt was 75% accurate. 3/6/17: While corssing out numbers, pt had to switch with background music provided: pt was 90% accurate.    (Pended)   -LB     Other Adult Goal- 7  Patient will perform divided attention task with 70% accuracy across two trials independently in order to improve multitasking abilities.  (Pended)   -LB     Status: Other Adult Goal- 7  Progressing as expected  (Pended)   -LB     Comments: Other Adult Goal- 7  4/14/17: For high level mult-tasking, pt was 80-90% accurate independently. 3/17/17: Pt required min cues for completing exec fxn task in the correct order. Pt to complete a maze while listening to music and pt was 50% accurate this date. 2/16: More of a prospective memory task thrown in the midst of a cognitive task and pt was 90% accurate.  2/23: For divided attention, pt was 70% accurate with min verbal cues. 3/3:While listening to music and recalling a word list, pt was 70% accurate.   (Pended)   -LB     Other Adult Goal- 8  Pt will recall short paragraph material presentally orally using strategies with 90% accuracy in order to improve immediate recall skills.  (Pended)   -LB     Status: Other Adult Goal- 8  New  (Pended)   -LB     Comments: Other Adult Goal- 8   3/27/17: For 22-36 word paragraphs, 85% accurate. For 36-50 words, 90% accurate.  pt was 3/6/17: Using a who what when where why strategy, pt was 80% accurate; without, pt was 60% accurate.   (Pended)   -LB        SLP Time Calculation    SLP Goal Re-Cert Due Date  05/26/21  (Pended)   -LB       User Key  (r) = Recorded By, (t) = Taken By, (c) = Cosigned By    Initials Name Provider Type    Gilberto Mares, Speech Therapy Student Speech Therapy Student        OP SLP Education     Row Name 05/06/21 1000       Education    Education Comments  Hmwk for paragraph inferencing.  (Pended)   -LB      User Key  (r) = Recorded By, (t) = Taken By, (c) = Cosigned By    Initials Name Effective Dates    Gilberto Mares, Speech Therapy Student 03/09/21 -         OP SLP Assessment/Plan - 05/06/21 1000        SLP Plan    Plan Comments  Cont cog tx  (Pended)    -LB       User Key  (r) = Recorded By, (t) = Taken By, (c) = Cosigned By    Initials Name Provider Type    Gilberto Mares, Speech Therapy Student Speech Therapy Student               Time Calculation:   SLP Start Time: (P) 1000  Untimed Charges  69692-EG Treatment/ST Modification Prosth Aug Alter : (P) 65  Total Minutes  Untimed Charges Total Minutes: (P) 65   Total Minutes: (P) 65    Therapy Charges for Today     Code Description Service Date Service Provider Modifiers Qty    [id number]  ST TREATMENT SPEECH 4 5/6/2021 Gilberto Araiza, Speech Therapy Student GN 1                   Gilberto Araiza, Speech Therapy Student  5/6/2021

## 2021-05-10 ENCOUNTER — HOSPITAL ENCOUNTER (OUTPATIENT)
Dept: SPEECH THERAPY | Facility: HOSPITAL | Age: 81
Setting detail: THERAPIES SERIES
Discharge: HOME | End: 2021-05-10

## 2021-05-10 DIAGNOSIS — R41.3 MEMORY LOSS: Primary | ICD-10-CM

## 2021-05-10 PROCEDURE — 92507 TX SP LANG VOICE COMM INDIV: CPT

## 2021-05-10 NOTE — THERAPY TREATMENT NOTE
Outpatient Speech Language Pathology   Adult Speech Language Cognitive Treatment Note  [facility]     Patient Name: Uche Polanco  : 1940  MRN: [id number]  Today's Date: 5/10/2021         Visit Date: 05/10/2021   Patient Active Problem List   Diagnosis   • Severe obstructive sleep apnea   • Gout   • AV block   • REM sleep behavior disorder   • Memory loss   • Postural dizziness with presyncope   • Chronic systolic congestive heart failure (CMS/HCC)   • Nonrheumatic aortic valve insufficiency          Visit Dx:    ICD-10-CM ICD-9-CM   1. Memory loss  R41.3 780.93                         SLP OP Goals     Row Name 05/10/21 0900          Goal Type Needed    Goal Type Needed  Verbal Expression;Memory;Attention/Orientation;Other Adult Goals  (Pended)   -LB        Subjective Comments    Subjective Comments  Pt alert and cooperative.  (Pended)   -LB        Verbal Expression Goals    Verbal Expression LTG's  Patient will be able to use verbal expressive language skills to communicate effectively in social/avocational/work setting  (Pended)   -LB     Patient will be able to use verbal expressive language skills to communicate effectively in social/avocational/work setting  80%:;with cues  (Pended)   -LB     Status: Patient will be able to use verbal expressive language skills to communicate effectively in social/avocational/work setting  New  (Pended)   -LB     Verbal Expression STG's  Patient will improve verbal expressive language skills by completing divergent naming tasks;Patient will improve verbal expressive language skills by listing words associated to target word provided  (Pended)   -LB     Patient will improve verbal expressive language skills by completing divergent naming tasks  80%:;with cues  (Pended)   -LB     Status: Patient will improve verbal expressive language skills by completing divergent naming tasks  Progressing as expected  (Pended)   -LB     Comments: Patient will improve verbal  expressive language skills by completing divergent naming tasks  5/10/21: Pt completed abstract and concrete divergent naming tasks with 4-5 items per 20 seconds.   (Pended)   -LB     Patient will improve verbal expressive language skills by listing words associated to target word provided  80%  (Pended)   -LB     Status: Patient will improve verbal expressive language skills by listing words associated to target word provided  New  (Pended)   -LB        Memory Goals    Memory LTG's  Patient will be able to remember  information needed to participate in activities of daily living  (Pended)   -LB     Patient will be able to remember  information needed to participate in activities of daily living  80% with cues  (Pended)   -LB     Status: Patient will be able to remember  information needed to participate in activities of daily living  Progressing as expected  (Pended)   -LB     Comments: Patient will be able to remember  information needed to participate in activities of daily living  4/29/21: Pt states that he has difficulties with watching TV and reading books due to forgetting what has happened. Pt states he has forgotten where items such as his car keys or mask are because he places them in places that he normally would not place them.  (Pended)   -LB     Memory STG's  Patient’s memory skills will be enhanced as reported by patient by utilizing internal memory strategies to recall up to 3 pieces of information after a 5- minute delay;Patient’s memory skills will be enhanced as reported by patient by using external memory aides;Patient will demonstrate improved ability to recall information by immediately recalling a series of words  (Pended)   -LB     Patient will demonstrate improved ability to recall information by immediately recalling a series of words  80%:;related;unrelated;with cues  (Pended)   -LB     Status: Patient will demonstrate improved ability to recall information by immediately recalling a  series of words  Progressing as expected  (Pended)   -LB     Comments: Patient will demonstrate improved ability to recall information by immediately recalling a series of words  21: Pt recall of 4 word array was 40% with minimal support. 5/3/21: Pt played card game Forget Me Not and was 20% accurate with moderate cues.19: 50-65 word paragraphs for immediate recall and pt was on average 80% accurate. 12/10/19: Memory for puzzles and pt was 70% accurate. 19: Immediate recall of memory book puzzle: pt was 70% accurate. 11/15/19: Immediate recall of Memory Puzzle Book and pt was 75% accurate. 19: ABC game for Immediate recall and pt was 70% accurate I'ly.  19: Recall of rules of familiar card game and pt was 80% accurate. 10/21/19: Recall from paragraph in an executive fxn task and pt was 50% accurate with mod cues. 10/18/19: Immediate recall for Memory Matches i pad game and pt was 70% accurate.  Fastest time memory matches and pt was impulsive but completed in a little 60 secs. 10/15/19: RBANS Immediate recall score dropped to a 73 from an 83 at last re-assessment. 10/4/19: Memory Recall for a picture scene and pt was 75% accurate. 19: Recalling Boxed Information for numbers: pt was 80% accurate. 19: Spaced Retrieval Method: pt was 12. 19: RBANS Immediate recall and pt improved score to an 83 up from 49 at time of eval. 19: Immediate recall of boxed information: pt was 85% accurate. 19: Immediate recall of shapes and figures (6) and pt was 75% accurate.  9/3/19: Immediate recall of shapes and figures (5) and pt improved to 80% accurate.  19: Pt was able to recall 5 related words with accuracy, 6 words, pt was 5/6. 19: 50-65 words: pt was 6/6 x 3 for immediate story recall. 19: 36-50 word paragraphs for immediate recall: pt was 7/8. Immediate recall for storytellin/3, using chunking: 3/3,   (Pended)   -LB     Patient’s memory skills will be enhanced  as reported by patient by utilizing internal memory strategies to recall up to 3 pieces of information after a 5- minute delay  80%:;with cues  (Pended)   -LB     Status: Patient’s memory skills will be enhanced as reported by patient by utilizing internal memory strategies to recall up to 3 pieces of information after a 5- minute delay  Progressing as expected  (Pended)   -LB     Comments: Patient’s memory skills will be enhanced as reported by patient by utilizing internal memory strategies to recall up to 3 pieces of information after a 5- minute delay  5/3/21: Pt recall of 5 names associated with faces was 100% independently. 12/24/19: Delayed recall of 6 un-related words and pt was 6/6. 12/17/19: Delayed recall of 4 un-related words from hmwk and pt was 4/4 x 2. 5 un-related words and pt was 3/5 I'ly and with increased delay, pt was 4/5 I'ly. 12/10/19: Delayed recall of 8 related words and pt was 100% accurate. 4 un-related words, pt was 2/4, 3/4, 4/4.  12/5/19: Delayed recall of 8 related words and pt was 8/8 x 2. 11/15/19: Delayed recall of 7 un-related words from hmwk and pt was 7/7 x 3. 11/12/19: RBANS Delayed recall score of 95 was improved from last re-assessment. 11/5/19: Delayed recall of 7 un-related words from homework and previous sessions and pt was 7/7. 11/1/19: Delayed recall of 6 un-related words: pt was 6/6 and added 2 more words and pt was 6/7 and then 6/8.  10/25/19: Delayed recall of 4 un-related words, pt was 4/4 with min cues, Independently got it 4/4 x 2.  10/21/19: Delayed recall for 7 un-related words from mk and pt was 6/7. With review and increased delay, pt was 7/7.  10/18/19: Delayed recall for 5 un-related word from homework and pt was 5/5 x 3. Added word 10/15/19: RBANS Delayed recall score of 78 dropped slightly from an 81 at previous re-assessment. 10/1/19:Delayed recall of paragraph information and pt was 2/3 for topics. 9/27/19: Delayed recall of 12 groupable pictures and pt  was 12/12 x 2. 9/24/19: Delayed recall of 16 groupable words and pt was 14/16. Increased delay of 16 groupable words: pt was 10/16.  9/20/19: Delayed recall of words from SRT and pt was 3/12. 9/16/19: RBANS Delayed recall score of 81 down from 87 at time of eval.- remains in low average range.  9/3/19: Delayed recall of 8 un-related words and pt was 8/8 x 2. 8/30/19: Delayed recall of 7 un-related pictures, pt was 7/7 x 2. 8/23/19: Delayed recall of 6 un-related words: pt was 6/6 x 3- gave 7 for hmwk. 8/20/19: Delayed recall of 5 un-related words and pt was 5/5 x 2. 6 un-related words: pt was 4/6.  8/16/19: Delayed recall of 8 related words, pt was 8/8 x 2. 4 un-related words, pt was 4/4 x 2. 8/9/19: Delayed recall of 7 words: pt was 6/7 x 2, 7/7 x 2 8/6/19: Delayed recall of 5 related words and pt was 5/5, 6 related words: pt was 6/6. 7 related words using alphabetical order and chunking according to starting letter and pt was 6/7 with min cue.  7/26/19: Delayed recall of 4 related words and pt was 3/4 I'ly; 4/4 x 2. Increased to 5 related words and pt was 4/5 and then 5/5.   (Pended)   -LB     Patient’s memory skills will be enhanced as reported by patient by using external memory aides  80%:;with cues  (Pended)   -LB     Status: Patient’s memory skills will be enhanced as reported by patient by using external memory aides  Progressing as expected  (Pended)   -LB     Comments: Patient’s memory skills will be enhanced as reported by patient by using external memory aides  5/3/21: Pt continues to use his planner to remember dates/plans. 12/24/19: Pt continues to use his planner. 11/7/19: Pt using his day planner for daily and monthly use. 10/25/19: Using planner for calendar with 100% accurate. Use of daily entries, pt was 80% accurate. 9/20/19: Pt is using planner 90% of the time with mild-moderate level of details. 9/6/19: Pt is making daily notes in his planner. 9/3/19: Pt using his calendar consistently however,  pt not using the daily journaling section as much as SLP would like. 8/30/19: Pt required min cues to catch up on daily entries. 8/16/19: Pt continues to use his planner daily. 8/9/19: Pt with increased use of his planner with daily notes.  7/26/19: Fxnl use of planner: pt was writing down his TO DO LIST and not what he had done.   (Pended)   -LB        Attention/Orientation Goals    Attention/Orientation LTG's  Patient will be able to participate in the community safely  (Pended)   -LB     Patient will be able to participate in the community safely  Independently  (Pended)   -LB     Status: Patient will be able to participate in the community safely  Progressing as expected  (Pended)   -LB     Comments: Patient will be able to participate in the community safely  5/10/21: Pt states he enjoyed time with his family over the weekend. 5/6/21: Pt states he attended his son in laws birthday party, [employer], and went driving around town over the past week.  (Pended)   -LB     Attention/Orientation STG's  Patient will improve attention skills by sustaining consistent behavioral response during continuous and repetitive activity (e.g., listening for target words, auditory/reading comprehension tasks);Patient will improve attention skills by focusing to selective target/task when presented with competing stimuli or in a distracting environment in order to complete task;Patient will improve attention skills by alternating or shifting focus between two different tasks in order to complete both tasks;Patient will improve attention skills by dividing focus and responding simultaneously to multiple tasks or in order to complete task  (Pended)   -LB     Patient will improve attention skills by sustaining consistent behavioral response during continuous and repetitive activity (e.g., listening for target words, auditory/reading comprehension tasks)  with cues;5 minute task  (Pended)   -LB     Status: Patient will improve attention  skills by sustaining consistent behavioral response during continuous and repetitive activity (e.g., listening for target words, auditory/reading comprehension tasks)  Progressing as expected  (Pended)   -LB     Comments: Patient will improve attention skills by sustaining consistent behavioral response during continuous and repetitive activity (e.g., listening for target words, auditory/reading comprehension tasks)  5/10/21: Pt completed paragraph inferencing homework task with 90% accuracy. 5/6/21: Pt completed paragraph inferencing task with 60% accuracy with minimal cues.  (Pended)   -LB     Patient will improve attention skills by focusing to selective target/task when presented with competing stimuli or in a distracting environment in order to complete task  80%:;with cues  (Pended)   -LB     Status: Patient will improve attention skills by focusing to selective target/task when presented with competing stimuli or in a distracting environment in order to complete task  Progressing as expected  (Pended)   -LB     Comments: Patient will improve attention skills by focusing to selective target/task when presented with competing stimuli or in a distracting environment in order to complete task  5/10/21: Pt completed Sort then Stack card game with 100% accuracy independently. 5/6/21: Pt completed visual scanning activity with 100% accuracy independently. 5/3/21: Pt played Forget Me Not card game with field of 21, 12, and 4 cards 20% accuracy and moderate cues.  (Pended)   -LB     Patient will improve attention skills by alternating or shifting focus between two different tasks in order to complete both tasks  80%:;with cues  (Pended)   -LB     Status: Patient will improve attention skills by alternating or shifting focus between two different tasks in order to complete both tasks  Progressing as expected  (Pended)   -LB     Comments: Patient will improve attention skills by alternating or shifting focus between two  different tasks in order to complete both tasks  5/10/21: Pt completed scoreboard card game and was 100% accurate with minimal cues. 19: [place] in the [place], pt was 80% accurate with min cues. 12/10/19: Attention for [place] in the [place] and pt was 80% accurate with mod cues. 11/15/19: Attention to detail for use of a chart and analyzing numbers- new task for pt and pt was 90% accurate. 19: Divided Attention APT score was Below Average. 10/21/19: While completing an executive function task, pt exhibited difficulty with attention and required mod cues and was 70% accurate. 19: While cards being turned over, pt had to tap table if the card played was lower than the previous one. Pt was 60% accurate. 9/10/19: Pt able to recall up to 7 digits. 19: Visual Scanning: Pt required mod cues for alternating between letters and was 80 % accurate.  19: Card game and pt was 80% accurate for new rules and watching increasing piles of cards for potential moves.  19: Sorting card by suit and  and pt was 70% accurate. 19: Sorting card by suit and  and pt required mod cues and repeated instructions and pt was 60% accurate.   (Pended)   -LB     Patient will improve attention skills by dividing focus and responding simultaneously to multiple tasks or in order to complete task  80%:;with cues  (Pended)   -LB     Status: Patient will improve attention skills by dividing focus and responding simultaneously to multiple tasks or in order to complete task  Progressing as expected  (Pended)   -LB     Comments: Patient will improve attention skills by dividing focus and responding simultaneously to multiple tasks or in order to complete task  21: Pt recall of 4 words and answering question regarding words was 50%. 5/3/21: Pt completed Which Way card game with 90% accuracy independently.  (Pended)   -LB        Other Goals    Other Adult Goal- 1  Pt will complete high level thought organization tasks  with 80% accuracy.   (Pended)   -LB     Status: Other Adult Goal- 1  Progressing as expected  (Pended)   -LB     Comments: Other Adult Goal- 1  5/10/21: Pt returned with homework and was 90% accurate with paragraph inferencing. 5/6/21: Pt completed paragraph inferencing task with 60% accuracy and minimcal cues. 12/24/19: Divergent naming: pt was 80% accurate. 11/12/19: RBANS Language score remained in the average range. 11/1/19: Divergent abstract naming: pt was 10/10 x 2. 10/18/19: Divergent abstract naming: pt was 100% accurate for 6 sets. Convergent naming: pt was 21/25. 10/15/19: RBANS Language score improved to a 92 from an 88 at last re-assessment. 10/1/19: Divergent naming: pt was 80% accurate. 9/16/19: RBANS Language score of 88 remains the same as time of initial eval. 8/30/19: Abstract divergent naming, pt was 8/10. 8/9/19: Divergent concrete category naming and pt was 15/15 x 2. 7/26/19: Divergent naming: pt was 10/10.   (Pended)   -LB     Other Adult Goal- 2  Pt will improve RBANS score to at least a 90 placing pt in the Average range.  (Pended)   -LB     Status: Other Adult Goal- 2  Progressing as expected  (Pended)   -LB     Comments: Other Adult Goal- 2  4/14/17: 10/10 for abstract categories. 3/24/17: 11/10 for concrete categories.   Pt able to name 16 items in a sixty second period of time on the RBANS. 3/17/17: For abstract category naming, pt was 100% accurate. Pt was 10/15 for abstract items. 2/23: Pt was on average 10-13/15. 2/27: Abstract items: pt was at least 10 items with 2 minute time frame allowed. 3/3: Pt was 15/15 for abstract categories  3/6/17: For abstract naming in one minute: T1:10/10 T2:8/10  (Pended)   -LB     Other Adult Goal- 3  --  (Pended)   -LB     Status: Other Adult Goal- 3  --  (Pended)   -LB     Comments: Other Adult Goal- 3  --  (Pended)   -LB     Other Adult Goal- 4  --  (Pended)   -LB     Status: Other Adult Goal- 4  --  (Pended)   -LB     Comments: Other Adult Goal- 4   --  (Pended)   -LB     Other Adult Goal- 5  --  (Pended)   -LB     Status: Other Adult Goal- 5  --  (Pended)   -LB     Comments: Other Adult Goal- 5  --  (Pended)   -LB     Other Adult Goal- 6  --  (Pended)   -LB     Status: Other Adult Goal- 6  --  (Pended)   -LB     Comments: Other Adult Goal- 6  --  (Pended)   -LB     Other Adult Goal- 7  --  (Pended)   -LB     Status: Other Adult Goal- 7  --  (Pended)   -LB     Comments: Other Adult Goal- 7  --  (Pended)   -LB     Other Adult Goal- 8  --  (Pended)   -LB     Status: Other Adult Goal- 8  --  (Pended)   -LB     Comments: Other Adult Goal- 8  --  (Pended)   -LB        SLP Time Calculation    SLP Goal Re-Cert Due Date  05/26/21  (Pended)   -LB       User Key  (r) = Recorded By, (t) = Taken By, (c) = Cosigned By    Initials Name Provider Type    Gilberto Mares Speech Therapy Student Speech Therapy Student        OP SLP Education     Row Name 05/10/21 0900       Education    Education Comments  Hmwk for abstract and concrete adding to category divergent task.  (Pended)   -LB      User Key  (r) = Recorded By, (t) = Taken By, (c) = Cosigned By    Initials Name Effective Dates    Gilberto Mares Speech Therapy Student 03/09/21 -         OP SLP Assessment/Plan - 05/10/21 0900        SLP Plan    Plan Comments  Cont cog tx  (Pended)    -LB      User Key  (r) = Recorded By, (t) = Taken By, (c) = Cosigned By    Initials Name Provider Type    Gilberto Mares Speech Therapy Student Speech Therapy Student               Time Calculation:   SLP Start Time: (P) 0900  Untimed Charges  53589-FD Treatment/ST Modification Prosth Aug Alter : (P) 60  Total Minutes  Untimed Charges Total Minutes: (P) 60   Total Minutes: (P) 60    Therapy Charges for Today     Code Description Service Date Service Provider Modifiers Qty    [id number] HC ST TREATMENT SPEECH 4 5/10/2021 Gilberto Araiza, Speech Therapy Student GN 1                   Gilberto Araiza Speech Therapy Student  5/10/2021

## 2021-05-11 ENCOUNTER — OFFICE VISIT (OUTPATIENT)
Dept: CARDIOLOGY | Facility: CLINIC | Age: 81
End: 2021-05-11

## 2021-05-11 VITALS
BODY MASS INDEX: 25.84 KG/M2 | WEIGHT: 195 LBS | HEART RATE: 66 BPM | DIASTOLIC BLOOD PRESSURE: 70 MMHG | OXYGEN SATURATION: 97 % | HEIGHT: 73 IN | SYSTOLIC BLOOD PRESSURE: 138 MMHG

## 2021-05-11 DIAGNOSIS — I44.30 AV BLOCK: ICD-10-CM

## 2021-05-11 DIAGNOSIS — I50.22 CHRONIC SYSTOLIC CONGESTIVE HEART FAILURE (HCC): Primary | ICD-10-CM

## 2021-05-11 DIAGNOSIS — I35.1 NONRHEUMATIC AORTIC VALVE INSUFFICIENCY: ICD-10-CM

## 2021-05-11 PROCEDURE — 99214 OFFICE O/P EST MOD 30 MIN: CPT | Performed by: INTERNAL MEDICINE

## 2021-05-11 PROCEDURE — 93280 PM DEVICE PROGR EVAL DUAL: CPT | Performed by: INTERNAL MEDICINE

## 2021-05-11 RX ORDER — CLOPIDOGREL BISULFATE 75 MG/1
75 TABLET ORAL DAILY
Qty: 90 TABLET | Refills: 1 | Status: SHIPPED | OUTPATIENT
Start: 2021-05-11 | End: 2021-11-02

## 2021-05-11 NOTE — PROGRESS NOTES
[facility]  Office visit  Uche Polanco  1940  [phone]  VISIT DATE:  5/11/2021      PCP: Jd Tapia MD  [id number] [facility] [address]  [facility] [address]    CC:  Chief Complaint   Patient presents with   • Coronary Artery Disease       PROBLEM LIST:  1. Valvular heart disease:  a. History of mitral valve repair at [facility] 2005, records pending.  b. Echocardiogram, August 2014: EF normal at 50% to 55%, moderate AR, mild MR, left atrium at 3.8 cm.   c. Echo, January 2017: EF 50%, moderate aortic insufficiency  2. Recent TIA:  a. Status post Medtronic link loop recorder implantation, November 2014.  b. Recent interrogation revealing AV block, pauses, symptomatic with dizziness and lightheadedness.  3. Obstructive sleep apnea requiring CPAP.   4. Benign hypertension.   5. Gout.   6. Coronary artery disease by report, data insufficient.   7. REM disorder.   8. Dyslipidemia.   9. Family history of hypertension.   10. AV block, symptomatic with lightheadedness, status post pacemaker implantation, 04/17/2015, Dr. Robert Gerber: Medtronic Advisa  [id number] [id number].   11. Surgical history:   a. Hemorrhoidectomy.   b. Basal cell carcinoma removed.   c. Valvular repair (mitral valve).      Cardiac studies and procedures:  February 2019 echo  · Left ventricular systolic function is moderately decreased.  · Estimated EF appears to be in the range of 36 - 40%.  · The following left ventricular wall segments are dyskinetic: apical lateral, apical inferior, apical septal and apex. The following left ventricular wall segments are akinetic: apical anterior.  · Left ventricular wall thickness is consistent with mild concentric hypertrophy.  · Mild-to-moderate mitral valve stenosis is present  · Mild to moderate aortic valve regurgitation is present.    March 2019 myocardial perfusion imaging  · Left ventricular ejection fraction is normal (Calculated EF = 54%).  · Fixed defect  consistent with moderate size apical infarct with extension into the inferior wall, associated moderate hypokinesis.  · No ischemia visualized  · Impressions are consistent with an intermediate risk study.    December 2019 echo  · Left ventricular systolic function is mildly decreased.  · Estimated EF appears to be in the range of 41 - 45%.  · The following left ventricular wall segments are akinetic: apical anterior, apical inferior, apical septal and apex. Diastolic wall thinning consistent with apical infarction.    June 2020 echo  · Estimated EF appears to be in the range of 56 - 60%  · The following left ventricular wall segments are hypokinetic: apical septal and apex hypokinetic.  · Left atrial cavity size is mild-to-moderately dilated.  · Saline test results are negative for right to left atrial level shunt.  · Moderate aortic valve regurgitation is present.    ASSESSMENT:   Diagnosis Plan   1. Chronic systolic congestive heart failure (CMS/HCC)     2. AV block     3. Nonrheumatic aortic valve insufficiency       Device interrogation:  Medtronic dual-chamber pacemaker  Dependent  Sensed RA 3.9 mV, threshold is 0.6 V at 0.4 ms, impedance 532 ohms  RV threshold 0.6 V at 0.4 ms, impedance 665 ohms  Estimated battery life 4 years  4 episodes NSVT, longest 12 beats in duration.    PLAN:  Cardiomyopathy:  I really feel fairly confident that his apical regional wall motion abnormalities are due to either previous small infarct or high-grade distal LAD disease.  Currently asymptomatic.  Improving EF on medical therapy.  If he has worsening regional wall motion abnormalities, declining functional capacity, or worsening EF will proceed with definitive evaluation of coronary anatomy with left heart catheterization.  Stage III chronic kidney disease.  Developed episodes of symptomatic hypotension and lightheadedness on carvedilol and bisoprolol 2.5 mg p.o. Nightly.  Has not tolerated titration of medical therapy due to  "intermittent hypotension.    Mitral valve prolapse status post mitral valve repair: Stable on exam today.  Continue routine clinical follow-up and intermittent echocardiographic surveillance    Aortic insufficiency: Moderate and stable.  Continue clinical follow-up and surveillance echocardiographic assessment.    History of TIA: No evidence of atrial arrhythmia.  Continue Plavix for stroke prophylaxis.  Continue statin.  Afterload controlled, goal less than 130/80 mmHg.    Hypertension: Goal less than 130/80 mmHg.  Continue current medical therapy.    Symptomatic high-grade AV block: Device dependent.  Currently asymptomatic, continue routine follow-up in device clinic.    Hyperlipidemia: Goal LDL less than 70, continue statin.      Subjective  No change in baseline functional capacity.  Ongoing therapy for developing cognitive impairment.  Walking outside on nice days without difficulty.  Blood pressures probably running less than 130/80 mmHg.  Tolerating statin without myalgias.  Denies bleeding complications on Plavix, mild intermittent bruising.  Denies chest pain, palpitations or dyspnea on exertion.  compliant with medical therapy.      PHYSICAL EXAMINATION:  Vitals:    05/11/21 1045   Weight: 88.5 kg (195 lb)   Height: 185.4 cm (73\")     General Appearance:    Alert, cooperative, no distress, appears stated age   Head:    Normocephalic, without obvious abnormality, atraumatic   Eyes:    conjunctiva/corneas clear   Nose:   Nares normal, septum midline, mucosa normal, no drainage   Throat:   Lips, teeth and gums normal   Neck:   Supple, symmetrical, trachea midline, no carotid    bruit or JVD   Lungs:     Clear to auscultation bilaterally, respirations unlabored   Chest Wall:    No tenderness or deformity    Heart:    Regular rate and rhythm, S1 and S2 normal, no murmur, rub   or gallop, normal carotid impulse bilaterally without bruit.   Abdomen:     Soft, non-tender   Extremities:   Extremities normal, " atraumatic, no cyanosis or edema   Pulses:   2+ and symmetric all extremities   Skin:   Skin color, texture, turgor normal, no rashes or lesions       Diagnostic Data:  Procedures  Lab Results   Component Value Date    CHLPL 190 08/22/2014    TRIG 93 07/23/2020    HDL 34 (L) 07/23/2020     Lab Results   Component Value Date    GLUCOSE 91 07/23/2020    BUN 18 07/23/2020    CREATININE 1.03 07/23/2020     07/23/2020    K 3.8 07/23/2020     07/23/2020    CO2 26.0 07/23/2020     Lab Results   Component Value Date    HGBA1C 5.30 07/22/2020     Lab Results   Component Value Date    WBC 5.14 07/23/2020    HGB 12.1 (L) 07/23/2020    HCT 37.4 (L) 07/23/2020     07/23/2020       Allergies  Allergies   Allergen Reactions   • Sulfa Antibiotics Rash       Current Medications    Current Outpatient Medications:   •  atorvastatin (LIPITOR) 20 MG tablet, Take 1 tablet by mouth Daily., Disp: 30 tablet, Rfl: 1  •  bisoprolol (ZEBeta) 5 MG tablet, TAKE 1/2 TABLET BY MOUTH EVERY NIGHT, Disp: 45 tablet, Rfl: 1  •  clonazePAM (KlonoPIN) 1 MG tablet, Take 1 tablet by mouth every night at bedtime., Disp: 90 tablet, Rfl: 0  •  clopidogrel (PLAVIX) 75 MG tablet, Take 1 tablet by mouth Daily., Disp: 90 tablet, Rfl: 0  •  donepezil (ARICEPT) 10 MG tablet, Take 1 tablet by mouth Every Night., Disp: 90 tablet, Rfl: 3  •  escitalopram (LEXAPRO) 10 MG tablet, Take 1 tablet by mouth Daily., Disp: 90 tablet, Rfl: 3  •  memantine (NAMENDA) 10 MG tablet, Take 1/2 tab daily x 2 wks, then 1/2 tab bid x 2 wks, then 1/2 tab am and 1 tab pm x 2 wks then 1 tab bid and continue, Disp: 180 tablet, Rfl: 1  •  Multiple Vitamins-Minerals (PRESERVISION AREDS 2 PO), Take 1 tablet by mouth 2 (Two) Times a Day., Disp: , Rfl:   •  mupirocin (BACTROBAN) 2 % ointment, APPLY A THIN LAYER TO SURGICAL SITE QD AT BANDAGE CHANGE, Disp: , Rfl:           ROS  Review of Systems   Cardiovascular: Negative for chest pain, dyspnea on exertion, irregular heartbeat  and palpitations.   Respiratory: Negative for cough, shortness of breath and sleep disturbances due to breathing.    Neurological: Positive for light-headedness.         SOCIAL HX  Social History     Socioeconomic History   • Marital status:      Spouse name: Not on file   • Number of children: Not on file   • Years of education: Not on file   • Highest education level: Not on file   Tobacco Use   • Smoking status: Never Smoker   • Smokeless tobacco: Never Used   Vaping Use   • Vaping Use: Never used   Substance and Sexual Activity   • Alcohol use: Yes     Alcohol/week: 1.0 - 4.0 standard drinks     Types: 1 - 4 Glasses of wine per week     Comment: week with dinner   • Drug use: No   • Sexual activity: Defer       FAMILY HX  Family History   Problem Relation Age of Onset   • Ovarian cancer Mother    • Lymphoma Father    • Cancer Father    • Hypertension Other              Jd Limon III, MD, FACC

## 2021-05-13 ENCOUNTER — HOSPITAL ENCOUNTER (OUTPATIENT)
Dept: SPEECH THERAPY | Facility: HOSPITAL | Age: 81
Setting detail: THERAPIES SERIES
Discharge: HOME | End: 2021-05-13

## 2021-05-13 DIAGNOSIS — R41.3 MEMORY LOSS: Primary | ICD-10-CM

## 2021-05-13 PROCEDURE — 92507 TX SP LANG VOICE COMM INDIV: CPT

## 2021-05-13 NOTE — THERAPY TREATMENT NOTE
Outpatient Speech Language Pathology   Adult Speech Language Cognitive Treatment Note  [facility]     Patient Name: Uche Polanco  : 1940  MRN: [id number]  Today's Date: 2021         Visit Date: 2021   Patient Active Problem List   Diagnosis   • Severe obstructive sleep apnea   • Gout   • AV block   • REM sleep behavior disorder   • Memory loss   • Postural dizziness with presyncope   • Chronic systolic congestive heart failure (CMS/HCC)   • Nonrheumatic aortic valve insufficiency          Visit Dx:    ICD-10-CM ICD-9-CM   1. Memory loss  R41.3 780.93                         SLP OP Goals     Row Name 21 0900          Goal Type Needed    Goal Type Needed  Verbal Expression;Memory;Attention/Orientation;Other Adult Goals  (Pended)   -LB        Subjective Comments    Subjective Comments  Pt alert and cooperative. Pt returned with homework.  (Pended)   -LB        Verbal Expression Goals    Verbal Expression LTG's  Patient will be able to use verbal expressive language skills to communicate effectively in social/avocational/work setting  (Pended)   -LB     Patient will be able to use verbal expressive language skills to communicate effectively in social/avocational/work setting  80%:;with cues  (Pended)   -LB     Status: Patient will be able to use verbal expressive language skills to communicate effectively in social/avocational/work setting  New  (Pended)   -LB     Verbal Expression STG's  Patient will improve verbal expressive language skills by completing divergent naming tasks;Patient will improve verbal expressive language skills by listing words associated to target word provided  (Pended)   -LB     Patient will improve verbal expressive language skills by completing divergent naming tasks  80%:;with cues  (Pended)   -LB     Status: Patient will improve verbal expressive language skills by completing divergent naming tasks  Progressing as expected  (Pended)   -LB     Comments: Patient  will improve verbal expressive language skills by completing divergent naming tasks  5/13/21: Pt completed divergent naming task and was 70% accurate with minimal cues.5/10/21: Pt completed abstract and concrete divergent naming tasks with 4-5 items per 20 seconds.   (Pended)   -LB     Patient will improve verbal expressive language skills by listing words associated to target word provided  80%  (Pended)   -LB     Status: Patient will improve verbal expressive language skills by listing words associated to target word provided  Progressing as expected  (Pended)   -LB     Comments: Patient will improve verbal expressive language skills by listing words associated to target word provided  5/13/21: Pt 70% accurate with minimal cues.  (Pended)   -LB        Memory Goals    Memory LTG's  Patient will be able to remember  information needed to participate in activities of daily living  (Pended)   -LB     Patient will be able to remember  information needed to participate in activities of daily living  80% with cues  (Pended)   -LB     Status: Patient will be able to remember  information needed to participate in activities of daily living  Progressing as expected  (Pended)   -LB     Comments: Patient will be able to remember  information needed to participate in activities of daily living  4/29/21: Pt states that he has difficulties with watching TV and reading books due to forgetting what has happened. Pt states he has forgotten where items such as his car keys or mask are because he places them in places that he normally would not place them.  (Pended)   -LB     Memory STG's  Patient’s memory skills will be enhanced as reported by patient by utilizing internal memory strategies to recall up to 3 pieces of information after a 5- minute delay;Patient’s memory skills will be enhanced as reported by patient by using external memory aides;Patient will demonstrate improved ability to recall information by immediately  recalling a series of words  (Pended)   -LB     Patient will demonstrate improved ability to recall information by immediately recalling a series of words  80%:;related;unrelated;with cues  (Pended)   -LB     Status: Patient will demonstrate improved ability to recall information by immediately recalling a series of words  Progressing as expected  (Pended)   -LB     Comments: Patient will demonstrate improved ability to recall information by immediately recalling a series of words  5/13/21: Pt recall of 3 cards from up to array of 9 was 60% with minimal support. 5/6/21: Pt recall of 4 word array was 40% with minimal support. 5/3/21: Pt played card game Forget Me Not and was 20% accurate with moderate cues.12/24/19: 50-65 word paragraphs for immediate recall and pt was on average 80% accurate. 12/10/19: Memory for puzzles and pt was 70% accurate. 12/5/19: Immediate recall of memory book puzzle: pt was 70% accurate. 11/15/19: Immediate recall of Memory Puzzle Book and pt was 75% accurate. 11/7/19: ABC game for Immediate recall and pt was 70% accurate I'ly.  11/5/19: Recall of rules of familiar card game and pt was 80% accurate. 10/21/19: Recall from paragraph in an executive fxn task and pt was 50% accurate with mod cues. 10/18/19: Immediate recall for Memory Matches i pad game and pt was 70% accurate.  Fastest time memory matches and pt was impulsive but completed in a little 60 secs. 10/15/19: RBANS Immediate recall score dropped to a 73 from an 83 at last re-assessment. 10/4/19: Memory Recall for a picture scene and pt was 75% accurate. 9/24/19: Recalling Boxed Information for numbers: pt was 80% accurate. 9/20/19: Spaced Retrieval Method: pt was 4/12. 9/16/19: RBANS Immediate recall and pt improved score to an 83 up from 49 at time of eval. 9/13/19: Immediate recall of boxed information: pt was 85% accurate. 9/6/19: Immediate recall of shapes and figures (6) and pt was 75% accurate.  9/3/19: Immediate recall of  shapes and figures (5) and pt improved to 80% accurate.  19: Pt was able to recall 5 related words with accuracy, 6 words, pt was 5/6. 19: 50-65 words: pt was 6/6 x 3 for immediate story recall. 19: 36-50 word paragraphs for immediate recall: pt was 7/8. Immediate recall for storytellin/3, using chunking: 3/3,   (Pended)   -LB     Patient’s memory skills will be enhanced as reported by patient by utilizing internal memory strategies to recall up to 3 pieces of information after a 5- minute delay  80%:;with cues  (Pended)   -LB     Status: Patient’s memory skills will be enhanced as reported by patient by utilizing internal memory strategies to recall up to 3 pieces of information after a 5- minute delay  Progressing as expected  (Pended)   -LB     Comments: Patient’s memory skills will be enhanced as reported by patient by utilizing internal memory strategies to recall up to 3 pieces of information after a 5- minute delay  21: Pt recall of 10 mixed word assosciations was 80% with minimal support. 5/3/21: Pt recall of 5 names associated with faces was 100% independently. 19: Delayed recall of 6 un-related words and pt was 6/6. 19: Delayed recall of 4 un-related words from hmwk and pt was 4/4 x 2. 5 un-related words and pt was 3/5 I'ly and with increased delay, pt was 4/5 I'ly. 12/10/19: Delayed recall of 8 related words and pt was 100% accurate. 4 un-related words, pt was 2/4, 3/4, 4/4.  19: Delayed recall of 8 related words and pt was 8/8 x 2. 11/15/19: Delayed recall of 7 un-related words from hmwk and pt was 7/7 x 3. 19: RBANS Delayed recall score of 95 was improved from last re-assessment. 19: Delayed recall of 7 un-related words from homework and previous sessions and pt was 7/7. 19: Delayed recall of 6 un-related words: pt was 6/6 and added 2 more words and pt was 6/7 and then 68.  10/25/19: Delayed recall of 4 un-related words, pt was 4/4 with min cues,  Independently got it 4/4 x 2.  10/21/19: Delayed recall for 7 un-related words from hwmk and pt was 6/7. With review and increased delay, pt was 7/7.  10/18/19: Delayed recall for 5 un-related word from homework and pt was 5/5 x 3. Added word 10/15/19: RBANS Delayed recall score of 78 dropped slightly from an 81 at previous re-assessment. 10/1/19:Delayed recall of paragraph information and pt was 2/3 for topics. 9/27/19: Delayed recall of 12 groupable pictures and pt was 12/12 x 2. 9/24/19: Delayed recall of 16 groupable words and pt was 14/16. Increased delay of 16 groupable words: pt was 10/16.  9/20/19: Delayed recall of words from SRT and pt was 3/12. 9/16/19: RBANS Delayed recall score of 81 down from 87 at time of eval.- remains in low average range.  9/3/19: Delayed recall of 8 un-related words and pt was 8/8 x 2. 8/30/19: Delayed recall of 7 un-related pictures, pt was 7/7 x 2. 8/23/19: Delayed recall of 6 un-related words: pt was 6/6 x 3- gave 7 for hmwk. 8/20/19: Delayed recall of 5 un-related words and pt was 5/5 x 2. 6 un-related words: pt was 4/6.  8/16/19: Delayed recall of 8 related words, pt was 8/8 x 2. 4 un-related words, pt was 4/4 x 2. 8/9/19: Delayed recall of 7 words: pt was 6/7 x 2, 7/7 x 2 8/6/19: Delayed recall of 5 related words and pt was 5/5, 6 related words: pt was 6/6. 7 related words using alphabetical order and chunking according to starting letter and pt was 6/7 with min cues.  (Pended)   -LB     Patient’s memory skills will be enhanced as reported by patient by using external memory aides  80%:;with cues  (Pended)   -LB     Status: Patient’s memory skills will be enhanced as reported by patient by using external memory aides  Progressing as expected  (Pended)   -LB     Comments: Patient’s memory skills will be enhanced as reported by patient by using external memory aides  5/3/21: Pt continues to use his planner to remember dates/plans. 12/24/19: Pt continues to use his planner.  11/7/19: Pt using his day planner for daily and monthly use. 10/25/19: Using planner for calendar with 100% accurate. Use of daily entries, pt was 80% accurate. 9/20/19: Pt is using planner 90% of the time with mild-moderate level of details. 9/6/19: Pt is making daily notes in his planner. 9/3/19: Pt using his calendar consistently however, pt not using the daily journaling section as much as SLP would like. 8/30/19: Pt required min cues to catch up on daily entries. 8/16/19: Pt continues to use his planner daily. 8/9/19: Pt with increased use of his planner with daily notes.  7/26/19: Fxnl use of planner: pt was writing down his TO DO LIST and not what he had done.   (Pended)   -LB        Attention/Orientation Goals    Attention/Orientation LTG's  Patient will be able to participate in the community safely  (Pended)   -LB     Patient will be able to participate in the community safely  Independently  (Pended)   -LB     Status: Patient will be able to participate in the community safely  Progressing as expected  (Pended)   -LB     Comments: Patient will be able to participate in the community safely  5/10/21: Pt states he enjoyed time with his family over the weekend. 5/6/21: Pt states he attended his son in laws birthday party, [employer], and went driving around town over the past week.  (Pended)   -LB     Attention/Orientation STG's  Patient will improve attention skills by sustaining consistent behavioral response during continuous and repetitive activity (e.g., listening for target words, auditory/reading comprehension tasks);Patient will improve attention skills by focusing to selective target/task when presented with competing stimuli or in a distracting environment in order to complete task;Patient will improve attention skills by alternating or shifting focus between two different tasks in order to complete both tasks;Patient will improve attention skills by dividing focus and responding simultaneously to  multiple tasks or in order to complete task  (Pended)   -LB     Patient will improve attention skills by sustaining consistent behavioral response during continuous and repetitive activity (e.g., listening for target words, auditory/reading comprehension tasks)  with cues;5 minute task  (Pended)   -LB     Status: Patient will improve attention skills by sustaining consistent behavioral response during continuous and repetitive activity (e.g., listening for target words, auditory/reading comprehension tasks)  Progressing as expected  (Pended)   -LB     Comments: Patient will improve attention skills by sustaining consistent behavioral response during continuous and repetitive activity (e.g., listening for target words, auditory/reading comprehension tasks)  5/13/21: Pt completed visual scanning activity with 100% accuracy independently. 5/10/21: Pt completed paragraph inferencing homework task with 90% accuracy. 5/6/21: Pt completed paragraph inferencing task with 60% accuracy with minimal cues.  (Pended)   -LB     Patient will improve attention skills by focusing to selective target/task when presented with competing stimuli or in a distracting environment in order to complete task  80%:;with cues  (Pended)   -LB     Status: Patient will improve attention skills by focusing to selective target/task when presented with competing stimuli or in a distracting environment in order to complete task  Progressing as expected  (Pended)   -LB     Comments: Patient will improve attention skills by focusing to selective target/task when presented with competing stimuli or in a distracting environment in order to complete task  5/10/21: Pt completed Sort then Stack card game with 100% accuracy independently. 5/6/21: Pt completed visual scanning activity with 100% accuracy independently. 5/3/21: Pt played Forget Me Not card game with field of 21, 12, and 4 cards 20% accuracy and moderate cues.  (Pended)   -LB     Patient will  improve attention skills by alternating or shifting focus between two different tasks in order to complete both tasks  80%:;with cues  (Pended)   -LB     Status: Patient will improve attention skills by alternating or shifting focus between two different tasks in order to complete both tasks  Progressing as expected  (Pended)   -LB     Comments: Patient will improve attention skills by alternating or shifting focus between two different tasks in order to complete both tasks  5/10/21: Pt completed scoreboard card game and was 100% accurate with minimal cues. 19: [place], pt was 80% accurate with min cues. 12/10/19: Attention for [place] and pt was 80% accurate with mod cues. 11/15/19: Attention to detail for use of a chart and analyzing numbers- new task for pt and pt was 90% accurate. 19: Divided Attention APT score was Below Average. 10/21/19: While completing an executive function task, pt exhibited difficulty with attention and required mod cues and was 70% accurate. 19: While cards being turned over, pt had to tap table if the card played was lower than the previous one. Pt was 60% accurate. 9/10/19: Pt able to recall up to 7 digits. 19: Visual Scanning: Pt required mod cues for alternating between letters and was 80 % accurate.  19: Card game and pt was 80% accurate for new rules and watching increasing piles of cards for potential moves.  19: Sorting card by suit and  and pt was 70% accurate. 19: Sorting card by suit and  and pt required mod cues and repeated instructions and pt was 60% accurate.   (Pended)   -LB     Patient will improve attention skills by dividing focus and responding simultaneously to multiple tasks or in order to complete task  80%:;with cues  (Pended)   -LB     Status: Patient will improve attention skills by dividing focus and responding simultaneously to multiple tasks or in order to complete task  Progressing as expected   (Pended)   -LB     Comments: Patient will improve attention skills by dividing focus and responding simultaneously to multiple tasks or in order to complete task  5/6/21: Pt recall of 4 words and answering question regarding words was 50%. 5/3/21: Pt completed Which Way card game with 90% accuracy independently.  (Pended)   -LB        Other Goals    Other Adult Goal- 1  Pt will complete high level thought organization tasks with 80% accuracy.   (Pended)   -LB     Status: Other Adult Goal- 1  Progressing as expected  (Pended)   -LB     Comments: Other Adult Goal- 1  5/13/21: Pt returned homework that was 90% accurate for abstract and concrete adding to category task. 5/10/21: Pt returned with homework and was 90% accurate with paragraph inferencing. 5/6/21: Pt completed paragraph inferencing task with 60% accuracy and minimcal cues. 12/24/19: Divergent naming: pt was 80% accurate. 11/12/19: RBANS Language score remained in the average range. 11/1/19: Divergent abstract naming: pt was 10/10 x 2. 10/18/19: Divergent abstract naming: pt was 100% accurate for 6 sets. Convergent naming: pt was 21/25. 10/15/19: RBANS Language score improved to a 92 from an 88 at last re-assessment. 10/1/19: Divergent naming: pt was 80% accurate. 9/16/19: RBANS Language score of 88 remains the same as time of initial eval. 8/30/19: Abstract divergent naming, pt was 8/10. 8/9/19: Divergent concrete category naming and pt was 15/15 x 2. 7/26/19: Divergent naming: pt was 10/10.   (Pended)   -LB     Other Adult Goal- 2  Pt will improve RBANS score to at least a 90 placing pt in the Average range.  (Pended)   -LB     Status: Other Adult Goal- 2  Progressing as expected  (Pended)   -LB     Comments: Other Adult Goal- 2  4/14/17: 10/10 for abstract categories. 3/24/17: 11/10 for concrete categories.   Pt able to name 16 items in a sixty second period of time on the RBANS. 3/17/17: For abstract category naming, pt was 100% accurate. Pt was 10/15  for abstract items. 2/23: Pt was on average 10-13/15. 2/27: Abstract items: pt was at least 10 items with 2 minute time frame allowed. 3/3: Pt was 15/15 for abstract categories  3/6/17: For abstract naming in one minute: T1:10/10 T2:8/10  (Pended)   -LB     Other Adult Goal- 3  Pt will improve RBANS score to at least a 90 placing pt in the Average range.  (Pended)   -LB     Status: Other Adult Goal- 3  New  (Pended)   -LB     Comments: Other Adult Goal- 3  4/14/17: 10/10 for abstract categories. 3/24/17: 11/10 for concrete categories.   Pt able to name 16 items in a sixty second period of time on the RBANS. 3/17/17: For abstract category naming, pt was 100% accurate. Pt was 10/15 for abstract items. 2/23: Pt was on average 10-13/15. 2/27: Abstract items: pt was at least 10 items with 2 minute time frame allowed. 3/3: Pt was 15/15 for abstract categories  3/6/17: For abstract naming in one minute: T1:10/10 T2:8/10  (Pended)   -LB     Other Adult Goal- 4  Patient will perform executive functioning task with 70% accuracy when provided prompts only in order to improve strategic thinking.  (Pended)   -LB     Status: Other Adult Goal- 4  Progressing as expected  (Pended)   -LB     Comments: Other Adult Goal- 4  4/14/17: Pt currently selling a house, managing finances, moving arrangments, traveling out of town, booking airfares, etc. 3/27/17: For recall of 16 objects and w/o review, pt was 50% accurate and with review ? 3/24/17: visuospatial recall, pt performed in the average range on the RBANS. 3/17/17: Pt given a task that involved visual recall as well as reasoning and problem solving and pt was ?Divided atten task while asked to perform a prospective memory task. Pt required mod cues this date. 2/23: Pt was 50-75% accurate this date with a mental flexibilty task and this was given a homework. 3/3: Card game played that was taught several weeks ago and pt was 70% accurate.  (Pended)   -LB     Other Adult Goal- 5   Patient will improve cognition as evidenced by STGs met. (LTG)  (Pended)   -LB     Status: Other Adult Goal- 5  Progressing as expected  (Pended)   -LB     Comments: Other Adult Goal- 5   4/14/17: For delayed storytelling, pt was 80% accuracy with no review strategies. 3/27/17: For delayed recall of names and pictures, pt was 80% accuracy.  3/24/17: For story re-telling, immediate and delayed, pt was borderline. 3/17/17: Pt had to recall 16 items with no review from almost 2 weeks ago and pt was 80% accurate. Pt using visualization and grouping in order to recall verbally presented information. 2/27: For visual recall of pictures, pt was 80% accurate.   (Pended)   -LB     Other Adult Goal- 6  Patient will perform alternating attention task with 80% accuracy across two trials in order to enhance mental flexibility and attention.  (Pended)   -LB     Status: Other Adult Goal- 6  Progressing as expected  (Pended)   -LB     Comments: Other Adult Goal- 6  3/27/17: For mental manipulation of three words forward and three words backward: pt was 100% accurate with 3 words and 80% accurate with four words. 3/24/17: For attention on the RBANS, pt was in the average range. Card game and pt was 80% accurate. 2/23: For answering questions given a list of four words, pt was 80% accurate. 2/27: For mental manipulation with numbers, pt was 100% accurate. 3/3: While playing cards and naming items from a delayed recall list activity, pt was 75% accurate. 3/6/17: While corssing out numbers, pt had to switch with background music provided: pt was 90% accurate.   (Pended)   -LB     Other Adult Goal- 7  Patient will perform divided attention task with 70% accuracy across two trials independently in order to improve multitasking abilities.  (Pended)   -LB     Status: Other Adult Goal- 7  Progressing as expected  (Pended)   -LB     Comments: Other Adult Goal- 7  4/14/17: For high level mult-tasking, pt was 80-90% accurate independently.  3/17/17: Pt required min cues for completing exec fxn task in the correct order. Pt to complete a maze while listening to music and pt was 50% accurate this date. 2/16: More of a prospective memory task thrown in the midst of a cognitive task and pt was 90% accurate.  2/23: For divided attention, pt was 70% accurate with min verbal cues. 3/3:While listening to music and recalling a word list, pt was 70% accurate.   (Pended)   -LB     Other Adult Goal- 8  Pt will recall short paragraph material presentally orally using strategies with 90% accuracy in order to improve immediate recall skills.  (Pended)   -LB     Status: Other Adult Goal- 8  New  (Pended)   -LB     Comments: Other Adult Goal- 8   3/27/17: For 22-36 word paragraphs, 85% accurate. For 36-50 words, 90% accurate.  pt was 3/6/17: Using a who what when where why strategy, pt was 80% accurate; without, pt was 60% accurate.   (Pended)   -LB        SLP Time Calculation    SLP Goal Re-Cert Due Date  05/26/21  (Pended)   -LB       User Key  (r) = Recorded By, (t) = Taken By, (c) = Cosigned By    Initials Name Provider Type    Gilberto Mares Speech Therapy Student Speech Therapy Student        OP SLP Education     Row Name 05/13/21 0900       Education    Education Comments  Hmwk for visual scanning and selective attention task given.  (Pended)   -LB      User Key  (r) = Recorded By, (t) = Taken By, (c) = Cosigned By    Initials Name Effective Dates    Gilberto Mares Speech Therapy Student 03/09/21 -         OP SLP Assessment/Plan - 05/13/21 0900        SLP Plan    Plan Comments  Cont cog tx  (Pended)    -LB      User Key  (r) = Recorded By, (t) = Taken By, (c) = Cosigned By    Initials Name Provider Type    Gilberto Mares Speech Therapy Student Speech Therapy Student               Time Calculation:   SLP Start Time: (P) 0900  Untimed Charges  84689-ZO Treatment/ST Modification Prosth Aug Alter : (P) 72  Total Minutes  Untimed Charges Total Minutes:  (P) 72   Total Minutes: (P) 72    Therapy Charges for Today     Code Description Service Date Service Provider Modifiers Qty    [id number]  ST TREATMENT SPEECH 5 5/13/2021 Gilberto Araiza, Speech Therapy Student GN 1                   Gilberto Araiza, Speech Therapy Student  5/13/2021

## 2021-05-17 ENCOUNTER — HOSPITAL ENCOUNTER (OUTPATIENT)
Dept: SPEECH THERAPY | Facility: HOSPITAL | Age: 81
Setting detail: THERAPIES SERIES
Discharge: HOME | End: 2021-05-17

## 2021-05-17 DIAGNOSIS — R41.3 MEMORY LOSS: Primary | ICD-10-CM

## 2021-05-17 PROCEDURE — 92507 TX SP LANG VOICE COMM INDIV: CPT

## 2021-05-17 RX ORDER — ATORVASTATIN CALCIUM 20 MG/1
20 TABLET, FILM COATED ORAL DAILY
Qty: 90 TABLET | Refills: 1 | Status: SHIPPED | OUTPATIENT
Start: 2021-05-17 | End: 2021-12-27

## 2021-05-17 NOTE — THERAPY TREATMENT NOTE
Outpatient Speech Language Pathology   Adult Speech Language Cognitive Treatment Note  [facility]     Patient Name: Uche Polanco  : 1940  MRN: [id number]  Today's Date: 2021         Visit Date: 2021   Patient Active Problem List   Diagnosis   • Severe obstructive sleep apnea   • Gout   • AV block   • REM sleep behavior disorder   • Memory loss   • Postural dizziness with presyncope   • Chronic systolic congestive heart failure (CMS/HCC)   • Nonrheumatic aortic valve insufficiency          Visit Dx:    ICD-10-CM ICD-9-CM   1. Memory loss  R41.3 780.93                         SLP OP Goals     Row Name 21 0900          Goal Type Needed    Goal Type Needed  Verbal Expression;Memory;Attention/Orientation;Other Adult Goals  (Pended)   -LB        Subjective Comments    Subjective Comments  Pt was alert, cooperative, and returned with homework.  (Pended)   -LB        Verbal Expression Goals    Verbal Expression LTG's  Patient will be able to use verbal expressive language skills to communicate effectively in social/avocational/work setting  (Pended)   -LB     Patient will be able to use verbal expressive language skills to communicate effectively in social/avocational/work setting  80%:;with cues  (Pended)   -LB     Status: Patient will be able to use verbal expressive language skills to communicate effectively in social/avocational/work setting  Progressing as expected  (Pended)   -LB     Verbal Expression STG's  Patient will improve verbal expressive language skills by completing divergent naming tasks;Patient will improve verbal expressive language skills by listing words associated to target word provided  (Pended)   -LB     Patient will improve verbal expressive language skills by completing divergent naming tasks  80%:;with cues  (Pended)   -LB     Status: Patient will improve verbal expressive language skills by completing divergent naming tasks  Progressing as expected  (Pended)   -LB      Comments: Patient will improve verbal expressive language skills by completing divergent naming tasks  5/10/21: Pt completed abstract and concrete divergent naming tasks with 4-5 items per 20 seconds.   (Pended)   -LB     Patient will improve verbal expressive language skills by listing words associated to target word provided  80%  (Pended)   -LB     Status: Patient will improve verbal expressive language skills by listing words associated to target word provided  Progressing as expected  (Pended)   -LB        Memory Goals    Memory LTG's  Patient will be able to remember  information needed to participate in activities of daily living  (Pended)   -LB     Patient will be able to remember  information needed to participate in activities of daily living  80% with cues  (Pended)   -LB     Status: Patient will be able to remember  information needed to participate in activities of daily living  Progressing as expected  (Pended)   -LB     Comments: Patient will be able to remember  information needed to participate in activities of daily living  4/29/21: Pt states that he has difficulties with watching TV and reading books due to forgetting what has happened. Pt states he has forgotten where items such as his car keys or mask are because he places them in places that he normally would not place them.  (Pended)   -LB     Memory STG's  Patient’s memory skills will be enhanced as reported by patient by utilizing internal memory strategies to recall up to 3 pieces of information after a 5- minute delay;Patient’s memory skills will be enhanced as reported by patient by using external memory aides;Patient will demonstrate improved ability to recall information by immediately recalling a series of words  (Pended)   -LB     Patient will demonstrate improved ability to recall information by immediately recalling a series of words  80%:;related;unrelated;with cues  (Pended)   -LB     Status: Patient will demonstrate improved  ability to recall information by immediately recalling a series of words  Progressing as expected  (Pended)   -LB     Comments: Patient will demonstrate improved ability to recall information by immediately recalling a series of words  5/17/21: Pt recall of 4-5 words groups and associated questions about word placement/category was 80% accurate with minimal-moderate cues. 5/6/21: Pt recall of 4 word array was 40% with minimal support. 5/3/21: Pt played card game Forget Me Not and was 20% accurate with moderate cues.12/24/19: 50-65 word paragraphs for immediate recall and pt was on average 80% accurate. 12/10/19: Memory for puzzles and pt was 70% accurate. 12/5/19: Immediate recall of memory book puzzle: pt was 70% accurate. 11/15/19: Immediate recall of Memory Puzzle Book and pt was 75% accurate. 11/7/19: ABC game for Immediate recall and pt was 70% accurate I'ly.  11/5/19: Recall of rules of familiar card game and pt was 80% accurate. 10/21/19: Recall from paragraph in an executive fxn task and pt was 50% accurate with mod cues. 10/18/19: Immediate recall for Memory Matches i pad game and pt was 70% accurate.  Fastest time memory matches and pt was impulsive but completed in a little 60 secs. 10/15/19: RBANS Immediate recall score dropped to a 73 from an 83 at last re-assessment. 10/4/19: Memory Recall for a picture scene and pt was 75% accurate. 9/24/19: Recalling Boxed Information for numbers: pt was 80% accurate. 9/20/19: Spaced Retrieval Method: pt was 4/12. 9/16/19: RBANS Immediate recall and pt improved score to an 83 up from 49 at time of eval. 9/13/19: Immediate recall of boxed information: pt was 85% accurate. 9/6/19: Immediate recall of shapes and figures (6) and pt was 75% accurate.  9/3/19: Immediate recall of shapes and figures (5) and pt improved to 80% accurate.  8/23/19: Pt was able to recall 5 related words with accuracy, 6 words, pt was 5/6. 8/16/19: 50-65 words: pt was 6/6 x 3 for immediate story  recall. 19: 36-50 word paragraphs for immediate recall: pt was 7/8. Immediate recall for storytellin/3, using chunking: 3/3,   (Pended)   -LB     Patient’s memory skills will be enhanced as reported by patient by utilizing internal memory strategies to recall up to 3 pieces of information after a 5- minute delay  80%:;with cues  (Pended)   -LB     Status: Patient’s memory skills will be enhanced as reported by patient by utilizing internal memory strategies to recall up to 3 pieces of information after a 5- minute delay  Progressing as expected  (Pended)   -LB     Comments: Patient’s memory skills will be enhanced as reported by patient by utilizing internal memory strategies to recall up to 3 pieces of information after a 5- minute delay  21: Pt recall of 4 words was 60% accurate after 3 minute delay. 5/3/21: Pt recall of 5 names associated with faces was 100% independently. 19: Delayed recall of 6 un-related words and pt was 6/6. 19: Delayed recall of 4 un-related words from hmwk and pt was 4/4 x 2. 5 un-related words and pt was 3/5 I'ly and with increased delay, pt was 4/5 I'ly. 12/10/19: Delayed recall of 8 related words and pt was 100% accurate. 4 un-related words, pt was 2/4, 3/4, 4/4.  19: Delayed recall of 8 related words and pt was 8/8 x 2. 11/15/19: Delayed recall of 7 un-related words from hmwk and pt was 7/7 x 3. 19: RBANS Delayed recall score of 95 was improved from last re-assessment. 19: Delayed recall of 7 un-related words from homework and previous sessions and pt was 7/7. 19: Delayed recall of 6 un-related words: pt was 6/6 and added 2 more words and pt was 6/7 and then 6/8.  10/25/19: Delayed recall of 4 un-related words, pt was 4/4 with min cues, Independently got it 4/4 x 2.  10/21/19: Delayed recall for 7 un-related words from hwmk and pt was 6/7. With review and increased delay, pt was 7/7.  10/18/19: Delayed recall for 5 un-related word from  homework and pt was 5/5 x 3. Added word 10/15/19: RBANS Delayed recall score of 78 dropped slightly from an 81 at previous re-assessment. 10/1/19:Delayed recall of paragraph information and pt was 2/3 for topics. 9/27/19: Delayed recall of 12 groupable pictures and pt was 12/12 x 2. 9/24/19: Delayed recall of 16 groupable words and pt was 14/16. Increased delay of 16 groupable words: pt was 10/16.  9/20/19: Delayed recall of words from SRT and pt was 3/12. 9/16/19: RBANS Delayed recall score of 81 down from 87 at time of eval.- remains in low average range.  9/3/19: Delayed recall of 8 un-related words and pt was 8/8 x 2. 8/30/19: Delayed recall of 7 un-related pictures, pt was 7/7 x 2. 8/23/19: Delayed recall of 6 un-related words: pt was 6/6 x 3- gave 7 for hmwk. 8/20/19: Delayed recall of 5 un-related words and pt was 5/5 x 2. 6 un-related words: pt was 4/6.  8/16/19: Delayed recall of 8 related words, pt was 8/8 x 2. 4 un-related words, pt was 4/4 x 2. 8/9/19: Delayed recall of 7 words: pt was 6/7 x 2, 7/7 x 2 8/6/19: Delayed recall of 5 related words and pt was 5/5, 6 related words: pt was 6/6. 7 related words using alphabetical order and chunking according to starting letter and pt was 6/7 with min cue.  (Pended)   -LB     Patient’s memory skills will be enhanced as reported by patient by using external memory aides  80%:;with cues  (Pended)   -LB     Status: Patient’s memory skills will be enhanced as reported by patient by using external memory aides  Progressing as expected  (Pended)   -LB     Comments: Patient’s memory skills will be enhanced as reported by patient by using external memory aides  5/17/21: Pt states he use his planner and phone to keep track of dates, appointments, etc. 5/3/21: Pt continues to use his planner to remember dates/plans. 12/24/19: Pt continues to use his planner. 11/7/19: Pt using his day planner for daily and monthly use. 10/25/19: Using planner for calendar with 100%  accurate. Use of daily entries, pt was 80% accurate. 9/20/19: Pt is using planner 90% of the time with mild-moderate level of details. 9/6/19: Pt is making daily notes in his planner. 9/3/19: Pt using his calendar consistently however, pt not using the daily journaling section as much as SLP would like. 8/30/19: Pt required min cues to catch up on daily entries. 8/16/19: Pt continues to use his planner daily. 8/9/19: Pt with increased use of his planner with daily notes.  7/26/19: Fxnl use of planner: pt was writing down his TO DO LIST and not what he had done.   (Pended)   -LB        Attention/Orientation Goals    Attention/Orientation LTG's  Patient will be able to participate in the community safely  (Pended)   -LB     Patient will be able to participate in the community safely  Independently  (Pended)   -LB     Status: Patient will be able to participate in the community safely  Progressing as expected  (Pended)   -LB     Comments: Patient will be able to participate in the community safely  5/10/21: Pt states he enjoyed time with his family over the weekend. 5/6/21: Pt states he attended his son in laws birthday party, [employer], and went driving around town over the past week.  (Pended)   -LB     Attention/Orientation STG's  Patient will improve attention skills by sustaining consistent behavioral response during continuous and repetitive activity (e.g., listening for target words, auditory/reading comprehension tasks);Patient will improve attention skills by focusing to selective target/task when presented with competing stimuli or in a distracting environment in order to complete task;Patient will improve attention skills by alternating or shifting focus between two different tasks in order to complete both tasks;Patient will improve attention skills by dividing focus and responding simultaneously to multiple tasks or in order to complete task  (Pended)   -LB     Patient will improve attention skills by  sustaining consistent behavioral response during continuous and repetitive activity (e.g., listening for target words, auditory/reading comprehension tasks)  with cues;5 minute task  (Pended)   -LB     Status: Patient will improve attention skills by sustaining consistent behavioral response during continuous and repetitive activity (e.g., listening for target words, auditory/reading comprehension tasks)  Progressing as expected  (Pended)   -LB     Comments: Patient will improve attention skills by sustaining consistent behavioral response during continuous and repetitive activity (e.g., listening for target words, auditory/reading comprehension tasks)  5/17/21: Pt sustained attention for 4-5 word memory task was 80% accurate with minimal-moderate cues. 5/10/21: Pt completed paragraph inferencing homework task with 90% accuracy. 5/6/21: Pt completed paragraph inferencing task with 60% accuracy with minimal cues.  (Pended)   -LB     Patient will improve attention skills by focusing to selective target/task when presented with competing stimuli or in a distracting environment in order to complete task  80%:;with cues  (Pended)   -LB     Status: Patient will improve attention skills by focusing to selective target/task when presented with competing stimuli or in a distracting environment in order to complete task  Progressing as expected  (Pended)   -LB     Comments: Patient will improve attention skills by focusing to selective target/task when presented with competing stimuli or in a distracting environment in order to complete task  5/17/21: Pt completed visual scanning homework task with 100% accuracy. 5/10/21: Pt completed Sort then Stack card game with 100% accuracy independently. 5/6/21: Pt completed visual scanning activity with 100% accuracy independently. 5/3/21: Pt played Forget Me Not card game with field of 21, 12, and 4 cards 20% accuracy and moderate cues.  (Pended)   -LB     Patient will improve  attention skills by alternating or shifting focus between two different tasks in order to complete both tasks  80%:;with cues  (Pended)   -LB     Status: Patient will improve attention skills by alternating or shifting focus between two different tasks in order to complete both tasks  Progressing as expected  (Pended)   -LB     Comments: Patient will improve attention skills by alternating or shifting focus between two different tasks in order to complete both tasks  5/10/21: Pt completed scoreboard card game and was 100% accurate with minimal cues. 19: [place], pt was 80% accurate with min cues. 12/10/19: Attention for [place] and pt was 80% accurate with mod cues. 11/15/19: Attention to detail for use of a chart and analyzing numbers- new task for pt and pt was 90% accurate. 19: Divided Attention APT score was Below Average. 10/21/19: While completing an executive function task, pt exhibited difficulty with attention and required mod cues and was 70% accurate. 19: While cards being turned over, pt had to tap table if the card played was lower than the previous one. Pt was 60% accurate. 9/10/19: Pt able to recall up to 7 digits. 19: Visual Scanning: Pt required mod cues for alternating between letters and was 80 % accurate.  19: Card game and pt was 80% accurate for new rules and watching increasing piles of cards for potential moves.  19: Sorting card by katie and REJI and pt was 70% accurate. 19: Sorting card by suomaira and  and pt required mod cues and repeated instructions and pt was 60% accurate.   (Pended)   -LB     Patient will improve attention skills by dividing focus and responding simultaneously to multiple tasks or in order to complete task  80%:;with cues  (Pended)   -LB     Status: Patient will improve attention skills by dividing focus and responding simultaneously to multiple tasks or in order to complete task  Progressing as expected   (Pended)   -LB     Comments: Patient will improve attention skills by dividing focus and responding simultaneously to multiple tasks or in order to complete task  5/6/21: Pt recall of 4 words and answering question regarding words was 50%. 5/3/21: Pt completed Which Way card game with 90% accuracy independently.  (Pended)   -LB        Other Goals    Other Adult Goal- 1  Pt will complete high level thought organization tasks with 80% accuracy.   (Pended)   -LB     Status: Other Adult Goal- 1  Progressing as expected  (Pended)   -LB     Comments: Other Adult Goal- 1  5/10/21: Pt returned with homework and was 90% accurate with paragraph inferencing. 5/6/21: Pt completed paragraph inferencing task with 60% accuracy and minimcal cues. 12/24/19: Divergent naming: pt was 80% accurate. 11/12/19: RBANS Language score remained in the average range. 11/1/19: Divergent abstract naming: pt was 10/10 x 2. 10/18/19: Divergent abstract naming: pt was 100% accurate for 6 sets. Convergent naming: pt was 21/25. 10/15/19: RBANS Language score improved to a 92 from an 88 at last re-assessment. 10/1/19: Divergent naming: pt was 80% accurate. 9/16/19: RBANS Language score of 88 remains the same as time of initial eval. 8/30/19: Abstract divergent naming, pt was 8/10. 8/9/19: Divergent concrete category naming and pt was 15/15 x 2. 7/26/19: Divergent naming: pt was 10/10.   (Pended)   -LB     Other Adult Goal- 2  Pt will improve RBANS score to at least a 90 placing pt in the Average range.  (Pended)   -LB     Status: Other Adult Goal- 2  Progressing as expected  (Pended)   -LB     Comments: Other Adult Goal- 2  4/14/17: 10/10 for abstract categories. 3/24/17: 11/10 for concrete categories.   Pt able to name 16 items in a sixty second period of time on the RBANS. 3/17/17: For abstract category naming, pt was 100% accurate. Pt was 10/15 for abstract items. 2/23: Pt was on average 10-13/15. 2/27: Abstract items: pt was at least 10 items with  2 minute time frame allowed. 3/3: Pt was 15/15 for abstract categories  3/6/17: For abstract naming in one minute: T1:10/10 T2:8/10  (Pended)   -LB     Other Adult Goal- 3  Pt will improve RBANS score to at least a 90 placing pt in the Average range.  (Pended)   -LB     Status: Other Adult Goal- 3  New  (Pended)   -LB     Comments: Other Adult Goal- 3  4/14/17: 10/10 for abstract categories. 3/24/17: 11/10 for concrete categories.   Pt able to name 16 items in a sixty second period of time on the RBANS. 3/17/17: For abstract category naming, pt was 100% accurate. Pt was 10/15 for abstract items. 2/23: Pt was on average 10-13/15. 2/27: Abstract items: pt was at least 10 items with 2 minute time frame allowed. 3/3: Pt was 15/15 for abstract categories  3/6/17: For abstract naming in one minute: T1:10/10 T2:8/10  (Pended)   -LB     Other Adult Goal- 4  Patient will perform executive functioning task with 70% accuracy when provided prompts only in order to improve strategic thinking.  (Pended)   -LB     Status: Other Adult Goal- 4  Progressing as expected  (Pended)   -LB     Comments: Other Adult Goal- 4  4/14/17: Pt currently selling a house, managing finances, moving arrangments, traveling out of town, booking airfares, etc. 3/27/17: For recall of 16 objects and w/o review, pt was 50% accurate and with review ? 3/24/17: visuospatial recall, pt performed in the average range on the RBANS. 3/17/17: Pt given a task that involved visual recall as well as reasoning and problem solving and pt was ?Divided atten task while asked to perform a prospective memory task. Pt required mod cues this date. 2/23: Pt was 50-75% accurate this date with a mental flexibilty task and this was given a homework. 3/3: Card game played that was taught several weeks ago and pt was 70% accurate.  (Pended)   -LB     Other Adult Goal- 5  Patient will improve cognition as evidenced by STGs met. (LTG)  (Pended)   -LB     Status: Other Adult Goal- 5   Progressing as expected  (Pended)   -LB     Comments: Other Adult Goal- 5   4/14/17: For delayed storytelling, pt was 80% accuracy with no review strategies. 3/27/17: For delayed recall of names and pictures, pt was 80% accuracy.  3/24/17: For story re-telling, immediate and delayed, pt was borderline. 3/17/17: Pt had to recall 16 items with no review from almost 2 weeks ago and pt was 80% accurate. Pt using visualization and grouping in order to recall verbally presented information. 2/27: For visual recall of pictures, pt was 80% accurate.   (Pended)   -LB     Other Adult Goal- 6  Patient will perform alternating attention task with 80% accuracy across two trials in order to enhance mental flexibility and attention.  (Pended)   -LB     Status: Other Adult Goal- 6  Progressing as expected  (Pended)   -LB     Comments: Other Adult Goal- 6  3/27/17: For mental manipulation of three words forward and three words backward: pt was 100% accurate with 3 words and 80% accurate with four words. 3/24/17: For attention on the RBANS, pt was in the average range. Card game and pt was 80% accurate. 2/23: For answering questions given a list of four words, pt was 80% accurate. 2/27: For mental manipulation with numbers, pt was 100% accurate. 3/3: While playing cards and naming items from a delayed recall list activity, pt was 75% accurate. 3/6/17: While corssing out numbers, pt had to switch with background music provided: pt was 90% accurate.   (Pended)   -LB     Other Adult Goal- 7  Patient will perform divided attention task with 70% accuracy across two trials independently in order to improve multitasking abilities.  (Pended)   -LB     Status: Other Adult Goal- 7  Progressing as expected  (Pended)   -LB     Comments: Other Adult Goal- 7  4/14/17: For high level mult-tasking, pt was 80-90% accurate independently. 3/17/17: Pt required min cues for completing exec fxn task in the correct order. Pt to complete a maze while  listening to music and pt was 50% accurate this date. 2/16: More of a prospective memory task thrown in the midst of a cognitive task and pt was 90% accurate.  2/23: For divided attention, pt was 70% accurate with min verbal cues. 3/3:While listening to music and recalling a word list, pt was 70% accurate.   (Pended)   -LB     Other Adult Goal- 8  Pt will recall short paragraph material presentally orally using strategies with 90% accuracy in order to improve immediate recall skills.  (Pended)   -LB     Status: Other Adult Goal- 8  New  (Pended)   -LB     Comments: Other Adult Goal- 8   3/27/17: For 22-36 word paragraphs, 85% accurate. For 36-50 words, 90% accurate.  pt was 3/6/17: Using a who what when where why strategy, pt was 80% accurate; without, pt was 60% accurate.   (Pended)   -LB        SLP Time Calculation    SLP Goal Re-Cert Due Date  05/26/21  (Pended)   -LB       User Key  (r) = Recorded By, (t) = Taken By, (c) = Cosigned By    Initials Name Provider Type    Gilberto Mares, Speech Therapy Student Speech Therapy Student        OP SLP Education     Row Name 05/17/21 0900       Education    Education Comments  Hmwk for alternating attention word find and divergent naming task.  (Pended)   -LB      User Key  (r) = Recorded By, (t) = Taken By, (c) = Cosigned By    Initials Name Effective Dates    Gilberto Mares Speech Therapy Student 03/09/21 -         OP SLP Assessment/Plan - 05/17/21 0900        SLP Plan    Plan Comments  Cont cog tx  (Pended)    -LB      User Key  (r) = Recorded By, (t) = Taken By, (c) = Cosigned By    Initials Name Provider Type    Gilberto Mares, Speech Therapy Student Speech Therapy Student               Time Calculation:   SLP Start Time: (P) 0900  Untimed Charges  03142-UT Treatment/ST Modification Prosth Aug Alter : (P) 60  Total Minutes  Untimed Charges Total Minutes: (P) 60   Total Minutes: (P) 60    Therapy Charges for Today     Code Description Service Date Service  Provider Modifiers Qty    [id number] [facility] TREATMENT SPEECH 4 5/17/2021 Gilberto Araiza, Speech Therapy Student GN 1                   Gilberto Araiza Speech Therapy Student  5/17/2021

## 2021-05-20 ENCOUNTER — HOSPITAL ENCOUNTER (OUTPATIENT)
Dept: SPEECH THERAPY | Facility: HOSPITAL | Age: 81
Setting detail: THERAPIES SERIES
Discharge: HOME | End: 2021-05-20

## 2021-05-20 DIAGNOSIS — R41.3 MEMORY LOSS: Primary | ICD-10-CM

## 2021-05-20 PROCEDURE — 92507 TX SP LANG VOICE COMM INDIV: CPT

## 2021-05-20 NOTE — THERAPY PROGRESS REPORT/RE-CERT
Outpatient Speech Language Pathology   Adult Speech Language Cognitive Progress Note  [facility]     Patient Name: Uche Polanco  : 1940  MRN: [id number]  Today's Date: 2021         Visit Date: 2021   Patient Active Problem List   Diagnosis   • Severe obstructive sleep apnea   • Gout   • AV block   • REM sleep behavior disorder   • Memory loss   • Postural dizziness with presyncope   • Chronic systolic congestive heart failure (CMS/HCC)   • Nonrheumatic aortic valve insufficiency          Visit Dx:    ICD-10-CM ICD-9-CM   1. Memory loss  R41.3 780.93                         SLP OP Goals     Row Name 21 0900          Goal Type Needed    Goal Type Needed  Verbal Expression;Memory;Attention/Orientation;Other Adult Goals  (Pended)   -LB        Subjective Comments    Subjective Comments  Pt alert, cooperative, and excited for his vacation in two weeks.   (Pended)   -LB        Verbal Expression Goals    Verbal Expression LTG's  Patient will be able to use verbal expressive language skills to communicate effectively in social/avocational/work setting  (Pended)   -LB     Patient will be able to use verbal expressive language skills to communicate effectively in social/avocational/work setting  80%:;with cues  (Pended)   -LB     Status: Patient will be able to use verbal expressive language skills to communicate effectively in social/avocational/work setting  Progressing as expected  (Pended)   -LB     Verbal Expression STG's  Patient will improve verbal expressive language skills by completing divergent naming tasks;Patient will improve verbal expressive language skills by listing words associated to target word provided  (Pended)   -LB     Patient will improve verbal expressive language skills by completing divergent naming tasks  80%:;with cues  (Pended)   -LB     Status: Patient will improve verbal expressive language skills by completing divergent naming tasks  Progressing as expected   (Pended)   -LB     Comments: Patient will improve verbal expressive language skills by completing divergent naming tasks  5/10/21: Pt completed abstract and concrete divergent naming tasks with 4-5 items per 20 seconds.   (Pended)   -LB     Patient will improve verbal expressive language skills by listing words associated to target word provided  80%  (Pended)   -LB     Status: Patient will improve verbal expressive language skills by listing words associated to target word provided  Progressing as expected  (Pended)   -LB        Memory Goals    Memory LTG's  Patient will be able to remember  information needed to participate in activities of daily living  (Pended)   -LB     Patient will be able to remember  information needed to participate in activities of daily living  80% with cues  (Pended)   -LB     Status: Patient will be able to remember  information needed to participate in activities of daily living  Progressing as expected  (Pended)   -LB     Comments: Patient will be able to remember  information needed to participate in activities of daily living  4/29/21: Pt states that he has difficulties with watching TV and reading books due to forgetting what has happened. Pt states he has forgotten where items such as his car keys or mask are because he places them in places that he normally would not place them.  (Pended)   -LB     Memory STG's  Patient’s memory skills will be enhanced as reported by patient by utilizing internal memory strategies to recall up to 3 pieces of information after a 5- minute delay;Patient’s memory skills will be enhanced as reported by patient by using external memory aides;Patient will demonstrate improved ability to recall information by immediately recalling a series of words  (Pended)   -LB     Patient will demonstrate improved ability to recall information by immediately recalling a series of words  80%:;related;unrelated;with cues  (Pended)   -LB     Status: Patient will  demonstrate improved ability to recall information by immediately recalling a series of words  Progressing as expected  (Pended)   -LB     Comments: Patient will demonstrate improved ability to recall information by immediately recalling a series of words  5/20/21: SLUMS paragraph recall was 3/4 independently. 5/17/21: Pt recall of 4-5 words groups and associated questions about word placement/category was 80% accurate with minimal-moderate cues. 5/6/21: Pt recall of 4 word array was 40% with minimal support. 5/3/21: Pt played card game Forget Me Not and was 20% accurate with moderate cues.12/24/19: 50-65 word paragraphs for immediate recall and pt was on average 80% accurate. 12/10/19: Memory for puzzles and pt was 70% accurate. 12/5/19: Immediate recall of memory book puzzle: pt was 70% accurate. 11/15/19: Immediate recall of Memory Puzzle Book and pt was 75% accurate. 11/7/19: ABC game for Immediate recall and pt was 70% accurate I'ly.  11/5/19: Recall of rules of familiar card game and pt was 80% accurate. 10/21/19: Recall from paragraph in an executive fxn task and pt was 50% accurate with mod cues. 10/18/19: Immediate recall for Memory Matches i pad game and pt was 70% accurate.  Fastest time memory matches and pt was impulsive but completed in a little 60 secs. 10/15/19: RBANS Immediate recall score dropped to a 73 from an 83 at last re-assessment. 10/4/19: Memory Recall for a picture scene and pt was 75% accurate. 9/24/19: Recalling Boxed Information for numbers: pt was 80% accurate. 9/20/19: Spaced Retrieval Method: pt was 4/12. 9/16/19: RBANS Immediate recall and pt improved score to an 83 up from 49 at time of eval. 9/13/19: Immediate recall of boxed information: pt was 85% accurate. 9/6/19: Immediate recall of shapes and figures (6) and pt was 75% accurate.  9/3/19: Immediate recall of shapes and figures (5) and pt improved to 80% accurate.  8/23/19: Pt was able to recall 5 related words with accuracy, 6  words, pt was 5/6. 19: 50-65 words: pt was 6/6 x 3 for immediate story recall. 19: 36-50 word paragraphs for immediate recall: pt was 7/8. Immediate recall for storytellin/3, using chunking: 3/3,   (Pended)   -LB     Patient’s memory skills will be enhanced as reported by patient by utilizing internal memory strategies to recall up to 3 pieces of information after a 5- minute delay  80%:;with cues  (Pended)   -LB     Status: Patient’s memory skills will be enhanced as reported by patient by utilizing internal memory strategies to recall up to 3 pieces of information after a 5- minute delay  Progressing as expected  (Pended)   -LB     Comments: Patient’s memory skills will be enhanced as reported by patient by utilizing internal memory strategies to recall up to 3 pieces of information after a 5- minute delay  21: SLUMS delayed recall was 4/5 independenly. 21: Pt recall of 4 words was 60% accurate after 3 minute delay. 5/3/21: Pt recall of 5 names associated with faces was 100% independently. 19: Delayed recall of 6 un-related words and pt was 6/6. 19: Delayed recall of 4 un-related words from hmwk and pt was 4/4 x 2. 5 un-related words and pt was 3/5 I'ly and with increased delay, pt was 4/5 I'ly. 12/10/19: Delayed recall of 8 related words and pt was 100% accurate. 4 un-related words, pt was 2/4, 3/4, 4/4.  19: Delayed recall of 8 related words and pt was 8/8 x 2. 11/15/19: Delayed recall of 7 un-related words from hmwk and pt was 7/7 x 3. 19: RBANS Delayed recall score of 95 was improved from last re-assessment. 19: Delayed recall of 7 un-related words from homework and previous sessions and pt was 7/7. 19: Delayed recall of 6 un-related words: pt was 6/6 and added 2 more words and pt was 6/7 and then 6/8.  10/25/19: Delayed recall of 4 un-related words, pt was 4/4 with min cues, Independently got it 4/4 x 2.  10/21/19: Delayed recall for 7 un-related words  from hwmk and pt was 6/7. With review and increased delay, pt was 7/7.  10/18/19: Delayed recall for 5 un-related word from homework and pt was 5/5 x 3. Added word 10/15/19: RBANS Delayed recall score of 78 dropped slightly from an 81 at previous re-assessment. 10/1/19:Delayed recall of paragraph information and pt was 2/3 for topics. 9/27/19: Delayed recall of 12 groupable pictures and pt was 12/12 x 2. 9/24/19: Delayed recall of 16 groupable words and pt was 14/16. Increased delay of 16 groupable words: pt was 10/16.  9/20/19: Delayed recall of words from SRT and pt was 3/12. 9/16/19: RBANS Delayed recall score of 81 down from 87 at time of eval.- remains in low average range.  9/3/19: Delayed recall of 8 un-related words and pt was 8/8 x 2. 8/30/19: Delayed recall of 7 un-related pictures, pt was 7/7 x 2. 8/23/19: Delayed recall of 6 un-related words: pt was 6/6 x 3- gave 7 for hmwk. 8/20/19: Delayed recall of 5 un-related words and pt was 5/5 x 2. 6 un-related words: pt was 4/6.  8/16/19: Delayed recall of 8 related words, pt was 8/8 x 2. 4 un-related words, pt was 4/4 x 2. 8/9/19: Delayed recall of 7 words: pt was 6/7 x 2, 7/7 x 2 8/6/19: Delayed recall of 5 related words and pt was 5/5, 6 related words: pt was 6/6. 7 related words using alphabetical order and chunking according to starting letter and pt was 6/7 with min cue.  (Pended)   -LB     Patient’s memory skills will be enhanced as reported by patient by using external memory aides  80%:;with cues  (Pended)   -LB     Status: Patient’s memory skills will be enhanced as reported by patient by using external memory aides  Progressing as expected  (Pended)   -LB     Comments: Patient’s memory skills will be enhanced as reported by patient by using external memory aides  5/17/21: Pt states he use his planner and phone to keep track of dates, appointments, etc. 5/3/21: Pt continues to use his planner to remember dates/plans. 12/24/19: Pt continues to use his  planner. 11/7/19: Pt using his day planner for daily and monthly use. 10/25/19: Using planner for calendar with 100% accurate. Use of daily entries, pt was 80% accurate. 9/20/19: Pt is using planner 90% of the time with mild-moderate level of details. 9/6/19: Pt is making daily notes in his planner. 9/3/19: Pt using his calendar consistently however, pt not using the daily journaling section as much as SLP would like. 8/30/19: Pt required min cues to catch up on daily entries. 8/16/19: Pt continues to use his planner daily. 8/9/19: Pt with increased use of his planner with daily notes.  7/26/19: Fxnl use of planner: pt was writing down his TO DO LIST and not what he had done.   (Pended)   -LB        Attention/Orientation Goals    Attention/Orientation LTG's  Patient will be able to participate in the community safely  (Pended)   -LB     Patient will be able to participate in the community safely  Independently  (Pended)   -LB     Status: Patient will be able to participate in the community safely  Progressing as expected  (Pended)   -LB     Comments: Patient will be able to participate in the community safely  5/10/21: Pt states he enjoyed time with his family over the weekend. 5/6/21: Pt states he attended his son in laws birthday party, [employer], and went driving around town over the past week.  (Pended)   -LB     Attention/Orientation STG's  Patient will improve attention skills by sustaining consistent behavioral response during continuous and repetitive activity (e.g., listening for target words, auditory/reading comprehension tasks);Patient will improve attention skills by focusing to selective target/task when presented with competing stimuli or in a distracting environment in order to complete task;Patient will improve attention skills by alternating or shifting focus between two different tasks in order to complete both tasks;Patient will improve attention skills by dividing focus and responding  simultaneously to multiple tasks or in order to complete task  (Pended)   -LB     Patient will improve attention skills by sustaining consistent behavioral response during continuous and repetitive activity (e.g., listening for target words, auditory/reading comprehension tasks)  with cues;5 minute task  (Pended)   -LB     Status: Patient will improve attention skills by sustaining consistent behavioral response during continuous and repetitive activity (e.g., listening for target words, auditory/reading comprehension tasks)  Progressing as expected  (Pended)   -LB     Comments: Patient will improve attention skills by sustaining consistent behavioral response during continuous and repetitive activity (e.g., listening for target words, auditory/reading comprehension tasks)  5/17/21: Pt sustained attention for 4-5 word memory task was 80% accurate with minimal-moderate cues. 5/10/21: Pt completed paragraph inferencing homework task with 90% accuracy. 5/6/21: Pt completed paragraph inferencing task with 60% accuracy with minimal cues.  (Pended)   -LB     Patient will improve attention skills by focusing to selective target/task when presented with competing stimuli or in a distracting environment in order to complete task  80%:;with cues  (Pended)   -LB     Status: Patient will improve attention skills by focusing to selective target/task when presented with competing stimuli or in a distracting environment in order to complete task  Progressing as expected  (Pended)   -LB     Comments: Patient will improve attention skills by focusing to selective target/task when presented with competing stimuli or in a distracting environment in order to complete task  5/17/21: Pt completed visual scanning homework task with 100% accuracy. 5/10/21: Pt completed Sort then Stack card game with 100% accuracy independently. 5/6/21: Pt completed visual scanning activity with 100% accuracy independently. 5/3/21: Pt played Forget Me Not  card game with field of 21, 12, and 4 cards 20% accuracy and moderate cues.  (Pended)   -LB     Patient will improve attention skills by alternating or shifting focus between two different tasks in order to complete both tasks  80%:;with cues  (Pended)   -LB     Status: Patient will improve attention skills by alternating or shifting focus between two different tasks in order to complete both tasks  Progressing as expected  (Pended)   -LB     Comments: Patient will improve attention skills by alternating or shifting focus between two different tasks in order to complete both tasks  21: Pt completed alternating attention homework task with 50% accuracy. 5/10/21: Pt completed scoreboard card game and was 100% accurate with minimal cues. 19: [place], pt was 80% accurate with min cues. 12/10/19: Attention for [place] and pt was 80% accurate with mod cues. 11/15/19: Attention to detail for use of a chart and analyzing numbers- new task for pt and pt was 90% accurate. 19: Divided Attention APT score was Below Average. 10/21/19: While completing an executive function task, pt exhibited difficulty with attention and required mod cues and was 70% accurate. 19: While cards being turned over, pt had to tap table if the card played was lower than the previous one. Pt was 60% accurate. 9/10/19: Pt able to recall up to 7 digits. 19: Visual Scanning: Pt required mod cues for alternating between letters and was 80 % accurate.  19: Card game and pt was 80% accurate for new rules and watching increasing piles of cards for potential moves.  19: Sorting card by suit and  and pt was 70% accurate. 19: Sorting card by suit and  and pt required mod cues and repeated instructions and pt was 60% accurate.   (Pended)   -LB     Patient will improve attention skills by dividing focus and responding simultaneously to multiple tasks or in order to complete task  80%:;with cues   (Pended)   -LB     Status: Patient will improve attention skills by dividing focus and responding simultaneously to multiple tasks or in order to complete task  Progressing as expected  (Pended)   -LB     Comments: Patient will improve attention skills by dividing focus and responding simultaneously to multiple tasks or in order to complete task  5/20/21: Pt completed 5 stack card game with moderate-maximum prompting and was 70% accurate. Pt completed 5 stack card game with only black cards independently and was 50% accurate. 5/6/21: Pt recall of 4 words and answering question regarding words was 50%. 5/3/21: Pt completed Which Way card game with 90% accuracy independently.  (Pended)   -LB        Other Goals    Other Adult Goal- 1  Pt will complete high level thought organization tasks with 80% accuracy.   (Pended)   -LB     Status: Other Adult Goal- 1  Progressing as expected  (Pended)   -LB     Comments: Other Adult Goal- 1  5/10/21: Pt returned with homework and was 90% accurate with paragraph inferencing. 5/6/21: Pt completed paragraph inferencing task with 60% accuracy and minimcal cues. 12/24/19: Divergent naming: pt was 80% accurate. 11/12/19: RBANS Language score remained in the average range. 11/1/19: Divergent abstract naming: pt was 10/10 x 2. 10/18/19: Divergent abstract naming: pt was 100% accurate for 6 sets. Convergent naming: pt was 21/25. 10/15/19: RBANS Language score improved to a 92 from an 88 at last re-assessment. 10/1/19: Divergent naming: pt was 80% accurate. 9/16/19: RBANS Language score of 88 remains the same as time of initial eval. 8/30/19: Abstract divergent naming, pt was 8/10. 8/9/19: Divergent concrete category naming and pt was 15/15 x 2. 7/26/19: Divergent naming: pt was 10/10.   (Pended)   -LB     Other Adult Goal- 2  Pt will improve RBANS score to at least a 90 placing pt in the Average range.  (Pended)   -LB     Status: Other Adult Goal- 2  Progressing as expected  (Pended)   -LB      Comments: Other Adult Goal- 2  5/20/21: SLUMS total score of 21 places pt in mild neurocognitive disorder range. 4/14/17: 10/10 for abstract categories. 3/24/17: 11/10 for concrete categories.   Pt able to name 16 items in a sixty second period of time on the RBANS. 3/17/17: For abstract category naming, pt was 100% accurate. Pt was 10/15 for abstract items. 2/23: Pt was on average 10-13/15. 2/27: Abstract items: pt was at least 10 items with 2 minute time frame allowed. 3/3: Pt was 15/15 for abstract categories  3/6/17: For abstract naming in one minute: T1:10/10 T2:8/10  (Pended)   -LB     Other Adult Goal- 3  Pt will improve RBANS score to at least a 90 placing pt in the Average range.  (Pended)   -LB     Status: Other Adult Goal- 3  New  (Pended)   -LB     Comments: Other Adult Goal- 3  4/14/17: 10/10 for abstract categories. 3/24/17: 11/10 for concrete categories.   Pt able to name 16 items in a sixty second period of time on the RBANS. 3/17/17: For abstract category naming, pt was 100% accurate. Pt was 10/15 for abstract items. 2/23: Pt was on average 10-13/15. 2/27: Abstract items: pt was at least 10 items with 2 minute time frame allowed. 3/3: Pt was 15/15 for abstract categories  3/6/17: For abstract naming in one minute: T1:10/10 T2:8/10  (Pended)   -LB     Other Adult Goal- 4  Patient will perform executive functioning task with 70% accuracy when provided prompts only in order to improve strategic thinking.  (Pended)   -LB     Status: Other Adult Goal- 4  Progressing as expected  (Pended)   -LB     Comments: Other Adult Goal- 4  4/14/17: Pt currently selling a house, managing finances, moving arrangments, traveling out of town, booking airfares, etc. 3/27/17: For recall of 16 objects and w/o review, pt was 50% accurate and with review ? 3/24/17: visuospatial recall, pt performed in the average range on the RBANS. 3/17/17: Pt given a task that involved visual recall as well as reasoning and problem  solving and pt was ?Divided atten task while asked to perform a prospective memory task. Pt required mod cues this date. 2/23: Pt was 50-75% accurate this date with a mental flexibilty task and this was given a homework. 3/3: Card game played that was taught several weeks ago and pt was 70% accurate.  (Pended)   -LB     Other Adult Goal- 5  Patient will improve cognition as evidenced by STGs met. (LTG)  (Pended)   -LB     Status: Other Adult Goal- 5  Progressing as expected  (Pended)   -LB     Comments: Other Adult Goal- 5   4/14/17: For delayed storytelling, pt was 80% accuracy with no review strategies. 3/27/17: For delayed recall of names and pictures, pt was 80% accuracy.  3/24/17: For story re-telling, immediate and delayed, pt was borderline. 3/17/17: Pt had to recall 16 items with no review from almost 2 weeks ago and pt was 80% accurate. Pt using visualization and grouping in order to recall verbally presented information. 2/27: For visual recall of pictures, pt was 80% accurate.   (Pended)   -LB     Other Adult Goal- 6  Patient will perform alternating attention task with 80% accuracy across two trials in order to enhance mental flexibility and attention.  (Pended)   -LB     Status: Other Adult Goal- 6  Progressing as expected  (Pended)   -LB     Comments: Other Adult Goal- 6  3/27/17: For mental manipulation of three words forward and three words backward: pt was 100% accurate with 3 words and 80% accurate with four words. 3/24/17: For attention on the RBANS, pt was in the average range. Card game and pt was 80% accurate. 2/23: For answering questions given a list of four words, pt was 80% accurate. 2/27: For mental manipulation with numbers, pt was 100% accurate. 3/3: While playing cards and naming items from a delayed recall list activity, pt was 75% accurate. 3/6/17: While corssing out numbers, pt had to switch with background music provided: pt was 90% accurate.   (Pended)   -LB     Other Adult Goal- 7   Patient will perform divided attention task with 70% accuracy across two trials independently in order to improve multitasking abilities.  (Pended)   -LB     Status: Other Adult Goal- 7  Progressing as expected  (Pended)   -LB     Comments: Other Adult Goal- 7  4/14/17: For high level mult-tasking, pt was 80-90% accurate independently. 3/17/17: Pt required min cues for completing exec fxn task in the correct order. Pt to complete a maze while listening to music and pt was 50% accurate this date. 2/16: More of a prospective memory task thrown in the midst of a cognitive task and pt was 90% accurate.  2/23: For divided attention, pt was 70% accurate with min verbal cues. 3/3:While listening to music and recalling a word list, pt was 70% accurate.   (Pended)   -LB     Other Adult Goal- 8  Pt will recall short paragraph material presentally orally using strategies with 90% accuracy in order to improve immediate recall skills.  (Pended)   -LB     Status: Other Adult Goal- 8  New  (Pended)   -LB     Comments: Other Adult Goal- 8   3/27/17: For 22-36 word paragraphs, 85% accurate. For 36-50 words, 90% accurate.  pt was 3/6/17: Using a who what when where why strategy, pt was 80% accurate; without, pt was 60% accurate.   (Pended)   -LB        SLP Time Calculation    SLP Goal Re-Cert Due Date  06/19/21  (Pended)   -LB       User Key  (r) = Recorded By, (t) = Taken By, (c) = Cosigned By    Initials Name Provider Type    Gilberto Mares Speech Therapy Student Speech Therapy Student        OP SLP Education     Row Name 05/20/21 0900       Education    Education Comments  Hmwk for alternating attention word finding.   (Pended)   -LB      User Key  (r) = Recorded By, (t) = Taken By, (c) = Cosigned By    Initials Name Effective Dates    Gilberto Mares Speech Therapy Student 03/09/21 -         OP SLP Assessment/Plan - 05/20/21 0900        SLP Assessment    Functional Problems  Speech Language- Adult/Cognition  (Pended)     -LB    Impact on Function: Adult Speech Language/Cognition  Trouble learning or remembering new information;Poor attention to task  (Pended)    -LB    Clinical Impression: Speech Language-Adult/Congnition  Mild:;Cognitive Communication Impairment  (Pended)    -LB    Functional Problems Comment  Pt reports difficulty with memory and attention  (Pended)    -LB    Clinical Impression Comments  SLUMS score of 21 places pt in mild neurocognitive disorder range  (Pended)    -LB    Please refer to paper survey for additional self-reported information  Yes  (Pended)    -LB    Please refer to items scanned into chart for additional diagnostic informaiton and handouts as provided by clinician  Yes  (Pended)    -LB    SLP Diagnosis  Mild cognitive communication deficit  (Pended)    -LB    Prognosis  Good (comment)  (Pended)    -LB    Patient/caregiver participated in establishment of treatment plan and goals  Yes  (Pended)    -LB    Patient would benefit from skilled therapy intervention  Yes  (Pended)    -LB       SLP Plan    Frequency  1-2x/week  (Pended)    -LB    Duration  8 weeks  (Pended)    -LB    Planned CPT's?  SLP INDIVIDUAL SPEECH THERAPY: 53105  (Pended)    -LB    Expected Duration of Therapy Session (SLP Eval)  60  (Pended)    -LB    Plan Comments  Cont cog tx  (Pended)    -LB      User Key  (r) = Recorded By, (t) = Taken By, (c) = Cosigned By    Initials Name Provider Type    Gilberto Mares, Speech Therapy Student Speech Therapy Student               Time Calculation:   SLP Start Time: (P) 0900  Untimed Charges  09205-HR Treatment/ST Modification Prosth Aug Alter : (P) 75  Total Minutes  Untimed Charges Total Minutes: (P) 75   Total Minutes: (P) 75    Therapy Charges for Today     Code Description Service Date Service Provider Modifiers Qty    [id number]  ST TREATMENT SPEECH 5 5/20/2021 Gilberto Araiza, Speech Therapy Student GN 1                   Gilberto Araiza Speech Therapy Student  5/20/2021

## 2021-05-20 NOTE — THERAPY PROGRESS REPORT/RE-CERT
Outpatient Speech Language Pathology   Adult Speech Language Cognitive Progress Note  [facility]     Patient Name: Uche Polanco  : 1940  MRN: [id number]  Today's Date: 2021         Visit Date: 2021   Patient Active Problem List   Diagnosis   • Severe obstructive sleep apnea   • Gout   • AV block   • REM sleep behavior disorder   • Memory loss   • Postural dizziness with presyncope   • Chronic systolic congestive heart failure (CMS/HCC)   • Nonrheumatic aortic valve insufficiency          Visit Dx:    ICD-10-CM ICD-9-CM   1. Memory loss  R41.3 780.93                         SLP OP Goals     Row Name 21 0900          Goal Type Needed    Goal Type Needed  Verbal Expression;Memory;Attention/Orientation;Other Adult Goals  (Pended)   -LB        Subjective Comments    Subjective Comments  Pt alert, cooperative, and excited for his vacation in two weeks.   (Pended)   -LB        Verbal Expression Goals    Verbal Expression LTG's  Patient will be able to use verbal expressive language skills to communicate effectively in social/avocational/work setting  (Pended)   -LB     Patient will be able to use verbal expressive language skills to communicate effectively in social/avocational/work setting  80%:;with cues  (Pended)   -LB     Status: Patient will be able to use verbal expressive language skills to communicate effectively in social/avocational/work setting  Progressing as expected  (Pended)   -LB     Verbal Expression STG's  Patient will improve verbal expressive language skills by completing divergent naming tasks;Patient will improve verbal expressive language skills by listing words associated to target word provided  (Pended)   -LB     Patient will improve verbal expressive language skills by completing divergent naming tasks  80%:;with cues  (Pended)   -LB     Status: Patient will improve verbal expressive language skills by completing divergent naming tasks  Progressing as expected   (Pended)   -LB     Comments: Patient will improve verbal expressive language skills by completing divergent naming tasks  5/10/21: Pt completed abstract and concrete divergent naming tasks with 4-5 items per 20 seconds.   (Pended)   -LB     Patient will improve verbal expressive language skills by listing words associated to target word provided  80%  (Pended)   -LB     Status: Patient will improve verbal expressive language skills by listing words associated to target word provided  Progressing as expected  (Pended)   -LB        Memory Goals    Memory LTG's  Patient will be able to remember  information needed to participate in activities of daily living  (Pended)   -LB     Patient will be able to remember  information needed to participate in activities of daily living  80% with cues  (Pended)   -LB     Status: Patient will be able to remember  information needed to participate in activities of daily living  Progressing as expected  (Pended)   -LB     Comments: Patient will be able to remember  information needed to participate in activities of daily living  4/29/21: Pt states that he has difficulties with watching TV and reading books due to forgetting what has happened. Pt states he has forgotten where items such as his car keys or mask are because he places them in places that he normally would not place them.  (Pended)   -LB     Memory STG's  Patient’s memory skills will be enhanced as reported by patient by utilizing internal memory strategies to recall up to 3 pieces of information after a 5- minute delay;Patient’s memory skills will be enhanced as reported by patient by using external memory aides;Patient will demonstrate improved ability to recall information by immediately recalling a series of words  (Pended)   -LB     Patient will demonstrate improved ability to recall information by immediately recalling a series of words  80%:;related;unrelated;with cues  (Pended)   -LB     Status: Patient will  demonstrate improved ability to recall information by immediately recalling a series of words  Progressing as expected  (Pended)   -LB     Comments: Patient will demonstrate improved ability to recall information by immediately recalling a series of words  5/20/21: SLUMS paragraph recall was 3/4 independently. 5/17/21: Pt recall of 4-5 words groups and associated questions about word placement/category was 80% accurate with minimal-moderate cues. 5/6/21: Pt recall of 4 word array was 40% with minimal support. 5/3/21: Pt played card game Forget Me Not and was 20% accurate with moderate cues.12/24/19: 50-65 word paragraphs for immediate recall and pt was on average 80% accurate. 12/10/19: Memory for puzzles and pt was 70% accurate. 12/5/19: Immediate recall of memory book puzzle: pt was 70% accurate. 11/15/19: Immediate recall of Memory Puzzle Book and pt was 75% accurate. 11/7/19: ABC game for Immediate recall and pt was 70% accurate I'ly.  11/5/19: Recall of rules of familiar card game and pt was 80% accurate. 10/21/19: Recall from paragraph in an executive fxn task and pt was 50% accurate with mod cues. 10/18/19: Immediate recall for Memory Matches i pad game and pt was 70% accurate.  Fastest time memory matches and pt was impulsive but completed in a little 60 secs. 10/15/19: RBANS Immediate recall score dropped to a 73 from an 83 at last re-assessment. 10/4/19: Memory Recall for a picture scene and pt was 75% accurate. 9/24/19: Recalling Boxed Information for numbers: pt was 80% accurate. 9/20/19: Spaced Retrieval Method: pt was 4/12. 9/16/19: RBANS Immediate recall and pt improved score to an 83 up from 49 at time of eval. 9/13/19: Immediate recall of boxed information: pt was 85% accurate. 9/6/19: Immediate recall of shapes and figures (6) and pt was 75% accurate.  9/3/19: Immediate recall of shapes and figures (5) and pt improved to 80% accurate.  8/23/19: Pt was able to recall 5 related words with accuracy, 6  words, pt was 5/6. 19: 50-65 words: pt was 6/6 x 3 for immediate story recall. 19: 36-50 word paragraphs for immediate recall: pt was 7/8. Immediate recall for storytellin/3, using chunking: 3/3,   (Pended)   -LB     Patient’s memory skills will be enhanced as reported by patient by utilizing internal memory strategies to recall up to 3 pieces of information after a 5- minute delay  80%:;with cues  (Pended)   -LB     Status: Patient’s memory skills will be enhanced as reported by patient by utilizing internal memory strategies to recall up to 3 pieces of information after a 5- minute delay  Progressing as expected  (Pended)   -LB     Comments: Patient’s memory skills will be enhanced as reported by patient by utilizing internal memory strategies to recall up to 3 pieces of information after a 5- minute delay  21: SLUMS delayed recall was 4/5 independenly. 21: Pt recall of 4 words was 60% accurate after 3 minute delay. 5/3/21: Pt recall of 5 names associated with faces was 100% independently. 19: Delayed recall of 6 un-related words and pt was 6/6. 19: Delayed recall of 4 un-related words from hmwk and pt was 4/4 x 2. 5 un-related words and pt was 3/5 I'ly and with increased delay, pt was 4/5 I'ly. 12/10/19: Delayed recall of 8 related words and pt was 100% accurate. 4 un-related words, pt was 2/4, 3/4, 4/4.  19: Delayed recall of 8 related words and pt was 8/8 x 2. 11/15/19: Delayed recall of 7 un-related words from hmwk and pt was 7/7 x 3. 19: RBANS Delayed recall score of 95 was improved from last re-assessment. 19: Delayed recall of 7 un-related words from homework and previous sessions and pt was 7/7. 19: Delayed recall of 6 un-related words: pt was 6/6 and added 2 more words and pt was 6/7 and then 6/8.  10/25/19: Delayed recall of 4 un-related words, pt was 4/4 with min cues, Independently got it 4/4 x 2.  10/21/19: Delayed recall for 7 un-related words  from hwmk and pt was 6/7. With review and increased delay, pt was 7/7.  10/18/19: Delayed recall for 5 un-related word from homework and pt was 5/5 x 3. Added word 10/15/19: RBANS Delayed recall score of 78 dropped slightly from an 81 at previous re-assessment. 10/1/19:Delayed recall of paragraph information and pt was 2/3 for topics. 9/27/19: Delayed recall of 12 groupable pictures and pt was 12/12 x 2. 9/24/19: Delayed recall of 16 groupable words and pt was 14/16. Increased delay of 16 groupable words: pt was 10/16.  9/20/19: Delayed recall of words from SRT and pt was 3/12. 9/16/19: RBANS Delayed recall score of 81 down from 87 at time of eval.- remains in low average range.  9/3/19: Delayed recall of 8 un-related words and pt was 8/8 x 2. 8/30/19: Delayed recall of 7 un-related pictures, pt was 7/7 x 2. 8/23/19: Delayed recall of 6 un-related words: pt was 6/6 x 3- gave 7 for hmwk. 8/20/19: Delayed recall of 5 un-related words and pt was 5/5 x 2. 6 un-related words: pt was 4/6.  8/16/19: Delayed recall of 8 related words, pt was 8/8 x 2. 4 un-related words, pt was 4/4 x 2. 8/9/19: Delayed recall of 7 words: pt was 6/7 x 2, 7/7 x 2 8/6/19: Delayed recall of 5 related words and pt was 5/5, 6 related words: pt was 6/6. 7 related words using alphabetical order and chunking according to starting letter and pt was 6/7 with min cue.  (Pended)   -LB     Patient’s memory skills will be enhanced as reported by patient by using external memory aides  80%:;with cues  (Pended)   -LB     Status: Patient’s memory skills will be enhanced as reported by patient by using external memory aides  Progressing as expected  (Pended)   -LB     Comments: Patient’s memory skills will be enhanced as reported by patient by using external memory aides  5/17/21: Pt states he use his planner and phone to keep track of dates, appointments, etc. 5/3/21: Pt continues to use his planner to remember dates/plans. 12/24/19: Pt continues to use his  planner. 11/7/19: Pt using his day planner for daily and monthly use. 10/25/19: Using planner for calendar with 100% accurate. Use of daily entries, pt was 80% accurate. 9/20/19: Pt is using planner 90% of the time with mild-moderate level of details. 9/6/19: Pt is making daily notes in his planner. 9/3/19: Pt using his calendar consistently however, pt not using the daily journaling section as much as SLP would like. 8/30/19: Pt required min cues to catch up on daily entries. 8/16/19: Pt continues to use his planner daily. 8/9/19: Pt with increased use of his planner with daily notes.  7/26/19: Fxnl use of planner: pt was writing down his TO DO LIST and not what he had done.   (Pended)   -LB        Attention/Orientation Goals    Attention/Orientation LTG's  Patient will be able to participate in the community safely  (Pended)   -LB     Patient will be able to participate in the community safely  Independently  (Pended)   -LB     Status: Patient will be able to participate in the community safely  Progressing as expected  (Pended)   -LB     Comments: Patient will be able to participate in the community safely  5/10/21: Pt states he enjoyed time with his family over the weekend. 5/6/21: Pt states he attended his son in laws birthday party, [employer], and went driving around town over the past week.  (Pended)   -LB     Attention/Orientation STG's  Patient will improve attention skills by sustaining consistent behavioral response during continuous and repetitive activity (e.g., listening for target words, auditory/reading comprehension tasks);Patient will improve attention skills by focusing to selective target/task when presented with competing stimuli or in a distracting environment in order to complete task;Patient will improve attention skills by alternating or shifting focus between two different tasks in order to complete both tasks;Patient will improve attention skills by dividing focus and responding  simultaneously to multiple tasks or in order to complete task  (Pended)   -LB     Patient will improve attention skills by sustaining consistent behavioral response during continuous and repetitive activity (e.g., listening for target words, auditory/reading comprehension tasks)  with cues;5 minute task  (Pended)   -LB     Status: Patient will improve attention skills by sustaining consistent behavioral response during continuous and repetitive activity (e.g., listening for target words, auditory/reading comprehension tasks)  Progressing as expected  (Pended)   -LB     Comments: Patient will improve attention skills by sustaining consistent behavioral response during continuous and repetitive activity (e.g., listening for target words, auditory/reading comprehension tasks)  5/17/21: Pt sustained attention for 4-5 word memory task was 80% accurate with minimal-moderate cues. 5/10/21: Pt completed paragraph inferencing homework task with 90% accuracy. 5/6/21: Pt completed paragraph inferencing task with 60% accuracy with minimal cues.  (Pended)   -LB     Patient will improve attention skills by focusing to selective target/task when presented with competing stimuli or in a distracting environment in order to complete task  80%:;with cues  (Pended)   -LB     Status: Patient will improve attention skills by focusing to selective target/task when presented with competing stimuli or in a distracting environment in order to complete task  Progressing as expected  (Pended)   -LB     Comments: Patient will improve attention skills by focusing to selective target/task when presented with competing stimuli or in a distracting environment in order to complete task  5/17/21: Pt completed visual scanning homework task with 100% accuracy. 5/10/21: Pt completed Sort then Stack card game with 100% accuracy independently. 5/6/21: Pt completed visual scanning activity with 100% accuracy independently. 5/3/21: Pt played Forget Me Not  card game with field of 21, 12, and 4 cards 20% accuracy and moderate cues.  (Pended)   -LB     Patient will improve attention skills by alternating or shifting focus between two different tasks in order to complete both tasks  80%:;with cues  (Pended)   -LB     Status: Patient will improve attention skills by alternating or shifting focus between two different tasks in order to complete both tasks  Progressing as expected  (Pended)   -LB     Comments: Patient will improve attention skills by alternating or shifting focus between two different tasks in order to complete both tasks  21: Pt completed alternating attention homework task with 50% accuracy. 5/10/21: Pt completed scoreboard card game and was 100% accurate with minimal cues. 19: [place], pt was 80% accurate with min cues. 12/10/19: Attention for [place] and pt was 80% accurate with mod cues. 11/15/19: Attention to detail for use of a chart and analyzing numbers- new task for pt and pt was 90% accurate. 19: Divided Attention APT score was Below Average. 10/21/19: While completing an executive function task, pt exhibited difficulty with attention and required mod cues and was 70% accurate. 19: While cards being turned over, pt had to tap table if the card played was lower than the previous one. Pt was 60% accurate. 9/10/19: Pt able to recall up to 7 digits. 19: Visual Scanning: Pt required mod cues for alternating between letters and was 80 % accurate.  19: Card game and pt was 80% accurate for new rules and watching increasing piles of cards for potential moves.  19: Sorting card by suit and  and pt was 70% accurate. 19: Sorting card by suit and  and pt required mod cues and repeated instructions and pt was 60% accurate.   (Pended)   -LB     Patient will improve attention skills by dividing focus and responding simultaneously to multiple tasks or in order to complete task  80%:;with cues   (Pended)   -LB     Status: Patient will improve attention skills by dividing focus and responding simultaneously to multiple tasks or in order to complete task  Progressing as expected  (Pended)   -LB     Comments: Patient will improve attention skills by dividing focus and responding simultaneously to multiple tasks or in order to complete task  5/20/21: Pt completed 5 stack card game with moderate-maximum prompting and was 70% accurate. Pt completed 5 stack card game with only black cards independently and was 50% accurate. 5/6/21: Pt recall of 4 words and answering question regarding words was 50%. 5/3/21: Pt completed Which Way card game with 90% accuracy independently.  (Pended)   -LB        Other Goals    Other Adult Goal- 1  Pt will complete high level thought organization tasks with 80% accuracy.   (Pended)   -LB     Status: Other Adult Goal- 1  Progressing as expected  (Pended)   -LB     Comments: Other Adult Goal- 1  5/10/21: Pt returned with homework and was 90% accurate with paragraph inferencing. 5/6/21: Pt completed paragraph inferencing task with 60% accuracy and minimcal cues. 12/24/19: Divergent naming: pt was 80% accurate. 11/12/19: RBANS Language score remained in the average range. 11/1/19: Divergent abstract naming: pt was 10/10 x 2. 10/18/19: Divergent abstract naming: pt was 100% accurate for 6 sets. Convergent naming: pt was 21/25. 10/15/19: RBANS Language score improved to a 92 from an 88 at last re-assessment. 10/1/19: Divergent naming: pt was 80% accurate. 9/16/19: RBANS Language score of 88 remains the same as time of initial eval. 8/30/19: Abstract divergent naming, pt was 8/10. 8/9/19: Divergent concrete category naming and pt was 15/15 x 2. 7/26/19: Divergent naming: pt was 10/10.   (Pended)   -LB     Other Adult Goal- 2  Pt will improve RBANS score to at least a 90 placing pt in the Average range.  (Pended)   -LB     Status: Other Adult Goal- 2  Progressing as expected  (Pended)   -LB      Comments: Other Adult Goal- 2  5/20/21: SLUMS total score of 21 places pt in mild neurocognitive disorder range. 4/14/17: 10/10 for abstract categories. 3/24/17: 11/10 for concrete categories.   Pt able to name 16 items in a sixty second period of time on the RBANS. 3/17/17: For abstract category naming, pt was 100% accurate. Pt was 10/15 for abstract items. 2/23: Pt was on average 10-13/15. 2/27: Abstract items: pt was at least 10 items with 2 minute time frame allowed. 3/3: Pt was 15/15 for abstract categories  3/6/17: For abstract naming in one minute: T1:10/10 T2:8/10  (Pended)   -LB     Other Adult Goal- 3  Pt will improve RBANS score to at least a 90 placing pt in the Average range.  (Pended)   -LB     Status: Other Adult Goal- 3  New  (Pended)   -LB     Comments: Other Adult Goal- 3  4/14/17: 10/10 for abstract categories. 3/24/17: 11/10 for concrete categories.   Pt able to name 16 items in a sixty second period of time on the RBANS. 3/17/17: For abstract category naming, pt was 100% accurate. Pt was 10/15 for abstract items. 2/23: Pt was on average 10-13/15. 2/27: Abstract items: pt was at least 10 items with 2 minute time frame allowed. 3/3: Pt was 15/15 for abstract categories  3/6/17: For abstract naming in one minute: T1:10/10 T2:8/10  (Pended)   -LB     Other Adult Goal- 4  Patient will perform executive functioning task with 70% accuracy when provided prompts only in order to improve strategic thinking.  (Pended)   -LB     Status: Other Adult Goal- 4  Progressing as expected  (Pended)   -LB     Comments: Other Adult Goal- 4  4/14/17: Pt currently selling a house, managing finances, moving arrangments, traveling out of town, booking airfares, etc. 3/27/17: For recall of 16 objects and w/o review, pt was 50% accurate and with review ? 3/24/17: visuospatial recall, pt performed in the average range on the RBANS. 3/17/17: Pt given a task that involved visual recall as well as reasoning and problem  solving and pt was ?Divided atten task while asked to perform a prospective memory task. Pt required mod cues this date. 2/23: Pt was 50-75% accurate this date with a mental flexibilty task and this was given a homework. 3/3: Card game played that was taught several weeks ago and pt was 70% accurate.  (Pended)   -LB     Other Adult Goal- 5  Patient will improve cognition as evidenced by STGs met. (LTG)  (Pended)   -LB     Status: Other Adult Goal- 5  Progressing as expected  (Pended)   -LB     Comments: Other Adult Goal- 5   4/14/17: For delayed storytelling, pt was 80% accuracy with no review strategies. 3/27/17: For delayed recall of names and pictures, pt was 80% accuracy.  3/24/17: For story re-telling, immediate and delayed, pt was borderline. 3/17/17: Pt had to recall 16 items with no review from almost 2 weeks ago and pt was 80% accurate. Pt using visualization and grouping in order to recall verbally presented information. 2/27: For visual recall of pictures, pt was 80% accurate.   (Pended)   -LB     Other Adult Goal- 6  Patient will perform alternating attention task with 80% accuracy across two trials in order to enhance mental flexibility and attention.  (Pended)   -LB     Status: Other Adult Goal- 6  Progressing as expected  (Pended)   -LB     Comments: Other Adult Goal- 6  3/27/17: For mental manipulation of three words forward and three words backward: pt was 100% accurate with 3 words and 80% accurate with four words. 3/24/17: For attention on the RBANS, pt was in the average range. Card game and pt was 80% accurate. 2/23: For answering questions given a list of four words, pt was 80% accurate. 2/27: For mental manipulation with numbers, pt was 100% accurate. 3/3: While playing cards and naming items from a delayed recall list activity, pt was 75% accurate. 3/6/17: While corssing out numbers, pt had to switch with background music provided: pt was 90% accurate.   (Pended)   -LB     Other Adult Goal- 7   Patient will perform divided attention task with 70% accuracy across two trials independently in order to improve multitasking abilities.  (Pended)   -LB     Status: Other Adult Goal- 7  Progressing as expected  (Pended)   -LB     Comments: Other Adult Goal- 7  4/14/17: For high level mult-tasking, pt was 80-90% accurate independently. 3/17/17: Pt required min cues for completing exec fxn task in the correct order. Pt to complete a maze while listening to music and pt was 50% accurate this date. 2/16: More of a prospective memory task thrown in the midst of a cognitive task and pt was 90% accurate.  2/23: For divided attention, pt was 70% accurate with min verbal cues. 3/3:While listening to music and recalling a word list, pt was 70% accurate.   (Pended)   -LB     Other Adult Goal- 8  Pt will recall short paragraph material presentally orally using strategies with 90% accuracy in order to improve immediate recall skills.  (Pended)   -LB     Status: Other Adult Goal- 8  New  (Pended)   -LB     Comments: Other Adult Goal- 8   3/27/17: For 22-36 word paragraphs, 85% accurate. For 36-50 words, 90% accurate.  pt was 3/6/17: Using a who what when where why strategy, pt was 80% accurate; without, pt was 60% accurate.   (Pended)   -LB        SLP Time Calculation    SLP Goal Re-Cert Due Date  06/19/21  (Pended)   -LB       User Key  (r) = Recorded By, (t) = Taken By, (c) = Cosigned By    Initials Name Provider Type    Gilberto Mares Speech Therapy Student Speech Therapy Student        OP SLP Education     Row Name 05/20/21 0900       Education    Education Comments  Hmwk for alternating attention word finding.   (Pended)   -LB      User Key  (r) = Recorded By, (t) = Taken By, (c) = Cosigned By    Initials Name Effective Dates    Gilberto Mares Speech Therapy Student 03/09/21 -         OP SLP Assessment/Plan - 05/20/21 0900        SLP Plan    Plan Comments  Cont cog tx  (Pended)    -LB      User Key  (r) = Recorded  By, (t) = Taken By, (c) = Cosigned By    Initials Name Provider Type    Gilberto Mares, Speech Therapy Student Speech Therapy Student               Time Calculation:   SLP Start Time: (P) 0900  Untimed Charges  05465-AF Treatment/ST Modification Prosth Aug Alter : (P) 75  Total Minutes  Untimed Charges Total Minutes: (P) 75   Total Minutes: (P) 75    Therapy Charges for Today     Code Description Service Date Service Provider Modifiers Qty    [id number]  ST TREATMENT SPEECH 5 5/20/2021 Gilberto Araiza, Speech Therapy Student GN 1                   Gilberto Araiza, Speech Therapy Student  5/20/2021

## 2021-05-29 PROCEDURE — 93294 REM INTERROG EVL PM/LDLS PM: CPT | Performed by: INTERNAL MEDICINE

## 2021-05-29 PROCEDURE — 93296 REM INTERROG EVL PM/IDS: CPT | Performed by: INTERNAL MEDICINE

## 2021-06-07 ENCOUNTER — APPOINTMENT (OUTPATIENT)
Dept: SPEECH THERAPY | Facility: HOSPITAL | Age: 81
End: 2021-06-07

## 2021-06-10 ENCOUNTER — HOSPITAL ENCOUNTER (OUTPATIENT)
Dept: SPEECH THERAPY | Facility: HOSPITAL | Age: 81
Setting detail: THERAPIES SERIES
Discharge: HOME | End: 2021-06-10

## 2021-06-10 DIAGNOSIS — R41.3 MEMORY LOSS: Primary | ICD-10-CM

## 2021-06-10 PROCEDURE — 92507 TX SP LANG VOICE COMM INDIV: CPT

## 2021-06-10 NOTE — THERAPY TREATMENT NOTE
Outpatient Speech Language Pathology   Adult Speech Language Cognitive Treatment Note  [facility]     Patient Name: Uche Polanco  : 1940  MRN: [id number]  Today's Date: 6/10/2021         Visit Date: 06/10/2021   Patient Active Problem List   Diagnosis   • Severe obstructive sleep apnea   • Gout   • AV block   • REM sleep behavior disorder   • Memory loss   • Postural dizziness with presyncope   • Chronic systolic congestive heart failure (CMS/HCC)   • Nonrheumatic aortic valve insufficiency          Visit Dx:    ICD-10-CM ICD-9-CM   1. Memory loss  R41.3 780.93                         SLP OP Goals     Row Name 06/10/21 0900          Goal Type Needed    Goal Type Needed  Verbal Expression;Memory;Attention/Orientation;Other Adult Goals  -HG        Subjective Comments    Subjective Comments  Pt alert, cooperative, returned with homework.   -HG        Verbal Expression Goals    Verbal Expression LTG's  Patient will be able to use verbal expressive language skills to communicate effectively in social/avocational/work setting  -HG     Patient will be able to use verbal expressive language skills to communicate effectively in social/avocational/work setting  80%:;with cues  -HG     Status: Patient will be able to use verbal expressive language skills to communicate effectively in social/avocational/work setting  Progressing as expected  -HG     Verbal Expression STG's  Patient will improve verbal expressive language skills by completing divergent naming tasks;Patient will improve verbal expressive language skills by listing words associated to target word provided  -HG     Patient will improve verbal expressive language skills by completing divergent naming tasks  80%:;with cues  -HG     Status: Patient will improve verbal expressive language skills by completing divergent naming tasks  Progressing as expected  -HG     Comments: Patient will improve verbal expressive language skills by completing divergent  naming tasks  5/10/21: Pt completed abstract and concrete divergent naming tasks with 4-5 items per 20 seconds.   -HG     Patient will improve verbal expressive language skills by listing words associated to target word provided  80%  -HG     Status: Patient will improve verbal expressive language skills by listing words associated to target word provided  Progressing as expected  -HG        Memory Goals    Memory LTG's  Patient will be able to remember  information needed to participate in activities of daily living  -HG     Patient will be able to remember  information needed to participate in activities of daily living  80% with cues  -HG     Status: Patient will be able to remember  information needed to participate in activities of daily living  Progressing as expected  -HG     Comments: Patient will be able to remember  information needed to participate in activities of daily living  4/29/21: Pt states that he has difficulties with watching TV and reading books due to forgetting what has happened. Pt states he has forgotten where items such as his car keys or mask are because he places them in places that he normally would not place them.  -HG     Memory STG's  Patient’s memory skills will be enhanced as reported by patient by utilizing internal memory strategies to recall up to 3 pieces of information after a 5- minute delay;Patient’s memory skills will be enhanced as reported by patient by using external memory aides;Patient will demonstrate improved ability to recall information by immediately recalling a series of words  -HG     Patient will demonstrate improved ability to recall information by immediately recalling a series of words  80%:;related;unrelated;with cues  -HG     Status: Patient will demonstrate improved ability to recall information by immediately recalling a series of words  Progressing as expected  -HG     Comments: Patient will demonstrate improved ability to recall information by  immediately recalling a series of words  21: SLUMS paragraph recall was 3/4 independently. 21: Pt recall of 4-5 words groups and associated questions about word placement/category was 80% accurate with minimal-moderate cues. 21: Pt recall of 4 word array was 40% with minimal support. 5/3/21: Pt played card game Forget Me Not and was 20% accurate with moderate cues.19: 50-65 word paragraphs for immediate recall and pt was on average 80% accurate. 12/10/19: Memory for puzzles and pt was 70% accurate. 19: Immediate recall of memory book puzzle: pt was 70% accurate. 11/15/19: Immediate recall of Memory Puzzle Book and pt was 75% accurate. 19: ABC game for Immediate recall and pt was 70% accurate I'ly.  19: Recall of rules of familiar card game and pt was 80% accurate. 10/21/19: Recall from paragraph in an executive fxn task and pt was 50% accurate with mod cues. 10/18/19: Immediate recall for Memory Matches i pad game and pt was 70% accurate.  Fastest time memory matches and pt was impulsive but completed in a little 60 secs. 10/15/19: RBANS Immediate recall score dropped to a 73 from an 83 at last re-assessment. 10/4/19: Memory Recall for a picture scene and pt was 75% accurate. 19: Recalling Boxed Information for numbers: pt was 80% accurate. 19: Spaced Retrieval Method: pt was . 19: RBANS Immediate recall and pt improved score to an 83 up from 49 at time of eval. 19: Immediate recall of boxed information: pt was 85% accurate. 19: Immediate recall of shapes and figures (6) and pt was 75% accurate.  9/3/19: Immediate recall of shapes and figures (5) and pt improved to 80% accurate.  19: Pt was able to recall 5 related words with accuracy, 6 words, pt was 5/6. 19: 50-65 words: pt was 6/6 x 3 for immediate story recall. 19: 36-50 word paragraphs for immediate recall: pt was . Immediate recall for storytellin/3, using chunking: 3/3,   -HG      Patient’s memory skills will be enhanced as reported by patient by utilizing internal memory strategies to recall up to 3 pieces of information after a 5- minute delay  80%:;with cues  -HG     Status: Patient’s memory skills will be enhanced as reported by patient by utilizing internal memory strategies to recall up to 3 pieces of information after a 5- minute delay  Progressing as expected  -HG     Comments: Patient’s memory skills will be enhanced as reported by patient by utilizing internal memory strategies to recall up to 3 pieces of information after a 5- minute delay  6/8/21: Delayed recall of 2 un-related pictures, pt was 2/2 x 3 and third word added, pt was 2/3 and was 3/3 after review and association.  5/20/21: SLUMS delayed recall was 4/5 independenly. 5/17/21: Pt recall of 4 words was 60% accurate after 3 minute delay. 5/3/21: Pt recall of 5 names associated with faces was 100% independently. 12/24/19: Delayed recall of 6 un-related words and pt was 6/6. 12/17/19: Delayed recall of 4 un-related words from hmwk and pt was 4/4 x 2. 5 un-related words and pt was 3/5 I'ly and with increased delay, pt was 4/5 I'ly. 12/10/19: Delayed recall of 8 related words and pt was 100% accurate. 4 un-related words, pt was 2/4, 3/4, 4/4.  12/5/19: Delayed recall of 8 related words and pt was 8/8 x 2. 11/15/19: Delayed recall of 7 un-related words from hmwk and pt was 7/7 x 3. 11/12/19: RBANS Delayed recall score of 95 was improved from last re-assessment. 11/5/19: Delayed recall of 7 un-related words from homework and previous sessions and pt was 7/7. 11/1/19: Delayed recall of 6 un-related words: pt was 6/6 and added 2 more words and pt was 6/7 and then 6/8.  10/25/19: Delayed recall of 4 un-related words, pt was 4/4 with min cues, Independently got it 4/4 x 2.  10/21/19: [facility] and [facility] 6/[facility]. With review and increased delay, pt was 7/7.  10/18/19: Delayed recall for 5  un-related word from homework and pt was 5/5 x 3. Added word 10/15/19: RBANS Delayed recall score of 78 dropped slightly from an 81 at previous re-assessment. 10/1/19:Delayed recall of paragraph information and pt was 2/3 for topics. 9/27/19: Delayed recall of 12 groupable pictures and pt was 12/12 x 2. 9/24/19: Delayed recall of 16 groupable words and pt was 14/16. Increased delay of 16 groupable words: pt was 10/16.  9/20/19: Delayed recall of words from SRT and pt was 3/12..  -HG     Patient’s memory skills will be enhanced as reported by patient by using external memory aides  80%:;with cues  -HG     Status: Patient’s memory skills will be enhanced as reported by patient by using external memory aides  Progressing as expected  -HG     Comments: Patient’s memory skills will be enhanced as reported by patient by using external memory aides  5/17/21: Pt states he use his planner and phone to keep track of dates, appointments, etc. 5/3/21: Pt continues to use his planner to remember dates/plans. 12/24/19: Pt continues to use his planner. 11/7/19: Pt using his day planner for daily and monthly use. 10/25/19: Using planner for calendar with 100% accurate. Use of daily entries, pt was 80% accurate. 9/20/19: Pt is using planner 90% of the time with mild-moderate level of details. 9/6/19: Pt is making daily notes in his planner. 9/3/19: Pt using his calendar consistently however, pt not using the daily journaling section as much as SLP would like. 8/30/19: Pt required min cues to catch up on daily entries. 8/16/19: Pt continues to use his planner daily. 8/9/19: Pt with increased use of his planner with daily notes.  7/26/19: Fxnl use of planner: pt was writing down his TO DO LIST and not what he had done.   -HG        Attention/Orientation Goals    Attention/Orientation LTG's  Patient will be able to participate in the community safely  -HG     Patient will be able to participate in the community safely  Independently   -HG     Status: Patient will be able to participate in the community safely  Progressing as expected  -HG     Comments: Patient will be able to participate in the community safely  5/10/21: Pt states he enjoyed time with his family over the weekend. 5/6/21: Pt states he attended his son in laws birthday party, [employer], and went driving around town over the past week.  -HG     Attention/Orientation STG's  Patient will improve attention skills by sustaining consistent behavioral response during continuous and repetitive activity (e.g., listening for target words, auditory/reading comprehension tasks);Patient will improve attention skills by focusing to selective target/task when presented with competing stimuli or in a distracting environment in order to complete task;Patient will improve attention skills by alternating or shifting focus between two different tasks in order to complete both tasks;Patient will improve attention skills by dividing focus and responding simultaneously to multiple tasks or in order to complete task  -HG     Patient will improve attention skills by sustaining consistent behavioral response during continuous and repetitive activity (e.g., listening for target words, auditory/reading comprehension tasks)  with cues;5 minute task  -HG     Status: Patient will improve attention skills by sustaining consistent behavioral response during continuous and repetitive activity (e.g., listening for target words, auditory/reading comprehension tasks)  Progressing as expected  -HG     Comments: Patient will improve attention skills by sustaining consistent behavioral response during continuous and repetitive activity (e.g., listening for target words, auditory/reading comprehension tasks)  5/17/21: Pt sustained attention for 4-5 word memory task was 80% accurate with minimal-moderate cues. 5/10/21: Pt completed paragraph inferencing homework task with 90% accuracy. 5/6/21: Pt completed paragraph  inferencing task with 60% accuracy with minimal cues.  -HG     Patient will improve attention skills by focusing to selective target/task when presented with competing stimuli or in a distracting environment in order to complete task  80%:;with cues  -HG     Status: Patient will improve attention skills by focusing to selective target/task when presented with competing stimuli or in a distracting environment in order to complete task  Progressing as expected  -HG     Comments: Patient will improve attention skills by focusing to selective target/task when presented with competing stimuli or in a distracting environment in order to complete task  5/17/21: Pt completed visual scanning homework task with 100% accuracy. 5/10/21: Pt completed Sort then Stack card game with 100% accuracy independently. 5/6/21: Pt completed visual scanning activity with 100% accuracy independently. 5/3/21: Pt played Forget Me Not card game with field of 21, 12, and 4 cards 20% accuracy and moderate cues.  -HG     Patient will improve attention skills by alternating or shifting focus between two different tasks in order to complete both tasks  80%:;with cues  -HG     Status: Patient will improve attention skills by alternating or shifting focus between two different tasks in order to complete both tasks  Progressing as expected  -HG     Comments: Patient will improve attention skills by alternating or shifting focus between two different tasks in order to complete both tasks  6/10/21: From homework, pt did not follow directions correctly for alternating attention, repeated task during the session and pt was 80% accurate with mod cues.  5/20/21: Pt completed alternating attention homework task with 50% accuracy. 5/10/21: Pt completed scoreboard card game and was 100% accurate with minimal cues. 12/17/19: [place], pt was 80% accurate with min cues. 12/10/19: Attention for [place] and pt was 80% accurate with mod cues.  11/15/19: Attention to detail for use of a chart and analyzing numbers- new task for pt and pt was 90% accurate. 19: Divided Attention APT score was Below Average. 10/21/19: While completing an executive function task, pt exhibited difficulty with attention and required mod cues and was 70% accurate. 19: While cards being turned over, pt had to tap table if the card played was lower than the previous one. Pt was 60% accurate. 9/10/19: Pt able to recall up to 7 digits. 19: Visual Scanning: Pt required mod cues for alternating between letters and was 80 % accurate.  19: Card game and pt was 80% accurate for new rules and watching increasing piles of cards for potential moves.  19: Sorting card by suit and  and pt was 70% accurate. 19: Sorting card by suit and  and pt required mod cues and repeated instructions and pt was 60% accurate.   -HG     Patient will improve attention skills by dividing focus and responding simultaneously to multiple tasks or in order to complete task  80%:;with cues  -HG     Status: Patient will improve attention skills by dividing focus and responding simultaneously to multiple tasks or in order to complete task  Progressing as expected  -HG     Comments: Patient will improve attention skills by dividing focus and responding simultaneously to multiple tasks or in order to complete task  6/10/21: Divided attention for dividing suit by two groups and pt required min cues and overall was 90% accurate. 21: Pt completed 5 stack card game with moderate-maximum prompting and was 70% accurate. Pt completed 5 stack card game with only black cards independently and was 50% accurate. 21: Pt recall of 4 words and answering question regarding words was 50%. 5/3/21: Pt completed Which Way card game with 90% accuracy independently.  -HG        Other Goals    Other Adult Goal- 1  Pt will complete high level thought organization tasks with 80% accuracy.   -HG      Status: Other Adult Goal- 1  Progressing as expected  -HG     Comments: Other Adult Goal- 1  6/10/21: Convergent naming abstract: pt was 70% accurate with mod cues. 5/10/21: Pt returned with homework and was 90% accurate with paragraph inferencing. 5/6/21: Pt completed paragraph inferencing task with 60% accuracy and minimcal cues. 12/24/19: Divergent naming: pt was 80% accurate. 11/12/19: RBANS Language score remained in the average range. 11/1/19: Divergent abstract naming: pt was 10/10 x 2. 10/18/19: Divergent abstract naming: pt was 100% accurate for 6 sets. Convergent naming: pt was 21/25. 10/15/19: RBANS Language score improved to a 92 from an 88 at last re-assessment. 10/1/19: Divergent naming: pt was 80% accurate. 9/16/19: RBANS Language score of 88 remains the same as time of initial eval. 8/30/19: Abstract divergent naming, pt was 8/10. 8/9/19: Divergent concrete category naming and pt was 15/15 x 2. 7/26/19: Divergent naming: pt was 10/10.   -HG     Other Adult Goal- 2  Pt will improve RBANS score to at least a 90 placing pt in the Average range.  -HG     Status: Other Adult Goal- 2  Progressing as expected  -HG     Comments: Other Adult Goal- 2  5/20/21: SLUMS total score of 21 places pt in mild neurocognitive disorder range. 4/14/17: 10/10 for abstract categories. 3/24/17: 11/10 for concrete categories.   Pt able to name 16 items in a sixty second period of time on the RBANS. 3/17/17: For abstract category naming, pt was 100% accurate. Pt was 10/15 for abstract items. 2/23: Pt was on average 10-13/15. 2/27: Abstract items: pt was at least 10 items with 2 minute time frame allowed. 3/3: Pt was 15/15 for abstract categories  3/6/17: For abstract naming in one minute: T1:10/10 T2:8/10  -HG     Other Adult Goal- 3  Pt will improve RBANS score to at least a 90 placing pt in the Average range.  -HG     Status: Other Adult Goal- 3  New  -HG     Comments: Other Adult Goal- 3  --  -HG     Other Adult Goal- 4  --   -HG     Status: Other Adult Goal- 4  --  -HG     Comments: Other Adult Goal- 4  --  -HG     Other Adult Goal- 5  --  -HG     Status: Other Adult Goal- 5  --  -HG     Comments: Other Adult Goal- 5  --  -HG     Other Adult Goal- 6  --  -HG     Status: Other Adult Goal- 6  --  -HG     Comments: Other Adult Goal- 6  --  -HG     Other Adult Goal- 7  --  -HG     Status: Other Adult Goal- 7  --  -HG     Comments: Other Adult Goal- 7  --  -HG     Other Adult Goal- 8  --  -HG     Status: Other Adult Goal- 8  --  -HG     Comments: Other Adult Goal- 8  --  -HG        SLP Time Calculation    SLP Goal Re-Cert Due Date  06/19/21  -HG       User Key  (r) = Recorded By, (t) = Taken By, (c) = Cosigned By    Initials Name Provider Type    Anjali Cortes MS CCC-SLP Speech and Language Pathologist        OP SLP Education     Row Name 06/10/21 0900       Education    Education Comments  [facility] for delayed recall of 4 un-related pictures.   -HG      User Key  (r) = Recorded By, (t) = Taken By, (c) = Cosigned By    Initials Name Effective Dates    Anjali Cortes MS CCC-SLP 08/09/20 -         OP SLP Assessment/Plan - 06/10/21 0900        SLP Plan    Plan Comments  Cont with Cog tx.    -HG      User Key  (r) = Recorded By, (t) = Taken By, (c) = Cosigned By    Initials Name Provider Type    Anjali Cortes MS CCC-SLP Speech and Language Pathologist               Time Calculation:   SLP Start Time: 0900  Untimed Charges  80571-SV Eval Oral Pharyng Swallow Minutes: 60  Total Minutes  Untimed Charges Total Minutes: 60   Total Minutes: 60    Therapy Charges for Today     Code Description Service Date Service Provider Modifiers Qty    [id number] HC ST TREATMENT SPEECH 4 6/10/2021 Anjali Serrano MS CCC-SLP GN 1                   Anjali Serrano MS CCC-SLP  6/10/2021

## 2021-06-14 ENCOUNTER — HOSPITAL ENCOUNTER (OUTPATIENT)
Dept: SPEECH THERAPY | Facility: HOSPITAL | Age: 81
Setting detail: THERAPIES SERIES
Discharge: HOME | End: 2021-06-14

## 2021-06-14 DIAGNOSIS — R41.3 MEMORY LOSS: Primary | ICD-10-CM

## 2021-06-14 PROCEDURE — 92507 TX SP LANG VOICE COMM INDIV: CPT

## 2021-06-14 NOTE — THERAPY TREATMENT NOTE
Outpatient Speech Language Pathology   Adult Speech Language Cognitive Treatment Note  [facility]     Patient Name: Uche Polanco  : 1940  MRN: [id number]  Today's Date: 2021         Visit Date: 2021   Patient Active Problem List   Diagnosis   • Severe obstructive sleep apnea   • Gout   • AV block   • REM sleep behavior disorder   • Memory loss   • Postural dizziness with presyncope   • Chronic systolic congestive heart failure (CMS/HCC)   • Nonrheumatic aortic valve insufficiency          Visit Dx:    ICD-10-CM ICD-9-CM   1. Memory loss  R41.3 780.93                         SLP OP Goals     Row Name 21 0900          Goal Type Needed    Goal Type Needed  Verbal Expression;Memory;Attention/Orientation;Other Adult Goals  -HG        Subjective Comments    Subjective Comments  Pt alert, cooperative, returned with homework and day planner.   -HG        Verbal Expression Goals    Verbal Expression LTG's  Patient will be able to use verbal expressive language skills to communicate effectively in social/avocational/work setting  -HG     Patient will be able to use verbal expressive language skills to communicate effectively in social/avocational/work setting  80%:;with cues  -HG     Status: Patient will be able to use verbal expressive language skills to communicate effectively in social/avocational/work setting  Progressing as expected  -HG     Verbal Expression STG's  Patient will improve verbal expressive language skills by completing divergent naming tasks;Patient will improve verbal expressive language skills by listing words associated to target word provided  -HG     Patient will improve verbal expressive language skills by completing divergent naming tasks  80%:;with cues  -HG     Status: Patient will improve verbal expressive language skills by completing divergent naming tasks  Progressing as expected  -HG     Comments: Patient will improve verbal expressive language skills by  completing divergent naming tasks  5/10/21: Pt completed abstract and concrete divergent naming tasks with 4-5 items per 20 seconds.   -HG     Patient will improve verbal expressive language skills by listing words associated to target word provided  80%  -HG     Status: Patient will improve verbal expressive language skills by listing words associated to target word provided  Progressing as expected  -HG        Memory Goals    Memory LTG's  Patient will be able to remember  information needed to participate in activities of daily living  -HG     Patient will be able to remember  information needed to participate in activities of daily living  80% with cues  -HG     Status: Patient will be able to remember  information needed to participate in activities of daily living  Progressing as expected  -HG     Comments: Patient will be able to remember  information needed to participate in activities of daily living  4/29/21: Pt states that he has difficulties with watching TV and reading books due to forgetting what has happened. Pt states he has forgotten where items such as his car keys or mask are because he places them in places that he normally would not place them.  -HG     Memory STG's  Patient’s memory skills will be enhanced as reported by patient by utilizing internal memory strategies to recall up to 3 pieces of information after a 5- minute delay;Patient’s memory skills will be enhanced as reported by patient by using external memory aides;Patient will demonstrate improved ability to recall information by immediately recalling a series of words  -HG     Patient will demonstrate improved ability to recall information by immediately recalling a series of words  80%:;related;unrelated;with cues  -HG     Status: Patient will demonstrate improved ability to recall information by immediately recalling a series of words  Progressing as expected  -HG     Comments: Patient will demonstrate improved ability to recall  information by immediately recalling a series of words  6/14/21: Immediate recall for word placement, 4 words: pt was 80% accurate with min cues. 5/20/21: SLUMS paragraph recall was 3/4 independently. 5/17/21: Pt recall of 4-5 words groups and associated questions about word placement/category was 80% accurate with minimal-moderate cues. 5/6/21: Pt recall of 4 word array was 40% with minimal support. 5/3/21: Pt played card game Forget Me Not and was 20% accurate with moderate cues.12/24/19: 50-65 word paragraphs for immediate recall and pt was on average 80% accurate. 12/10/19: Memory for puzzles and pt was 70% accurate. 12/5/19: Immediate recall of memory book puzzle: pt was 70% accurate. 11/15/19: Immediate recall of Memory Puzzle Book and pt was 75% accurate. 11/7/19: ABC game for Immediate recall and pt was 70% accurate I'ly.  11/5/19: Recall of rules of familiar card game and pt was 80% accurate. 10/21/19: Recall from paragraph in an executive fxn task and pt was 50% accurate with mod cues. 10/18/19: Immediate recall for Memory Matches i pad game and pt was 70% accurate.  Fastest time memory matches and pt was impulsive but completed in a little 60 secs. 10/15/19: RBANS Immediate recall score dropped to a 73 from an 83 at last re-assessment. 10/4/19: Memory Recall for a picture scene and pt was 75% accurate. 9/24/19: Recalling Boxed Information for numbers: pt was 80% accurate. 9/20/19: Spaced Retrieval Method: pt was 4/12. 9/16/19: RBANS Immediate recall and pt improved score to an 83 up from 49 at time of eval. 9/13/19: Immediate recall of boxed information: pt was 85% accurate. 9/6/19: Immediate recall of shapes and figures (6) and pt was 75% accurate.  9/3/19: Immediate recall of shapes and figures (5) and pt improved to 80% accurate.  8/23/19: Pt was able to recall 5 related words with accuracy, 6 words, pt was 5/6. 8/16/19: 50-65 words: pt was 6/6 x 3 for immediate story recall. 8/6/19: 36-50 word  paragraphs for immediate recall: pt was 7/8. Immediate recall for storytellin/3, using chunking: 3/3,   -HG     Patient’s memory skills will be enhanced as reported by patient by utilizing internal memory strategies to recall up to 3 pieces of information after a 5- minute delay  80%:;with cues  -HG     Status: Patient’s memory skills will be enhanced as reported by patient by utilizing internal memory strategies to recall up to 3 pieces of information after a 5- minute delay  Progressing as expected  -HG     Comments: Patient’s memory skills will be enhanced as reported by patient by utilizing internal memory strategies to recall up to 3 pieces of information after a 5- minute delay  21: Delayed recall of 4 un-related pictures from homework and pt was 4/4, 5th added: pt was 4/5 x 3.  21: Delayed recall of 2 un-related pictures, pt was 2/2 x 3 and third word added, pt was 2/3 and was 3/3 after review and association.  21: SLUMS delayed recall was 4/5 independenly. 21: Pt recall of 4 words was 60% accurate after 3 minute delay. 5/3/21: Pt recall of 5 names associated with faces was 100% independently. 19: Delayed recall of 6 un-related words and pt was 6/6. 19: Delayed recall of 4 un-related words from hmwk and pt was 4/4 x 2. 5 un-related words and pt was 3/5 I'ly and with increased delay, pt was 4/5 I'ly. 12/10/19: Delayed recall of 8 related words and pt was 100% accurate. 4 un-related words, pt was 2/4, 3/4, 4/4.  19: Delayed recall of 8 related words and pt was 8/8 x 2. 11/15/19: Delayed recall of 7 un-related words from hmwk and pt was 7/7 x 3. 19: RBANS Delayed recall score of 95 was improved from last re-assessment. 19: Delayed recall of 7 un-related words from homework and previous sessions and pt was 7/7. 19: Delayed recall of 6 un-related words: pt was 6/6 and added 2 more words and pt was 6/7 and then 6/8.  10/25/19: Delayed recall of 4 un-related  words, pt was 4/4 with min cues, Independently got it 4/4 x 2.  10/21/19: Delayed recall for 7 un-related words from hwmk and pt was 6/7. With review and increased delay, pt was 7/7.  10/18/19: Delayed recall for 5 un-related word from homework and pt was 5/5 x 3. Added word 10/15/19: RBANS Delayed recall score of 78 dropped slightly from an 81 at previous re-assessment. 10/1/19:Delayed recall of paragraph information and pt was 2/3 for topics. 9/27/19: Delayed recall of 12 groupable pictures and pt was 12/12 x 2. 9/24/19: Delayed recall of 16 groupable words and pt was 14/16. Increased delay of 16 groupable words: pt was 10/16.  9/20/19: Delayed recall of words from SRT and pt was 3/12..  -HG     Patient’s memory skills will be enhanced as reported by patient by using external memory aides  80%:;with cues  -HG     Status: Patient’s memory skills will be enhanced as reported by patient by using external memory aides  Progressing as expected  -HG     Comments: Patient’s memory skills will be enhanced as reported by patient by using external memory aides  5/17/21: Pt states he use his planner and phone to keep track of dates, appointments, etc. 5/3/21: Pt continues to use his planner to remember dates/plans. 12/24/19: Pt continues to use his planner. 11/7/19: Pt using his day planner for daily and monthly use. 10/25/19: Using planner for calendar with 100% accurate. Use of daily entries, pt was 80% accurate. 9/20/19: Pt is using planner 90% of the time with mild-moderate level of details. 9/6/19: Pt is making daily notes in his planner. 9/3/19: Pt using his calendar consistently however, pt not using the daily journaling section as much as SLP would like. 8/30/19: Pt required min cues to catch up on daily entries. 8/16/19: Pt continues to use his planner daily. 8/9/19: Pt with increased use of his planner with daily notes.  7/26/19: Fxnl use of planner: pt was writing down his TO DO LIST and not what he had done.    -HG        Attention/Orientation Goals    Attention/Orientation LTG's  Patient will be able to participate in the community safely  -HG     Patient will be able to participate in the community safely  Independently  -HG     Status: Patient will be able to participate in the community safely  Progressing as expected  -HG     Comments: Patient will be able to participate in the community safely  5/10/21: Pt states he enjoyed time with his family over the weekend. 5/6/21: Pt states he attended his son in laws birthday party, [place], and went driving around town over the past week.  -HG     Attention/Orientation STG's  Patient will improve attention skills by sustaining consistent behavioral response during continuous and repetitive activity (e.g., listening for target words, auditory/reading comprehension tasks);Patient will improve attention skills by focusing to selective target/task when presented with competing stimuli or in a distracting environment in order to complete task;Patient will improve attention skills by alternating or shifting focus between two different tasks in order to complete both tasks;Patient will improve attention skills by dividing focus and responding simultaneously to multiple tasks or in order to complete task  -HG     Patient will improve attention skills by sustaining consistent behavioral response during continuous and repetitive activity (e.g., listening for target words, auditory/reading comprehension tasks)  with cues;5 minute task  -HG     Status: Patient will improve attention skills by sustaining consistent behavioral response during continuous and repetitive activity (e.g., listening for target words, auditory/reading comprehension tasks)  Progressing as expected  -HG     Comments: Patient will improve attention skills by sustaining consistent behavioral response during continuous and repetitive activity (e.g., listening for target words, auditory/reading comprehension tasks)   5/17/21: Pt sustained attention for 4-5 word memory task was 80% accurate with minimal-moderate cues. 5/10/21: Pt completed paragraph inferencing homework task with 90% accuracy. 5/6/21: Pt completed paragraph inferencing task with 60% accuracy with minimal cues.  -HG     Patient will improve attention skills by focusing to selective target/task when presented with competing stimuli or in a distracting environment in order to complete task  80%:;with cues  -HG     Status: Patient will improve attention skills by focusing to selective target/task when presented with competing stimuli or in a distracting environment in order to complete task  Progressing as expected  -HG     Comments: Patient will improve attention skills by focusing to selective target/task when presented with competing stimuli or in a distracting environment in order to complete task  5/17/21: Pt completed visual scanning homework task with 100% accuracy. 5/10/21: Pt completed Sort then Stack card game with 100% accuracy independently. 5/6/21: Pt completed visual scanning activity with 100% accuracy independently. 5/3/21: Pt played Forget Me Not card game with field of 21, 12, and 4 cards 20% accuracy and moderate cues.  -HG     Patient will improve attention skills by alternating or shifting focus between two different tasks in order to complete both tasks  80%:;with cues  -HG     Status: Patient will improve attention skills by alternating or shifting focus between two different tasks in order to complete both tasks  Progressing as expected  -HG     Comments: Patient will improve attention skills by alternating or shifting focus between two different tasks in order to complete both tasks  6/14/21: Shifting attention from one card sorting task to another and pt was 60% accurate. 6/10/21: From homework, pt did not follow directions correctly for alternating attention, repeated task during the session and pt was 80% accurate with mod cues.  5/20/21:  Pt completed alternating attention homework task with 50% accuracy. 5/10/21: Pt completed scoreboard card game and was 100% accurate with minimal cues. 19: [place] in the [place], pt was 80% accurate with min cues. 12/10/19: Attention for [place] in the [place] and pt was 80% accurate with mod cues. 11/15/19: Attention to detail for use of a chart and analyzing numbers- new task for pt and pt was 90% accurate. 19: Divided Attention APT score was Below Average. 10/21/19: While completing an executive function task, pt exhibited difficulty with attention and required mod cues and was 70% accurate. 19: While cards being turned over, pt had to tap table if the card played was lower than the previous one. Pt was 60% accurate. 9/10/19: Pt able to recall up to 7 digits. 19: Visual Scanning: Pt required mod cues for alternating between letters and was 80 % accurate.  19: Card game and pt was 80% accurate for new rules and watching increasing piles of cards for potential moves.  19: Sorting card by suit and  and pt was 70% accurate. 19: Sorting card by suit and  and pt required mod cues and repeated instructions and pt was 60% accurate.   -HG     Patient will improve attention skills by dividing focus and responding simultaneously to multiple tasks or in order to complete task  80%:;with cues  -HG     Status: Patient will improve attention skills by dividing focus and responding simultaneously to multiple tasks or in order to complete task  Progressing as expected  -HG     Comments: Patient will improve attention skills by dividing focus and responding simultaneously to multiple tasks or in order to complete task  6/10/21: Divided attention for dividing suit by two groups and pt required min cues and overall was 90% accurate. 21: Pt completed 5 stack card game with moderate-maximum prompting and was 70% accurate. Pt completed 5 stack card game with only black cards independently and  was 50% accurate. 5/6/21: Pt recall of 4 words and answering question regarding words was 50%. 5/3/21: Pt completed Which Way card game with 90% accuracy independently.  -HG        Other Goals    Other Adult Goal- 1  Pt will complete high level thought organization tasks with 80% accuracy.   -HG     Status: Other Adult Goal- 1  Progressing as expected  -HG     Comments: Other Adult Goal- 1  6/14/21: Written directions worksheet and pt was 90% accurate. 6/10/21: Convergent naming abstract: pt was 70% accurate with mod cues. 5/10/21: Pt returned with homework and was 90% accurate with paragraph inferencing. 5/6/21: Pt completed paragraph inferencing task with 60% accuracy and minimcal cues. 12/24/19: Divergent naming: pt was 80% accurate. 11/12/19: RBANS Language score remained in the average range. 11/1/19: Divergent abstract naming: pt was 10/10 x 2. 10/18/19: Divergent abstract naming: pt was 100% accurate for 6 sets. Convergent naming: pt was 21/25. 10/15/19: RBANS Language score improved to a 92 from an 88 at last re-assessment. 10/1/19: Divergent naming: pt was 80% accurate. 9/16/19: RBANS Language score of 88 remains the same as time of initial eval. 8/30/19: Abstract divergent naming, pt was 8/10. 8/9/19: Divergent concrete category naming and pt was 15/15 x 2. 7/26/19: Divergent naming: pt was 10/10.   -HG     Other Adult Goal- 2  Pt will improve RBANS score to at least a 90 placing pt in the Average range.  -HG     Status: Other Adult Goal- 2  Progressing as expected  -HG     Comments: Other Adult Goal- 2  5/20/21: SLUMS total score of 21 places pt in mild neurocognitive disorder range. 4/14/17: 10/10 for abstract categories. 3/24/17: 11/10 for concrete categories.   Pt able to name 16 items in a sixty second period of time on the RBANS. 3/17/17: For abstract category naming, pt was 100% accurate. Pt was 10/15 for abstract items. 2/23: Pt was on average 10-13/15. 2/27: Abstract items: pt was at least 10  items with 2 minute time frame allowed. 3/3: Pt was 15/15 for abstract categories  3/6/17: For abstract naming in one minute: T1:10/10 T2:8/10  -HG     Other Adult Goal- 3  Pt will improve RBANS score to at least a 90 placing pt in the Average range.  -HG     Status: Other Adult Goal- 3  New  -HG        SLP Time Calculation    SLP Goal Re-Cert Due Date  06/19/21  -HG       User Key  (r) = Recorded By, (t) = Taken By, (c) = Cosigned By    Initials Name Provider Type    Anjali Cortes MS CCC-SLP Speech and Language Pathologist        OP SLP Education     Row Name 06/14/21 0900       Education    Education Comments  Hmwk for 6 un-related pictures.   -HG      User Key  (r) = Recorded By, (t) = Taken By, (c) = Cosigned By    Initials Name Effective Dates    Anjali Cortes MS CCC-SLP 08/09/20 -         OP SLP Assessment/Plan - 06/14/21 0900        SLP Plan    Plan Comments  Cont with Cog tx with plan to re-assess at next session.    -HG      User Key  (r) = Recorded By, (t) = Taken By, (c) = Cosigned By    Initials Name Provider Type    Anjali Cortes MS CCC-SLP Speech and Language Pathologist               Time Calculation:   SLP Start Time: 0900  Untimed Charges  18939-EA Treatment/ST Modification Prosth Aug Alter : 60  Total Minutes  Untimed Charges Total Minutes: 60   Total Minutes: 60    Therapy Charges for Today     Code Description Service Date Service Provider Modifiers Qty    [id number]  ST TREATMENT SPEECH 4 6/14/2021 Anjali Serrano MS CCC-SLP GN 1                   Anjali Serrano MS CCC-SLP  6/14/2021

## 2021-06-17 ENCOUNTER — APPOINTMENT (OUTPATIENT)
Dept: SPEECH THERAPY | Facility: HOSPITAL | Age: 81
End: 2021-06-17

## 2021-06-21 ENCOUNTER — HOSPITAL ENCOUNTER (OUTPATIENT)
Dept: SPEECH THERAPY | Facility: HOSPITAL | Age: 81
Setting detail: THERAPIES SERIES
Discharge: HOME | End: 2021-06-21

## 2021-06-21 DIAGNOSIS — R41.3 MEMORY LOSS: Primary | ICD-10-CM

## 2021-06-21 PROCEDURE — 92507 TX SP LANG VOICE COMM INDIV: CPT

## 2021-06-21 NOTE — THERAPY PROGRESS REPORT/RE-CERT
Outpatient Speech Language Pathology   Adult Speech Language Cognitive Progress Note  [facility]     Patient Name: Uche Polanco  : 1940  MRN: [id number]  Today's Date: 2021         Visit Date: 2021   Patient Active Problem List   Diagnosis   • Severe obstructive sleep apnea   • Gout   • AV block   • REM sleep behavior disorder   • Memory loss   • Postural dizziness with presyncope   • Chronic systolic congestive heart failure (CMS/HCC)   • Nonrheumatic aortic valve insufficiency          Visit Dx:    ICD-10-CM ICD-9-CM   1. Memory loss  R41.3 780.93                         SLP OP Goals     Row Name 21 0857          Goal Type Needed    Goal Type Needed  Verbal Expression;Memory;Attention/Orientation;Other Adult Goals  -HG        Subjective Comments    Subjective Comments  Pt alert, cooperative, returned with homework.   -HG        Verbal Expression Goals    Verbal Expression LTG's  Patient will be able to use verbal expressive language skills to communicate effectively in social/avocational/work setting  -HG     Patient will be able to use verbal expressive language skills to communicate effectively in social/avocational/work setting  80%:;with cues  -HG     Status: Patient will be able to use verbal expressive language skills to communicate effectively in social/avocational/work setting  Progressing as expected  -HG     Verbal Expression STG's  Patient will improve verbal expressive language skills by completing divergent naming tasks;Patient will improve verbal expressive language skills by listing words associated to target word provided  -HG     Patient will improve verbal expressive language skills by completing divergent naming tasks  80%:;with cues  -HG     Status: Patient will improve verbal expressive language skills by completing divergent naming tasks  Progressing as expected  -HG     Comments: Patient will improve verbal expressive language skills by completing divergent  naming tasks  6/21/21: RBANS For language and pt improved to a 92. 5/10/21: Pt completed abstract and concrete divergent naming tasks with 4-5 items per 20 seconds.   -HG     Patient will improve verbal expressive language skills by listing words associated to target word provided  80%  -HG     Status: Patient will improve verbal expressive language skills by listing words associated to target word provided  Progressing as expected  -HG        Memory Goals    Memory LTG's  Patient will be able to remember  information needed to participate in activities of daily living  -HG     Patient will be able to remember  information needed to participate in activities of daily living  80% with cues  -HG     Status: Patient will be able to remember  information needed to participate in activities of daily living  Progressing as expected  -HG     Comments: Patient will be able to remember  information needed to participate in activities of daily living  4/29/21: Pt states that he has difficulties with watching TV and reading books due to forgetting what has happened. Pt states he has forgotten where items such as his car keys or mask are because he places them in places that he normally would not place them.  -HG     Memory STG's  Patient’s memory skills will be enhanced as reported by patient by utilizing internal memory strategies to recall up to 3 pieces of information after a 5- minute delay;Patient’s memory skills will be enhanced as reported by patient by using external memory aides;Patient will demonstrate improved ability to recall information by immediately recalling a series of words  -HG     Patient will demonstrate improved ability to recall information by immediately recalling a series of words  80%:;related;unrelated;with cues  -HG     Status: Patient will demonstrate improved ability to recall information by immediately recalling a series of words  Progressing as expected  -HG     Comments: Patient will  demonstrate improved ability to recall information by immediately recalling a series of words  6/21/21: RBANS Immediate recall score of 65 is down from a previous score of 78. 6/14/21: Immediate recall for word placement, 4 words: pt was 80% accurate with min cues. 5/20/21: SLUMS paragraph recall was 3/4 independently. 5/17/21: Pt recall of 4-5 words groups and associated questions about word placement/category was 80% accurate with minimal-moderate cues. 5/6/21: Pt recall of 4 word array was 40% with minimal support. 5/3/21: Pt played card game Forget Me Not and was 20% accurate with moderate cues.12/24/19: 50-65 word paragraphs for immediate recall and pt was on average 80% accurate. 12/10/19: Memory for puzzles and pt was 70% accurate. 12/5/19: Immediate recall of memory book puzzle: pt was 70% accurate. 11/15/19: Immediate recall of Memory Puzzle Book and pt was 75% accurate. 11/7/19: ABC game for Immediate recall and pt was 70% accurate I'ly.  11/5/19: Recall of rules of familiar card game and pt was 80% accurate. 10/21/19: Recall from paragraph in an executive fxn task and pt was 50% accurate with mod cues. 10/18/19: Immediate recall for Memory Matches i pad game and pt was 70% accurate.  Fastest time memory matches and pt was impulsive but completed in a little 60 secs. 10/15/19: RBANS Immediate recall score dropped to a 73 from an 83 at last re-assessment. 10/4/19: Memory Recall for a picture scene and pt was 75% accurate. 9/24/19: Recalling Boxed Information for numbers: pt was 80% accurate. 9/20/19: Spaced Retrieval Method: pt was 4/12. 9/16/19: RBANS Immediate recall and pt improved score to an 83 up from 49 at time of eval. 9/13/19: Immediate recall of boxed information: pt was 85% accurate. 9/6/19: Immediate recall of shapes and figures (6) and pt was 75% accurate.  9/3/19: Immediate recall of shapes and figures (5) and pt improved to 80% accurate.  8/23/19: Pt was able to recall 5 related words with  accuracy, 6 words, pt was 5/6. 19: 50-65 words: pt was 6/6 x 3 for immediate story recall. 19: 36-50 word paragraphs for immediate recall: pt was 7/8. Immediate recall for storytellin/3, using chunking: 3/3,   -HG     Patient’s memory skills will be enhanced as reported by patient by utilizing internal memory strategies to recall up to 3 pieces of information after a 5- minute delay  80%:;with cues  -HG     Status: Patient’s memory skills will be enhanced as reported by patient by utilizing internal memory strategies to recall up to 3 pieces of information after a 5- minute delay  Progressing as expected  -HG     Comments: Patient’s memory skills will be enhanced as reported by patient by utilizing internal memory strategies to recall up to 3 pieces of information after a 5- minute delay  21: Delayed recall of 6 un-related pictures: pt was 6/6 and pt was able to add 3 more pics to it x 2. RBANS Delayed recall score of 88 is down slightly from previous assesmsent.  21: Delayed recall of 4 un-related pictures from homework and pt was 4/4, 5th added: pt was 4/5 x 3.  21: Delayed recall of 2 un-related pictures, pt was 2/2 x 3 and third word added, pt was 2/3 and was 3/3 after review and association.  21: SLUMS delayed recall was 4/5 independenly. 21: Pt recall of 4 words was 60% accurate after 3 minute delay. 5/3/21: Pt recall of 5 names associated with faces was 100% independently. 19: Delayed recall of 6 un-related words and pt was 6/6. 19: Delayed recall of 4 un-related words from hmwk and pt was 4/4 x 2. 5 un-related words and pt was 3/5 I'ly and with increased delay, pt was 4/5 I'ly. 12/10/19: Delayed recall of 8 related words and pt was 100% accurate. 4 un-related words, pt was 2/4, 3/4, 4/4.  19: Delayed recall of 8 related words and pt was 8/8 x 2. 11/15/19: Delayed recall of 7 un-related words from hmwk and pt was 7/7 x 3. 19: RBANS Delayed recall  score of 95 was improved from last re-assessment. 11/5/19: Delayed recall of 7 un-related words from homework and previous sessions and pt was 7/7. 11/1/19: Delayed recall of 6 un-related words: pt was 6/6 and added 2 more words and pt was 6/7 and then 6/8.  10/25/19: Delayed recall of 4 un-related words, pt was 4/4 with min cues, Independently got it 4/4 x 2.  10/21/19: Delayed recall for 7 un-related words from hwmk and pt was 6/7. With review and increased delay, pt was 7/7.  10/18/19: Delayed recall for 5 un-related word from homework and pt was 5/5 x 3. Added word 10/15/19: RBANS Delayed recall score of 78 dropped slightly from an 81 at previous re-assessment. 10/1/19:Delayed recall of paragraph information and pt was 2/3 for topics. 9/27/19: Delayed recall of 12 groupable pictures and pt was 12/12 x 2. 9/24/19: Delayed recall of 16 groupable words and pt was 14/16. Increased delay of 16 groupable words: pt was 10/16.  9/20/19: Delayed recall of words from SRT and pt was 3/12..  -HG     Patient’s memory skills will be enhanced as reported by patient by using external memory aides  80%:;with cues  -HG     Status: Patient’s memory skills will be enhanced as reported by patient by using external memory aides  Progressing as expected  -HG     Comments: Patient’s memory skills will be enhanced as reported by patient by using external memory aides  6/21/21: Pt is faithful with using his calendar. Education for use of larger space in order to prompt for delayed recall purposes. 5/17/21: Pt states he use his planner and phone to keep track of dates, appointments, etc. 5/3/21: Pt continues to use his planner to remember dates/plans. 12/24/19: Pt continues to use his planner. 11/7/19: Pt using his day planner for daily and monthly use. 10/25/19: Using planner for calendar with 100% accurate. Use of daily entries, pt was 80% accurate. 9/20/19: Pt is using planner 90% of the time with mild-moderate level of details.  9/6/19: Pt is making daily notes in his planner. 9/3/19: Pt using his calendar consistently however, pt not using the daily journaling section as much as SLP would like. 8/30/19: Pt required min cues to catch up on daily entries. 8/16/19: Pt continues to use his planner daily. 8/9/19: Pt with increased use of his planner with daily notes.  7/26/19: Fxnl use of planner: pt was writing down his TO DO LIST and not what he had done.   -HG        Attention/Orientation Goals    Attention/Orientation LTG's  Patient will be able to participate in the community safely  -HG     Patient will be able to participate in the community safely  Independently  -HG     Status: Patient will be able to participate in the community safely  Progressing as expected  -HG     Comments: Patient will be able to participate in the community safely  6/21/21: RBANS Attention score of 85 is up slightly from a previous 82. 5/10/21: Pt states he enjoyed time with his family over the weekend. 5/6/21: Pt states he attended his son in laws birthday party, [employer], and went driving around town over the past week.  -HG     Attention/Orientation STG's  Patient will improve attention skills by sustaining consistent behavioral response during continuous and repetitive activity (e.g., listening for target words, auditory/reading comprehension tasks);Patient will improve attention skills by focusing to selective target/task when presented with competing stimuli or in a distracting environment in order to complete task;Patient will improve attention skills by alternating or shifting focus between two different tasks in order to complete both tasks;Patient will improve attention skills by dividing focus and responding simultaneously to multiple tasks or in order to complete task  -HG     Patient will improve attention skills by sustaining consistent behavioral response during continuous and repetitive activity (e.g., listening for target words, auditory/reading  comprehension tasks)  with cues;5 minute task  -HG     Status: Patient will improve attention skills by sustaining consistent behavioral response during continuous and repetitive activity (e.g., listening for target words, auditory/reading comprehension tasks)  Progressing as expected  -HG     Comments: Patient will improve attention skills by sustaining consistent behavioral response during continuous and repetitive activity (e.g., listening for target words, auditory/reading comprehension tasks)  5/17/21: Pt sustained attention for 4-5 word memory task was 80% accurate with minimal-moderate cues. 5/10/21: Pt completed paragraph inferencing homework task with 90% accuracy. 5/6/21: Pt completed paragraph inferencing task with 60% accuracy with minimal cues.  -HG     Patient will improve attention skills by focusing to selective target/task when presented with competing stimuli or in a distracting environment in order to complete task  80%:;with cues  -HG     Status: Patient will improve attention skills by focusing to selective target/task when presented with competing stimuli or in a distracting environment in order to complete task  Progressing as expected  -HG     Comments: Patient will improve attention skills by focusing to selective target/task when presented with competing stimuli or in a distracting environment in order to complete task  5/17/21: Pt completed visual scanning homework task with 100% accuracy. 5/10/21: Pt completed Sort then Stack card game with 100% accuracy independently. 5/6/21: Pt completed visual scanning activity with 100% accuracy independently. 5/3/21: Pt played Forget Me Not card game with field of 21, 12, and 4 cards 20% accuracy and moderate cues.  -HG     Patient will improve attention skills by alternating or shifting focus between two different tasks in order to complete both tasks  80%:;with cues  -HG     Status: Patient will improve attention skills by alternating or shifting  focus between two different tasks in order to complete both tasks  Progressing as expected  -HG     Comments: Patient will improve attention skills by alternating or shifting focus between two different tasks in order to complete both tasks  21: Shifting attention from one card sorting task to another and pt was 60% accurate. 6/10/21: From homework, pt did not follow directions correctly for alternating attention, repeated task during the session and pt was 80% accurate with mod cues.  21: Pt completed alternating attention homework task with 50% accuracy. 5/10/21: Pt completed scoreboard card game and was 100% accurate with minimal cues. 19: [place], pt was 80% accurate with min cues. 12/10/19: Attention for [place] and pt was 80% accurate with mod cues. 11/15/19: Attention to detail for use of a chart and analyzing numbers- new task for pt and pt was 90% accurate. 19: Divided Attention APT score was Below Average. 10/21/19: While completing an executive function task, pt exhibited difficulty with attention and required mod cues and was 70% accurate. 19: While cards being turned over, pt had to tap table if the card played was lower than the previous one. Pt was 60% accurate. 9/10/19: Pt able to recall up to 7 digits. 19: Visual Scanning: Pt required mod cues for alternating between letters and was 80 % accurate.  19: Card game and pt was 80% accurate for new rules and watching increasing piles of cards for potential moves.  19: Sorting card by suit and  and pt was 70% accurate. 19: Sorting card by suit and  and pt required mod cues and repeated instructions and pt was 60% accurate.   -HG     Patient will improve attention skills by dividing focus and responding simultaneously to multiple tasks or in order to complete task  80%:;with cues  -HG     Status: Patient will improve attention skills by dividing focus and responding simultaneously  to multiple tasks or in order to complete task  Progressing as expected  -HG     Comments: Patient will improve attention skills by dividing focus and responding simultaneously to multiple tasks or in order to complete task  6/10/21: Divided attention for dividing suit by two groups and pt required min cues and overall was 90% accurate. 5/20/21: Pt completed 5 stack card game with moderate-maximum prompting and was 70% accurate. Pt completed 5 stack card game with only black cards independently and was 50% accurate. 5/6/21: Pt recall of 4 words and answering question regarding words was 50%. 5/3/21: Pt completed Which Way card game with 90% accuracy independently.  -HG        Other Goals    Other Adult Goal- 1  Pt will complete high level thought organization tasks with 80% accuracy.   -HG     Status: Other Adult Goal- 1  Progressing as expected  -HG     Comments: Other Adult Goal- 1  6/14/21: Written directions worksheet and pt was 90% accurate. 6/10/21: Convergent naming abstract: pt was 70% accurate with mod cues. 5/10/21: Pt returned with homework and was 90% accurate with paragraph inferencing. 5/6/21: Pt completed paragraph inferencing task with 60% accuracy and minimcal cues. 12/24/19: Divergent naming: pt was 80% accurate. 11/12/19: RBANS Language score remained in the average range. 11/1/19: Divergent abstract naming: pt was 10/10 x 2. 10/18/19: Divergent abstract naming: pt was 100% accurate for 6 sets. Convergent naming: pt was 21/25. 10/15/19: RBANS Language score improved to a 92 from an 88 at last re-assessment. 10/1/19: Divergent naming: pt was 80% accurate. 9/16/19: RBANS Language score of 88 remains the same as time of initial eval. 8/30/19: Abstract divergent naming, pt was 8/10. 8/9/19: Divergent concrete category naming and pt was 15/15 x 2. 7/26/19: Divergent naming: pt was 10/10.   -HG     Other Adult Goal- 2  Pt will improve RBANS score to at least a 90 placing pt in the Average range.  -HG      Status: Other Adult Goal- 2  Progressing as expected  -HG     Comments: Other Adult Goal- 2  6/21/21: RBANS Total score of 90 is up from previous session. 5/20/21: SLUMS total score of 21 places pt in mild neurocognitive disorder range. 4/14/17: 10/10 for abstract categories. 3/24/17: 11/10 for concrete categories.   Pt able to name 16 items in a sixty second period of time on the RBANS. 3/17/17: For abstract category naming, pt was 100% accurate. Pt was 10/15 for abstract items. 2/23: Pt was on average 10-13/15. 2/27: Abstract items: pt was at least 10 items with 2 minute time frame allowed. 3/3: Pt was 15/15 for abstract categories  3/6/17: For abstract naming in one minute: T1:10/10 T2:8/10  -HG     Other Adult Goal- 3  --  -HG     Status: Other Adult Goal- 3  --  -HG     Comments: Other Adult Goal- 3  --  -HG     Other Adult Goal- 4  --  -HG     Status: Other Adult Goal- 4  --  -HG     Comments: Other Adult Goal- 4  --  -HG     Other Adult Goal- 5  --  -HG     Status: Other Adult Goal- 5  --  -HG     Comments: Other Adult Goal- 5  --  -HG     Other Adult Goal- 6  --  -HG     Status: Other Adult Goal- 6  --  -HG     Comments: Other Adult Goal- 6  --  -HG     Other Adult Goal- 7  --  -HG     Status: Other Adult Goal- 7  --  -HG     Comments: Other Adult Goal- 7  --  -HG     Other Adult Goal- 8  --  -HG     Status: Other Adult Goal- 8  --  -HG     Comments: Other Adult Goal- 8  --  -HG        SLP Time Calculation    SLP Goal Re-Cert Due Date  07/21/21  -HG       User Key  (r) = Recorded By, (t) = Taken By, (c) = Cosigned By    Initials Name Provider Type    Anjali Cortes MS CCC-SLP Speech and Language Pathologist        OP SLP Education     Row Name 06/21/21 0857       Education    Barriers to Learning  No barriers identified  -HG    Education Provided  Patient expressed understanding of evaluation;Patient participated in establishing goals and treatment plan;Patient demonstrated recommended  strategies;Patient requires further education on strategies, risks  -    Assessed  Learning needs;Learning motivation;Learning preferences;Learning readiness  -    Learning Motivation  Strong  -    Learning Method  Explanation;Demonstration;Teach back  -    Teaching Response  Reinforcement needed;Verbalized understanding;Demonstrated understanding  -    Education Comments  [facility]mwk for daily journaling in larger space in his day planner.   -HG      User Key  (r) = Recorded By, (t) = Taken By, (c) = Cosigned By    Initials Name Effective Dates    Anjali Cortes MS CCC-SLP 06/16/21 -         OP SLP Assessment/Plan - 06/21/21 0857        SLP Assessment    Functional Problems  Speech Language- Adult/Cognition   -    Impact on Function: Adult Speech Language/Cognition  Trouble learning or remembering new information;Poor attention to task   -    Clinical Impression: Speech Language-Adult/Congnition  Mild:;Cognitive Communication Impairment   -HG    Functional Problems Comment  Pt stated that    -HG    Clinical Impression Comments  RBANS Total score of 90 places pt in the Average range.   -HG    Please refer to paper survey for additional self-reported information  Yes   -HG    Please refer to items scanned into chart for additional diagnostic informaiton and handouts as provided by clinician  Yes   -HG    SLP Diagnosis  Mild Cog/Comm impairment   -HG    Prognosis  Excellent (comment)   -HG    Patient/caregiver participated in establishment of treatment plan and goals  Yes   -HG    Patient would benefit from skilled therapy intervention  Yes   -HG       SLP Plan    Frequency  1-2x/week   -HG    Duration  6-8 weeks   -HG    Planned CPT's?  SLP INDIVIDUAL SPEECH THERAPY: 76073   -HG    Expected Duration of Therapy Session (SLP Eval)  60   -HG    Plan Comments  Cont with Cog tx.   -HG      User Key  (r) = Recorded By, (t) = Taken By, (c) = Cosigned By    Initials Name Provider Type    Anjali Cortes,  MS CCC-SLP Speech and Language Pathologist               Time Calculation:   SLP Start Time: 0857  Untimed Charges  16970-MB Treatment/ST Modification Prosth Aug Alter : 60  Total Minutes  Untimed Charges Total Minutes: 60   Total Minutes: 60    Therapy Charges for Today     Code Description Service Date Service Provider Modifiers Qty    [id number]  ST TREATMENT SPEECH 4 6/21/2021 Anjali Serrano, MS CCC-SLP GN 1                   Anjali Serrano MS CCC-SLP  6/21/2021

## 2021-06-24 ENCOUNTER — APPOINTMENT (OUTPATIENT)
Dept: SPEECH THERAPY | Facility: HOSPITAL | Age: 81
End: 2021-06-24

## 2021-07-07 ENCOUNTER — TELEPHONE (OUTPATIENT)
Dept: CARDIOLOGY | Facility: CLINIC | Age: 81
End: 2021-07-07

## 2021-07-07 NOTE — TELEPHONE ENCOUNTER
Missed remote cardiac device transmission. Called patient-left voice mail message to send in transmission or call device clinic for assistance.

## 2021-07-27 ENCOUNTER — OFFICE VISIT (OUTPATIENT)
Dept: SLEEP MEDICINE | Facility: HOSPITAL | Age: 81
End: 2021-07-27

## 2021-07-27 VITALS
HEIGHT: 73 IN | SYSTOLIC BLOOD PRESSURE: 121 MMHG | DIASTOLIC BLOOD PRESSURE: 59 MMHG | OXYGEN SATURATION: 95 % | BODY MASS INDEX: 25.71 KG/M2 | WEIGHT: 194 LBS | HEART RATE: 70 BPM

## 2021-07-27 DIAGNOSIS — G47.33 SEVERE OBSTRUCTIVE SLEEP APNEA: Primary | ICD-10-CM

## 2021-07-27 DIAGNOSIS — G47.52 REM SLEEP BEHAVIOR DISORDER: ICD-10-CM

## 2021-07-27 PROCEDURE — 99214 OFFICE O/P EST MOD 30 MIN: CPT | Performed by: INTERNAL MEDICINE

## 2021-07-27 RX ORDER — CLONAZEPAM 1 MG/1
1 TABLET ORAL
Qty: 90 TABLET | Refills: 0 | Status: SHIPPED | OUTPATIENT
Start: 2021-07-27 | End: 2021-12-28

## 2021-07-27 NOTE — PROGRESS NOTES
Subjective:     Chief Complaint:   Chief Complaint   Patient presents with   • Follow-up       HPI:    Uche Polanco is a 81 y.o. male here for follow-up of obstructive sleep apnea and REM behavior disorder.    He has a longstanding history of REM behavior disorder and severe obstructive sleep apnea.  He has been on clonazepam over the long-term for treatment of his RBD.  He has had problems with memory loss and has been followed in the past by neurology for cognitive impairment.  In the past his wife has attempted to decrease his clonazepam dose in the hopes that it might improve his cognition but this invariably resulted in more violent behavior at night.  We have tried high-dose melatonin with little effect.    He also has severe obstructive sleep apnea.  He is treated with auto CPAP at a range of 12-20 cm H2O.    He has had problems with mask leak and over the years we have discovered that any increasing pressures to control a mildly elevated AHI results in more mask leak and a decrease in pressures results in a higher AHI.      I reviewed his download with specifics as below.  It continues to show a mildly elevated AHI and intermittent mask leak.  His wife says that she gets up at night and adjust his mask at times.    He remains on Klonopin 1 mg nightly.  He or his wife have noted no problems or increasing parasomnias.    He has an appointment with the [facility] disorder center at  in January and that is the earliest they can get at this point.    Type of mask: full face mask    I reviewed his PAP download:  Average pressure: 16  Average AHI:  11  Average minutes in large leak per night: Variably high    Overall, he is benefiting from PAP therapy.    Current medications are:   Current Outpatient Medications:   •  atorvastatin (LIPITOR) 20 MG tablet, Take 1 tablet by mouth Daily., Disp: 90 tablet, Rfl: 1  •  clonazePAM (KlonoPIN) 1 MG tablet, Take 1 tablet by mouth every night at bedtime., Disp: 90  tablet, Rfl: 0  •  clopidogrel (PLAVIX) 75 MG tablet, Take 1 tablet by mouth Daily., Disp: 90 tablet, Rfl: 1  •  donepezil (ARICEPT) 10 MG tablet, Take 1 tablet by mouth Every Night., Disp: 90 tablet, Rfl: 3  •  escitalopram (LEXAPRO) 10 MG tablet, Take 1 tablet by mouth Daily., Disp: 90 tablet, Rfl: 3  •  memantine (NAMENDA) 10 MG tablet, Take 1/2 tab daily x 2 wks, then 1/2 tab bid x 2 wks, then 1/2 tab am and 1 tab pm x 2 wks then 1 tab bid and continue (Patient taking differently: 5 mg 2 (two) times a day.), Disp: 180 tablet, Rfl: 1  •  Multiple Vitamins-Minerals (PRESERVISION AREDS 2 PO), Take 1 tablet by mouth 2 (Two) Times a Day., Disp: , Rfl: .    The patient's relevant past medical, surgical, family and social history were reviewed and updated in Epic as appropriate.     ROS:    Review of Systems      Objective:    Physical Exam   Constitutional: He is oriented to person, place, and time. He appears well-developed.   HENT:   Head: Normocephalic and atraumatic.   Neck: No thyromegaly present.   Cardiovascular: Normal rate and regular rhythm. Exam reveals no gallop and no friction rub.   No murmur heard.  Pulmonary/Chest: Effort normal. No respiratory distress. He has no wheezes. He has no rales.   Neurological: He is alert and oriented to person, place, and time.   Skin: Skin is warm and dry.   Psychiatric: His behavior is normal.   Vitals reviewed.      Data:    Patient's PAP download was personally reviewed including raw data and results.    Reviewed JONATHAN Douglas's note from 4/14/2021    Assessment:    Problem List Items Addressed This Visit        Pulmonary Problems    Severe obstructive sleep apnea - Primary    Overview     Auto CPAP 12-20            Other    REM sleep behavior disorder          1. RBD: Remains on clonazepam 1 mg nightly.  Current dose seems appropriate.  He has failed high-dose melatonin in the past and lower doses of Klonopin have typically resulted in more violent behavior.     2. Severe obstructive sleep apnea: Mild elevation of AHI.  He has an intermittent mask leak.  I have tried higher and lower levels of minimum pressure and I think the current level is appropriate.  Have again discussed mask leak with the patient and his wife.    Plan:     1. I refilled his Klonopin prescription after checking his [employer].  3-month refill.  No change in dose.  2. CPAP supplies for the next year.  No change in settings  3. 6-month follow-up      Discussed in detail with the patient and his wife.  He will call prior to his follow up visit for any new problems.    Level of Risk Moderate due to: two stable chronic illnesses and prescription drug management    Signed by  Reed Hansen MD

## 2021-08-23 ENCOUNTER — DOCUMENTATION (OUTPATIENT)
Dept: SPEECH THERAPY | Facility: HOSPITAL | Age: 81
End: 2021-08-23

## 2021-08-23 DIAGNOSIS — R41.3 MEMORY LOSS: Primary | ICD-10-CM

## 2021-08-23 NOTE — THERAPY DISCHARGE NOTE
Speech Language Pathology Discharge Summary         Patient Name: Uche Polanco  : 1940  MRN: [id number]    Today's Date: 2021      SLP OP Goals     Row Name 21 1500          Goal Type Needed    Goal Type Needed  Verbal Expression;Memory;Attention/Orientation;Other Adult Goals  -HG        Subjective Comments    Subjective Comments  Pt seen for ~10 tx sessions. Pt had been on hold with travels and now plan for Neuropysch testing at  so SLP will D/C at this time.   -HG        Verbal Expression Goals    Verbal Expression LTG's  Patient will be able to use verbal expressive language skills to communicate effectively in social/avocational/work setting  -HG     Patient will be able to use verbal expressive language skills to communicate effectively in social/avocational/work setting  80%:;with cues  -HG     Status: Patient will be able to use verbal expressive language skills to communicate effectively in social/avocational/work setting  Progressing as expected  -HG     Verbal Expression STG's  Patient will improve verbal expressive language skills by completing divergent naming tasks;Patient will improve verbal expressive language skills by listing words associated to target word provided  -HG     Patient will improve verbal expressive language skills by completing divergent naming tasks  80%:;with cues  -HG     Status: Patient will improve verbal expressive language skills by completing divergent naming tasks  Progressing as expected  -HG     Comments: Patient will improve verbal expressive language skills by completing divergent naming tasks  21: RBANS For language and pt improved to a 92. 5/10/21: Pt completed abstract and concrete divergent naming tasks with 4-5 items per 20 seconds.   -HG     Patient will improve verbal expressive language skills by listing words associated to target word provided  80%  -HG     Status: Patient will improve verbal expressive language skills by listing  words associated to target word provided  Progressing as expected  -HG        Memory Goals    Memory LTG's  Patient will be able to remember  information needed to participate in activities of daily living  -HG     Patient will be able to remember  information needed to participate in activities of daily living  80% with cues  -HG     Status: Patient will be able to remember  information needed to participate in activities of daily living  Progressing as expected  -HG     Comments: Patient will be able to remember  information needed to participate in activities of daily living  4/29/21: Pt states that he has difficulties with watching TV and reading books due to forgetting what has happened. Pt states he has forgotten where items such as his car keys or mask are because he places them in places that he normally would not place them.  -HG     Memory STG's  Patient’s memory skills will be enhanced as reported by patient by utilizing internal memory strategies to recall up to 3 pieces of information after a 5- minute delay;Patient’s memory skills will be enhanced as reported by patient by using external memory aides;Patient will demonstrate improved ability to recall information by immediately recalling a series of words  -HG     Patient will demonstrate improved ability to recall information by immediately recalling a series of words  80%:;related;unrelated;with cues  -HG     Status: Patient will demonstrate improved ability to recall information by immediately recalling a series of words  Progressing as expected  -HG     Comments: Patient will demonstrate improved ability to recall information by immediately recalling a series of words  6/21/21: RBANS Immediate recall score of 65 is down from a previous score of 78. 6/14/21: Immediate recall for word placement, 4 words: pt was 80% accurate with min cues. 5/20/21: SLUMS paragraph recall was 3/4 independently. 5/17/21: Pt recall of 4-5 words groups and associated  questions about word placement/category was 80% accurate with minimal-moderate cues. 21: Pt recall of 4 word array was 40% with minimal support. 5/3/21: Pt played card game Forget Me Not and was 20% accurate with moderate cues.19: 50-65 word paragraphs for immediate recall and pt was on average 80% accurate. 12/10/19: Memory for puzzles and pt was 70% accurate. 19: Immediate recall of memory book puzzle: pt was 70% accurate. 11/15/19: Immediate recall of Memory Puzzle Book and pt was 75% accurate. 19: ABC game for Immediate recall and pt was 70% accurate I'ly.  19: Recall of rules of familiar card game and pt was 80% accurate. 10/21/19: Recall from paragraph in an executive fxn task and pt was 50% accurate with mod cues. 10/18/19: Immediate recall for Memory Matches i pad game and pt was 70% accurate.  Fastest time memory matches and pt was impulsive but completed in a little 60 secs. 10/15/19: RBANS Immediate recall score dropped to a 73 from an 83 at last re-assessment. 10/4/19: Memory Recall for a picture scene and pt was 75% accurate. 19: Recalling Boxed Information for numbers: pt was 80% accurate. 19: Spaced Retrieval Method: pt was . 19: RBANS Immediate recall and pt improved score to an 83 up from 49 at time of eval. 19: Immediate recall of boxed information: pt was 85% accurate. 19: Immediate recall of shapes and figures (6) and pt was 75% accurate.  9/3/19: Immediate recall of shapes and figures (5) and pt improved to 80% accurate.  19: Pt was able to recall 5 related words with accuracy, 6 words, pt was 5/6. 19: 50-65 words: pt was 6/6 x 3 for immediate story recall. 19: 36-50 word paragraphs for immediate recall: pt was 7/8. Immediate recall for storytellin/3, using chunking: 3/3,   -HG     Patient’s memory skills will be enhanced as reported by patient by utilizing internal memory strategies to recall up to 3 pieces of information  after a 5- minute delay  80%:;with cues  -HG     Status: Patient’s memory skills will be enhanced as reported by patient by utilizing internal memory strategies to recall up to 3 pieces of information after a 5- minute delay  Progressing as expected  -HG     Comments: Patient’s memory skills will be enhanced as reported by patient by utilizing internal memory strategies to recall up to 3 pieces of information after a 5- minute delay  6/21/21: Delayed recall of 6 un-related pictures: pt was 6/6 and pt was able to add 3 more pics to it x 2. RBANS Delayed recall score of 88 is down slightly from previous assesmsent.  6/14/21: Delayed recall of 4 un-related pictures from homework and pt was 4/4, 5th added: pt was 4/5 x 3.  6/8/21: Delayed recall of 2 un-related pictures, pt was 2/2 x 3 and third word added, pt was 2/3 and was 3/3 after review and association.  5/20/21: SLUMS delayed recall was 4/5 independenly. 5/17/21: Pt recall of 4 words was 60% accurate after 3 minute delay. 5/3/21: Pt recall of 5 names associated with faces was 100% independently. 12/24/19: Delayed recall of 6 un-related words and pt was 6/6. 12/17/19: Delayed recall of 4 un-related words from hmwk and pt was 4/4 x 2. 5 un-related words and pt was 3/5 I'ly and with increased delay, pt was 4/5 I'ly. 12/10/19: Delayed recall of 8 related words and pt was 100% accurate. 4 un-related words, pt was 2/4, 3/4, 4/4.  12/5/19: Delayed recall of 8 related words and pt was 8/8 x 2. 11/15/19: Delayed recall of 7 un-related words from hm[facility] and pt was 7/7 x 3. 11/12/19: RBANS Delayed recall score of 95 was improved from last re-assessment. 11/5/19: Delayed recall of 7 un-related words from homework and previous sessions and pt was 7/7. 11/1/19: Delayed recall of 6 un-related words: pt was 6/6 and added 2 more words and pt was 6/7 and then 6/8.  10/25/19: Delayed recall of 4 un-related words, pt was 4/4 with min cues, Independently got it 4/4 x 2.  10/21/19:  Delayed recall for 7 un-related words from [facility] and pt was 6/7. With review and increased delay, pt was 7/7.  10/18/19: Delayed recall for 5 un-related word from homework and pt was 5/5 x 3. Added word 10/15/19: RBANS Delayed recall score of 78 dropped slightly from an 81 at previous re-assessment. 10/1/19:Delayed recall of paragraph information and pt was 2/3 for topics. 9/27/19: Delayed recall of 12 groupable pictures and pt was 12/12 x 2. 9/24/19: Delayed recall of 16 groupable words and pt was 14/16. Increased delay of 16 groupable words: pt was 10/16.  9/20/19: Delayed recall of words from SRT and pt was 3/12..  -HG     Patient’s memory skills will be enhanced as reported by patient by using external memory aides  80%:;with cues  -HG     Status: Patient’s memory skills will be enhanced as reported by patient by using external memory aides  Progressing as expected  -HG     Comments: Patient’s memory skills will be enhanced as reported by patient by using external memory aides  6/21/21: Pt is faithful with using his calendar. Education for use of larger space in order to prompt for delayed recall purposes. 5/17/21: Pt states he use his planner and phone to keep track of dates, appointments, etc. 5/3/21: Pt continues to use his planner to remember dates/plans. 12/24/19: Pt continues to use his planner. 11/7/19: Pt using his day planner for daily and monthly use. 10/25/19: Using planner for calendar with 100% accurate. Use of daily entries, pt was 80% accurate. 9/20/19: Pt is using planner 90% of the time with mild-moderate level of details. 9/6/19: Pt is making daily notes in his planner. 9/3/19: Pt using his calendar consistently however, pt not using the daily journaling section as much as SLP would like. 8/30/19: Pt required min cues to catch up on daily entries. 8/16/19: Pt continues to use his planner daily. 8/9/19: Pt with increased use of his planner with daily notes.  7/26/19: Fxnl use of planner: pt was  writing down his TO DO LIST and not what he had done.   -HG        Attention/Orientation Goals    Attention/Orientation LTG's  Patient will be able to participate in the community safely  -HG     Patient will be able to participate in the community safely  Independently  -HG     Status: Patient will be able to participate in the community safely  Progressing as expected  -HG     Comments: Patient will be able to participate in the community safely  6/21/21: RBANS Attention score of 85 is up slightly from a previous 82. 5/10/21: Pt states he enjoyed time with his family over the weekend. 5/6/21: Pt states he attended his son in laws birthday party, [employer], and went driving around town over the past week.  -HG     Attention/Orientation STG's  Patient will improve attention skills by sustaining consistent behavioral response during continuous and repetitive activity (e.g., listening for target words, auditory/reading comprehension tasks);Patient will improve attention skills by focusing to selective target/task when presented with competing stimuli or in a distracting environment in order to complete task;Patient will improve attention skills by alternating or shifting focus between two different tasks in order to complete both tasks;Patient will improve attention skills by dividing focus and responding simultaneously to multiple tasks or in order to complete task  -HG     Patient will improve attention skills by sustaining consistent behavioral response during continuous and repetitive activity (e.g., listening for target words, auditory/reading comprehension tasks)  with cues;5 minute task  -HG     Status: Patient will improve attention skills by sustaining consistent behavioral response during continuous and repetitive activity (e.g., listening for target words, auditory/reading comprehension tasks)  Progressing as expected  -HG     Comments: Patient will improve attention skills by sustaining consistent behavioral  response during continuous and repetitive activity (e.g., listening for target words, auditory/reading comprehension tasks)  5/17/21: Pt sustained attention for 4-5 word memory task was 80% accurate with minimal-moderate cues. 5/10/21: Pt completed paragraph inferencing homework task with 90% accuracy. 5/6/21: Pt completed paragraph inferencing task with 60% accuracy with minimal cues.  -HG     Patient will improve attention skills by focusing to selective target/task when presented with competing stimuli or in a distracting environment in order to complete task  80%:;with cues  -HG     Status: Patient will improve attention skills by focusing to selective target/task when presented with competing stimuli or in a distracting environment in order to complete task  Progressing as expected  -HG     Comments: Patient will improve attention skills by focusing to selective target/task when presented with competing stimuli or in a distracting environment in order to complete task  5/17/21: Pt completed visual scanning homework task with 100% accuracy. 5/10/21: Pt completed Sort then Stack card game with 100% accuracy independently. 5/6/21: Pt completed visual scanning activity with 100% accuracy independently. 5/3/21: Pt played Forget Me Not card game with field of 21, 12, and 4 cards 20% accuracy and moderate cues.  -HG     Patient will improve attention skills by alternating or shifting focus between two different tasks in order to complete both tasks  80%:;with cues  -HG     Status: Patient will improve attention skills by alternating or shifting focus between two different tasks in order to complete both tasks  Progressing as expected  -HG     Comments: Patient will improve attention skills by alternating or shifting focus between two different tasks in order to complete both tasks  6/14/21: Shifting attention from one card sorting task to another and pt was 60% accurate. 6/10/21: From homework, pt did not follow  directions correctly for alternating attention, repeated task during the session and pt was 80% accurate with mod cues.  21: Pt completed alternating attention homework task with 50% accuracy. 5/10/21: Pt completed scoreboard card game and was 100% accurate with minimal cues. 19: [place], pt was 80% accurate with min cues. 12/10/19: Attention for [place] and pt was 80% accurate with mod cues. 11/15/19: Attention to detail for use of a chart and analyzing numbers- new task for pt and pt was 90% accurate. 19: Divided Attention APT score was Below Average. 10/21/19: While completing an executive function task, pt exhibited difficulty with attention and required mod cues and was 70% accurate. 19: While cards being turned over, pt had to tap table if the card played was lower than the previous one. Pt was 60% accurate. 9/10/19: Pt able to recall up to 7 digits. 19: Visual Scanning: Pt required mod cues for alternating between letters and was 80 % accurate.  19: Card game and pt was 80% accurate for new rules and watching increasing piles of cards for potential moves.  19: Sorting card by katie and REJI and pt was 70% accurate. 19: Sorting card by katie and REJI and pt required mod cues and repeated instructions and pt was 60% accurate.   -HG     Patient will improve attention skills by dividing focus and responding simultaneously to multiple tasks or in order to complete task  80%:;with cues  -HG     Status: Patient will improve attention skills by dividing focus and responding simultaneously to multiple tasks or in order to complete task  Progressing as expected  -HG     Comments: Patient will improve attention skills by dividing focus and responding simultaneously to multiple tasks or in order to complete task  6/10/21: Divided attention for dividing suit by two groups and pt required min cues and overall was 90% accurate. 21: Pt completed 5 stack card  game with moderate-maximum prompting and was 70% accurate. Pt completed 5 stack card game with only black cards independently and was 50% accurate. 5/6/21: Pt recall of 4 words and answering question regarding words was 50%. 5/3/21: Pt completed Which Way card game with 90% accuracy independently.  -HG        Other Goals    Other Adult Goal- 1  Pt will complete high level thought organization tasks with 80% accuracy.   -HG     Status: Other Adult Goal- 1  Progressing as expected  -HG     Comments: Other Adult Goal- 1  6/14/21: Written directions worksheet and pt was 90% accurate. 6/10/21: Convergent naming abstract: pt was 70% accurate with mod cues. 5/10/21: Pt returned with homework and was 90% accurate with paragraph inferencing. 5/6/21: Pt completed paragraph inferencing task with 60% accuracy and minimcal cues. 12/24/19: Divergent naming: pt was 80% accurate. 11/12/19: RBANS Language score remained in the average range. 11/1/19: Divergent abstract naming: pt was 10/10 x 2. 10/18/19: Divergent abstract naming: pt was 100% accurate for 6 sets. Convergent naming: pt was 21/25. 10/15/19: RBANS Language score improved to a 92 from an 88 at last re-assessment. 10/1/19: Divergent naming: pt was 80% accurate. 9/16/19: RBANS Language score of 88 remains the same as time of initial eval. 8/30/19: Abstract divergent naming, pt was 8/10. 8/9/19: Divergent concrete category naming and pt was 15/15 x 2. 7/26/19: Divergent naming: pt was 10/10.   -HG     Other Adult Goal- 2  Pt will improve RBANS score to at least a 90 placing pt in the Average range.  -HG     Status: Other Adult Goal- 2  Progressing as expected  -HG     Comments: Other Adult Goal- 2  6/21/21: RBANS Total score of 90 is up from previous session. 5/20/21: SLUMS total score of 21 places pt in mild neurocognitive disorder range. 4/14/17: 10/10 for abstract categories. 3/24/17: 11/10 for concrete categories.   Pt able to name 16 items in a sixty second period of  time on the RBANS. 3/17/17: For abstract category naming, pt was 100% accurate. Pt was 10/15 for abstract items. 2/23: Pt was on average 10-13/15. 2/27: Abstract items: pt was at least 10 items with 2 minute time frame allowed. 3/3: Pt was 15/15 for abstract categories  3/6/17: For abstract naming in one minute: T1:10/10 T2:8/10  -HG     Other Adult Goal- 3  Pt will improve RBANS score to at least a 90 placing pt in the Average range.  -HG     Status: Other Adult Goal- 3  New  -HG     Comments: Other Adult Goal- 3  4/14/17: 10/10 for abstract categories. 3/24/17: 11/10 for concrete categories.   Pt able to name 16 items in a sixty second period of time on the RBANS. 3/17/17: For abstract category naming, pt was 100% accurate. Pt was 10/15 for abstract items. 2/23: Pt was on average 10-13/15. 2/27: Abstract items: pt was at least 10 items with 2 minute time frame allowed. 3/3: Pt was 15/15 for abstract categories  3/6/17: For abstract naming in one minute: T1:10/10 T2:8/10  -HG     Other Adult Goal- 4  Patient will perform executive functioning task with 70% accuracy when provided prompts only in order to improve strategic thinking.  -HG     Status: Other Adult Goal- 4  Progressing as expected  -HG     Comments: Other Adult Goal- 4  4/14/17: Pt currently selling a house, managing finances, moving arrangments, traveling out of town, booking airfares, etc. 3/27/17: For recall of 16 objects and w/o review, pt was 50% accurate and with review ? 3/24/17: visuospatial recall, pt performed in the average range on the RBANS. 3/17/17: Pt given a task that involved visual recall as well as reasoning and problem solving and pt was ?Divided atten task while asked to perform a prospective memory task. Pt required mod cues this date. 2/23: Pt was 50-75% accurate this date with a mental flexibilty task and this was given a homework. 3/3: Card game played that was taught several weeks ago and pt was 70% accurate.  -HG     Other  Adult Goal- 5  Patient will improve cognition as evidenced by STGs met. (LTG)  -HG     Status: Other Adult Goal- 5  Progressing as expected  -HG     Comments: Other Adult Goal- 5   4/14/17: For delayed storytelling, pt was 80% accuracy with no review strategies. 3/27/17: For delayed recall of names and pictures, pt was 80% accuracy.  3/24/17: For story re-telling, immediate and delayed, pt was borderline. 3/17/17: Pt had to recall 16 items with no review from almost 2 weeks ago and pt was 80% accurate. Pt using visualization and grouping in order to recall verbally presented information. 2/27: For visual recall of pictures, pt was 80% accurate.   -HG     Other Adult Goal- 6  Patient will perform alternating attention task with 80% accuracy across two trials in order to enhance mental flexibility and attention.  -HG     Status: Other Adult Goal- 6  Progressing as expected  -HG     Comments: Other Adult Goal- 6  3/27/17: For mental manipulation of three words forward and three words backward: pt was 100% accurate with 3 words and 80% accurate with four words. 3/24/17: For attention on the RBANS, pt was in the average range. Card game and pt was 80% accurate. 2/23: For answering questions given a list of four words, pt was 80% accurate. 2/27: For mental manipulation with numbers, pt was 100% accurate. 3/3: While playing cards and naming items from a delayed recall list activity, pt was 75% accurate. 3/6/17: While corssing out numbers, pt had to switch with background music provided: pt was 90% accurate.   -HG     Other Adult Goal- 7  Patient will perform divided attention task with 70% accuracy across two trials independently in order to improve multitasking abilities.  -HG     Status: Other Adult Goal- 7  Progressing as expected  -HG     Comments: Other Adult Goal- 7  4/14/17: For high level mult-tasking, pt was 80-90% accurate independently. 3/17/17: Pt required min cues for completing exec fxn task in the correct  order. Pt to complete a maze while listening to music and pt was 50% accurate this date. 2/16: More of a prospective memory task thrown in the midst of a cognitive task and pt was 90% accurate.  2/23: For divided attention, pt was 70% accurate with min verbal cues. 3/3:While listening to music and recalling a word list, pt was 70% accurate.   -     Other Adult Goal- 8  Pt will recall short paragraph material presentally orally using strategies with 90% accuracy in order to improve immediate recall skills.  -HG     Status: Other Adult Goal- 8  New  -HG     Comments: Other Adult Goal- 8   3/27/17: For 22-36 word paragraphs, 85% accurate. For 36-50 words, 90% accurate.  pt was 3/6/17: Using a who what when where why strategy, pt was 80% accurate; without, pt was 60% accurate.   -        SLP Time Calculation    SLP Goal Re-Cert Due Date  07/21/21  -       User Key  (r) = Recorded By, (t) = Taken By, (c) = Cosigned By    Initials Name Provider Type     Anjali Serrano MS CCC-SLP Speech and Language Pathologist                 Time Calculation:                    Anjali Serrano MS CCC-SLP  8/23/2021

## 2021-08-28 PROCEDURE — 93294 REM INTERROG EVL PM/LDLS PM: CPT | Performed by: INTERNAL MEDICINE

## 2021-08-28 PROCEDURE — 93296 REM INTERROG EVL PM/IDS: CPT | Performed by: INTERNAL MEDICINE

## 2021-10-06 ENCOUNTER — TELEPHONE (OUTPATIENT)
Dept: CARDIOLOGY | Facility: CLINIC | Age: 81
End: 2021-10-06

## 2021-11-02 RX ORDER — CLOPIDOGREL BISULFATE 75 MG/1
75 TABLET ORAL DAILY
Qty: 90 TABLET | Refills: 1 | Status: SHIPPED | OUTPATIENT
Start: 2021-11-02 | End: 2022-05-23 | Stop reason: SDUPTHER

## 2021-11-23 ENCOUNTER — OFFICE VISIT (OUTPATIENT)
Dept: CARDIOLOGY | Facility: CLINIC | Age: 81
End: 2021-11-23

## 2021-11-23 VITALS
OXYGEN SATURATION: 96 % | DIASTOLIC BLOOD PRESSURE: 72 MMHG | SYSTOLIC BLOOD PRESSURE: 130 MMHG | BODY MASS INDEX: 25.71 KG/M2 | HEIGHT: 73 IN | WEIGHT: 194 LBS | HEART RATE: 74 BPM

## 2021-11-23 DIAGNOSIS — I35.1 NONRHEUMATIC AORTIC VALVE INSUFFICIENCY: ICD-10-CM

## 2021-11-23 DIAGNOSIS — I44.30 AV BLOCK: ICD-10-CM

## 2021-11-23 DIAGNOSIS — I50.22 CHRONIC SYSTOLIC CONGESTIVE HEART FAILURE (HCC): Primary | ICD-10-CM

## 2021-11-23 PROCEDURE — 93280 PM DEVICE PROGR EVAL DUAL: CPT | Performed by: INTERNAL MEDICINE

## 2021-11-23 PROCEDURE — 99214 OFFICE O/P EST MOD 30 MIN: CPT | Performed by: INTERNAL MEDICINE

## 2021-11-23 NOTE — PROGRESS NOTES
[facility] at [facility]  Office visit  Uche Polanco  1940  [phone]  VISIT DATE:  11/23/2021      PCP: Jd Tapia MD  [id number]  [facility] [address]    CC:  Chief Complaint   Patient presents with   • Chronic systolic congestive heart failure       PROBLEM LIST:  1. Valvular heart disease:  a. History of mitral valve repair at [facility] 2005, records pending.  b. Echocardiogram, August 2014: EF normal at 50% to 55%, moderate AR, mild MR, left atrium at 3.8 cm.   c. Echo, January 2017: EF 50%, moderate aortic insufficiency  2. Recent TIA:  a. Status post Medtronic link loop recorder implantation, November 2014.  b. Recent interrogation revealing AV block, pauses, symptomatic with dizziness and lightheadedness.  3. Obstructive sleep apnea requiring CPAP.   4. Benign hypertension.   5. Gout.   6. Coronary artery disease by report, data insufficient.   7. REM disorder.   8. Dyslipidemia.   9. Family history of hypertension.   10. AV block, symptomatic with lightheadedness, status post pacemaker implantation, 04/17/2015, Dr. Robert Gerber: Medtronic Advisa  [id number] DR01.   11. Surgical history:   a. Hemorrhoidectomy.   b. Basal cell carcinoma removed.   c. Valvular repair (mitral valve).      Cardiac studies and procedures:  February 2019 echo  · Left ventricular systolic function is moderately decreased.  · Estimated EF appears to be in the range of 36 - 40%.  · The following left ventricular wall segments are dyskinetic: apical lateral, apical inferior, apical septal and apex. The following left ventricular wall segments are akinetic: apical anterior.  · Left ventricular wall thickness is consistent with mild concentric hypertrophy.  · Mild-to-moderate mitral valve stenosis is present  · Mild to moderate aortic valve regurgitation is present.    March 2019 myocardial perfusion imaging  · Left ventricular ejection fraction is normal (Calculated EF =  54%).  · Fixed defect consistent with moderate size apical infarct with extension into the inferior wall, associated moderate hypokinesis.  · No ischemia visualized  · Impressions are consistent with an intermediate risk study.    December 2019 echo  · Left ventricular systolic function is mildly decreased.  · Estimated EF appears to be in the range of 41 - 45%.  · The following left ventricular wall segments are akinetic: apical anterior, apical inferior, apical septal and apex. Diastolic wall thinning consistent with apical infarction.    June 2020 echo  · Estimated EF appears to be in the range of 56 - 60%  · The following left ventricular wall segments are hypokinetic: apical septal and apex hypokinetic.  · Left atrial cavity size is mild-to-moderately dilated.  · Saline test results are negative for right to left atrial level shunt.  · Moderate aortic valve regurgitation is present.    ASSESSMENT:   Diagnosis Plan   1. Chronic systolic congestive heart failure (HCC)     2. Nonrheumatic aortic valve insufficiency     3. AV block       Device interrogation:  Medtronic dual-chamber pacemaker  Dependent  Sensed RA 3.1 mV, threshold is 0.75 V at 0.4 ms, impedance 532 ohms  RV sensing 20 mV, threshold 0.5 V at 0.4 ms, impedance 665 ohms  Estimated battery life 3.5 years  1 episode of NSVT, 2 seconds in duration.    PLAN:  Cardiomyopathy:  I really feel fairly confident that his apical regional wall motion abnormalities are due to either previous small infarct or high-grade distal LAD disease.  Currently asymptomatic.  Improving EF on medical therapy.  If he has worsening regional wall motion abnormalities, declining functional capacity, or worsening EF will proceed with definitive evaluation of coronary anatomy with left heart catheterization.  Stage III chronic kidney disease.  Developed episodes of symptomatic hypotension and lightheadedness on carvedilol and bisoprolol 2.5 mg p.o. Nightly.  Has not tolerated  "titration of medical therapy due to intermittent hypotension.    Mitral valve prolapse status post mitral valve repair: Stable on exam today.  Continue routine clinical follow-up and intermittent echocardiographic surveillance    Aortic insufficiency: Moderate and stable.  Continue clinical follow-up and surveillance echocardiographic assessment.    History of TIA: No evidence of atrial arrhythmia.  Continue Plavix for stroke prophylaxis.  Continue statin.  Afterload controlled, goal less than 130/80 mmHg.    Hypertension: Goal less than 130/80 mmHg.  Continue current medical therapy.    Symptomatic high-grade AV block: Device dependent.  Currently asymptomatic, continue routine follow-up in device clinic.    Hyperlipidemia: Goal LDL less than 70, continue statin.      Subjective  No change in baseline functional capacity.  Ongoing therapy for developing cognitive impairment.  Still has intermittent gait instability.  Blood pressures running less than 130/80 mmHg.  Tolerating statin without myalgias.  Denies bleeding complications on Plavix, mild intermittent bruising.  Denies chest pain, palpitations or dyspnea on exertion.  compliant with medical therapy.      PHYSICAL EXAMINATION:  Vitals:    11/23/21 1047   BP: 130/72   BP Location: Right arm   Patient Position: Sitting   Pulse: 74   SpO2: 96%   Weight: 88 kg (194 lb)   Height: 185.4 cm (73\")     General Appearance:    Alert, cooperative, no distress, appears stated age   Head:    Normocephalic, without obvious abnormality, atraumatic   Eyes:    conjunctiva/corneas clear   Nose:   Nares normal, septum midline, mucosa normal, no drainage   Throat:   Lips, teeth and gums normal   Neck:   Supple, symmetrical, trachea midline, no carotid    bruit or JVD   Lungs:     Clear to auscultation bilaterally, respirations unlabored   Chest Wall:    No tenderness or deformity    Heart:    Regular rate and rhythm, S1 and S2 normal, no murmur, rub   or gallop, normal carotid " impulse bilaterally without bruit.   Abdomen:     Soft, non-tender   Extremities:   Extremities normal, atraumatic, no cyanosis or edema   Pulses:   2+ and symmetric all extremities   Skin:   Skin color, texture, turgor normal, no rashes or lesions       Diagnostic Data:  Procedures  Lab Results   Component Value Date    CHLPL 190 08/22/2014    TRIG 93 07/23/2020    HDL 34 (L) 07/23/2020     Lab Results   Component Value Date    GLUCOSE 91 07/23/2020    BUN 18 07/23/2020    CREATININE 1.03 07/23/2020     07/23/2020    K 3.8 07/23/2020     07/23/2020    CO2 26.0 07/23/2020     Lab Results   Component Value Date    HGBA1C 5.30 07/22/2020     Lab Results   Component Value Date    WBC 5.14 07/23/2020    HGB 12.1 (L) 07/23/2020    HCT 37.4 (L) 07/23/2020     07/23/2020       Allergies  Allergies   Allergen Reactions   • Sulfa Antibiotics Rash       Current Medications    Current Outpatient Medications:   •  atorvastatin (LIPITOR) 20 MG tablet, Take 1 tablet by mouth Daily., Disp: 90 tablet, Rfl: 1  •  clonazePAM (KlonoPIN) 1 MG tablet, Take 1 tablet by mouth every night at bedtime., Disp: 90 tablet, Rfl: 0  •  clopidogrel (PLAVIX) 75 MG tablet, TAKE 1 TABLET BY MOUTH DAILY, Disp: 90 tablet, Rfl: 1  •  donepezil (ARICEPT) 10 MG tablet, Take 1 tablet by mouth Every Night., Disp: 90 tablet, Rfl: 3  •  escitalopram (LEXAPRO) 10 MG tablet, Take 1 tablet by mouth Daily., Disp: 90 tablet, Rfl: 3  •  memantine (NAMENDA) 10 MG tablet, Take 1/2 tab daily x 2 wks, then 1/2 tab bid x 2 wks, then 1/2 tab am and 1 tab pm x 2 wks then 1 tab bid and continue (Patient taking differently: 5 mg 2 (Two) Times a Day.), Disp: 180 tablet, Rfl: 1  •  Multiple Vitamins-Minerals (PRESERVISION AREDS 2 PO), Take 1 tablet by mouth 2 (Two) Times a Day., Disp: , Rfl:           ROS  Review of Systems   Cardiovascular: Negative for chest pain, dyspnea on exertion, irregular heartbeat and palpitations.   Respiratory: Negative for  cough, shortness of breath and sleep disturbances due to breathing.    Neurological: Positive for light-headedness.         SOCIAL HX  Social History     Socioeconomic History   • Marital status:    Tobacco Use   • Smoking status: Never Smoker   • Smokeless tobacco: Never Used   Vaping Use   • Vaping Use: Never used   Substance and Sexual Activity   • Alcohol use: Yes     Alcohol/week: 1.0 - 4.0 standard drink     Types: 1 - 4 Glasses of wine per week     Comment: week with dinner   • Drug use: No   • Sexual activity: Defer       FAMILY HX  Family History   Problem Relation Age of Onset   • Ovarian cancer Mother    • Lymphoma Father    • Cancer Father    • Hypertension Other              Jd Limon III, MD, FACC

## 2021-11-27 PROCEDURE — 93294 REM INTERROG EVL PM/LDLS PM: CPT | Performed by: INTERNAL MEDICINE

## 2021-11-27 PROCEDURE — 93296 REM INTERROG EVL PM/IDS: CPT | Performed by: INTERNAL MEDICINE

## 2021-12-24 DIAGNOSIS — R41.3 MEMORY LOSS: ICD-10-CM

## 2021-12-26 DIAGNOSIS — G47.52 REM SLEEP BEHAVIOR DISORDER: ICD-10-CM

## 2021-12-27 RX ORDER — ATORVASTATIN CALCIUM 20 MG/1
20 TABLET, FILM COATED ORAL DAILY
Qty: 30 TABLET | Refills: 5 | Status: SHIPPED | OUTPATIENT
Start: 2021-12-27 | End: 2022-06-20

## 2021-12-27 RX ORDER — MEMANTINE HYDROCHLORIDE 10 MG/1
10 TABLET ORAL 2 TIMES DAILY
Qty: 60 TABLET | Refills: 0 | Status: SHIPPED | OUTPATIENT
Start: 2021-12-27 | End: 2022-04-06

## 2021-12-27 NOTE — TELEPHONE ENCOUNTER
Patient is tolerating the medication. He is only taking 1/2 tablet twice dialy right now. They never increased it to the 1 bid. Patient wife said that she was waiting on his Neruocognative appt with  to increase it any further. His appt is 1/5/21/22 at . She will also call back to schedule an appt with Veronica POWERS after the  appointment. I am sending in one refill.

## 2021-12-28 RX ORDER — CLONAZEPAM 1 MG/1
1 TABLET ORAL NIGHTLY
Qty: 90 TABLET | Refills: 0 | Status: SHIPPED | OUTPATIENT
Start: 2021-12-28 | End: 2022-04-11

## 2022-01-04 ENCOUNTER — TELEPHONE (OUTPATIENT)
Dept: CARDIOLOGY | Facility: CLINIC | Age: 82
End: 2022-01-04

## 2022-01-04 NOTE — TELEPHONE ENCOUNTER
Spoke with wife and relayed friendly  reminder that Mr Collin's carelink transmission is due tomorrow.

## 2022-01-10 ENCOUNTER — OFFICE VISIT (OUTPATIENT)
Dept: SLEEP MEDICINE | Facility: HOSPITAL | Age: 82
End: 2022-01-10

## 2022-01-10 VITALS
SYSTOLIC BLOOD PRESSURE: 149 MMHG | HEART RATE: 60 BPM | BODY MASS INDEX: 25.6 KG/M2 | DIASTOLIC BLOOD PRESSURE: 70 MMHG | OXYGEN SATURATION: 96 % | WEIGHT: 193.2 LBS | HEIGHT: 73 IN

## 2022-01-10 DIAGNOSIS — G47.52 REM SLEEP BEHAVIOR DISORDER: ICD-10-CM

## 2022-01-10 DIAGNOSIS — G47.33 SEVERE OBSTRUCTIVE SLEEP APNEA: Primary | ICD-10-CM

## 2022-01-10 PROCEDURE — 99214 OFFICE O/P EST MOD 30 MIN: CPT | Performed by: INTERNAL MEDICINE

## 2022-01-10 RX ORDER — MULTIPLE VITAMINS W/ MINERALS TAB 9MG-400MCG
1 TAB ORAL DAILY
COMMUNITY
End: 2022-05-24

## 2022-01-10 NOTE — PROGRESS NOTES
Subjective:     Chief Complaint:   Chief Complaint   Patient presents with   • Follow-up       HPI:    Uche Polanco is a 81 y.o. male here for follow-up of obstructive sleep apnea and REM behavior disorder.    He has a longstanding history of REM behavior disorder and severe obstructive sleep apnea.  He has been on clonazepam over the long-term for treatment of his RBD.  He has had problems with memory loss and has been followed in the past by neurology for cognitive impairment.  In the past his wife has attempted to decrease his clonazepam dose in the hopes that it might improve his cognition but this invariably resulted in more violent behavior at night.  We have tried high-dose melatonin with little effect.    He also has severe obstructive sleep apnea.  He is treated with auto CPAP at a range of 12-20 cm H2O.    He has had problems with mask leak and over the years we have discovered that any increasing pressures to control a mildly elevated AHI results in more mask leak and a decrease in pressures results in a higher AHI.      His download today indicates similar findings to those in the past.  He has an intermittent mask leak and this may persist for several days or weeks at a time.  When he has more mask leak he has a higher AHI    He continues on Klonopin nightly and his wife notes that his nocturnal behavior disturbances are adequately controlled on the current dose of 1 mg nightly.    Type of mask: full face mask    I reviewed his PAP download:  Average pressure: 13  Average AHI:  9  Average minutes in large leak per night: Variably high    Overall, he is benefiting from PAP therapy.    Current medications are:   Current Outpatient Medications:   •  atorvastatin (LIPITOR) 20 MG tablet, TAKE 1 TABLET BY MOUTH DAILY, Disp: 30 tablet, Rfl: 5  •  clonazePAM (KlonoPIN) 1 MG tablet, Take 1 tablet by mouth Every Night., Disp: 90 tablet, Rfl: 0  •  clopidogrel (PLAVIX) 75 MG tablet, TAKE 1 TABLET BY MOUTH  DAILY, Disp: 90 tablet, Rfl: 1  •  donepezil (ARICEPT) 10 MG tablet, Take 1 tablet by mouth Every Night., Disp: 90 tablet, Rfl: 3  •  escitalopram (LEXAPRO) 10 MG tablet, Take 1 tablet by mouth Daily., Disp: 90 tablet, Rfl: 3  •  memantine (NAMENDA) 10 MG tablet, Take 1 tablet by mouth 2 (Two) Times a Day., Disp: 60 tablet, Rfl: 0  •  Multiple Vitamins-Minerals (PRESERVISION AREDS 2 PO), Take 1 tablet by mouth 2 (Two) Times a Day., Disp: , Rfl:   •  multivitamin with minerals (CENTRUM SILVER 50+MEN PO), Take 1 tablet by mouth Daily., Disp: , Rfl:   •  vitamin D3 125 MCG (5000 UT) capsule capsule, Take 5,000 Units by mouth Daily., Disp: , Rfl: .    The patient's relevant past medical, surgical, family and social history were reviewed and updated in Epic as appropriate.     ROS:    Review of Systems      Objective:    Physical Exam   Constitutional: He is oriented to person, place, and time. He appears well-developed.   HENT:   Head: Normocephalic and atraumatic.   Neck: No thyromegaly present.   Cardiovascular: Normal rate and regular rhythm. Exam reveals no gallop and no friction rub.   No murmur heard.  Pulmonary/Chest: Effort normal. No respiratory distress. He has no wheezes. He has no rales.   Neurological: He is alert and oriented to person, place, and time.   Skin: Skin is warm and dry.   Psychiatric: His behavior is normal.   Vitals reviewed.      Data:    Patient's PAP download was personally reviewed including raw data and results.    Assessment:    Problem List Items Addressed This Visit        Pulmonary Problems    Severe obstructive sleep apnea - Primary    Overview     Auto CPAP 12-20            Other    REM sleep behavior disorder          1. RBD: Remains on clonazepam 1 mg nightly.  Current dose seems appropriate based upon a lack of side effects and his wife's reports.  He has failed high-dose melatonin in the past and lower doses of Klonopin have typically resulted in more violent behavior.     2. Severe obstructive sleep apnea: He has used therapy on 99% of the nights recorded.  He is averaging 5 hours and 25 minutes on the nights utilized.  His average pressure is 13.4 with an overall average AHI of 9.3 though this varies significantly.  He has a higher AHI on the nights where his mask leaks more.    Plan:     1. eKasper compliant.  Will refill his prescription when it is due  2. CPAP supplies for the next year.  No change in settings.  Discussed being aware of mask leak and adjusting the mask as needed with the patient and his wife.  3. 6-month follow-up      Discussed in detail with the patient and his wife.  He will call prior to his follow up visit for any new problems.    Level of Risk Moderate due to: two stable chronic illnesses and prescription drug management    Signed by  Reed Hansen MD

## 2022-01-12 ENCOUNTER — HOME CARE VISIT (OUTPATIENT)
Dept: HOME HEALTH SERVICES | Facility: HOME HEALTHCARE | Age: 82
End: 2022-01-12

## 2022-02-26 PROCEDURE — 93296 REM INTERROG EVL PM/IDS: CPT | Performed by: INTERNAL MEDICINE

## 2022-02-26 PROCEDURE — 93294 REM INTERROG EVL PM/LDLS PM: CPT | Performed by: INTERNAL MEDICINE

## 2022-04-06 ENCOUNTER — OFFICE VISIT (OUTPATIENT)
Dept: NEUROLOGY | Facility: CLINIC | Age: 82
End: 2022-04-06

## 2022-04-06 VITALS
OXYGEN SATURATION: 99 % | DIASTOLIC BLOOD PRESSURE: 80 MMHG | WEIGHT: 189 LBS | HEART RATE: 83 BPM | BODY MASS INDEX: 25.05 KG/M2 | HEIGHT: 73 IN | SYSTOLIC BLOOD PRESSURE: 144 MMHG

## 2022-04-06 DIAGNOSIS — G25.0 ESSENTIAL TREMOR: ICD-10-CM

## 2022-04-06 DIAGNOSIS — F01.518 MIXED ALZHEIMER'S AND VASCULAR DEMENTIA WITH BEHAVIOR DISTURBANCES: ICD-10-CM

## 2022-04-06 DIAGNOSIS — G30.9 MIXED ALZHEIMER'S AND VASCULAR DEMENTIA WITH BEHAVIOR DISTURBANCES: ICD-10-CM

## 2022-04-06 DIAGNOSIS — G47.52 REM SLEEP BEHAVIOR DISORDER: Primary | ICD-10-CM

## 2022-04-06 DIAGNOSIS — F02.818 MIXED ALZHEIMER'S AND VASCULAR DEMENTIA WITH BEHAVIOR DISTURBANCES: ICD-10-CM

## 2022-04-06 PROBLEM — Z86.73 HISTORY OF TIA (TRANSIENT ISCHEMIC ATTACK): Status: ACTIVE | Noted: 2022-01-28

## 2022-04-06 PROBLEM — I50.9 CHF (CONGESTIVE HEART FAILURE): Status: ACTIVE | Noted: 2022-01-28

## 2022-04-06 PROBLEM — R41.89 COGNITIVE DEFICITS: Status: ACTIVE | Noted: 2022-01-28

## 2022-04-06 PROCEDURE — 99214 OFFICE O/P EST MOD 30 MIN: CPT | Performed by: NURSE PRACTITIONER

## 2022-04-06 RX ORDER — MEMANTINE HYDROCHLORIDE 10 MG/1
10 TABLET ORAL 2 TIMES DAILY
Qty: 180 TABLET | Refills: 3 | Status: SHIPPED | OUTPATIENT
Start: 2022-04-06 | End: 2022-06-10 | Stop reason: SDUPTHER

## 2022-04-06 RX ORDER — ESCITALOPRAM OXALATE 10 MG/1
10 TABLET ORAL DAILY
Qty: 90 TABLET | Refills: 3 | Status: SHIPPED | OUTPATIENT
Start: 2022-04-06 | End: 2022-06-10 | Stop reason: SDUPTHER

## 2022-04-06 RX ORDER — DONEPEZIL HYDROCHLORIDE 10 MG/1
10 TABLET, FILM COATED ORAL NIGHTLY
Qty: 90 TABLET | Refills: 3 | Status: SHIPPED | OUTPATIENT
Start: 2022-04-06 | End: 2022-06-10 | Stop reason: SDUPTHER

## 2022-04-06 NOTE — PROGRESS NOTES
Subjective:     Patient ID: Uche Polanco is a 81 y.o. male.    CC:   Chief Complaint   Patient presents with   • Memory Loss       HPI:   History of Present Illness   Uche Polanco is an 81 y.o. male who returns to clinic today for 1 year follow up cognitive disorder initially suspected to be MCI. He has noted symptoms for several years marked by forgetfulness and word-finding difficulties . This has gradually worsened subjectively over time. There have been no associated symptoms and he has denied impairments in ADL's.      He has a history of dizziness in the past, which has been well managed most recently with ENT.     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 This has been treated by Dr. Hansen with Klonopin.     Neuroimaging in the past has been notable only for an old left cerebellar infarct. Screening bloodwork has been normal in the past as well. He is currently taking donepezil. He has also worked with Anjali Serrano (cognitive rehabilitation) in the past, which was quite beneficial.      4/6/2022-today he is accompanied by his wife for follow-up. Since last visit he has undergone formal Neuro Psych testing at  on 1/5/2022 with Zeke Prater Neuropsychologist for further evaluation of memory loss. Summary and findings reviewed today in clinic with patient and his wife. 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 MOCA then was a 12/30- with moderate to severe impairments in recall verbal and visual memory as well as executive function, psychomotor speech, processing speed and semantics. He is currently on the donepezil 10mg at night. Started on memantine last visit in April 2021-his wife has been giving 1/2 tablet twice a day due to concerns of possible dizziness-they are willing to increase this. So far he has tolerated  the memantine 5mg twice a day. She tells me that he has had declining function with difficulty in focus, attention, keeping up with appointments in his calendar.  He has difficulty with his medications. No falls since January/February 2021-suffered severe fall with [facility] at that time. He completed 3 or so visits with speech therapist in 2021 but he did become frustrated so he quit per his wife. They are about to take a 10 day trip to [place] and once they return he would be willing to restart this.       The following portions of the patient's history were reviewed and updated as appropriate: allergies, current medications, past family history, past medical history, past social history, past surgical history and problem list.    Past Medical History:   Diagnosis Date   • AV block    • Bell palsy    • CAD (coronary artery disease)    • Cognitive impairment, mild, so stated     Assessed By: Chiki Newton (Neurology); Last Assessed: 11 Sep 2014   • Dementia (HCC) 2015   • Essential tremor 4/6/2022   • Fall 03/2020   • Family history of hypertension    • Gout    • Head injury Fall 04/2021   • Hyperlipidemia    • Hypertension    • Memory loss 2015    Record of meds and therapy [facility]   • TOM (obstructive sleep apnea)    • REM  disorder    • Shingles Teenager   • Skin cancer 10/22/2019    left cheek   • Stroke (HCC) 11/2014   • TIA (transient ischemic attack)    • Valvular heart disease        Past Surgical History:   Procedure Laterality Date   • HEMORRHOIDECTOMY     • HERNIA REPAIR     • MITRAL VALVE REPAIR/REPLACEMENT     • OTHER SURGICAL HISTORY  11/2014    Medtronic link loop recorder   • PACEMAKER IMPLANTATION     • SKIN SURGERY  Month ago    Cell removed from head    • SQUAMOUS CELL CARCINOMA EXCISION  10/2020    right ear   • TONSILLECTOMY         Social History     Socioeconomic History   • Marital status:    Tobacco Use   • Smoking status: Never Smoker   • Smokeless tobacco: Never Used   Vaping  "Use   • Vaping Use: Never used   Substance and Sexual Activity   • Alcohol use: Yes     Alcohol/week: 1.0 - 4.0 standard drink     Types: 1 - 4 Glasses of wine per week     Comment: week with dinner   • Drug use: No   • Sexual activity: Not Currently     Partners: Female     Comment: Vasectomy       Family History   Problem Relation Age of Onset   • Ovarian cancer Mother    • Lymphoma Father    • Cancer Father    • Hypertension Other         Review of Systems   Constitutional: Negative for chills, fatigue, fever and unexpected weight change.   HENT: Negative for ear pain, hearing loss, nosebleeds, rhinorrhea and sore throat.    Eyes: Negative for photophobia, pain, discharge, itching and visual disturbance.   Respiratory: Negative for cough, chest tightness, shortness of breath and wheezing.    Cardiovascular: Negative for chest pain, palpitations and leg swelling.   Gastrointestinal: Negative for abdominal pain, blood in stool, constipation, diarrhea, nausea and vomiting.   Genitourinary: Negative for dysuria, frequency, hematuria and urgency.   Musculoskeletal: Negative for arthralgias, back pain, gait problem, joint swelling, myalgias, neck pain and neck stiffness.   Skin: Negative for rash and wound.   Allergic/Immunologic: Negative for environmental allergies and food allergies.   Neurological: Positive for tremors. Negative for dizziness, seizures, syncope, speech difficulty, weakness, light-headedness, numbness and headaches.   Hematological: Negative for adenopathy. Does not bruise/bleed easily.   Psychiatric/Behavioral: Positive for behavioral problems, confusion, decreased concentration and sleep disturbance. Negative for agitation, hallucinations and suicidal ideas. The patient is not nervous/anxious.    All other systems reviewed and are negative.       Objective:  /80   Pulse 83   Ht 185.4 cm (73\")   Wt 85.7 kg (189 lb)   SpO2 99%   BMI 24.94 kg/m²     Neurologic Exam     Mental Status "   Oriented to person, place, and time.   Registration: recalls 3 of 3 objects. Recall at 5 minutes: recalls 2 of 3 objects. Follows 3 step commands.   Attention: decreased. Concentration: decreased.   Speech: speech is normal   Level of consciousness: alert  Knowledge: poor. Able to perform simple calculations.   Abnormal comprehension.     Cranial Nerves   Cranial nerves II through XII intact.     CN VII   Right facial weakness: chronic facial asymmetry with no changes.    Motor Exam   Muscle bulk: normal  Overall muscle tone: normal    Strength   Strength 5/5 throughout.     Gait, Coordination, and Reflexes     Gait  Gait: normal    Coordination   Finger to nose coordination: normal    Tremor   Resting tremor: absent  Intention tremor: present (minimal bue kinetic fine hand tremor noted)  Action tremor: absent    Reflexes   Right : 2+  Left : 2+  Normal finger and heel taps bilaterally, no shuffling, good arm swing with gait       Physical Exam  Constitutional:       Appearance: Normal appearance.   Neurological:      Mental Status: He is alert and oriented to person, place, and time.      Coordination: Finger-Nose-Finger Test normal.      Gait: Gait is intact.      Deep Tendon Reflexes: Strength normal.   Psychiatric:         Mood and Affect: Mood is depressed.         Speech: Speech normal.         Behavior: Behavior is slowed.         Thought Content: Thought content normal.         Cognition and Memory: He exhibits impaired recent memory.         Judgment: Judgment normal.         Assessment/Plan:       Diagnoses and all orders for this visit:    1. REM sleep behavior disorder (Primary)  Comments:  on clonazepam and sees sleep specialist, on cpap for sleep apnea too    2. 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  Dispense: 90 tablet; Refill: 3  -     memantine (NAMENDA) 10 MG tablet; Take 1  tablet by mouth 2 (Two) Times a Day.  Dispense: 180 tablet; Refill: 3  -     escitalopram (LEXAPRO) 10 MG tablet; Take 1 tablet by mouth Daily.  Dispense: 90 tablet; Refill: 3    3. Essential tremor  Comments:  mild, monitor           Call when you get back from your trip for speech therapy-call us for an order to see Anjali for your memory again.    After your vacation-Increase memantine (namenda) to 1/2 pill in morning and 1 pill in evening for 4-6 weeks and then go up to 1 pill twice a day.    Follow up in 6 months for re-eval or sooner if needed.    Reviewed neuro psych testing findings and recommendations in detail-he is not driving and aware he cannot. Wife is POA. They are aware he should not be left alone.     Reviewed medications, potential side effects and signs and symptoms to report. Discussed risk versus benefits of treatment plan with patient and/or family-including medications, labs and radiology that may be ordered. Addressed questions and concerns during visit. Patient and/or family verbalized understanding and agree with plan.    AS THE PROVIDER, I PERSONALLY WORE PPE DURING ENTIRE FACE TO FACE ENCOUNTER IN CLINIC WITH THE PATIENT. PATIENT ALSO WORE PPE DURING ENTIRE FACE TO FACE ENCOUNTER EXCEPT FOR A MAX OF 30 SECONDS DURING NEUROLOGICAL EVALUATION OF CRANIAL NERVES AND THEN MASK WAS PLACED BACK OVER PATIENT FACE FOR REMAINDER OF VISIT. I WASHED MY HANDS BEFORE AND AFTER VISIT.    During this visit the following were done:  Labs Reviewed []    Labs Ordered []    Radiology Reports Reviewed []    Radiology Ordered []    PCP Records Reviewed []    Referring Provider Records Reviewed []    ER Records Reviewed []    Hospital Records Reviewed []    History Obtained From Family [x]    Radiology Images Reviewed []    Other Reviewed [x]    Records Requested []      JONATHAN Mcdonald  4/6/2022

## 2022-04-06 NOTE — PATIENT INSTRUCTIONS
Call when you get back from your trip for speech therapy-call us for an order to see Anjali for your memory again.    After your vacation-Increase memantine (namenda) to 1/2 pill in morning and 1 pill in evening for 4-6 weeks and then go up to 1 pill twice a day.

## 2022-04-08 ENCOUNTER — TELEPHONE (OUTPATIENT)
Dept: CARDIOLOGY | Facility: CLINIC | Age: 82
End: 2022-04-08

## 2022-04-08 NOTE — TELEPHONE ENCOUNTER
Patient has an upcoming dental extraction. His dentist wants to know what kind of prophylactic antibiotic he needs to be on d/t patient having a pacemaker.

## 2022-04-08 NOTE — TELEPHONE ENCOUNTER
Confirmed with patient's wife that per Dr. Limon patient does not need antibiotics prior to procedure.

## 2022-04-11 DIAGNOSIS — G47.52 REM SLEEP BEHAVIOR DISORDER: ICD-10-CM

## 2022-04-11 RX ORDER — CLONAZEPAM 1 MG/1
1 TABLET ORAL NIGHTLY
Qty: 90 TABLET | Refills: 0 | Status: SHIPPED | OUTPATIENT
Start: 2022-04-11 | End: 2022-07-21

## 2022-05-23 RX ORDER — CLOPIDOGREL BISULFATE 75 MG/1
75 TABLET ORAL DAILY
Qty: 90 TABLET | Refills: 1 | Status: SHIPPED | OUTPATIENT
Start: 2022-05-23 | End: 2022-11-10

## 2022-05-24 ENCOUNTER — OFFICE VISIT (OUTPATIENT)
Dept: CARDIOLOGY | Facility: CLINIC | Age: 82
End: 2022-05-24

## 2022-05-24 VITALS
HEART RATE: 80 BPM | DIASTOLIC BLOOD PRESSURE: 68 MMHG | OXYGEN SATURATION: 94 % | BODY MASS INDEX: 25.84 KG/M2 | SYSTOLIC BLOOD PRESSURE: 138 MMHG | HEIGHT: 73 IN | WEIGHT: 195 LBS

## 2022-05-24 DIAGNOSIS — I50.22 CHRONIC SYSTOLIC CONGESTIVE HEART FAILURE: ICD-10-CM

## 2022-05-24 DIAGNOSIS — I44.30 AV BLOCK: Primary | ICD-10-CM

## 2022-05-24 DIAGNOSIS — I35.1 NONRHEUMATIC AORTIC VALVE INSUFFICIENCY: ICD-10-CM

## 2022-05-24 PROCEDURE — 93280 PM DEVICE PROGR EVAL DUAL: CPT | Performed by: INTERNAL MEDICINE

## 2022-05-24 PROCEDURE — 99214 OFFICE O/P EST MOD 30 MIN: CPT | Performed by: INTERNAL MEDICINE

## 2022-05-24 NOTE — PROGRESS NOTES
[facility]  Office visit  Uche Polanco  1940  [phone]  VISIT DATE:  5/24/2022      PCP: Jd Tapia MD  [address]  [facility] [id number]    CC:  Chief Complaint   Patient presents with   • Chronic systolic congestive heart failure (HCC)       PROBLEM LIST:  1. Valvular heart disease:  a. History of mitral valve repair at [facility] 2005, records pending.  b. Echocardiogram, August 2014: EF normal at 50% to 55%, moderate AR, mild MR, left atrium at 3.8 cm.   c. Echo, January 2017: EF 50%, moderate aortic insufficiency  2. Recent TIA:  a. Status post Medtronic link loop recorder implantation, November 2014.  b. Recent interrogation revealing AV block, pauses, symptomatic with dizziness and lightheadedness.  3. Obstructive sleep apnea requiring CPAP.   4. Benign hypertension.   5. Gout.   6. Coronary artery disease by report, data insufficient.   7. REM disorder.   8. Dyslipidemia.   9. Family history of hypertension.   10. AV block, symptomatic with lightheadedness, status post pacemaker implantation, 04/17/2015, Dr. Robert Gerber: Medtronic Advisa  [id number] DR01.   11. Surgical history:   a. Hemorrhoidectomy.   b. Basal cell carcinoma removed.   c. Valvular repair (mitral valve).      Cardiac studies and procedures:  February 2019 echo  · Left ventricular systolic function is moderately decreased.  · Estimated EF appears to be in the range of 36 - 40%.  · The following left ventricular wall segments are dyskinetic: apical lateral, apical inferior, apical septal and apex. The following left ventricular wall segments are akinetic: apical anterior.  · Left ventricular wall thickness is consistent with mild concentric hypertrophy.  · Mild-to-moderate mitral valve stenosis is present  · Mild to moderate aortic valve regurgitation is present.    March 2019 myocardial perfusion imaging  · Left ventricular ejection fraction is normal (Calculated EF  = 54%).  · Fixed defect consistent with moderate size apical infarct with extension into the inferior wall, associated moderate hypokinesis.  · No ischemia visualized  · Impressions are consistent with an intermediate risk study.    December 2019 echo  · Left ventricular systolic function is mildly decreased.  · Estimated EF appears to be in the range of 41 - 45%.  · The following left ventricular wall segments are akinetic: apical anterior, apical inferior, apical septal and apex. Diastolic wall thinning consistent with apical infarction.    June 2020 echo  · Estimated EF appears to be in the range of 56 - 60%  · The following left ventricular wall segments are hypokinetic: apical septal and apex hypokinetic.  · Left atrial cavity size is mild-to-moderately dilated.  · Saline test results are negative for right to left atrial level shunt.  · Moderate aortic valve regurgitation is present.    ASSESSMENT:   Diagnosis Plan   1. AV block     2. Chronic systolic congestive heart failure (HCC)     3. Nonrheumatic aortic valve insufficiency       Device interrogation:  Medtronic dual-chamber pacemaker  Dependent  Normal interrogation, no significant arrhythmia.  ST eval after years, see scanned sheet.    PLAN:  Cardiomyopathy:  I really feel fairly confident that his apical regional wall motion abnormalities are due to either previous small infarct or high-grade distal LAD disease.  Currently asymptomatic.  Improving EF on medical therapy.  If he has worsening regional wall motion abnormalities, declining functional capacity, or worsening EF will proceed with definitive evaluation of coronary anatomy with left heart catheterization.  Stage III chronic kidney disease.  Developed episodes of symptomatic hypotension and lightheadedness on carvedilol and bisoprolol 2.5 mg p.o. Nightly.  Has not tolerated titration of medical therapy due to intermittent hypotension.    Mitral valve prolapse status post mitral valve repair:  "Stable on exam today.  Continue routine clinical follow-up and intermittent echocardiographic surveillance    Aortic insufficiency: Moderate and stable.  Continue clinical follow-up and surveillance echocardiographic assessment.    History of TIA: No evidence of atrial arrhythmia.  Continue Plavix for stroke prophylaxis.  Continue statin.  Afterload controlled, goal less than 130/80 mmHg.    Hypertension: Goal less than 130/80 mmHg.  Continue current medical therapy.    Symptomatic high-grade AV block: Device dependent.  Currently asymptomatic, continue routine follow-up in device clinic.    Hyperlipidemia: Goal LDL less than 70, continue statin.      Subjective  No change in baseline functional capacity.  Ongoing therapy for developing cognitive impairment.  Still has intermittent gait instability.  Blood pressures running less than 130/80 mmHg.  Tolerating statin without myalgias.  Denies bleeding complications on Plavix.  Denies chest pain, palpitations or dyspnea on exertion.  compliant with medical therapy.  Was able to go on a [employer] [place] cruise this spring without difficulty.    PHYSICAL EXAMINATION:  Vitals:    05/24/22 1320   BP: 138/68   BP Location: Left arm   Patient Position: Sitting   Pulse: 80   SpO2: 94%   Weight: 88.5 kg (195 lb)   Height: 185.4 cm (73\")     General Appearance:    Alert, cooperative, no distress, appears stated age   Head:    Normocephalic, without obvious abnormality, atraumatic   Eyes:    conjunctiva/corneas clear   Nose:   Nares normal, septum midline, mucosa normal, no drainage   Throat:   Lips, teeth and gums normal   Neck:   Supple, symmetrical, trachea midline, no carotid    bruit or JVD   Lungs:     Clear to auscultation bilaterally, respirations unlabored   Chest Wall:    No tenderness or deformity    Heart:    Regular rate and rhythm, S1 and S2 normal, no murmur, rub   or gallop, normal carotid impulse bilaterally without bruit.   Abdomen:     Soft, non-tender "   Extremities:   Extremities normal, atraumatic, no cyanosis or edema   Pulses:   2+ and symmetric all extremities   Skin:   Skin color, texture, turgor normal, no rashes or lesions       Diagnostic Data:  Procedures  Lab Results   Component Value Date    CHLPL 190 08/22/2014    TRIG 93 07/23/2020    HDL 34 (L) 07/23/2020     Lab Results   Component Value Date    GLUCOSE 91 07/23/2020    BUN 18 07/23/2020    CREATININE 1.03 07/23/2020     07/23/2020    K 3.8 07/23/2020     07/23/2020    CO2 26.0 07/23/2020     Lab Results   Component Value Date    HGBA1C 5.30 07/22/2020     Lab Results   Component Value Date    WBC 5.14 07/23/2020    HGB 12.1 (L) 07/23/2020    HCT 37.4 (L) 07/23/2020     07/23/2020       Allergies  Allergies   Allergen Reactions   • Sulfa Antibiotics Rash       Current Medications    Current Outpatient Medications:   •  atorvastatin (LIPITOR) 20 MG tablet, TAKE 1 TABLET BY MOUTH DAILY, Disp: 30 tablet, Rfl: 5  •  clonazePAM (KlonoPIN) 1 MG tablet, TAKE 1 TABLET BY MOUTH EVERY NIGHT, Disp: 90 tablet, Rfl: 0  •  clopidogrel (PLAVIX) 75 MG tablet, Take 1 tablet by mouth Daily., Disp: 90 tablet, Rfl: 1  •  donepezil (ARICEPT) 10 MG tablet, Take 1 tablet by mouth Every Night., Disp: 90 tablet, Rfl: 3  •  escitalopram (LEXAPRO) 10 MG tablet, Take 1 tablet by mouth Daily., Disp: 90 tablet, Rfl: 3  •  memantine (NAMENDA) 10 MG tablet, Take 1 tablet by mouth 2 (Two) Times a Day., Disp: 180 tablet, Rfl: 3  •  Multiple Vitamins-Minerals (PRESERVISION AREDS 2 PO), Take 1 tablet by mouth 2 (Two) Times a Day., Disp: , Rfl:           ROS  Review of Systems   Cardiovascular: Negative for chest pain, dyspnea on exertion, irregular heartbeat and palpitations.   Respiratory: Negative for cough, shortness of breath and sleep disturbances due to breathing.    Neurological: Positive for light-headedness.         SOCIAL HX  Social History     Socioeconomic History   • Marital status:    Tobacco  Use   • Smoking status: Never Smoker   • Smokeless tobacco: Never Used   Vaping Use   • Vaping Use: Never used   Substance and Sexual Activity   • Alcohol use: Yes     Alcohol/week: 1.0 - 4.0 standard drink     Types: 1 - 4 Glasses of wine per week     Comment: week with dinner   • Drug use: No   • Sexual activity: Not Currently     Partners: Female     Comment: Vasectomy       FAMILY HX  Family History   Problem Relation Age of Onset   • Ovarian cancer Mother    • Lymphoma Father    • Cancer Father    • Hypertension Other              dJ Limon III, MD, FACC

## 2022-05-28 PROCEDURE — 93296 REM INTERROG EVL PM/IDS: CPT | Performed by: INTERNAL MEDICINE

## 2022-05-28 PROCEDURE — 93294 REM INTERROG EVL PM/LDLS PM: CPT | Performed by: INTERNAL MEDICINE

## 2022-06-02 ENCOUNTER — APPOINTMENT (OUTPATIENT)
Dept: CT IMAGING | Facility: HOSPITAL | Age: 82
End: 2022-06-02

## 2022-06-02 ENCOUNTER — APPOINTMENT (OUTPATIENT)
Dept: GENERAL RADIOLOGY | Facility: HOSPITAL | Age: 82
End: 2022-06-02

## 2022-06-02 ENCOUNTER — TELEPHONE (OUTPATIENT)
Dept: CARDIOLOGY | Facility: CLINIC | Age: 82
End: 2022-06-02

## 2022-06-02 ENCOUNTER — HOSPITAL ENCOUNTER (EMERGENCY)
Facility: HOSPITAL | Age: 82
Discharge: HOME | End: 2022-06-02
Attending: EMERGENCY MEDICINE | Admitting: EMERGENCY MEDICINE

## 2022-06-02 VITALS
DIASTOLIC BLOOD PRESSURE: 62 MMHG | RESPIRATION RATE: 18 BRPM | OXYGEN SATURATION: 93 % | HEIGHT: 73 IN | BODY MASS INDEX: 24.92 KG/M2 | WEIGHT: 188 LBS | SYSTOLIC BLOOD PRESSURE: 122 MMHG | HEART RATE: 74 BPM | TEMPERATURE: 98.4 F

## 2022-06-02 DIAGNOSIS — R44.3 HALLUCINATIONS: ICD-10-CM

## 2022-06-02 DIAGNOSIS — N28.9 RENAL INSUFFICIENCY: ICD-10-CM

## 2022-06-02 DIAGNOSIS — R53.1 GENERALIZED WEAKNESS: Primary | ICD-10-CM

## 2022-06-02 DIAGNOSIS — R41.0 CONFUSION: ICD-10-CM

## 2022-06-02 LAB
ALBUMIN SERPL-MCNC: 4.6 G/DL (ref 3.5–5.2)
ALBUMIN/GLOB SERPL: 1.6 G/DL
ALP SERPL-CCNC: 71 U/L (ref 39–117)
ALT SERPL W P-5'-P-CCNC: 12 U/L (ref 1–41)
AMMONIA BLD-SCNC: <10 UMOL/L (ref 16–60)
AMPHET+METHAMPHET UR QL: NEGATIVE
AMPHETAMINES UR QL: NEGATIVE
ANION GAP SERPL CALCULATED.3IONS-SCNC: 11 MMOL/L (ref 5–15)
APAP SERPL-MCNC: <5 MCG/ML (ref 0–30)
AST SERPL-CCNC: 18 U/L (ref 1–40)
BACTERIA UR QL AUTO: NORMAL /HPF
BARBITURATES UR QL SCN: NEGATIVE
BASOPHILS # BLD AUTO: 0.03 10*3/MM3 (ref 0–0.2)
BASOPHILS NFR BLD AUTO: 0.4 % (ref 0–1.5)
BENZODIAZ UR QL SCN: NEGATIVE
BILIRUB SERPL-MCNC: 0.8 MG/DL (ref 0–1.2)
BILIRUB UR QL STRIP: NEGATIVE
BUN SERPL-MCNC: 20 MG/DL (ref 8–23)
BUN/CREAT SERPL: 14.4 (ref 7–25)
BUPRENORPHINE SERPL-MCNC: NEGATIVE NG/ML
CALCIUM SPEC-SCNC: 9.5 MG/DL (ref 8.6–10.5)
CANNABINOIDS SERPL QL: NEGATIVE
CHLORIDE SERPL-SCNC: 105 MMOL/L (ref 98–107)
CLARITY UR: CLEAR
CO2 SERPL-SCNC: 26 MMOL/L (ref 22–29)
COCAINE UR QL: NEGATIVE
COLOR UR: ABNORMAL
CREAT SERPL-MCNC: 1.39 MG/DL (ref 0.76–1.27)
DEPRECATED RDW RBC AUTO: 45 FL (ref 37–54)
EGFRCR SERPLBLD CKD-EPI 2021: 50.6 ML/MIN/1.73
EOSINOPHIL # BLD AUTO: 0.04 10*3/MM3 (ref 0–0.4)
EOSINOPHIL NFR BLD AUTO: 0.5 % (ref 0.3–6.2)
ERYTHROCYTE [DISTWIDTH] IN BLOOD BY AUTOMATED COUNT: 13 % (ref 12.3–15.4)
ETHANOL BLD-MCNC: <10 MG/DL (ref 0–10)
FLUAV SUBTYP SPEC NAA+PROBE: NOT DETECTED
FLUBV RNA ISLT QL NAA+PROBE: NOT DETECTED
GLOBULIN UR ELPH-MCNC: 2.8 GM/DL
GLUCOSE SERPL-MCNC: 103 MG/DL (ref 65–99)
GLUCOSE UR STRIP-MCNC: NEGATIVE MG/DL
HCT VFR BLD AUTO: 44.8 % (ref 37.5–51)
HGB BLD-MCNC: 15 G/DL (ref 13–17.7)
HGB UR QL STRIP.AUTO: NEGATIVE
HOLD SPECIMEN: NORMAL
HYALINE CASTS UR QL AUTO: NORMAL /LPF
IMM GRANULOCYTES # BLD AUTO: 0.01 10*3/MM3 (ref 0–0.05)
IMM GRANULOCYTES NFR BLD AUTO: 0.1 % (ref 0–0.5)
KETONES UR QL STRIP: ABNORMAL
LEUKOCYTE ESTERASE UR QL STRIP.AUTO: ABNORMAL
LYMPHOCYTES # BLD AUTO: 0.55 10*3/MM3 (ref 0.7–3.1)
LYMPHOCYTES NFR BLD AUTO: 7.4 % (ref 19.6–45.3)
MAGNESIUM SERPL-MCNC: 2 MG/DL (ref 1.6–2.4)
MCH RBC QN AUTO: 31.6 PG (ref 26.6–33)
MCHC RBC AUTO-ENTMCNC: 33.5 G/DL (ref 31.5–35.7)
MCV RBC AUTO: 94.5 FL (ref 79–97)
METHADONE UR QL SCN: NEGATIVE
MONOCYTES # BLD AUTO: 0.5 10*3/MM3 (ref 0.1–0.9)
MONOCYTES NFR BLD AUTO: 6.7 % (ref 5–12)
NEUTROPHILS NFR BLD AUTO: 6.3 10*3/MM3 (ref 1.7–7)
NEUTROPHILS NFR BLD AUTO: 84.9 % (ref 42.7–76)
NITRITE UR QL STRIP: NEGATIVE
NRBC BLD AUTO-RTO: 0 /100 WBC (ref 0–0.2)
OPIATES UR QL: NEGATIVE
OXYCODONE UR QL SCN: NEGATIVE
PCP UR QL SCN: NEGATIVE
PH UR STRIP.AUTO: <=5 [PH] (ref 5–8)
PLATELET # BLD AUTO: 177 10*3/MM3 (ref 140–450)
PMV BLD AUTO: 10.3 FL (ref 6–12)
POTASSIUM SERPL-SCNC: 4.3 MMOL/L (ref 3.5–5.2)
PROPOXYPH UR QL: NEGATIVE
PROT SERPL-MCNC: 7.4 G/DL (ref 6–8.5)
PROT UR QL STRIP: ABNORMAL
RBC # BLD AUTO: 4.74 10*6/MM3 (ref 4.14–5.8)
RBC # UR STRIP: NORMAL /HPF
REF LAB TEST METHOD: NORMAL
SALICYLATES SERPL-MCNC: <0.3 MG/DL
SARS-COV-2 RNA PNL SPEC NAA+PROBE: NOT DETECTED
SODIUM SERPL-SCNC: 142 MMOL/L (ref 136–145)
SP GR UR STRIP: >=1.03 (ref 1–1.03)
SQUAMOUS #/AREA URNS HPF: NORMAL /HPF
T4 FREE SERPL-MCNC: 1.15 NG/DL (ref 0.93–1.7)
TRICYCLICS UR QL SCN: NEGATIVE
TROPONIN T SERPL-MCNC: <0.01 NG/ML (ref 0–0.03)
TSH SERPL DL<=0.05 MIU/L-ACNC: 0.93 UIU/ML (ref 0.27–4.2)
UROBILINOGEN UR QL STRIP: ABNORMAL
WBC # UR STRIP: NORMAL /HPF
WBC NRBC COR # BLD: 7.43 10*3/MM3 (ref 3.4–10.8)
WHOLE BLOOD HOLD COAG: NORMAL
WHOLE BLOOD HOLD SPECIMEN: NORMAL

## 2022-06-02 PROCEDURE — 70450 CT HEAD/BRAIN W/O DYE: CPT

## 2022-06-02 PROCEDURE — 80306 DRUG TEST PRSMV INSTRMNT: CPT | Performed by: EMERGENCY MEDICINE

## 2022-06-02 PROCEDURE — 83735 ASSAY OF MAGNESIUM: CPT | Performed by: EMERGENCY MEDICINE

## 2022-06-02 PROCEDURE — 81001 URINALYSIS AUTO W/SCOPE: CPT | Performed by: EMERGENCY MEDICINE

## 2022-06-02 PROCEDURE — 84484 ASSAY OF TROPONIN QUANT: CPT | Performed by: EMERGENCY MEDICINE

## 2022-06-02 PROCEDURE — 84439 ASSAY OF FREE THYROXINE: CPT | Performed by: EMERGENCY MEDICINE

## 2022-06-02 PROCEDURE — 82140 ASSAY OF AMMONIA: CPT | Performed by: EMERGENCY MEDICINE

## 2022-06-02 PROCEDURE — 93005 ELECTROCARDIOGRAM TRACING: CPT | Performed by: EMERGENCY MEDICINE

## 2022-06-02 PROCEDURE — 80143 DRUG ASSAY ACETAMINOPHEN: CPT | Performed by: EMERGENCY MEDICINE

## 2022-06-02 PROCEDURE — 87636 SARSCOV2 & INF A&B AMP PRB: CPT | Performed by: EMERGENCY MEDICINE

## 2022-06-02 PROCEDURE — 80179 DRUG ASSAY SALICYLATE: CPT | Performed by: EMERGENCY MEDICINE

## 2022-06-02 PROCEDURE — P9612 CATHETERIZE FOR URINE SPEC: HCPCS

## 2022-06-02 PROCEDURE — 84443 ASSAY THYROID STIM HORMONE: CPT | Performed by: EMERGENCY MEDICINE

## 2022-06-02 PROCEDURE — 99283 EMERGENCY DEPT VISIT LOW MDM: CPT

## 2022-06-02 PROCEDURE — 80053 COMPREHEN METABOLIC PANEL: CPT | Performed by: EMERGENCY MEDICINE

## 2022-06-02 PROCEDURE — 85025 COMPLETE CBC W/AUTO DIFF WBC: CPT | Performed by: EMERGENCY MEDICINE

## 2022-06-02 PROCEDURE — 71045 X-RAY EXAM CHEST 1 VIEW: CPT

## 2022-06-02 PROCEDURE — 82077 ASSAY SPEC XCP UR&BREATH IA: CPT | Performed by: EMERGENCY MEDICINE

## 2022-06-02 RX ORDER — SODIUM CHLORIDE 0.9 % (FLUSH) 0.9 %
10 SYRINGE (ML) INJECTION AS NEEDED
Status: DISCONTINUED | OUTPATIENT
Start: 2022-06-02 | End: 2022-06-02 | Stop reason: HOSPADM

## 2022-06-02 NOTE — TELEPHONE ENCOUNTER
ELIANA  Sent a MDT reading today. He has been delusional and in bed all day. Has diarrhea and not eating.Staggering when he walks. Talked to Parisa in the device clinic. Per Parisa device reading was normal. No events. Wants to know if he needs to go to the ED. Advised to go to the ED to be evaluated.

## 2022-06-02 NOTE — ED PROVIDER NOTES
Subjective   82-year-old male presents for evaluation of generalized weakness and hallucinations.  Of note, the patient has a history of cognitive impairment and is followed by Dr. Newton of neurology.  He lives at home with his wife of 58 years.  She notes that he recently had some mild diarrhea and was unsure as to whether or not this could be a contributing factor to his current symptoms.  She notes that for the past 2 days he has been sleepier than normal and has been experiencing generalized weakness and fatigue when compared to his baseline.  Last night, the patient began experiencing confusion and hallucinations.  No known triggers for his symptoms.  Given his confusion this afternoon, she brought him to the emergency department for evaluation.  No known fevers.  No known exposures to anyone with COVID-19.  No known sick contacts.          Review of Systems   Neurological: Positive for weakness.   Psychiatric/Behavioral: Positive for confusion and hallucinations.   All other systems reviewed and are negative.      Past Medical History:   Diagnosis Date   • AV block    • Bell palsy    • CAD (coronary artery disease)    • Cognitive impairment, mild, so stated     Assessed By: Chiki Newton (Neurology); Last Assessed: 11 Sep 2014   • Dementia ([facility]) 2015   • Essential tremor 04/06/2022   • Fall 03/2020   • Family history of hypertension    • Gout    • Head injury Fall 04/2021   • Hyperlipidemia    • Hypertension    • Memory loss 2015    Record of meds and therapy [facility]   • TOM (obstructive sleep apnea)    • REM  disorder    • Shingles Teenager   • Skin cancer 10/22/2019    left cheek   • Stroke ([facility]) 11/2014   • TIA (transient ischemic attack)    • Valvular heart disease        Allergies   Allergen Reactions   • Sulfa Antibiotics Rash       Past Surgical History:   Procedure Laterality Date   • HEMORRHOIDECTOMY     • HERNIA REPAIR     • MITRAL VALVE REPAIR/REPLACEMENT     • OTHER SURGICAL HISTORY   11/2014    Medtronic link loop recorder   • PACEMAKER IMPLANTATION     • SKIN SURGERY  Month ago    Cell removed from head    • SQUAMOUS CELL CARCINOMA EXCISION  10/2020    right ear   • TONSILLECTOMY         Family History   Problem Relation Age of Onset   • Ovarian cancer Mother    • Lymphoma Father    • Cancer Father    • Hypertension Other        Social History     Socioeconomic History   • Marital status:    Tobacco Use   • Smoking status: Never Smoker   • Smokeless tobacco: Never Used   Vaping Use   • Vaping Use: Never used   Substance and Sexual Activity   • Alcohol use: Yes     Alcohol/week: 1.0 - 4.0 standard drink     Types: 1 - 4 Glasses of wine per week     Comment: week with dinner   • Drug use: No   • Sexual activity: Not Currently     Partners: Female     Comment: Vasectomy           Objective   Physical Exam  Vitals and nursing note reviewed.   Constitutional:       Appearance: He is well-developed. He is not diaphoretic.      Comments: Chronically ill-appearing male   HENT:      Head: Normocephalic and atraumatic.   Neck:      Vascular: No JVD.      Comments: No meningeal signs or nuchal rigidity  Cardiovascular:      Rate and Rhythm: Normal rate and regular rhythm.      Heart sounds: Normal heart sounds. No murmur heard.    No friction rub. No gallop.   Pulmonary:      Effort: Pulmonary effort is normal. No respiratory distress.      Breath sounds: Normal breath sounds. No wheezing or rales.   Abdominal:      General: Bowel sounds are normal. There is no distension.      Palpations: Abdomen is soft. There is no mass.      Tenderness: There is no abdominal tenderness. There is no guarding.   Musculoskeletal:         General: Normal range of motion.      Cervical back: Normal range of motion.   Skin:     General: Skin is warm and dry.      Coloration: Skin is not pale.      Findings: No erythema or rash.   Neurological:      General: No focal deficit present.      Mental Status: He is alert  and oriented to person, place, and time.      Comments: Awake, alert, and oriented x3, clear and fluent speech, appears mildly confused with open-ended questioning, no ataxia or dysmetria, neurovascularly intact distally in all fours with bounding distal pulses and normal sensation, 5 out of 5 strength in all fours, no cranial nerve deficits noted with cranial nerves II through XII grossly intact     Psychiatric:         Mood and Affect: Mood normal.         Thought Content: Thought content normal.         Judgment: Judgment normal.         Procedures           ED Course  ED Course as of 06/02/22 2010   Thu Jun 02, 2022 1727 82-year-old male presents for evaluation of altered mental status.  He is accompanied by his wife of 58 years with whom he lives.  She notes that over the past few days he has been more somnolent than normal and has been experiencing confusion and hallucinations since late last night.  On arrival to the ED, the patient is nontoxic-appearing.  No focal neurological deficits noted on exam.  Broad differential diagnosis.  We will obtain labs and imaging, and we will reassess following initial interventions. [DD]   1748 I personally viewed the patient's x-ray images myself, and I am in agreement with the radiologist's reading for final interpretation.     [DD]   1748 CT head is negative. [DD]   1748 Labs are bland/at baseline. [DD]   1943 Upon reevaluation, the patient's mental status is currently at baseline.  Given his negative work-up, I see no indication for admission at this time.  The patient's wife is in agreement.  She feels that home is the best place for him and feels very comfortable taking him home.  He will follow-up with his neurologist within the next week.  Agreeable with plan and given appropriate strict return precautions. [DD]      ED Course User Index  [DD] Eduardo Rockwell MD                                         Recent Results (from the past 24 hour(s))   Comprehensive  Metabolic Panel    Collection Time: 06/02/22  4:47 PM    Specimen: Blood   Result Value Ref Range    Glucose 103 (H) 65 - 99 mg/dL    BUN 20 8 - 23 mg/dL    Creatinine 1.39 (H) 0.76 - 1.27 mg/dL    Sodium 142 136 - 145 mmol/L    Potassium 4.3 3.5 - 5.2 mmol/L    Chloride 105 98 - 107 mmol/L    CO2 26.0 22.0 - 29.0 mmol/L    Calcium 9.5 8.6 - 10.5 mg/dL    Total Protein 7.4 6.0 - 8.5 g/dL    Albumin 4.60 3.50 - 5.20 g/dL    ALT (SGPT) 12 1 - 41 U/L    AST (SGOT) 18 1 - 40 U/L    Alkaline Phosphatase 71 39 - 117 U/L    Total Bilirubin 0.8 0.0 - 1.2 mg/dL    Globulin 2.8 gm/dL    A/G Ratio 1.6 g/dL    BUN/Creatinine Ratio 14.4 7.0 - 25.0    Anion Gap 11.0 5.0 - 15.0 mmol/L    eGFR 50.6 (L) >60.0 mL/min/1.73   Troponin    Collection Time: 06/02/22  4:47 PM    Specimen: Blood   Result Value Ref Range    Troponin T <0.010 0.000 - 0.030 ng/mL   Magnesium    Collection Time: 06/02/22  4:47 PM    Specimen: Blood   Result Value Ref Range    Magnesium 2.0 1.6 - 2.4 mg/dL   Green Top (Gel)    Collection Time: 06/02/22  4:47 PM   Result Value Ref Range    Extra Tube Hold for add-ons.    Lavender Top    Collection Time: 06/02/22  4:47 PM   Result Value Ref Range    Extra Tube hold for add-on    Gold Top - SST    Collection Time: 06/02/22  4:47 PM   Result Value Ref Range    Extra Tube Hold for add-ons.    Light Blue Top    Collection Time: 06/02/22  4:47 PM   Result Value Ref Range    Extra Tube Hold for add-ons.    CBC Auto Differential    Collection Time: 06/02/22  4:47 PM    Specimen: Blood   Result Value Ref Range    WBC 7.43 3.40 - 10.80 10*3/mm3    RBC 4.74 4.14 - 5.80 10*6/mm3    Hemoglobin 15.0 13.0 - 17.7 g/dL    Hematocrit 44.8 37.5 - 51.0 %    MCV 94.5 79.0 - 97.0 fL    MCH 31.6 26.6 - 33.0 pg    MCHC 33.5 31.5 - 35.7 g/dL    RDW 13.0 12.3 - 15.4 %    RDW-SD 45.0 37.0 - 54.0 fl    MPV 10.3 6.0 - 12.0 fL    Platelets 177 140 - 450 10*3/mm3    Neutrophil % 84.9 (H) 42.7 - 76.0 %    Lymphocyte % 7.4 (L) 19.6 - 45.3 %     Monocyte % 6.7 5.0 - 12.0 %    Eosinophil % 0.5 0.3 - 6.2 %    Basophil % 0.4 0.0 - 1.5 %    Immature Grans % 0.1 0.0 - 0.5 %    Neutrophils, Absolute 6.30 1.70 - 7.00 10*3/mm3    Lymphocytes, Absolute 0.55 (L) 0.70 - 3.10 10*3/mm3    Monocytes, Absolute 0.50 0.10 - 0.90 10*3/mm3    Eosinophils, Absolute 0.04 0.00 - 0.40 10*3/mm3    Basophils, Absolute 0.03 0.00 - 0.20 10*3/mm3    Immature Grans, Absolute 0.01 0.00 - 0.05 10*3/mm3    nRBC 0.0 0.0 - 0.2 /100 WBC   T4, Free    Collection Time: 06/02/22  4:47 PM    Specimen: Blood   Result Value Ref Range    Free T4 1.15 0.93 - 1.70 ng/dL   TSH    Collection Time: 06/02/22  4:47 PM    Specimen: Blood   Result Value Ref Range    TSH 0.925 0.270 - 4.200 uIU/mL   Acetaminophen Level    Collection Time: 06/02/22  4:47 PM    Specimen: Blood   Result Value Ref Range    Acetaminophen <5.0 0.0 - 30.0 mcg/mL   Ethanol    Collection Time: 06/02/22  4:47 PM    Specimen: Blood   Result Value Ref Range    Ethanol <10 0 - 10 mg/dL   Salicylate Level    Collection Time: 06/02/22  4:47 PM    Specimen: Blood   Result Value Ref Range    Salicylate <0.3 <=30.0 mg/dL   COVID-19 and FLU A/B PCR - Swab, Nasopharynx    Collection Time: 06/02/22  6:43 PM    Specimen: Nasopharynx; Swab   Result Value Ref Range    COVID19 Not Detected Not Detected - Ref. Range    Influenza A PCR Not Detected Not Detected    Influenza B PCR Not Detected Not Detected   Ammonia    Collection Time: 06/02/22  6:43 PM    Specimen: Blood   Result Value Ref Range    Ammonia <10 (L) 16 - 60 umol/L   Urinalysis With Culture If Indicated - Urine, Catheter In/Out    Collection Time: 06/02/22  7:16 PM    Specimen: Urine, Catheter In/Out   Result Value Ref Range    Color, UA Dark Yellow (A) Yellow, Straw    Appearance, UA Clear Clear    pH, UA <=5.0 5.0 - 8.0    Specific Gravity, UA >=1.030 1.001 - 1.030    Glucose, UA Negative Negative    Ketones, UA Trace (A) Negative    Bilirubin, UA Negative Negative    Blood, UA  Negative Negative    Protein, UA Trace (A) Negative    Leuk Esterase, UA Trace (A) Negative    Nitrite, UA Negative Negative    Urobilinogen, UA 1.0 E.U./dL 0.2 - 1.0 E.U./dL   Urine Drug Screen - Urine, Catheter In/Out    Collection Time: 06/02/22  7:16 PM    Specimen: Urine, Catheter In/Out   Result Value Ref Range    THC, Screen, Urine Negative Negative    Phencyclidine (PCP), Urine Negative Negative    Cocaine Screen, Urine Negative Negative    Methamphetamine, Ur Negative Negative    Opiate Screen Negative Negative    Amphetamine Screen, Urine Negative Negative    Benzodiazepine Screen, Urine Negative Negative    Tricyclic Antidepressants Screen Negative Negative    Methadone Screen, Urine Negative Negative    Barbiturates Screen, Urine Negative Negative    Oxycodone Screen, Urine Negative Negative    Propoxyphene Screen Negative Negative    Buprenorphine, Screen, Urine Negative Negative   Urinalysis, Microscopic Only - Urine, Catheter In/Out    Collection Time: 06/02/22  7:16 PM    Specimen: Urine, Catheter In/Out   Result Value Ref Range    RBC, UA 0-2 None Seen, 0-2 /HPF    WBC, UA 0-2 None Seen, 0-2 /HPF    Bacteria, UA None Seen None Seen, Trace /HPF    Squamous Epithelial Cells, UA None Seen None Seen, 0-2 /HPF    Hyaline Casts, UA 0-6 0 - 6 /LPF    Methodology Automated Microscopy      Note: In addition to lab results from this visit, the labs listed above may include labs taken at another facility or during a different encounter within the last 24 hours. Please correlate lab times with ED admission and discharge times for further clarification of the services performed during this visit.    CT Head Without Contrast   Final Result   No acute intracranial findings.       This report was finalized on 6/2/2022 5:40 PM by Bridger Sosa MD.          XR Chest 1 View   Final Result       1. No acute cardiopulmonary disease.           This report was finalized on 6/2/2022 5:11 PM by Holland Santiago MD.         "    Vitals:    06/02/22 1635   BP: 122/62   BP Location: Right arm   Patient Position: Sitting   Pulse: 74   Resp: 18   Temp: 98.4 °F (36.9 °C)   TempSrc: Oral   SpO2: 93%   Weight: 85.3 kg (188 lb)   Height: 185.4 cm (73\")     Medications   sodium chloride 0.9 % flush 10 mL (has no administration in time range)     ECG/EMG Results (last 24 hours)     Procedure Component Value Units Date/Time    ECG 12 Lead [[id number]] Collected: 06/02/22 1652     Updated: 06/02/22 1652        ECG 12 Lead   Preliminary Result                     MDM    Final diagnoses:   Generalized weakness   Confusion   Hallucinations   Renal insufficiency       ED Disposition  ED Disposition     ED Disposition   Discharge    Condition   Stable    Comment   --             Chiki Newton MD  [address] [address]  [phone]    In 1 week           Medication List      No changes were made to your prescriptions during this visit.          Eduardo Rockwell MD  06/02/22 2011    "

## 2022-06-03 ENCOUNTER — TELEPHONE (OUTPATIENT)
Dept: NEUROLOGY | Facility: CLINIC | Age: 82
End: 2022-06-03

## 2022-06-03 NOTE — TELEPHONE ENCOUNTER
Caller: Hiral Polanco    Relationship: Emergency Contact    Best call back number: [phone]  What medications are you currently taking:   Current Outpatient Medications on File Prior to Visit   Medication Sig Dispense Refill   • atorvastatin (LIPITOR) 20 MG tablet TAKE 1 TABLET BY MOUTH DAILY 30 tablet 5   • clonazePAM (KlonoPIN) 1 MG tablet TAKE 1 TABLET BY MOUTH EVERY NIGHT 90 tablet 0   • clopidogrel (PLAVIX) 75 MG tablet Take 1 tablet by mouth Daily. 90 tablet 1   • donepezil (ARICEPT) 10 MG tablet Take 1 tablet by mouth Every Night. 90 tablet 3   • escitalopram (LEXAPRO) 10 MG tablet Take 1 tablet by mouth Daily. 90 tablet 3   • memantine (NAMENDA) 10 MG tablet Take 1 tablet by mouth 2 (Two) Times a Day. 180 tablet 3   • Multiple Vitamins-Minerals (PRESERVISION AREDS 2 PO) Take 1 tablet by mouth 2 (Two) Times a Day.       Current Facility-Administered Medications on File Prior to Visit   Medication Dose Route Frequency Provider Last Rate Last Admin   • [DISCONTINUED] sodium chloride 0.9 % flush 10 mL  10 mL Intravenous PRN Eduardo Rockwell MD              When did you start taking these medications: N/A    Which medication are you concerned about: MEMANTINE    Who prescribed you this medication: JITENDRA KNUTSON    What are your concerns: PATIENT IS HAVING DIARRHEA     How long have you had these concerns: ABOUT 3 WEEKS    PATIENT WAS SEEN IN THE ED YESTERDAY FOR HALLUCINATIONS, GENERALIZED WEAKNESS, CONFUSION AND LETHARGY    PATIENT IS SCHEDULED ON 06/10/2022 FOR A FOLLOW UP WITH JITENDRA    PLEASE CALL PATIENT'S WIFE AND ADVISE    THANK YOU

## 2022-06-03 NOTE — TELEPHONE ENCOUNTER
I SPOKE WITH PT'S WIFE, WILLIS, AND SHE SAID PT HAS HAD DIARRHEA FOR ABOUT 5 DAYS, AND PT IS ON THE MEMANTINE AND SHOULD BE TAKEN TWO A DAY NOW BUT SHE READ ABOUT ALL THE SIDE EFFECTS AND HE HAS ALL THE LISTED SIDE EFFECTS LISTED HE IS NOT COHERENT, CANT UNDERSTAND DIRECTIONS, UNSTEADY ON FEET, STARES THROUGH YOU AND SHE WAS TOLD BY CARDIOLOGY TO TAKE PT TO ER, [facility], AND THEY RELEASED HIM ABOUT LAST NIGHT.  SHE TOOK HIS BP WHICH WAS NORMAL, COVID TEST NEGATIVE, SHE WANTED TO KNOW IF SHE COULD D/C THE MEMANTINE OR DO THEY NEED TO TITRATE OFF THE MEDICATION?  ALSO ANY THING ELSE TO REPLACE THE MEDICATION?

## 2022-06-03 NOTE — TELEPHONE ENCOUNTER
They can reduce the memantine to once a day to see if the side effects subside.  If the side effects do not go away in about 2 days then they can stop the medication

## 2022-06-06 ENCOUNTER — TELEPHONE (OUTPATIENT)
Dept: SLEEP MEDICINE | Facility: HOSPITAL | Age: 82
End: 2022-06-06

## 2022-06-06 DIAGNOSIS — G47.33 SEVERE OBSTRUCTIVE SLEEP APNEA: Primary | ICD-10-CM

## 2022-06-06 NOTE — TELEPHONE ENCOUNTER
Caller: Hiral Polanco    Relationship: Emergency Contact      Date of last appointment: 01/10/2022  Issues/Supplies requested: ALL OF SUPPLIES FOR PT CPAP MACHINE    Type of mask (fill or nasal): FULL    Does equipment use a modem or chip: YES, CHIP    Ordering physician's name: PAPO    Patient contact information: [phone]    Fax number where the order should be sent: UNKNOWN SPOKE WITH [facility] IN [place].    PT WIFE STATED THAT THE ORDER THAT WAS SENT OVER WAS FROM 2020 AND THEY WILL NOT FILL THE ORDER. PT IS IN NEED OF NEW SUPPLIES. PHONE NUMBER THAT PT WIFE SPOKE WITH -[phone]

## 2022-06-10 ENCOUNTER — OFFICE VISIT (OUTPATIENT)
Dept: NEUROLOGY | Facility: CLINIC | Age: 82
End: 2022-06-10

## 2022-06-10 VITALS
BODY MASS INDEX: 25.45 KG/M2 | DIASTOLIC BLOOD PRESSURE: 70 MMHG | HEART RATE: 78 BPM | OXYGEN SATURATION: 98 % | WEIGHT: 192 LBS | SYSTOLIC BLOOD PRESSURE: 130 MMHG | HEIGHT: 73 IN

## 2022-06-10 DIAGNOSIS — F02.818 MIXED ALZHEIMER'S AND VASCULAR DEMENTIA WITH BEHAVIOR DISTURBANCES: ICD-10-CM

## 2022-06-10 DIAGNOSIS — G30.9 MIXED ALZHEIMER'S AND VASCULAR DEMENTIA WITH BEHAVIOR DISTURBANCES: ICD-10-CM

## 2022-06-10 DIAGNOSIS — F01.518 MIXED ALZHEIMER'S AND VASCULAR DEMENTIA WITH BEHAVIOR DISTURBANCES: ICD-10-CM

## 2022-06-10 DIAGNOSIS — G25.0 ESSENTIAL TREMOR: Primary | ICD-10-CM

## 2022-06-10 PROCEDURE — 99214 OFFICE O/P EST MOD 30 MIN: CPT | Performed by: NURSE PRACTITIONER

## 2022-06-10 RX ORDER — MEMANTINE HYDROCHLORIDE 5 MG/1
5 TABLET ORAL 2 TIMES DAILY
Qty: 180 TABLET | Refills: 3 | Status: SHIPPED | OUTPATIENT
Start: 2022-06-10 | End: 2022-10-10

## 2022-06-10 RX ORDER — DONEPEZIL HYDROCHLORIDE 10 MG/1
10 TABLET, FILM COATED ORAL NIGHTLY
Qty: 90 TABLET | Refills: 3 | Status: SHIPPED | OUTPATIENT
Start: 2022-06-10 | End: 2022-10-10

## 2022-06-10 RX ORDER — ESCITALOPRAM OXALATE 10 MG/1
10 TABLET ORAL DAILY
Qty: 90 TABLET | Refills: 3 | Status: SHIPPED | OUTPATIENT
Start: 2022-06-10 | End: 2022-10-10

## 2022-06-10 NOTE — TELEPHONE ENCOUNTER
PT AND PT'S WIFE WERE IN THE OFFICE TODAY FOR A VISIT AND I ASK ABOUT THE PHONE # WHICH WAS THE WRONG # AND I GAVE MESSAGE WHILE IN OFFICE AND CHANGED THE PHONE # FOR PT

## 2022-06-10 NOTE — PROGRESS NOTES
Subjective:     Patient ID: Uche Polanco is a 82 y.o. male.    CC:   Chief Complaint   Patient presents with   • Memory Loss       HPI:   History of Present Illness   6/10/2022-  This is a very pleasant 82-year-old male, who presents for sooner neurology follow-up after recent [facility] emergency department visit on 06/02/2022. Per the ER records which I am able to review in clinic today. He was accompanied by his wife, to whom they have been  for 58 years, and she is his primary caregiver and power of . She noticed that he was having some diarrhea and also some generalized weakness with more fatigue, lethargy and confusion. He was also having additional hallucinations, so she brought him into the emergency room for further evaluation. During that emergency room visit his physical and neurological exams were essentially normal with baseline mild confusion. He did undergo a CT of the head completed on 06/02/2022. This showed no acute intracranial abnormalities with some scattered subcortical and periventricular white matter changes, which are chronic. This is consistent with the chronic small vessel ischemic changes. A chest x-ray which showed no acute cardiopulmonary process.     He is here for follow-up today. At his last visit, we did discuss increasing his memantine 5 mg twice a day to 10 mg twice a day. His wife is concerned that this has caused some diarrhea and stomach upset. She is wondering about lowering the dosing. He does have a confirmed diagnosis of mixed etiology, vascular and Alzheimer's dementia. He has been taking donepezil 10 mg daily and the memantine they are wanting to discuss continuing the very low dose. His wife is with him during today's visit to provide additional history.     The patient's wife reports that patient became very weak and fatigued last week. She utilized a COVID-19 test at home, and it returned negative. She also took the patient's vitals.  "She adds the patient only wanted to sleep, so he stayed in bed until 2:30 PM. The patient got up and went to the bathroom and was very unsteady. She was trying to talk with the patient, and he was having near-syncopal episodes. She sent in the patient's pacemaker reading, and the Cardiology nurse advised the patient's wife to take him to the emergency room for further evaluation. She states while at the emergency room, the patient became agitated, unable to sit still, and unable to follow simple commands. She needed assistance with transferring him. She denies the patient has sustained any falls since his last visit; however, he has been close to falling. The patient does not remember any of this occurring.    The patient's wife states the patient was not eating well and has been having loose stools with \"yellow\" on top of the water, which is not liquid, for several weeks. She inquires if all of his symptoms are due to the increased dosage of memantine from 5mg twice a day to 5mg in am and 10mg in pm. His wife denies noticing the patient having these symptoms occur prior to the increased dosage. She then decreased the patient's dosage to 5mg pill twice daily, and she and the patient went for a walk on 06/09/2022. The patient also had a headache for several days but this resolved.    The patient's wife notes the patient continues to have difficulty completing a sentence or conversation. He continues to have to difficulty tracking the phone or a calendar, and she is managing his medications. She adds the patient is having a good cognition day today.    The patient reports the COVID-19 pandemic has affected his mood due to not being able to congregate with people as often as he would like. His wife reports Lexapro is the first medication the patient has tried for his mood.    He continues to take donepezil, Plavix, Lipitor, Lexapro, and clonazepam. The eye doctor prescribed the patient AREDS and was advised to " discontinue taking vitamin D.     The patient has previously been seen by Dr. Paulino in gastroenterology for diarrhea with reported normal Colonoscopy.    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    He follows with Cardiology.    Previous neuro history and workup:  He has a history of dizziness in the past, which has been well managed most recently with ENT.     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 This has been treated by Dr. Hansen with Klonopin.     Neuroimaging in the past has been notable only for an old left cerebellar infarct. Screening bloodwork has been normal in the past as well. He is currently taking donepezil. He has also worked with Anjali Serrano (cognitive rehabilitation) in the past, which was quite beneficial.      Underwent formal Neuro Psych testing at  on 1/5/2022 with Zeke Prater Neuropsychologist for further evaluation of memory loss. Summary and findings reviewed today in clinic with patient and his wife. 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 MOCA then was a 12/30- with moderate to severe impairments in recall verbal and visual memory as well as executive function, psychomotor speech, processing speed and semantics. No falls since January/February 2021-suffered severe fall with [facility] at that time. He completed 3 or so visits with speech therapist in 2021 but he did become frustrated so he quit per his wife. Memory issues have been present many years.      The following portions of the patient's history were reviewed and updated as appropriate: allergies, current medications, past family history, past medical history, past social history, past surgical history and problem list.    Past Medical History:   Diagnosis Date   • AV block    • Bell palsy     • CAD (coronary artery disease)    • Cognitive impairment, mild, so stated     Assessed By: Chiki Newton (Neurology); Last Assessed: 11 Sep 2014   • Dementia (HCC) 2015   • Essential tremor 04/06/2022   • Fall 03/2020   • Family history of hypertension    • Gout    • Head injury Fall 04/2021   • Hyperlipidemia    • Hypertension    • Memory loss 2015    Record of meds and therapy [facility]   • TOM (obstructive sleep apnea)    • REM  disorder    • Shingles Teenager   • Skin cancer 10/22/2019    left cheek   • Stroke (HCC) 11/2014   • TIA (transient ischemic attack)    • Valvular heart disease        Past Surgical History:   Procedure Laterality Date   • HEMORRHOIDECTOMY     • HERNIA REPAIR     • MITRAL VALVE REPAIR/REPLACEMENT     • OTHER SURGICAL HISTORY  11/2014    [employer] F[employer] link loop recorder   • PACEMAKER IMPLANTATION     • SKIN SURGERY  Month ago    Cell removed from head    • SQUAMOUS CELL CARCINOMA EXCISION  10/2020    right ear   • TONSILLECTOMY         Social History     Socioeconomic History   • Marital status:    Tobacco Use   • Smoking status: Never Smoker   • Smokeless tobacco: Never Used   Vaping Use   • Vaping Use: Never used   Substance and Sexual Activity   • Alcohol use: Not Currently     Comment: week with dinner   • Drug use: No   • Sexual activity: Not Currently     Partners: Female     Comment: Vasectomy       Family History   Problem Relation Age of Onset   • Ovarian cancer Mother    • Lymphoma Father    • Cancer Father    • Hypertension Other           Current Outpatient Medications:   •  atorvastatin (LIPITOR) 20 MG tablet, TAKE 1 TABLET BY MOUTH DAILY, Disp: 30 tablet, Rfl: 5  •  clonazePAM (KlonoPIN) 1 MG tablet, TAKE 1 TABLET BY MOUTH EVERY NIGHT, Disp: 90 tablet, Rfl: 0  •  clopidogrel (PLAVIX) 75 MG tablet, Take 1 tablet by mouth Daily., Disp: 90 tablet, Rfl: 1  •  donepezil (ARICEPT) 10 MG tablet, Take 1 tablet by mouth Every Night., Disp: 90 tablet, Rfl: 3  •   "escitalopram (LEXAPRO) 10 MG tablet, Take 1 tablet by mouth Daily., Disp: 90 tablet, Rfl: 3  •  memantine (NAMENDA) 5 MG tablet, Take 1 tablet by mouth 2 (Two) Times a Day., Disp: 180 tablet, Rfl: 3  •  Multiple Vitamins-Minerals (ICAPS AREDS 2 PO), Take 1 tablet by mouth 2 (Two) Times a Day., Disp: , Rfl:      Review of Systems   Constitutional: Negative for chills, fatigue, fever and unexpected weight change.   HENT: Negative for ear pain, hearing loss, nosebleeds, rhinorrhea and sore throat.    Eyes: Negative for photophobia, pain, discharge, itching and visual disturbance.   Respiratory: Negative for cough, chest tightness, shortness of breath and wheezing.    Cardiovascular: Negative for chest pain, palpitations and leg swelling.   Gastrointestinal: Negative for abdominal pain, blood in stool, constipation, diarrhea, nausea and vomiting.   Genitourinary: Negative for dysuria, frequency, hematuria and urgency.   Musculoskeletal: Negative for arthralgias, back pain, gait problem, joint swelling, myalgias, neck pain and neck stiffness.   Skin: Negative for rash and wound.   Allergic/Immunologic: Negative for environmental allergies and food allergies.   Neurological: Positive for dizziness and weakness. Negative for tremors, seizures, syncope, speech difficulty, light-headedness, numbness and headaches.   Hematological: Negative for adenopathy. Does not bruise/bleed easily.   Psychiatric/Behavioral: Positive for confusion, decreased concentration and sleep disturbance. Negative for agitation, hallucinations and suicidal ideas. The patient is not nervous/anxious.    All other systems reviewed and are negative.       Objective:  /70   Pulse 78   Ht 185.4 cm (73\")   Wt 87.1 kg (192 lb)   SpO2 98%   BMI 25.33 kg/m²     Neurologic Exam     Mental Status   Oriented to person, place, and time.   Registration: recalls 3 of 3 objects. Recall at 5 minutes: recalls 1 of 3 objects.   Speech: speech is normal "   Level of consciousness: alert    Cranial Nerves   Cranial nerves II through XII intact.     CN VII   Right facial weakness: central (chronic facial asymmetry with no changes.)    Motor Exam   Muscle bulk: normal  Overall muscle tone: normal    Strength   Strength 5/5 throughout.     Gait, Coordination, and Reflexes     Gait  Gait: normal    Coordination   Finger to nose coordination: normal    Tremor   Resting tremor: absent  Intention tremor: present (mild bue kinetic fine hand tremor)  Action tremor: absent    Reflexes   Right : 2+  Left : 2+      Physical Exam  Constitutional:       Appearance: Normal appearance.   Neurological:      Mental Status: He is alert and oriented to person, place, and time.      Coordination: Finger-Nose-Finger Test normal.      Gait: Gait is intact.      Deep Tendon Reflexes: Strength normal.   Psychiatric:         Mood and Affect: Affect is blunt.         Speech: Speech normal.         Behavior: Behavior is slowed.         Thought Content: Thought content normal.         Cognition and Memory: He exhibits impaired recent memory.         MMSE 25/30, Recall 1/3    He also had labs completed in ER 6/2/2022- with urinalysis negative for infection. Urine drug screen was negative. Ammonia level low at less than 10. COVID-19 and influenza swabs negative. Salicylate level normal, Ethanol level normal, Acetaminophen level normal, TSH and free T4 level normal, Magnesium normal, Troponin normal and Comprehensive metabolic panel with glucose 103 and creatinine 1.39 but otherwise normal, and CBC with differential essentially normal overall.     Assessment/Plan:       Diagnoses and all orders for this visit:    1. Essential tremor (Primary)  Comments:  monitor    2. 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  Dispense: 180 tablet; Refill: 3  -     donepezil (ARICEPT) 10 MG  tablet; Take 1 tablet by mouth Every Night.  Dispense: 90 tablet; Refill: 3  -     escitalopram (LEXAPRO) 10 MG tablet; Take 1 tablet by mouth Daily.  Dispense: 90 tablet; Refill: 3           We discussed all of the plan of care and recommendations with he and his wife. They will follow up as scheduled in clinic. They will call us with any new concerns. We will decrease the patient's memantine to 5 mg twice daily and continue all other medications as prescribed. He will begin cognitive therapy. I recommend he follow up with gastroenterology if his symptoms do not improve with the decreased dosage of memantine. Wife did inquire about lowering or stopping escitalopram but after further discussion she opted to continue this since he still has some trouble with depression which is more situational.  He is scheduled to follow-up with us on 10/10/2022 in clinic.    Reviewed medications, potential side effects and signs and symptoms to report. Discussed risk versus benefits of treatment plan with patient and/or family-including medications, labs and radiology that may be ordered. Addressed questions and concerns during visit. Patient and/or family verbalized understanding and agree with plan.    AS THE PROVIDER, I PERSONALLY WORE PPE DURING ENTIRE FACE TO FACE ENCOUNTER IN CLINIC WITH THE PATIENT. PATIENT ALSO WORE PPE DURING ENTIRE FACE TO FACE ENCOUNTER EXCEPT FOR A MAX OF 30 SECONDS DURING NEUROLOGICAL EVALUATION OF CRANIAL NERVES AND THEN MASK WAS PLACED BACK OVER PATIENT FACE FOR REMAINDER OF VISIT. I WASHED MY HANDS BEFORE AND AFTER VISIT.    During this visit the following were done:  Labs Reviewed [x]    Labs Ordered []    Radiology Reports Reviewed [x]    Radiology Ordered []    PCP Records Reviewed []    Referring Provider Records Reviewed []    ER Records Reviewed [x]    Hospital Records Reviewed []    History Obtained From Family [x]  wife  Radiology Images Reviewed [x]    Other Reviewed []    Records Requested  []      Transcribed from ambient dictation for JONATHAN Mcdonald by Jay Steven    06/10/22   13:00 EDT    Patient verbalized consent to the visit recording.  I have personally performed the services described in this document as transcribed by the above individual, and it is both accurate and complete.  JONATHAN Mcdonald  6/10/2022  13:14 EDT

## 2022-06-13 LAB
QT INTERVAL: 462 MS
QTC INTERVAL: 505 MS

## 2022-06-20 RX ORDER — ATORVASTATIN CALCIUM 20 MG/1
20 TABLET, FILM COATED ORAL DAILY
Qty: 30 TABLET | Refills: 5 | Status: SHIPPED | OUTPATIENT
Start: 2022-06-20 | End: 2022-12-30

## 2022-06-22 ENCOUNTER — TELEPHONE (OUTPATIENT)
Dept: SLEEP MEDICINE | Facility: HOSPITAL | Age: 82
End: 2022-06-22

## 2022-07-06 ENCOUNTER — TELEPHONE (OUTPATIENT)
Dept: CARDIOLOGY | Facility: CLINIC | Age: 82
End: 2022-07-06

## 2022-07-17 DIAGNOSIS — G47.52 REM SLEEP BEHAVIOR DISORDER: ICD-10-CM

## 2022-07-21 ENCOUNTER — TELEPHONE (OUTPATIENT)
Dept: SLEEP MEDICINE | Facility: HOSPITAL | Age: 82
End: 2022-07-21

## 2022-07-21 RX ORDER — CLONAZEPAM 1 MG/1
1 TABLET ORAL NIGHTLY
Qty: 90 TABLET | Refills: 0 | Status: SHIPPED | OUTPATIENT
Start: 2022-07-21 | End: 2022-08-01 | Stop reason: SDUPTHER

## 2022-08-01 ENCOUNTER — OFFICE VISIT (OUTPATIENT)
Dept: SLEEP MEDICINE | Facility: HOSPITAL | Age: 82
End: 2022-08-01

## 2022-08-01 VITALS
DIASTOLIC BLOOD PRESSURE: 56 MMHG | HEART RATE: 66 BPM | OXYGEN SATURATION: 93 % | HEIGHT: 73 IN | SYSTOLIC BLOOD PRESSURE: 126 MMHG | BODY MASS INDEX: 25.5 KG/M2 | WEIGHT: 192.4 LBS

## 2022-08-01 DIAGNOSIS — G47.52 REM SLEEP BEHAVIOR DISORDER: ICD-10-CM

## 2022-08-01 DIAGNOSIS — G47.33 OSA (OBSTRUCTIVE SLEEP APNEA): Primary | ICD-10-CM

## 2022-08-01 PROCEDURE — 99213 OFFICE O/P EST LOW 20 MIN: CPT | Performed by: NURSE PRACTITIONER

## 2022-08-01 RX ORDER — CLONAZEPAM 1 MG/1
1 TABLET ORAL NIGHTLY
Qty: 90 TABLET | Refills: 0 | Status: SHIPPED | OUTPATIENT
Start: 2022-08-01 | End: 2022-11-01 | Stop reason: SDUPTHER

## 2022-08-01 NOTE — PROGRESS NOTES
Chief Complaint:   Chief Complaint   Patient presents with   • Follow-up       HPI:    Uche Polanco is a 82 y.o. male here for follow-up of sleep apnea and REM behavior disorder.  Patient was last seen 1/10/2022.  Patient and wife present today stating he does do well with clonazepam he is not acting out, kicking, and falling out of bed while he is on clonazepam.  Patient states now that he is on Namenda and Lexapro if he could decrease the canals and has a p.m. and possibly come off of it if these would help sedate him.  She is going to start breaking clonazepam into half for approximately 1 to 2 weeks and then a fourth for approximately 1 to 2 weeks to see if this is something he will be able to wean off of.  I will refill the full dose at this time.  He is sleeping 11 hours nightly and goes to sleep very quickly he will only occasionally get up through the night.  He has an [place] score of 7/24.  Patient has an AHI today of 6.5 which is very normal for him.  He has no concerns or complaints and will continue therapy.        Current medications are:   Current Outpatient Medications:   •  atorvastatin (LIPITOR) 20 MG tablet, TAKE 1 TABLET BY MOUTH DAILY, Disp: 30 tablet, Rfl: 5  •  clonazePAM (KlonoPIN) 1 MG tablet, Take 1 tablet by mouth Every Night., Disp: 90 tablet, Rfl: 0  •  clopidogrel (PLAVIX) 75 MG tablet, Take 1 tablet by mouth Daily., Disp: 90 tablet, Rfl: 1  •  donepezil (ARICEPT) 10 MG tablet, Take 1 tablet by mouth Every Night., Disp: 90 tablet, Rfl: 3  •  escitalopram (LEXAPRO) 10 MG tablet, Take 1 tablet by mouth Daily., Disp: 90 tablet, Rfl: 3  •  memantine (NAMENDA) 5 MG tablet, Take 1 tablet by mouth 2 (Two) Times a Day., Disp: 180 tablet, Rfl: 3  •  Multiple Vitamins-Minerals (ICAPS AREDS 2 PO), Take 1 tablet by mouth 2 (Two) Times a Day., Disp: , Rfl: .      The patient's relevant past medical, surgical, family and social history were reviewed and updated in Epic as appropriate.        Review of Systems   Respiratory: Positive for apnea and shortness of breath.    Cardiovascular: Positive for palpitations and leg swelling.   Neurological:        Memory loss   Psychiatric/Behavioral: Positive for dysphoric mood and sleep disturbance. The patient is nervous/anxious.    All other systems reviewed and are negative.        Objective:    Physical Exam  Constitutional:       Appearance: Normal appearance.   HENT:      Head: Normocephalic and atraumatic.      Mouth/Throat:      Comments: Mallampati 3 anatomy  Cardiovascular:      Comments: Pacemaker  Pulmonary:      Effort: Pulmonary effort is normal.      Breath sounds: Normal breath sounds.   Skin:     General: Skin is warm and dry.   Neurological:      Mental Status: He is alert. Mental status is at baseline.   Psychiatric:         Mood and Affect: Mood normal.         Behavior: Behavior normal.         Thought Content: Thought content normal.         Judgment: Judgment normal.         CPAP Report  20/30 days of use  Greater than 4-hour use 77%  Settings 12-20  95th percentile pressure 15.4  AHI of 6.5    The patient continues to use and benefit from CPAP therapy.    ASSESSMENT/PLAN    Diagnoses and all orders for this visit:    1. TOM (obstructive sleep apnea) (Primary)  -     PAP Therapy    2. REM sleep behavior disorder  -     clonazePAM (KlonoPIN) 1 MG tablet; Take 1 tablet by mouth Every Night.  Dispense: 90 tablet; Refill: 0        1. Counseled patient regarding multimodal approach with healthy nutrition, healthy sleep, regular physical activity, social activities, counseling, and medications. Encouraged to practice lateral sleep position. Avoid alcohol and sedatives close to bedtime.  2.   Refill CPAP supplies x1 year.  Clonazepam 1 mg nightly she will taper off to see if he will do okay with this and I will see him back in follow-up in 3 months.  He is up-to-date and compliant with [facility].    I have reviewed the results of my evaluation and  impression and discussed my recommendations in detail with the patient.      Signed by  Letty Linder, JONATHAN    August 1, 2022      CC: Jd Tapia MD         No ref. provider found

## 2022-08-27 PROCEDURE — 93296 REM INTERROG EVL PM/IDS: CPT | Performed by: INTERNAL MEDICINE

## 2022-08-27 PROCEDURE — 93294 REM INTERROG EVL PM/LDLS PM: CPT | Performed by: INTERNAL MEDICINE

## 2022-10-05 ENCOUNTER — TELEPHONE (OUTPATIENT)
Dept: CARDIOLOGY | Facility: CLINIC | Age: 82
End: 2022-10-05

## 2022-10-10 ENCOUNTER — HOME HEALTH ADMISSION (OUTPATIENT)
Dept: HOME HEALTH SERVICES | Facility: HOME HEALTHCARE | Age: 82
End: 2022-10-10

## 2022-10-10 ENCOUNTER — OFFICE VISIT (OUTPATIENT)
Dept: NEUROLOGY | Facility: CLINIC | Age: 82
End: 2022-10-10

## 2022-10-10 VITALS
HEART RATE: 78 BPM | OXYGEN SATURATION: 97 % | SYSTOLIC BLOOD PRESSURE: 120 MMHG | BODY MASS INDEX: 25.45 KG/M2 | HEIGHT: 73 IN | WEIGHT: 192 LBS | DIASTOLIC BLOOD PRESSURE: 70 MMHG

## 2022-10-10 DIAGNOSIS — Z86.73 HISTORY OF TIA (TRANSIENT ISCHEMIC ATTACK): ICD-10-CM

## 2022-10-10 DIAGNOSIS — R29.898 FINE MOTOR IMPAIRMENT: ICD-10-CM

## 2022-10-10 DIAGNOSIS — G25.0 ESSENTIAL TREMOR: ICD-10-CM

## 2022-10-10 DIAGNOSIS — G30.9 MIXED ALZHEIMER'S AND VASCULAR DEMENTIA WITH BEHAVIOR DISTURBANCES: Primary | ICD-10-CM

## 2022-10-10 DIAGNOSIS — R29.818 FINE MOTOR IMPAIRMENT: ICD-10-CM

## 2022-10-10 DIAGNOSIS — R26.89 ABNORMALITY OF GAIT DUE TO IMPAIRMENT OF BALANCE: ICD-10-CM
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
## 2022-10-10 DIAGNOSIS — F02.818 MIXED ALZHEIMER'S AND VASCULAR DEMENTIA WITH BEHAVIOR DISTURBANCES: Primary | ICD-10-CM

## 2022-10-10 DIAGNOSIS — F01.518 MIXED ALZHEIMER'S AND VASCULAR DEMENTIA WITH BEHAVIOR DISTURBANCES: Primary | ICD-10-CM

## 2022-10-10 PROCEDURE — 99215 OFFICE O/P EST HI 40 MIN: CPT | Performed by: NURSE PRACTITIONER

## 2022-10-10 RX ORDER — DONEPEZIL HYDROCHLORIDE 10 MG/1
10 TABLET, FILM COATED ORAL NIGHTLY
Qty: 90 TABLET | Refills: 3 | Status: SHIPPED | OUTPATIENT
Start: 2022-10-10

## 2022-10-10 RX ORDER — MEMANTINE HYDROCHLORIDE 5 MG/1
5 TABLET ORAL 2 TIMES DAILY
Qty: 180 TABLET | Refills: 3 | Status: SHIPPED | OUTPATIENT
Start: 2022-10-10

## 2022-10-10 RX ORDER — ESCITALOPRAM OXALATE 10 MG/1
10 TABLET ORAL DAILY
Qty: 90 TABLET | Refills: 3 | Status: SHIPPED | OUTPATIENT
Start: 2022-10-10 | End: 2023-10-10

## 2022-10-13 DIAGNOSIS — R41.89 COGNITIVE DEFICITS: ICD-10-CM

## 2022-10-13 DIAGNOSIS — F02.818 MIXED ALZHEIMER'S AND VASCULAR DEMENTIA WITH BEHAVIOR DISTURBANCES: Primary | ICD-10-CM

## 2022-10-13 DIAGNOSIS — R29.818 FINE MOTOR IMPAIRMENT: ICD-10-CM

## 2022-10-13 DIAGNOSIS — R29.898 FINE MOTOR IMPAIRMENT: ICD-10-CM

## 2022-10-13 DIAGNOSIS — G30.9 MIXED ALZHEIMER'S AND VASCULAR DEMENTIA WITH BEHAVIOR DISTURBANCES: Primary | ICD-10-CM

## 2022-10-13 DIAGNOSIS — F01.518 MIXED ALZHEIMER'S AND VASCULAR DEMENTIA WITH BEHAVIOR DISTURBANCES: Primary | ICD-10-CM

## 2022-10-13 DIAGNOSIS — R26.89 ABNORMALITY OF GAIT DUE TO IMPAIRMENT OF BALANCE: ICD-10-CM

## 2022-10-24 ENCOUNTER — TREATMENT (OUTPATIENT)
Dept: PHYSICAL THERAPY | Facility: CLINIC | Age: 82
End: 2022-10-24

## 2022-10-24 ENCOUNTER — OFFICE VISIT (OUTPATIENT)
Dept: PHYSICAL THERAPY | Facility: CLINIC | Age: 82
End: 2022-10-24

## 2022-10-24 DIAGNOSIS — G30.9 MIXED ALZHEIMER'S AND VASCULAR DEMENTIA WITH BEHAVIOR DISTURBANCES: Primary | ICD-10-CM

## 2022-10-24 DIAGNOSIS — F02.818 MIXED ALZHEIMER'S AND VASCULAR DEMENTIA WITH BEHAVIOR DISTURBANCES: ICD-10-CM

## 2022-10-24 DIAGNOSIS — Z74.09 IMPAIRED FUNCTIONAL MOBILITY, BALANCE, GAIT, AND ENDURANCE: ICD-10-CM

## 2022-10-24 DIAGNOSIS — G30.9 MIXED ALZHEIMER'S AND VASCULAR DEMENTIA WITH BEHAVIOR DISTURBANCES: ICD-10-CM

## 2022-10-24 DIAGNOSIS — R41.841 COGNITIVE COMMUNICATION DEFICIT: Primary | ICD-10-CM

## 2022-10-24 DIAGNOSIS — F01.518 MIXED ALZHEIMER'S AND VASCULAR DEMENTIA WITH BEHAVIOR DISTURBANCES: ICD-10-CM

## 2022-10-24 DIAGNOSIS — R41.89 COGNITIVE DEFICITS: ICD-10-CM

## 2022-10-24 DIAGNOSIS — F02.818 MIXED ALZHEIMER'S AND VASCULAR DEMENTIA WITH BEHAVIOR DISTURBANCES: Primary | ICD-10-CM

## 2022-10-24 DIAGNOSIS — F01.518 MIXED ALZHEIMER'S AND VASCULAR DEMENTIA WITH BEHAVIOR DISTURBANCES: Primary | ICD-10-CM

## 2022-10-24 PROCEDURE — 97162 PT EVAL MOD COMPLEX 30 MIN: CPT | Performed by: PHYSICAL THERAPIST

## 2022-10-24 PROCEDURE — 96125 COGNITIVE TEST BY HC PRO: CPT | Performed by: SPEECH-LANGUAGE PATHOLOGIST

## 2022-10-24 NOTE — PROGRESS NOTES
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 The first one was a trip, one was on the steps and the last was taking a suitcase down the steps. A year and a half ago he fell down the steps at night and he broke his arm and collar bone. H does have REM sleep disorder which causes him to essentially sleep walk. Wife states he almost always uses the hand rail. He can walk through the mall and in store. He does have unsteadiness when he walks.       Patient Occupation: retired - [employer] sales           Objective          Strength/Myotome Testing     Left Hip   Planes of Motion   Flexion: 4+  Extension: 3-  Abduction: 4-    Right Hip   Planes of Motion   Flexion: 4+  Extension: 3-  Abduction: 4-    Left Knee   Flexion: 4+  Extension: 4+    Right Knee   Flexion: 4+  Extension: 4+    Left Ankle/Foot   Dorsiflexion: 5    Right Ankle/Foot   Dorsiflexion: 5    Ambulation     Comments   Normalized gait         PT Neuro         Assessment & Plan     Assessment  Impairments: abnormal coordination, abnormal gait, activity intolerance, impaired balance, impaired physical strength and safety issue  Functional Limitations: carrying objects, walking, standing and stooping  Assessment details: Patient is an 82 YOM that presents with balance deficits related to dementia. Patient demonstrates weak BLE hip musculature with altered balance with stair climbing and  descending. He has had multiple falls within the last 1-2 years that have resulted in injury. He would benefit from skilled PT intervention to address deficits in order to improve global balance and strength.   Prognosis: fair    Goals  Plan Goals: STG (4 visits)  1. Patient will report compliance with initial HEP.   2. Patient to improve HERNÁNDEZ balance score to >/= 52 /56 to decrease patient's risk of falls.  3. Patient to perform TUG within <7 sec without LOB for improved functional mobility.  4. Patient to ambulate 10 meters without AD within <8 sec without LOB for improved gait orly and functional mobility.  5. Patient to improve FGA score to >/= 24 /30 to decrease patient's risk of falls and improve community ambulation.    LTG (8 visits)  1. Patient will be I with final HEP.   2. Patient to improve HERNÁNDEZ balance score to >/= 56 /56 to decrease patient's risk of falls.  3. Patient to perform TUG within <6 sec without LOB for improved functional mobility.  4. Patient to ambulate 10 meters without AD within <7 sec without LOB for improved gait orly and functional mobility.  5. Patient to improve FGA score to >/= 28 /30 to decrease patient's risk of falls and improve community ambulation.        Plan  Therapy options: will be seen for skilled therapy services  Planned modality interventions: TENS  Planned therapy interventions: ADL retraining, balance/weight-bearing training, flexibility, gait training, manual therapy, neuromuscular re-education, motor coordination training, postural training, strengthening, stretching, therapeutic activities, transfer training and home exercise program  Frequency: 1x week  Duration in visits: 8  Treatment plan discussed with: patient and caregiver  Plan details: Patient will be seen 1x/wk x 8wks with treatment to include strengthening, stretching, manual therapy, neuromuscular re-education, balance, gait and endurance training.         Timed:  Manual Therapy:    0     mins   77537;  Therapeutic Exercise:    0     mins  82801;     Neuromuscular Cynthia:    0    mins  11772;    Therapeutic Activity:     0     mins  27110;     Gait Trainin     mins  56147;     Electrical Stimulation:    0     mins  53114 ( );    Untimed:  Canalith Repositioning    0     mins 44466    Timed Treatment:   0   mins   Total Treatment:     45   mins    PT SIGNATURE: Jackie West, PT, DPT, MSCS, CDP, CSRS  [place] License #[id number]  DATE TREATMENT INITIATED: 10/24/2022      Medicare Initial Certification Certification Period: 10/24/5020akbm7  I certify that the therapy services are furnished while this patient is under my care.  The services outlined above are required by this patient, and will be reviewed every 90 days.     PHYSICIAN: Veronica Moreland APRN  NPI: [id number]                                         DATE:     Please sign and return via fax to [phone].   Thank you,   [facility] Physical Therapy.

## 2022-10-25 NOTE — PROGRESS NOTES
Outpatient Speech Language Pathology   Adult Speech Language Cognitive Initial Evaluation       Patient Name: Uche Polanco  : 1940  MRN: [id number]  Today's Date: 10/25/2022        Visit Date: 10/24/2022   Patient Active Problem List   Diagnosis   • Severe obstructive sleep apnea   • Gout   • AV block   • REM sleep behavior disorder   • Memory loss   • Postural dizziness with presyncope   • Chronic systolic congestive heart failure (HCC)   • Nonrheumatic aortic valve insufficiency   • Cognitive deficits   • History of TIA (transient ischemic attack)   • CHF (congestive heart failure) ([facility])   • Essential tremor   • 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 ([facility])   • Abnormality of gait due to impairment of balance   • Fine motor impairment   • Fall (on) (from) other stairs and steps, initial encounter        Past Medical History:   Diagnosis Date   • AV block    • Bell palsy    • CAD (coronary artery disease)    • Cognitive impairment, mild, so stated     Assessed By: Chiki Newton (Neurology); Last Assessed: 11 Sep 2014   • Dementia ([facility])    • Essential tremor 2022   • Fall 2020   • Family history of hypertension    • Gout    • Head injury Fall 2021   • Hyperlipidemia    • Hypertension    • Memory loss     Record of meds and therapy [facility]   • TOM (obstructive sleep apnea)    • REM  disorder    • Shingles Teenager   • Skin cancer 10/22/2019    left cheek   • Stroke ([facility]) 2014   • TIA (transient ischemic attack)    • Valvular heart disease         Past Surgical History:   Procedure Laterality Date   • HEMORRHOIDECTOMY     • HERNIA REPAIR     • MITRAL VALVE REPAIR/REPLACEMENT     • OTHER SURGICAL HISTORY  2014    [employer] link loop recorder   • PACEMAKER IMPLANTATION     • SKIN SURGERY  Month ago    Cell removed from head    • SQUAMOUS CELL CARCINOMA EXCISION  10/2020    right ear   • TONSILLECTOMY           Visit Dx:    ICD-10-CM ICD-9-CM    1. Cognitive communication deficit  R41.841 799.52   2. 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 Cognitive deficits  R41.89 294.9            OP SLP Assessment/Plan - 10/25/22 0900        SLP Assessment    Functional Problems Speech Language- Adult/Cognition  -RB    Impact on Function: Adult Speech Language/Cognition Difficulty communicating wants, needs, and ideas;Difficulty communicating in a medical emergency;Restrictions in personal and social life;Unable to understand written/spoken language;Difficulty following directions;Difficulty participating in avocational activities;Trouble learning or remembering new information;Poor attention to task  -RB    Clinical Impression: Speech Language-Adult/Congnition Severe:;Cognitive Communication Impairment  -RB    Functional Problems Comment Severe cog/comm disorder impacting ability to complete ADLs and hometasks, participate in avocational activities, and learn and remember new information. Difficulties frustrate the pt, decreasing overall QOL.  -RB    Clinical Impression Comments Would benefit from skilled ST  -RB    Please refer to paper survey for additional self-reported information Yes  -RB    Please refer to items scanned into chart for additional diagnostic informaiton and handouts as provided by clinician Yes  -RB    SLP Diagnosis Severe cog/comm disorder  -RB    Prognosis Good (comment)  -RB    Patient/caregiver participated in establishment of treatment plan and goals Yes  -RB    Patient would benefit from skilled therapy intervention Yes  -RB       SLP Plan    Frequency 1x/weekly  -RB    Duration 12 weeks  -RB    Planned CPT's? SLP INDIVIDUAL SPEECH THERAPY: 92047  -RB    Expected Duration of Therapy Session (SLP Eval) 45  -RB    Plan Comments Initiate POC  -RB          User Key  (r) = Recorded By, (t) = Taken By, (c) = Cosigned By    Initials Name Provider Type    RB  Charlotte Bruce, SLP Speech and Language Pathologist                 SLP SLC Evaluation - 10/25/22 0800        Communication Assessment/Intervention    Document Type evaluation  -RB    Total Evaluation Minutes, SLP 45  -RB    Subjective Information no complaints  -RB    Patient Observations alert;cooperative;agree to therapy  -RB    Patient/Family/Caregiver Comments/Observations Wife present for evaluation  -RB    Patient Effort good  -RB    Symptoms Noted During/After Treatment none  -RB       General Information    Patient Profile Reviewed yes  -RB    Precautions/Limitations, Vision WFL with corrective lenses  -RB    Precautions/Limitations, Hearing WFL;for purposes of eval  -RB    Patient Level of Education Mr. Polanco was referred to  following a medical history of vascular dementia and alzheimers. He reports that he experiences short term and immediate memory deficits, word retrieval difficulty, decreased attention to task, and decreased ability to use external memory aids such as calendars and his phone. Mr. Polanco currently lives at home with his wife. His wife reports that she believes the pt struggles primarily with memory and language, but that his humor hasn't changed. The pt reports getting frustrated with his communication and cognitive impairments and states that they interfere with his ability to complete ADLs and home tasks.  -RB    Prior Level of Function-Communication WFL  -RB    Plans/Goals Discussed with patient;spouse/S.O.  -RB    Barriers to Rehab none identified  -RB    Patient's Goals for Discharge functional cognition;functional communication  -RB    Family Goals for Discharge functional communication;functional cognition;patient able to return to previous activities/roles  -RB    Standardized Assessment Used RBANS  -RB       Pain    Additional Documentation Pain Scale: Numbers Pre/Post-Treatment (Group)  -RB       Pain Scale: Numbers Pre/Post-Treatment    Pretreatment Pain Rating 0/10 - no  pain  -RB    Posttreatment Pain Rating 0/10 - no pain  -RB       Oral Motor Structure and Function    Oral Motor Structure and Function WFL  -RB       Oral Musculature and Cranial Nerve Assessment    Oral Motor General Assessment lingual impairment;generalized oral motor weakness  -RB    Oral Labial or Buccal Impairment, Detail, Cranial Nerve VII (Facial): left labial droop  -RB    Oral Motor, Comment Minimal L side labial droop  -RB       Cognitive Assessment Intervention- SLP    Cognitive Function (Cognition) moderate impairment;severe impairment  -RB    Orientation Status (Cognition) WFL  -RB    Memory (Cognitive) severe impairment;simple;immediate;short-term;delayed  -RB    Attention (Cognitive) moderate impairment;sustained;selective  -RB    Thought Organization (Cognitive) mild impairment;concrete divergent  -RB    Reasoning (Cognitive) mild impairment;simple  -RB    Problem Solving (Cognitive) mild impairment;simple  -RB    Executive Function (Cognition) moderate impairment;self-monitoring/correction;home management activities;complex organization  -RB    Pragmatics (Communication) WFL  -RB    Right Hemisphere Function severe impairment;visuo-spatial  -RB    Cognition, Comment mod/Severe cog/comm impairment characterized by deficits in memory, attention, thought organization, reasoning, problem solving, executive functioning, and visuospatial abilities. Conclusion were drawn from standardized assessment, conversations with pt and spouse, and observations of structured and unstructured tasks.  -RB       Standardized Tests    Cognitive/Memory Tests RBANS: Repeatable Battery for the Assessment of Neuropsychological Status  -RB       RBANS- Repeatable Battery for the Assessment of Neuropsychological Status    Immediate Memory Index Score 57  -RB    Immediate Memory Qualitative Description extremely low  -RB    Language Index Score 83  -RB    Language Qualitative Description borderline  -RB    RBANS Comments PT  previously scored in 2020: 61 for immediate memory, 136 for visuospatial, 89 for language, 79 for attention, 78 for delayed memory. RBANS unable to be completed during one session due to time constraints and pt's comprehension of tasks. The immeidate memory and language subtests were completed and scored approriately. The pt and his wife report more language related difficulties than he presented with throughout the assessment. For the visuospatial subtest, pt recieved a score of 13 for the figure copy task, but was unable to understand the instructions for the line orientation task. 5 items were completed for the line orientation task with the client recieving a score of 4, however the client did not provide appropriate responses and required continued prompting and cueing. The attention subtest was not completed due to time constraints and will be completed next session. The delayed memory subtest was not completed.  -RB          User Key  (r) = Recorded By, (t) = Taken By, (c) = Cosigned By    Initials Name Provider Type    Charlotte Dillard, SLP Speech and Language Pathologist                  ACTIVITY  ACCURACY ASSISTANCE NEEDED   LTGs:     Pt and family will implement compensatory strategies to maximize patient’s memory function so patient can continue to participate in daily activities 80% of the time with no cues      Pt will be able to remember information needed to participate in avocational activities 80% of the time with no cues            STG:  Pt’s memory skills will be enhanced as reported by patient by utilizing internal memory strategies to recall up to 3 pieces of information after a 5 minute delay      Pt’s memory skills will be enhanced as reported by patient using external memory aides 90% of the time with min assist           STG:     Pt will recall 4 pieces of  auditory and visual information immediately after being presented with it 80-90% of the time with no cues                STG:  Pt will  demonstrate improved ability to recall information by listening to information and answering  Questions and summarizing material 80-90% of the time with no cues       STG:   Pt will use word finding strategies in conversation with 90% no cues    Pt will sustain attention to functional tasks with 90% no cues.          Certification: 10/25/22-1/24/23                 OP SLP Education     Row Name 10/25/22 0900       Education    Barriers to Learning No barriers identified  -RB    Education Provided Described results of evaluation;Patient expressed understanding of evaluation;Family/caregivers expressed understanding of evaluation;Patient participated in establishing goals and treatment plan;Family/caregivers participated in establishing goals and treatment plan;Patient demonstrated recommended strategies  -RB    Assessed Learning needs;Learning motivation;Learning preferences;Learning readiness  -RB    Learning Motivation Strong  -RB    Learning Method Explanation;Demonstration;Teach back;Written materials  -RB    Teaching Response Verbalized understanding;Demonstrated understanding  -RB    Education Comments HEP: general memory and communication strategies  -RB          User Key  (r) = Recorded By, (t) = Taken By, (c) = Cosigned By    Initials Name Effective Dates    RB Charlotte Bruce SLP 06/16/21 -                [facility] Speech Language Pathology   [address] [facility]. [address] [place], [place] [id number]          PHYSICIAN: Veronica Moreland APRN    NPI: [id number]         I certify that the therapy services are furnished while this patient is under my care.  The services outlined above are required by this patient, and will be reviewed every 90 days.                PHYSICIAN:                                         DATE:      Please sign and return via fax to [phone].   Thank you,   [facility] SpeechTherapy.  [facility] Speech Language Pathology   [address]  [place] [address]    SLP , Jocelyn Benitez, completed treatment under my direct supervision.     Charlotte Bruce MA CCC-SLP [facility]IS  [place] license: [id number]                10/25/2022

## 2022-10-26 NOTE — PROGRESS NOTES
Reviewed SLP notes. Severe cognitive communication difficulties. Should benefit from SLP. JONATHAN Douglas

## 2022-11-01 ENCOUNTER — OFFICE VISIT (OUTPATIENT)
Dept: SLEEP MEDICINE | Facility: HOSPITAL | Age: 82
End: 2022-11-01

## 2022-11-01 VITALS
WEIGHT: 191 LBS | HEIGHT: 73 IN | SYSTOLIC BLOOD PRESSURE: 181 MMHG | BODY MASS INDEX: 25.31 KG/M2 | OXYGEN SATURATION: 94 % | HEART RATE: 65 BPM | DIASTOLIC BLOOD PRESSURE: 71 MMHG

## 2022-11-01 DIAGNOSIS — G47.33 OSA (OBSTRUCTIVE SLEEP APNEA): Primary | ICD-10-CM

## 2022-11-01 DIAGNOSIS — G47.52 REM SLEEP BEHAVIOR DISORDER: ICD-10-CM

## 2022-11-01 PROCEDURE — 99213 OFFICE O/P EST LOW 20 MIN: CPT | Performed by: NURSE PRACTITIONER

## 2022-11-01 RX ORDER — CLONAZEPAM 1 MG/1
1 TABLET ORAL NIGHTLY
Qty: 90 TABLET | Refills: 0 | Status: SHIPPED | OUTPATIENT
Start: 2022-11-01

## 2022-11-01 NOTE — PROGRESS NOTES
Chief Complaint:   Chief Complaint   Patient presents with   • Follow-up       HPI:    Uche Polanco is a 82 y.o. male here for follow-up of REM sleep disorder and sleep apnea.  Patient was last seen 8/1/2022.  Patient continues to do well on clonazepam at bedtime.  He is having no sleepwalking/talking, movements, or acting out during the night.  Patient feels his symptoms are very controlled.  He has no side effects from this medication and does wish to have a refill today.  Patient continues to sleep 10 to 11 hours nightly feels rested upon awakening.  Go to sleep quickly and will occasionally get up during the night.  Patient has an [place] score of 6/24.  We will get his medicine refilled for him today.        Current medications are:   Current Outpatient Medications:   •  atorvastatin (LIPITOR) 20 MG tablet, TAKE 1 TABLET BY MOUTH DAILY, Disp: 30 tablet, Rfl: 5  •  clonazePAM (KlonoPIN) 1 MG tablet, Take 1 tablet by mouth Every Night., Disp: 90 tablet, Rfl: 0  •  clopidogrel (PLAVIX) 75 MG tablet, Take 1 tablet by mouth Daily., Disp: 90 tablet, Rfl: 1  •  donepezil (ARICEPT) 10 MG tablet, Take 1 tablet by mouth Every Night., Disp: 90 tablet, Rfl: 3  •  escitalopram (LEXAPRO) 10 MG tablet, Take 1 tablet by mouth Daily., Disp: 90 tablet, Rfl: 3  •  memantine (NAMENDA) 5 MG tablet, Take 1 tablet by mouth 2 (Two) Times a Day., Disp: 180 tablet, Rfl: 3  •  Multiple Vitamins-Minerals (ICAPS AREDS 2 PO), Take 1 tablet by mouth 2 (Two) Times a Day., Disp: , Rfl: .      The patient's relevant past medical, surgical, family and social history were reviewed and updated in Epic as appropriate.       Review of Systems   Eyes: Positive for visual disturbance.   Respiratory: Positive for apnea.    Cardiovascular: Positive for palpitations and leg swelling.   Neurological: Positive for tremors.        Memory loss   Psychiatric/Behavioral: Positive for dysphoric mood and sleep disturbance. The patient is nervous/anxious.     All other systems reviewed and are negative.        Objective:    Physical Exam  Constitutional:       Appearance: Normal appearance.   HENT:      Head: Normocephalic and atraumatic.      Mouth/Throat:      Comments: Mallampati 3 anatomy  Cardiovascular:      Comments: Pacemaker  Skin:     General: Skin is warm and dry.   Neurological:      Mental Status: He is alert and oriented to person, place, and time.   Psychiatric:         Mood and Affect: Mood normal.         Behavior: Behavior normal.         Thought Content: Thought content normal.         Judgment: Judgment normal.         CPAP Report    No download today  The patient continues to use and benefit from CPAP therapy.    ASSESSMENT/PLAN    Diagnoses and all orders for this visit:    1. TOM (obstructive sleep apnea) (Primary)    2. REM sleep behavior disorder  -     clonazePAM (KlonoPIN) 1 MG tablet; Take 1 tablet by mouth Every Night.  Dispense: 90 tablet; Refill: 0        1. Counseled patient regarding multimodal approach with healthy nutrition, healthy sleep, regular physical activity, social activities, counseling, and medications. Encouraged to practice lateral sleep position. Avoid alcohol and sedatives close to bedtime.  2.   Patient will continue CPAP  Clonazepam 1 mg #90 with 0 refills I will see him back in 4 months.    I have reviewed the results of my evaluation and impression and discussed my recommendations in detail with the patient.      Signed by  Letty Linder, JONATHAN    November 1, 2022      CC: Jd Tapia MD         No ref. provider found

## 2022-11-09 ENCOUNTER — TREATMENT (OUTPATIENT)
Dept: PHYSICAL THERAPY | Facility: CLINIC | Age: 82
End: 2022-11-09

## 2022-11-09 DIAGNOSIS — Z74.09 IMPAIRED MOBILITY AND ADLS: ICD-10-CM

## 2022-11-09 DIAGNOSIS — F01.518 MIXED ALZHEIMER'S AND VASCULAR DEMENTIA WITH BEHAVIOR DISTURBANCES: Primary | ICD-10-CM

## 2022-11-09 DIAGNOSIS — Z78.9 IMPAIRED MOBILITY AND ADLS: ICD-10-CM

## 2022-11-09 DIAGNOSIS — R27.8 DECREASED COORDINATION: Primary | ICD-10-CM

## 2022-11-09 DIAGNOSIS — R41.841 COGNITIVE COMMUNICATION DEFICIT: Primary | ICD-10-CM

## 2022-11-09 DIAGNOSIS — Z74.09 IMPAIRED FUNCTIONAL MOBILITY, BALANCE, GAIT, AND ENDURANCE: ICD-10-CM

## 2022-11-09 DIAGNOSIS — G30.9 MIXED ALZHEIMER'S AND VASCULAR DEMENTIA WITH BEHAVIOR DISTURBANCES: Primary | ICD-10-CM

## 2022-11-09 DIAGNOSIS — F02.818 MIXED ALZHEIMER'S AND VASCULAR DEMENTIA WITH BEHAVIOR DISTURBANCES: Primary | ICD-10-CM

## 2022-11-09 PROCEDURE — 97166 OT EVAL MOD COMPLEX 45 MIN: CPT | Performed by: OCCUPATIONAL THERAPIST

## 2022-11-09 PROCEDURE — 97112 NEUROMUSCULAR REEDUCATION: CPT | Performed by: PHYSICAL THERAPIST

## 2022-11-09 PROCEDURE — 97110 THERAPEUTIC EXERCISES: CPT | Performed by: PHYSICAL THERAPIST

## 2022-11-09 PROCEDURE — 92507 TX SP LANG VOICE COMM INDIV: CPT | Performed by: SPEECH-LANGUAGE PATHOLOGIST

## 2022-11-09 NOTE — PROGRESS NOTES
Occupational Therapy Initial Evaluation and Plan of Care  [facility] [place]  [address] [address]    Patient: Uche Polanco   : 1940  Diagnosis/ICD-10 Code:  Decreased coordination [R27.8]  Referring practitioner: DELORIS Mcdonald*  Date of Initial Visit: Type: THERAPY  Noted: 2022  Today's Date: 2022  Patient seen for 1 sessions    Subjective:     Subjective Questionnaire: 9HPT RIGHT: 34.87 LEFT: 48.26  Deliberate with movement, decreased in-hand manipulation with L vs R      Subjective Evaluation    History of Present Illness  Mechanism of injury: Patient is accompanied to OP OT session by wife who assists with hx.  Pt dx w/ mixed dementia and states that he has fallen a few times. The first one was a trip, one was on the steps and the last was taking a suitcase down the steps. A year and a half ago he fell down the steps at night and he broke his arm and collar bone. He does have REM sleep disorder which causes him to essentially sleep walk. Wife states he almost always uses the hand rail. He can walk through the mall and in store. He experiences unsteadiness when he walks.   Buttons, zippers are difficult, some days are better than others. He has difficulty with donning second sleeve of jackets and shirts. Hanging up clothes in closet orientation is off. Pt uses regular razor and takes significantly more time. He is experiencing some shakiness with HW when filling out calendar.   Pt states he is not coping with his dz very well. Wife states that since COVID they have not had much of a social life and have been isolated. Enjoys traveling-most family in [place].   Pt has taken up some responsibilities for cleaning the house, benny the hard wood. Mops, empty waste basket and recycling      Patient Occupation: retired - automotive sales  Patient Goals  Patient goals for therapy: increased strength and independence with ADLs/IADLs  Patient goal: FMC         Objective           Static Posture     Comments  Demonstrates difficulty with lining up zipper on jacket to complete    Increased time and effort for buttoning activity    Active Range of Motion   Left Shoulder   Flexion: WFL    Right Shoulder   Flexion: WFL    Left Elbow   Flexion: WFL  Extension: WFL  Forearm supination: WFL  Forearm pronation: WFL    Right Elbow   Flexion: WFL  Extension: WFL  Forearm supination: WFL  Forearm pronation: WFL    Left Wrist   Wrist flexion: WFL  Wrist extension: WFL    Right Wrist   Wrist flexion: WFL  Wrist extension: WFl    Additional Active Range of Motion Details  opposition intact bilaterally, eyes open/closed      OT Neuro        Assessment & Plan     Assessment  Impairments: abnormal coordination, abnormal gait, activity intolerance, impaired balance, impaired physical strength and lacks appropriate home exercise program  Functional Limitations: carrying objects, lifting, walking, pulling, stooping, reaching behind back, reaching overhead and unable to perform repetitive tasks  Assessment details: Pt presents with declining cognitive status and FMC impacting ADL and IADL independence. Pt now having difficulty with buttoning, zipping and other high level tasks along with coordinating and sequencing of activities. Pt to benefit from skilled OT services to address these areas and to promote increased overall QOL and pt satisfaction.    Goals  Plan Goals:   Pt will improve bilateral scores by 5 or more seconds on the 9HPT to demonstrate improved accuracy and speed with fine motor coordination by 8 wks.      Pt will be independent with hand FMC HEPs to increase independence with ADL/IADL performance tasks by 8 wks.     Pt will be independent assist with AE use to increase safety and independence with ADL/IADL tasks by 4 wks.     Plan  Planned therapy interventions: ADL retraining, balance/weight-bearing training, fine motor coordination training, flexibility, functional ROM exercises, home  exercise program, IADL retraining, motor coordination training, neuromuscular re-education, strengthening, stretching, therapeutic activities and transfer training  Frequency: 1x week  Duration in visits: 8  Treatment plan discussed with: patient  Plan details: Est OT POC and goals to meet pt needs and promote increased independence, safety and engagement in daily tasks.          Timed:  Manual Therapy:    0     mins  91864;  Therapeutic Exercise:    0     mins  10315;     Neuromuscular Cynthia:    0    mins  41506;    Therapeutic Activity:     0     mins  36803;     Self-Care/ADL     0     mins  25207;   Sensory Int. Tech      0     mins 12634;  Ultrasound:     0     mins  53229;    Electrical Stimulation:    0     mins  50184 ( );    Untimed:  Electrical Stimulation:    0     mins  88468 ( );    Timed Treatment:   0   mins   Total Treatment:     45   mins    OT Signature: Rossi Almonte MS, OTR/L, CDP  [place] License #: [id number]  DATE TREATMENT INITIATED: 11/11/2022    Initial Certification Certification Period: 11/11/2022 thru 2/8/2023  I certify that the therapy services are furnished while this patient is under my care. The services outlined above are required by this patient and will be reviewed every 90 days.     PHYSICIAN: Veronica Moreland APRN  NPI: [id number]                                      DATE:     Physician Signature: __________________________________  Veronica Moreland APRN    Please sign and return via fax to [phone]  Thank you,   [facility] Occupational Therapy

## 2022-11-09 NOTE — PROGRESS NOTES
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   Pain: 0/10  Clinical Progress: unchanged  Home Program Compliance: Yes  Treatment has included: therapeutic exercise and neuromuscular re-education    Objective   See Exercise, Manual, and Modality Logs for complete treatment.    PT Neuro          Assessment & Plan     Assessment    Assessment details: Patient demonstrates great balance during stable surface exercise. He required assistance on unstable surfaces and cues for exercise correctness. One step cues were needed initially but he tolerated additional tasks with mod VC's once exercise was initiated.      Plan  Plan details: Patient to continue with PT services to improve gait, balance, strength, transfers and overall functional mobility.          Timed:  Manual Therapy:            0     mins  99928;  Therapeutic Exercise:    10    mins  39483;     Neuromuscular Cynthia:    30    mins  84673;    Therapeutic Activity:      0     mins  52243;     Gait Trainin    mins  43252;     Electrical Stimulation:    0    mins  02969 ( );     Untimed:  Canalith Repositioning techniques _0_ 82394      Timed Treatment:   40   mins   Total Treatment:     40   mins      Jackie West, PT, DPT, MSCS, CDP, CSRS  [place] License #: [id number]  Physical Therapist

## 2022-11-09 NOTE — PROGRESS NOTES
Outpatient Speech Language Pathology   Adult Speech Language Cognitive Brief Evaluation and Treatment Note       Patient Name: Uche Polanco  : 1940  MRN: [id number]  Today's Date: 2022        Visit Date: 2022   Patient Active Problem List   Diagnosis   • Severe obstructive sleep apnea   • Gout   • AV block   • REM sleep behavior disorder   • Memory loss   • Postural dizziness with presyncope   • Chronic systolic congestive heart failure ([facility])   • Nonrheumatic aortic valve insufficiency   • Cognitive deficits   • History of TIA (transient ischemic attack)   • CHF (congestive heart failure) ([facility])   • Essential tremor   • 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 ([facility])   • Abnormality of gait due to impairment of balance   • Fine motor impairment   • Fall (on) (from) other stairs and steps, initial encounter        Past Medical History:   Diagnosis Date   • AV block    • Bell palsy    • CAD (coronary artery disease)    • Cognitive impairment, mild, so stated     Assessed By: Chiki Newton (Neurology); Last Assessed: 11 Sep 2014   • Dementia ([facility])    • Essential tremor 2022   • Fall 2020   • Family history of hypertension    • Gout    • Head injury Fall 2021   • Hyperlipidemia    • Hypertension    • Memory loss     Record of meds and therapy [facility]   • TOM (obstructive sleep apnea)    • REM  disorder    • Shingles Teenager   • Skin cancer 10/22/2019    left cheek   • Stroke ([facility]) 2014   • TIA (transient ischemic attack)    • Valvular heart disease         Past Surgical History:   Procedure Laterality Date   • HEMORRHOIDECTOMY     • HERNIA REPAIR     • MITRAL VALVE REPAIR/REPLACEMENT     • OTHER SURGICAL HISTORY  2014    Medtronic link loop recorder   • PACEMAKER IMPLANTATION     • SKIN SURGERY  Month ago    Cell removed from head    • SQUAMOUS CELL CARCINOMA EXCISION  10/2020    right ear   • TONSILLECTOMY           Visit Dx:     ICD-10-CM ICD-9-CM   1. Cognitive communication deficit  R41.841 799.52            OP SLP Assessment/Plan - 11/09/22 1200        SLP Assessment    Functional Problems Speech Language- Adult/Cognition  -RB    Impact on Function: Adult Speech Language/Cognition Difficulty communicating wants, needs, and ideas;Difficulty communicating in a medical emergency;Restrictions in personal and social life;Difficulty participating in avocational activities;Decreased recall of personal information and medical history;Trouble learning or remembering new information;Poor attention to task  -RB    Clinical Impression: Speech Language-Adult/Congnition Moderate-Severe:;Cognitive Communication Impairment  -RB    Functional Problems Comment Moderate-severe cog/comm disorder impacting ability to complete ADLs and hometasks, participate in avocational activities, and learn and remember new information. Difficulties frustrate the pt, decreasing overall QOL.  -RB    Clinical Impression Comments Receptive to tx and strategies  -RB    Please refer to paper survey for additional self-reported information Yes  -RB    Please refer to items scanned into chart for additional diagnostic informaiton and handouts as provided by clinician Yes  -RB    SLP Diagnosis Moderate-severe cog/comm disorder  -RB    Prognosis Good (comment)  -RB    Patient/caregiver participated in establishment of treatment plan and goals Yes  -RB    Patient would benefit from skilled therapy intervention Yes  -RB       SLP Plan    Frequency 1x/weekly  -RB    Duration 11 weeks  -RB    Planned CPT's? SLP INDIVIDUAL SPEECH THERAPY: 40572  -RB    Expected Duration of Therapy Session (SLP Eval) 45  -RB    Plan Comments Continue POC  -RB          User Key  (r) = Recorded By, (t) = Taken By, (c) = Cosigned By    Initials Name Provider Type    Charlotte Dillard SLP Speech and Language Pathologist                 SLP SLC Evaluation - 11/09/22 1100        Cognitive Assessment  "Intervention- SLP    Attention (Cognitive) moderate impairment;sustained;selective;alternating  -RB    Cognition, Comment Mod-Severe cog/comm impairment characterized by deficits in memory, attention, thought organization, reasoning, problem solving, executive functioning, and visuospatial abilities. Conclusion were drawn from standardized assessment, conversations with pt and spouse, and observations of structured and unstructured tasks.  -RB       RBANS- Repeatable Battery for the Assessment of Neuropsychological Status    Attention Index Score 79  -RB    Attention Qualitative Description extremely low  -RB    RBANS Comments Attention subtest was completed this session. Pt demonstrated increased difficulty with visual attention tasks. Delayed memory subtest was not completed due to difficulty with immediate memory and inability to complete delayed tasks across sessions.  -RB          User Key  (r) = Recorded By, (t) = Taken By, (c) = Cosigned By    Initials Name Provider Type    Charlotte Dillard SLP Speech and Language Pathologist              Pain- 0  Subjective: \"basketball is starting\"      ACTIVITY  ACCURACY ASSISTANCE NEEDED   LTGs:     Pt and family will implement compensatory strategies to maximize patient’s memory function so patient can continue to participate in daily activities 80% of the time with no cues      Pt will be able to remember information needed to participate in avocational activities 80% of the time with no cues           Targeted IM/compensatory strategies                    Targeted daily/home tasks       70%                      70%       Assistance                        Assistance   STG:  Pt’s memory skills will be enhanced as reported by patient by utilizing internal memory strategies to recall up to 3 pieces of information after a 5 minute delay      Pt’s memory skills will be enhanced as reported by patient using external memory aides 90% of the time with min assist          Not " targeted today                    Assessed medical management with medicine organizers      Modeled timers, alarms, and calendars                         50%                         Mod assistance   STG:     Pt will recall 4 pieces of  auditory and visual information immediately after being presented with it 80-90% of the time with no cues             Not targeted today     STG:  Pt will demonstrate improved ability to recall information by listening to information and answering  Questions and summarizing material 80-90% of the time with no cues  Assessed health literacy 70% Mod assistance   STG:   Pt will use word finding strategies in conversation with 90% no cues    Pt will sustain attention to functional tasks with 90% no cues. Not targeted today            Targeted sustained attention to functional tasks             75%- required redirection to task              Min cues     Certification: 10/25/22-1/24/23                 OP SLP Education     Row Name 11/09/22 1200       Education    Barriers to Learning No barriers identified  -RB    Education Provided Described results of evaluation;Patient expressed understanding of evaluation;Family/caregivers expressed understanding of evaluation;Family/caregivers participated in establishing goals and treatment plan;Patient participated in establishing goals and treatment plan;Patient demonstrated recommended strategies;Family/caregivers demonstrated recommended strategies  -RB    Assessed Learning needs;Learning motivation;Learning preferences;Learning readiness  -RB    Learning Motivation Strong  -RB    Learning Method Explanation;Demonstration;Teach back;Written materials  -RB    Teaching Response Verbalized understanding;Demonstrated understanding  -RB    Education Comments HEP: timers and alarms, calendar options  -RB          User Key  (r) = Recorded By, (t) = Taken By, (c) = Cosigned By    Initials Name Effective Dates    Charlotte Dillard SLP 06/16/21 -                    Jocelyn Benitez, SLP , provided treatment under my direct supervision  Charlotte Bruce MA CCC-SLP [place]  [place] license: [id number]                      11/9/2022

## 2022-11-10 RX ORDER — CLOPIDOGREL BISULFATE 75 MG/1
75 TABLET ORAL DAILY
Qty: 90 TABLET | Refills: 1 | Status: SHIPPED | OUTPATIENT
Start: 2022-11-10

## 2022-11-15 ENCOUNTER — TREATMENT (OUTPATIENT)
Dept: PHYSICAL THERAPY | Facility: CLINIC | Age: 82
End: 2022-11-15

## 2022-11-15 DIAGNOSIS — G30.9 MIXED ALZHEIMER'S AND VASCULAR DEMENTIA WITH BEHAVIOR DISTURBANCES: Primary | ICD-10-CM

## 2022-11-15 DIAGNOSIS — F01.518 MIXED ALZHEIMER'S AND VASCULAR DEMENTIA WITH BEHAVIOR DISTURBANCES: Primary | ICD-10-CM

## 2022-11-15 DIAGNOSIS — Z74.09 IMPAIRED FUNCTIONAL MOBILITY, BALANCE, GAIT, AND ENDURANCE: ICD-10-CM

## 2022-11-15 DIAGNOSIS — F02.818 MIXED ALZHEIMER'S AND VASCULAR DEMENTIA WITH BEHAVIOR DISTURBANCES: Primary | ICD-10-CM

## 2022-11-15 DIAGNOSIS — R41.841 COGNITIVE COMMUNICATION DEFICIT: Primary | ICD-10-CM

## 2022-11-15 PROCEDURE — 97110 THERAPEUTIC EXERCISES: CPT | Performed by: PHYSICAL THERAPIST

## 2022-11-15 PROCEDURE — 92507 TX SP LANG VOICE COMM INDIV: CPT | Performed by: SPEECH-LANGUAGE PATHOLOGIST

## 2022-11-15 PROCEDURE — 97112 NEUROMUSCULAR REEDUCATION: CPT | Performed by: PHYSICAL THERAPIST

## 2022-11-15 NOTE — PROGRESS NOTES
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  Pain: 0/10  Clinical Progress: unchanged  Home Program Compliance: No  Treatment has included: therapeutic exercise and neuromuscular re-education    Objective   See Exercise, Manual, and Modality Logs for complete treatment.    PT Neuro          Assessment & Plan     Assessment    Assessment details: Patient with more fatigue today. He remained balanced on unstable surfaces, however it did take more effort. Patient will continue to be progressed as indicated.     Plan  Plan details: Patient to continue with PT services to improve gait, balance, strength, transfers and overall functional mobility.          Timed:  Manual Therapy:            0     mins  39679;  Therapeutic Exercise:    8    mins  35375;     Neuromuscular Cynthia:    30    mins  04519;    Therapeutic Activity:      0     mins  62251;     Gait Trainin    mins  51377;     Electrical Stimulation:    0    mins  02422 ( );     Untimed:  Canalith Repositioning techniques _0_ 25236      Timed Treatment:   38   mins   Total Treatment:     38   mins      Jackie West, PT, DPT, MSCS, CDP, CSRS  [place] License #: [id number]  Physical Therapist

## 2022-11-15 NOTE — PROGRESS NOTES
Outpatient Speech Language Pathology [facility]  Adult Speech Language Cognitive  Treatment Note       Patient Name: Uhce Polanco  : 1940  MRN: [id number]  Today's Date: 11/15/2022        Visit Date: 11/15/2022   Patient Active Problem List   Diagnosis   • Severe obstructive sleep apnea   • Gout   • AV block   • REM sleep behavior disorder   • Memory loss   • Postural dizziness with presyncope   • Chronic systolic congestive heart failure ([facility])   • Nonrheumatic aortic valve insufficiency   • Cognitive deficits   • History of TIA (transient ischemic attack)   • CHF (congestive heart failure) ([facility])   • Essential tremor   • 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 ([facility])   • Abnormality of gait due to impairment of balance   • Fine motor impairment   • Fall (on) (from) other stairs and steps, initial encounter        Past Medical History:   Diagnosis Date   • AV block    • Bell palsy    • CAD (coronary artery disease)    • Cognitive impairment, mild, so stated     Assessed By: Chiki Newton (Neurology); Last Assessed: 11 Sep 2014   • Dementia ([facility])    • Essential tremor 2022   • Fall 2020   • Family history of hypertension    • Gout    • Head injury Fall 2021   • Hyperlipidemia    • Hypertension    • Memory loss     Record of meds and therapy [facility]   • TOM (obstructive sleep apnea)    • REM  disorder    • Shingles Teenager   • Skin cancer 10/22/2019    left cheek   • Stroke ([facility]) 2014   • TIA (transient ischemic attack)    • Valvular heart disease         Past Surgical History:   Procedure Laterality Date   • HEMORRHOIDECTOMY     • HERNIA REPAIR     • MITRAL VALVE REPAIR/REPLACEMENT     • OTHER SURGICAL HISTORY  2014    Medtronic link loop recorder   • PACEMAKER IMPLANTATION     • SKIN SURGERY  Month ago    Cell removed from head    • SQUAMOUS CELL CARCINOMA EXCISION  10/2020    right ear   • TONSILLECTOMY           Visit Dx:     "ICD-10-CM ICD-9-CM   1. Cognitive communication deficit  R41.841 799.52            OP SLP Assessment/Plan - 11/09/22 1200        SLP Assessment    Functional Problems Speech Language- Adult/Cognition  -RB    Impact on Function: Adult Speech Language/Cognition Difficulty communicating wants, needs, and ideas;Difficulty communicating in a medical emergency;Restrictions in personal and social life;Difficulty participating in avocational activities;Decreased recall of personal information and medical history;Trouble learning or remembering new information;Poor attention to task  -RB    Clinical Impression: Speech Language-Adult/Congnition Moderate-Severe:;Cognitive Communication Impairment  -RB    Functional Problems Comment Moderate-severe cog/comm disorder impacting ability to complete ADLs and hometasks, participate in avocational activities, and learn and remember new information. Difficulties frustrate the pt, decreasing overall QOL.  -RB    Clinical Impression Comments Receptive to tx and strategies  -RB    Please refer to paper survey for additional self-reported information Yes  -RB    Please refer to items scanned into chart for additional diagnostic informaiton and handouts as provided by clinician Yes  -RB    SLP Diagnosis Moderate-severe cog/comm disorder  -RB    Prognosis Good (comment)  -RB    Patient/caregiver participated in establishment of treatment plan and goals Yes  -RB    Patient would benefit from skilled therapy intervention Yes  -RB       SLP Plan    Frequency 1x/weekly  -RB    Duration 10 weeks  -RB    Planned CPT's? SLP INDIVIDUAL SPEECH THERAPY: 63315  -RB    Expected Duration of Therapy Session (SLP Eval) 45  -RB    Plan Comments Continue POC  -RB          User Key  (r) = Recorded By, (t) = Taken By, (c) = Cosigned By    Initials Name Provider Type    Charlotte Dillard SLP Speech and Language Pathologist                  Pain- 0  Subjective: \"watched [place]\"      ACTIVITY  ACCURACY " ASSISTANCE NEEDED   LTGs:     Pt and family will implement compensatory strategies to maximize patient’s memory function so patient can continue to participate in daily activities 80% of the time with no cues      Pt will be able to remember information needed to participate in avocational activities 80% of the time with no cues           Targeted IM/compensatory strategies                    Targeted daily/home tasks                                    Assistance                        Assistance   STG:  Pt’s memory skills will be enhanced as reported by patient by utilizing internal memory strategies to recall up to 3 pieces of information after a 5 minute delay      Pt’s memory skills will be enhanced as reported by patient using external memory aides 90% of the time with min assist        Targeted association, visualization, rehearsal                      Demonstrated memory journal                       Name recall: 4/4 5 min delay                         Min cues                       STG:     Pt will recall 4 pieces of  auditory and visual information immediately after being presented with it 80-90% of the time with no cues             Not targeted today     STG:  Pt will demonstrate improved ability to recall information by listening to information and answering  Questions and summarizing material 80-90% of the time with no cues    Targeted  visual tasks   Article recall: 50%   No cues   STG:   Pt will use word finding strategies in conversation with 90% no cues    Pt will sustain attention to functional tasks with 90% no cues. Modeled word finding strategies            Targeted sustained attention to functional tasks                 80%                  Min cues     Certification: 10/25/22-1/24/23                 OP SLP Education     Row Name 11/09/22 1200       Education    Barriers to Learning No barriers identified  -RB    Education Provided Described results of evaluation;Patient expressed understanding  of evaluation;Family/caregivers expressed understanding of evaluation;Family/caregivers participated in establishing goals and treatment plan;Patient participated in establishing goals and treatment plan;Patient demonstrated recommended strategies;Family/caregivers demonstrated recommended strategies  -RB    Assessed Learning needs;Learning motivation;Learning preferences;Learning readiness  -RB    Learning Motivation Strong  -RB    Learning Method Explanation;Demonstration;Teach back;Written materials  -RB    Teaching Response Verbalized understanding;Demonstrated understanding  -RB    Education Comments HEP: word finding strategies, association  -RB          User Key  (r) = Recorded By, (t) = Taken By, (c) = Cosigned By    Initials Name Effective Dates    Charlotte Dillard, SLP 06/16/21 -                   Jocelyn Benitez, JOSSE , provided treatment under my direct supervision  Charlotte Bruce MA CCC-SLP [facility] license: [id number]                      11/15/2022

## 2022-11-18 ENCOUNTER — TREATMENT (OUTPATIENT)
Dept: PHYSICAL THERAPY | Facility: CLINIC | Age: 82
End: 2022-11-18

## 2022-11-18 DIAGNOSIS — R27.8 DECREASED COORDINATION: Primary | ICD-10-CM

## 2022-11-18 DIAGNOSIS — Z74.09 IMPAIRED MOBILITY AND ADLS: ICD-10-CM

## 2022-11-18 DIAGNOSIS — Z78.9 IMPAIRED MOBILITY AND ADLS: ICD-10-CM

## 2022-11-18 PROCEDURE — 97110 THERAPEUTIC EXERCISES: CPT | Performed by: OCCUPATIONAL THERAPIST

## 2022-11-18 PROCEDURE — 97530 THERAPEUTIC ACTIVITIES: CPT | Performed by: OCCUPATIONAL THERAPIST

## 2022-11-18 PROCEDURE — 97112 NEUROMUSCULAR REEDUCATION: CPT | Performed by: OCCUPATIONAL THERAPIST

## 2022-11-18 NOTE — PROGRESS NOTES
"Occupational Therapy Daily Treatment Note  Visit: 2  Date of Initial Visit: Type: THERAPY  Noted: 2022      Patient: Uche Polanco   : 1940  Diagnosis/ICD-10 Code:  Decreased coordination [R27.8]  Referring practitioner: DELORIS Mcdonald*  Date of Initial Visit: Type: THERAPY  Noted: 2022  Today's Date: 2022  Patient seen for 2 sessions      Subjective:   Patient reports: \"Well I didn't have a great start to my day.\" Wife explains that pt went out to the bird feeder and tripped on break in concrete, falling into brushes. He only sustained minor scraping on R elbow which pt states is \"sore\" but not \"painful.\"  Pain: 0/10  Subjective Questionnaire: n/a  Clinical Progress: unchanged  Home Program Compliance: Yes  Treatment has included: therapeutic exercise, neuromuscular re-education and therapeutic activity    Subjective   Objective     OT Neuro     SEE FLOW SHEET AND ACTIVITY LOG FOR DETAILS     Assessment & Plan     Assessment    Assessment details: Pt with significant difficulty following commands for HW activities. When asked to write something about a specific topic, he was able to perform well, writing several sentences. He would forget spelling but able to maintain thought and letter recognition. However when breaking words or letters apart to make them neater for legibility, pt u/a to comprehend this, even when simplified. Required to write specific letters and have pt trace over the letters to improve smoothness and legibility.     Plan  Plan details: Continue w/ OT POC and goals to meet pt needs and promote increased independence, safety and engagement in daily tasks.            Visit Diagnoses:    ICD-10-CM ICD-9-CM   1. Decreased coordination  R27.8 781.3   2. Impaired mobility and ADLs  Z74.09 V49.89    Z78.9              Timed:  Manual Therapy:    0     mins  99567;  Therapeutic Exercise:    12     mins  73331;     Neuromuscular Cynthia:    12    mins  32278;  "   Therapeutic Activity:     20     mins  89746;     Self-Care/ADL     0     mins  76909;   Sensory Int. Tech      0     mins 09944;  Ultrasound:     0     mins  43746;    Electrical Stimulation:    0     mins  87074 ( );    Untimed:  Electrical Stimulation:    0     mins  47707 ( );    Timed Treatment:   44   mins   Total Treatment:     44   mins    OT Signature: Rossi Almonte MS, OTR/L, CDP  [place] License #: [id number]

## 2022-11-22 ENCOUNTER — TREATMENT (OUTPATIENT)
Dept: PHYSICAL THERAPY | Facility: CLINIC | Age: 82
End: 2022-11-22

## 2022-11-22 DIAGNOSIS — R41.841 COGNITIVE COMMUNICATION DEFICIT: Primary | ICD-10-CM

## 2022-11-22 PROCEDURE — 92507 TX SP LANG VOICE COMM INDIV: CPT | Performed by: SPEECH-LANGUAGE PATHOLOGIST

## 2022-11-22 NOTE — PROGRESS NOTES
Outpatient Speech Language Pathology [facility]  Adult Speech Language Cognitive  Treatment Note       Patient Name: Uche Polanco  : 1940  MRN: [id number]  Today's Date: 2022        Visit Date: 2022   Patient Active Problem List   Diagnosis   • Severe obstructive sleep apnea   • Gout   • AV block   • REM sleep behavior disorder   • Memory loss   • Postural dizziness with presyncope   • Chronic systolic congestive heart failure ([facility])   • Nonrheumatic aortic valve insufficiency   • Cognitive deficits   • History of TIA (transient ischemic attack)   • CHF (congestive heart failure) ([facility])   • Essential tremor   • 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 ([facility])   • Abnormality of gait due to impairment of balance   • Fine motor impairment   • Fall (on) (from) other stairs and steps, initial encounter        Past Medical History:   Diagnosis Date   • AV block    • Bell palsy    • CAD (coronary artery disease)    • Cognitive impairment, mild, so stated     Assessed By: Chiki Newton (Neurology); Last Assessed: 11 Sep 2014   • Dementia ([facility])    • Essential tremor 2022   • Fall 2020   • Family history of hypertension    • Gout    • Head injury Fall 2021   • Hyperlipidemia    • Hypertension    • Memory loss     Record of meds and therapy [facility]   • TOM (obstructive sleep apnea)    • REM  disorder    • Shingles Teenager   • Skin cancer 10/22/2019    left cheek   • Stroke ([facility]) 2014   • TIA (transient ischemic attack)    • Valvular heart disease         Past Surgical History:   Procedure Laterality Date   • HEMORRHOIDECTOMY     • HERNIA REPAIR     • MITRAL VALVE REPAIR/REPLACEMENT     • OTHER SURGICAL HISTORY  2014    Medtronic link loop recorder   • PACEMAKER IMPLANTATION     • SKIN SURGERY  Month ago    Cell removed from head    • SQUAMOUS CELL CARCINOMA EXCISION  10/2020    right ear   • TONSILLECTOMY           Visit Dx:     "ICD-10-CM ICD-9-CM   1. Cognitive communication deficit  R41.841 799.52            OP SLP Assessment/Plan -        SLP Assessment    Functional Problems Speech Language- Adult/Cognition  -RB    Impact on Function: Adult Speech Language/Cognition Difficulty communicating wants, needs, and ideas;Difficulty communicating in a medical emergency;Restrictions in personal and social life;Difficulty participating in avocational activities;Decreased recall of personal information and medical history;Trouble learning or remembering new information;Poor attention to task  -RB    Clinical Impression: Speech Language-Adult/Congnition Moderate-Severe:;Cognitive Communication Impairment  -RB    Functional Problems Comment Moderate-severe cog/comm disorder impacting ability to complete ADLs and hometasks, participate in avocational activities, and learn and remember new information. Difficulties frustrate the pt, decreasing overall QOL.  -RB    Clinical Impression Comments Receptive to tx and strategies, using strategies at home  -RB    Please refer to paper survey for additional self-reported information Yes  -RB    Please refer to items scanned into chart for additional diagnostic informaiton and handouts as provided by clinician Yes  -RB    SLP Diagnosis Moderate-severe cog/comm disorder  -RB    Prognosis Good (comment)  -RB    Patient/caregiver participated in establishment of treatment plan and goals Yes  -RB    Patient would benefit from skilled therapy intervention Yes  -RB       SLP Plan    Frequency 1x/weekly  -RB    Duration 9 weeks  -RB    Planned CPT's? SLP INDIVIDUAL SPEECH THERAPY: 05887  -RB    Expected Duration of Therapy Session (SLP Eval) 45  -RB    Plan Comments Continue POC  -RB          User Key  (r) = Recorded By, (t) = Taken By, (c) = Cosigned By    Initials Name Provider Type    Charlotte Dillard SLP Speech and Language Pathologist                  Pain- 0  Subjective: \"early wake up call this morning\"      " ACTIVITY  ACCURACY ASSISTANCE NEEDED   LTGs:     Pt and family will implement compensatory strategies to maximize patient’s memory function so patient can continue to participate in daily activities 80% of the time with no cues      Pt will be able to remember information needed to participate in avocational activities 80% of the time with no cues           Targeted IM/compensatory strategies                    Targeted daily/home tasks                                    Assistance                        Assistance   STG:  Pt’s memory skills will be enhanced as reported by patient by utilizing internal memory strategies to recall up to 3 pieces of information after a 5 minute delay      Pt’s memory skills will be enhanced as reported by patient using external memory aides 90% of the time with min assist        Modeled grouping and spaced retrieval                     Educated on use of a HUB for important items, pr brought in journal and demonstrated                                                                     STG:     Pt will recall 4 pieces of  auditory and visual information immediately after being presented with it 80-90% of the time with no cues         Modeled spaced retrieval and how to utilize for safety and home tasks     STG:  Pt will demonstrate improved ability to recall information by listening to information and answering  Questions and summarizing material 80-90% of the time with no cues    Targeted  visual tasks   Article recall: 50%   No cues   STG:   Pt will use word finding strategies in conversation with 90% no cues    Pt will sustain attention to functional tasks with 90% no cues. targeted word finding strategies            Targeted sustained attention to functional tasks                 80%                  Min cues     Certification: 10/25/22-1/24/23                 OP SLP Education     Row Name        Education    Barriers to Learning No barriers identified  -RB    Education Provided  Described results of evaluation;Patient expressed understanding of evaluation;Family/caregivers expressed understanding of evaluation;Family/caregivers participated in establishing goals and treatment plan;Patient participated in establishing goals and treatment plan;Patient demonstrated recommended strategies;Family/caregivers demonstrated recommended strategies  -RB    Assessed Learning needs;Learning motivation;Learning preferences;Learning readiness  -RB    Learning Motivation Strong  -RB    Learning Method Explanation;Demonstration;Teach back;Written materials  -RB    Teaching Response Verbalized understanding;Demonstrated understanding  -RB    Education Comments HEP: spaced retrieval, grouping  -RB          User Key  (r) = Recorded By, (t) = Taken By, (c) = Cosigned By    Initials Name Effective Dates    Charlotte Dillard SLP 06/16/21 -                   Charlotte Bruce MA CCC-SLP CBIS  [place] license: [id number]                      11/22/2022

## 2022-11-26 PROCEDURE — 93296 REM INTERROG EVL PM/IDS: CPT | Performed by: INTERNAL MEDICINE

## 2022-11-26 PROCEDURE — 93294 REM INTERROG EVL PM/LDLS PM: CPT | Performed by: INTERNAL MEDICINE

## 2022-12-13 ENCOUNTER — OFFICE VISIT (OUTPATIENT)
Dept: CARDIOLOGY | Facility: CLINIC | Age: 82
End: 2022-12-13

## 2022-12-13 VITALS
HEART RATE: 70 BPM | SYSTOLIC BLOOD PRESSURE: 138 MMHG | OXYGEN SATURATION: 95 % | BODY MASS INDEX: 25.05 KG/M2 | HEIGHT: 73 IN | WEIGHT: 189 LBS | DIASTOLIC BLOOD PRESSURE: 70 MMHG

## 2022-12-13 DIAGNOSIS — I35.1 NONRHEUMATIC AORTIC VALVE INSUFFICIENCY: ICD-10-CM

## 2022-12-13 DIAGNOSIS — I50.22 CHRONIC SYSTOLIC CONGESTIVE HEART FAILURE: Primary | ICD-10-CM

## 2022-12-13 DIAGNOSIS — I44.30 AV BLOCK: ICD-10-CM

## 2022-12-13 PROCEDURE — 99214 OFFICE O/P EST MOD 30 MIN: CPT | Performed by: INTERNAL MEDICINE

## 2022-12-13 NOTE — PROGRESS NOTES
[facility] at [facility]  Office visit  Uche Polanco  1940  [phone]  VISIT DATE:  12/13/2022      PCP: Jd Tapia MD  [address] [facility] [place] [address]  [facility] [place] [address]    CC:  Chief Complaint   Patient presents with   • AV block   • Chronic systolic congestive heart failure       PROBLEM LIST:  1. Valvular heart disease:  a. History of mitral valve repair at [facility] 2005, records pending.  b. Echocardiogram, August 2014: EF normal at 50% to 55%, moderate AR, mild MR, left atrium at 3.8 cm.   c. Echo, January 2017: EF 50%, moderate aortic insufficiency  2. Recent TIA:  a. Status post Medtronic link loop recorder implantation, November 2014.  b. Recent interrogation revealing AV block, pauses, symptomatic with dizziness and lightheadedness.  3. Obstructive sleep apnea requiring CPAP.   4. Benign hypertension.   5. Gout.   6. Coronary artery disease by report, data insufficient.   7. REM disorder.   8. Dyslipidemia.   9. Family history of hypertension.   10. AV block, symptomatic with lightheadedness, status post pacemaker implantation, 04/17/2015, Dr. Robert Gerber: Medtronic Advisa  MRIA2 DR01.   11. Surgical history:   a. Hemorrhoidectomy.   b. Basal cell carcinoma removed.   c. Valvular repair (mitral valve).      Cardiac studies and procedures:  February 2019 echo  · Left ventricular systolic function is moderately decreased.  · Estimated EF appears to be in the range of 36 - 40%.  · The following left ventricular wall segments are dyskinetic: apical lateral, apical inferior, apical septal and apex. The following left ventricular wall segments are akinetic: apical anterior.  · Left ventricular wall thickness is consistent with mild concentric hypertrophy.  · Mild-to-moderate mitral valve stenosis is present  · Mild to moderate aortic valve regurgitation is present.    March 2019 myocardial perfusion imaging  · Left ventricular ejection fraction is normal  (Calculated EF = 54%).  · Fixed defect consistent with moderate size apical infarct with extension into the inferior wall, associated moderate hypokinesis.  · No ischemia visualized  · Impressions are consistent with an intermediate risk study.    December 2019 echo  · Left ventricular systolic function is mildly decreased.  · Estimated EF appears to be in the range of 41 - 45%.  · The following left ventricular wall segments are akinetic: apical anterior, apical inferior, apical septal and apex. Diastolic wall thinning consistent with apical infarction.    June 2020 echo  · Estimated EF appears to be in the range of 56 - 60%  · The following left ventricular wall segments are hypokinetic: apical septal and apex hypokinetic.  · Left atrial cavity size is mild-to-moderately dilated.  · Saline test results are negative for right to left atrial level shunt.  · Moderate aortic valve regurgitation is present.    ASSESSMENT:   Diagnosis Plan   1. Chronic systolic congestive heart failure (HCC)        2. Nonrheumatic aortic valve insufficiency        3. AV block          Device interrogation:  Medtronic dual-chamber pacemaker  Normal remote interrogation    PLAN:  Cardiomyopathy:  I really feel fairly confident that his apical regional wall motion abnormalities are due to either previous small infarct or high-grade distal LAD disease.  Currently asymptomatic.  Improving EF on medical therapy.  Stage III chronic kidney disease.  Continue current medical therapy.  Did not tolerate titration of guideline directed medical therapy due to intermittent symptomatic hypotension.    Mitral valve prolapse status post mitral valve repair: Stable on exam today.  Continue routine clinical follow-up and intermittent echocardiographic surveillance    Aortic insufficiency: Moderate and stable.  Continue clinical follow-up and surveillance echocardiographic assessment.    History of TIA: No evidence of atrial arrhythmia.  Continue Plavix  "for stroke prophylaxis.  Continue statin.  Afterload controlled, goal less than 130/80 mmHg.    Hypertension: Goal less than 130/80 mmHg.  Continue current medical therapy.    Symptomatic high-grade AV block: Device dependent.  Currently asymptomatic, continue routine follow-up in device clinic.    Hyperlipidemia: Goal LDL less than 70, continue statin.      Subjective  No change in baseline functional capacity.  Ongoing therapy for developing cognitive impairment.  Still has intermittent gait instability.  Blood pressures running less than 130/80 mmHg.  Tolerating statin without myalgias.  Denies bleeding complications on Plavix.  Denies chest pain, palpitations or dyspnea on exertion.  compliant with medical therapy.      PHYSICAL EXAMINATION:  Vitals:    12/13/22 1331   BP: 138/70   BP Location: Right arm   Patient Position: Sitting   Cuff Size: Adult   Pulse: 70   SpO2: 95%   Weight: 85.7 kg (189 lb)   Height: 185.4 cm (73\")     General Appearance:    Alert, cooperative, no distress, appears stated age   Head:    Normocephalic, without obvious abnormality, atraumatic   Eyes:    conjunctiva/corneas clear   Nose:   Nares normal, septum midline, mucosa normal, no drainage   Throat:   Lips, teeth and gums normal   Neck:   Supple, symmetrical, trachea midline, no carotid    bruit or JVD   Lungs:     Clear to auscultation bilaterally, respirations unlabored   Chest Wall:    No tenderness or deformity    Heart:    Regular rate and rhythm, S1 and S2 normal, no murmur, rub   or gallop, normal carotid impulse bilaterally without bruit.   Abdomen:     Soft, non-tender   Extremities:   Extremities normal, atraumatic, no cyanosis or edema   Pulses:   2+ and symmetric all extremities   Skin:   Skin color, texture, turgor normal, no rashes or lesions       Diagnostic Data:  Procedures  Lab Results   Component Value Date    CHLPL 190 08/22/2014    TRIG 93 07/23/2020    HDL 34 (L) 07/23/2020     Lab Results   Component Value " Date    GLUCOSE 103 (H) 06/02/2022    BUN 20 06/02/2022    CREATININE 1.39 (H) 06/02/2022     06/02/2022    K 4.3 06/02/2022     06/02/2022    CO2 26.0 06/02/2022     Lab Results   Component Value Date    HGBA1C 5.30 07/22/2020     Lab Results   Component Value Date    WBC 7.43 06/02/2022    HGB 15.0 06/02/2022    HCT 44.8 06/02/2022     06/02/2022       Allergies  Allergies   Allergen Reactions   • Sulfa Antibiotics Rash       Current Medications    Current Outpatient Medications:   •  atorvastatin (LIPITOR) 20 MG tablet, TAKE 1 TABLET BY MOUTH DAILY, Disp: 30 tablet, Rfl: 5  •  clonazePAM (KlonoPIN) 1 MG tablet, Take 1 tablet by mouth Every Night., Disp: 90 tablet, Rfl: 0  •  clopidogrel (PLAVIX) 75 MG tablet, TAKE 1 TABLET BY MOUTH DAILY, Disp: 90 tablet, Rfl: 1  •  donepezil (ARICEPT) 10 MG tablet, Take 1 tablet by mouth Every Night., Disp: 90 tablet, Rfl: 3  •  escitalopram (LEXAPRO) 10 MG tablet, Take 1 tablet by mouth Daily., Disp: 90 tablet, Rfl: 3  •  memantine (NAMENDA) 5 MG tablet, Take 1 tablet by mouth 2 (Two) Times a Day., Disp: 180 tablet, Rfl: 3  •  Multiple Vitamins-Minerals (ICAPS AREDS 2 PO), Take 1 tablet by mouth 2 (Two) Times a Day., Disp: , Rfl:           ROS  Review of Systems   Cardiovascular: Negative for chest pain, dyspnea on exertion, irregular heartbeat and palpitations.   Respiratory: Negative for cough, shortness of breath and sleep disturbances due to breathing.    Neurological: Positive for light-headedness.         SOCIAL HX  Social History     Socioeconomic History   • Marital status:    Tobacco Use   • Smoking status: Never   • Smokeless tobacco: Never   Vaping Use   • Vaping Use: Never used   Substance and Sexual Activity   • Alcohol use: Not Currently     Alcohol/week: 1.0 standard drink     Types: 1 Glasses of wine per week     Comment: week with dinner   • Drug use: Never   • Sexual activity: Not Currently     Partners: Female     Birth  control/protection: Vasectomy     Comment: Vasectomy       FAMILY HX  Family History   Problem Relation Age of Onset   • Ovarian cancer Mother    • Lymphoma Father    • Cancer Father    • Hypertension Other              Jd Limon III, MD, FACC

## 2022-12-30 RX ORDER — ATORVASTATIN CALCIUM 20 MG/1
20 TABLET, FILM COATED ORAL DAILY
Qty: 30 TABLET | Refills: 5 | Status: SHIPPED | OUTPATIENT
Start: 2022-12-30

## 2023-01-04 ENCOUNTER — TELEPHONE (OUTPATIENT)
Dept: CARDIOLOGY | Facility: CLINIC | Age: 83
End: 2023-01-04
Payer: MEDICARE

## 2023-01-04 ENCOUNTER — DOCUMENTATION (OUTPATIENT)
Dept: CARDIOLOGY | Facility: CLINIC | Age: 83
End: 2023-01-04
Payer: MEDICARE

## 2023-01-04 NOTE — TELEPHONE ENCOUNTER
Spoke with patient's wife. Reminded her that Mr. Polanco's pacemaker reading is due today. She thanked me for the reminder and said they'll get the reading sent in.

## 2023-02-25 PROCEDURE — 93296 REM INTERROG EVL PM/IDS: CPT | Performed by: INTERNAL MEDICINE

## 2023-02-25 PROCEDURE — 93294 REM INTERROG EVL PM/LDLS PM: CPT | Performed by: INTERNAL MEDICINE

## 2023-02-28 ENCOUNTER — OFFICE VISIT (OUTPATIENT)
Dept: SLEEP MEDICINE | Facility: HOSPITAL | Age: 83
End: 2023-02-28
Payer: MEDICARE

## 2023-02-28 VITALS
HEIGHT: 73 IN | OXYGEN SATURATION: 96 % | HEART RATE: 66 BPM | BODY MASS INDEX: 25.18 KG/M2 | WEIGHT: 190 LBS | SYSTOLIC BLOOD PRESSURE: 135 MMHG | DIASTOLIC BLOOD PRESSURE: 62 MMHG

## 2023-02-28 DIAGNOSIS — G47.52 REM SLEEP BEHAVIOR DISORDER: ICD-10-CM

## 2023-02-28 DIAGNOSIS — G47.33 OSA (OBSTRUCTIVE SLEEP APNEA): Primary | ICD-10-CM

## 2023-02-28 PROCEDURE — 99213 OFFICE O/P EST LOW 20 MIN: CPT | Performed by: NURSE PRACTITIONER

## 2023-02-28 NOTE — PROGRESS NOTES
Chief Complaint:   Chief Complaint   Patient presents with   • Follow-up       HPI:    Uche Polanco is a 82 y.o. male here for follow-up of sleep apnea and REM sleep disorder.  Patient was last seen 11/1/2022.  Patient is still taking clonazepam 1 mg at bedtime and is not acting out during the night or talking in his sleep.  He does not need a refill today.  Patient continues to use CPAP therapy.  He sleeps 10 to 11 hours nightly and is rested upon awakening.  Patient goes to sleep quickly and only occasionally get up x1.  Patient has an [place] score of 8/24.  No other concerns or complaints today and will continue CPAP.        Current medications are:   Current Outpatient Medications:   •  atorvastatin (LIPITOR) 20 MG tablet, TAKE 1 TABLET BY MOUTH DAILY, Disp: 30 tablet, Rfl: 5  •  clonazePAM (KlonoPIN) 1 MG tablet, Take 1 tablet by mouth Every Night., Disp: 90 tablet, Rfl: 0  •  clopidogrel (PLAVIX) 75 MG tablet, TAKE 1 TABLET BY MOUTH DAILY, Disp: 90 tablet, Rfl: 1  •  donepezil (ARICEPT) 10 MG tablet, Take 1 tablet by mouth Every Night., Disp: 90 tablet, Rfl: 3  •  escitalopram (LEXAPRO) 10 MG tablet, Take 1 tablet by mouth Daily., Disp: 90 tablet, Rfl: 3  •  memantine (NAMENDA) 5 MG tablet, Take 1 tablet by mouth 2 (Two) Times a Day., Disp: 180 tablet, Rfl: 3  •  Multiple Vitamins-Minerals (ICAPS AREDS 2 PO), Take 1 tablet by mouth 2 (Two) Times a Day., Disp: , Rfl: .      The patient's relevant past medical, surgical, family and social history were reviewed and updated in Epic as appropriate.       Review of Systems   Eyes: Positive for visual disturbance.   Respiratory: Positive for apnea.    Cardiovascular: Positive for palpitations and leg swelling.   Neurological: Positive for tremors.        Memory loss   Psychiatric/Behavioral: Positive for dysphoric mood and sleep disturbance. The patient is nervous/anxious.    All other systems reviewed and are negative.        Objective:    Physical  Exam  Constitutional:       Appearance: Normal appearance.   HENT:      Head: Normocephalic and atraumatic.      Mouth/Throat:      Comments: Class III airway  Cardiovascular:      Rate and Rhythm: Regular rhythm.      Comments: Pacemaker  Skin:     General: Skin is warm and dry.   Neurological:      Mental Status: He is alert.   Psychiatric:         Mood and Affect: Mood normal.         Behavior: Behavior normal.         Thought Content: Thought content normal.         Judgment: Judgment normal.         CPAP Report  55/56 days of use  Greater than 4-hour use 66%  Settings 12-20  AHI of 6.2  95th percentile pressure 12.7    The patient continues to use and benefit from CPAP therapy.    ASSESSMENT/PLAN    Diagnoses and all orders for this visit:    1. TOM (obstructive sleep apnea) (Primary)    2. REM sleep behavior disorder        1. Counseled patient regarding multimodal approach with healthy nutrition, healthy sleep, regular physical activity, social activities, counseling, and medications. Encouraged to practice lateral sleep position. Avoid alcohol and sedatives close to bedtime.  2.   Continue CPAP therapy  Patient's family member will call when clonazepam as needed.  We will see him back in 4 months or sooner symptoms warrant.    I have reviewed the results of my evaluation and impression and discussed my recommendations in detail with the patient.      Signed by  JONATHAN Preciado    February 28, 2023      CC: Jd Tapia MD         No ref. provider found

## 2023-03-03 ENCOUNTER — TELEPHONE (OUTPATIENT)
Dept: NEUROLOGY | Facility: CLINIC | Age: 83
End: 2023-03-03

## 2023-03-03 DIAGNOSIS — G30.9 MIXED ALZHEIMER'S AND VASCULAR DEMENTIA WITH BEHAVIOR DISTURBANCES: Primary | ICD-10-CM

## 2023-03-03 DIAGNOSIS — F01.518 MIXED ALZHEIMER'S AND VASCULAR DEMENTIA WITH BEHAVIOR DISTURBANCES: Primary | ICD-10-CM

## 2023-03-03 DIAGNOSIS — F02.818 MIXED ALZHEIMER'S AND VASCULAR DEMENTIA WITH BEHAVIOR DISTURBANCES: Primary | ICD-10-CM

## 2023-03-03 NOTE — TELEPHONE ENCOUNTER
Mr. Polanco was diagnosed with mixed Alzheimer's and vascular dementia.This diagnosis has been confirmed with formal neuropsychological testing at  completed on 01/05/2022. I am not sure if she is wanting to have him get this testing again? Or if she wants a consult with the memory specialist at  [facility] to discuss possible studies? I do not believe they would require repeat testing. Just clarify her request please. I am happy to place referral, I just want to make sure we refer him to the correct clinic. Thanks, JONATHAN Douglas

## 2023-03-03 NOTE — TELEPHONE ENCOUNTER
Provider: ZENIA  Caller: WILLIS  Relationship to Patient: SPOUSE  Phone Number: [phone]  Reason for Call: PT SPOUSE CALLED IN AND REQUESTED A REFERRAL BE SENT OVER TO  FOR THE COGNITIVE TESTING CENTER. PT SPOUSE IS TRYING TO GET PT INTO THE [facility] . PLEASE CALL [phone] TO CREATE AN ORDER FOR THE REFERRAL. PT SPOUSE IS REQUESTING NEURO MEDICAL RECORDS TO BE SENT. PT SPOUSE STATED IF THE CLINICAL TEAM HAS ANY QUESTIONS TO PLEASE REACH OUT TO HER.    PLEASE REVIEW AND ADVISE       THANK YOU

## 2023-04-05 ENCOUNTER — TELEPHONE (OUTPATIENT)
Dept: CARDIOLOGY | Facility: CLINIC | Age: 83
End: 2023-04-05
Payer: MEDICARE

## 2023-04-05 NOTE — TELEPHONE ENCOUNTER
Called patient to remind him today is his scheduled day to send in his pacemaker reading with his home monitor. Spoke with patient's wife. She thanked me for the reminder and said she would get the reading sent in.

## 2023-04-13 ENCOUNTER — OFFICE VISIT (OUTPATIENT)
Dept: NEUROLOGY | Facility: CLINIC | Age: 83
End: 2023-04-13
Payer: MEDICARE

## 2023-04-13 VITALS
HEART RATE: 74 BPM | SYSTOLIC BLOOD PRESSURE: 122 MMHG | WEIGHT: 192 LBS | DIASTOLIC BLOOD PRESSURE: 60 MMHG | OXYGEN SATURATION: 96 % | BODY MASS INDEX: 25.45 KG/M2 | HEIGHT: 73 IN

## 2023-04-13 DIAGNOSIS — F01.518 MIXED ALZHEIMER'S AND VASCULAR DEMENTIA WITH BEHAVIOR DISTURBANCES: Primary | ICD-10-CM

## 2023-04-13 DIAGNOSIS — R26.89 ABNORMALITY OF GAIT DUE TO IMPAIRMENT OF BALANCE: ICD-10-CM

## 2023-04-13 DIAGNOSIS — F02.818 MIXED ALZHEIMER'S AND VASCULAR DEMENTIA WITH BEHAVIOR DISTURBANCES: Primary | ICD-10-CM

## 2023-04-13 DIAGNOSIS — Z86.73 HISTORY OF TIA (TRANSIENT ISCHEMIC ATTACK): ICD-10-CM

## 2023-04-13 DIAGNOSIS — G30.9 MIXED ALZHEIMER'S AND VASCULAR DEMENTIA WITH BEHAVIOR DISTURBANCES: Primary | ICD-10-CM

## 2023-04-13 PROCEDURE — 1160F RVW MEDS BY RX/DR IN RCRD: CPT | Performed by: NURSE PRACTITIONER

## 2023-04-13 PROCEDURE — 1159F MED LIST DOCD IN RCRD: CPT | Performed by: NURSE PRACTITIONER

## 2023-04-13 PROCEDURE — 99214 OFFICE O/P EST MOD 30 MIN: CPT | Performed by: NURSE PRACTITIONER

## 2023-04-13 RX ORDER — DONEPEZIL HYDROCHLORIDE 10 MG/1
10 TABLET, FILM COATED ORAL NIGHTLY
Qty: 90 TABLET | Refills: 3 | Status: SHIPPED | OUTPATIENT
Start: 2023-04-13

## 2023-04-13 RX ORDER — MEMANTINE HYDROCHLORIDE 21 MG/1
1 CAPSULE, EXTENDED RELEASE ORAL DAILY
Qty: 30 CAPSULE | Refills: 1 | Status: SHIPPED | OUTPATIENT
Start: 2023-04-13

## 2023-04-13 RX ORDER — MEMANTINE HYDROCHLORIDE 28 MG/1
28 CAPSULE, EXTENDED RELEASE ORAL DAILY
Qty: 90 CAPSULE | Refills: 3 | Status: SHIPPED | OUTPATIENT
Start: 2023-04-13

## 2023-04-13 RX ORDER — ESCITALOPRAM OXALATE 10 MG/1
10 TABLET ORAL DAILY
Qty: 90 TABLET | Refills: 3 | Status: SHIPPED | OUTPATIENT
Start: 2023-04-13 | End: 2024-04-12

## 2023-04-13 RX ORDER — MEMANTINE HYDROCHLORIDE 14 MG/1
14 CAPSULE, EXTENDED RELEASE ORAL DAILY
Qty: 30 CAPSULE | Refills: 1 | Status: SHIPPED | OUTPATIENT
Start: 2023-04-13

## 2023-04-13 NOTE — PROGRESS NOTES
Subjective:     Patient ID: Uche Polanco is a 82 y.o. male.    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 He has a history of TIA, and has had falls in the past, impairment in mobility, and fine motor. Noted with neuropsychology at  to have advanced cognitive changes. His wife recently called and requested a referral to  [facility], and this was placed. He is currently scheduled with Dr. Chiki Monaco on 10/17/2023. He is currently taking donepezil 10 mg daily. He is currently on memantine 5 mg twice per day (previously had dizziness worsening with higher dosing) and escitalopram 10 mg daily.     He is accompanied by his wife, Hiral, during today's visit. She has noted that he has difficulty with memory, thinking, repeating questions and stories, decision making and judgment, expressing himself, disorientation, and getting lost at times. He is completely dependent on her for medication management and managing finances. He does not drive. She manages all of his care at this time.     Today, he reports he is not doing well. He denies any changes in his health. He denies any hospitalizations or surgeries.     His wife confirms she has been contacted regarding his appointment with  in 10/2023. She states they saw a commercial on television of the studies involving Alzheimer's disease, but she is unsure if he is going to be able to participate because of his condition. She reports she was interviewed on the phone and was given an appointment, although she was told he likely does not fit criteria. His wife has noted continued decline in cognition.    He notes he has a problem with dizziness intermittently and this has been long term. His wife states he was dizzy  "this morning and notes at times, he is \"really off.\" He is not taking any blood pressure medication. 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 His wife states he does not drink much water, but he believes he drinks \"a moderate amount\" of water. He also drinks a lot of tea. His wife has also purchased Pedialyte for him. He follows with Dr. Jd Limon, cardiology. His wife reports they recently sent in a pacemaker reading, which they do regularly. His wife states he will be seeing Dr. Limon soon. Dr. Limon prescribes his Plavix and Lipitor. He reports he has a hard time explaining when his dizziness occurs. He has been diagnosed with postural dizziness.    His wife states he attended physical therapy for a few weeks since last visit. He was completing occupational, speech, and physical therapy together, but he was getting frustrated with it, so they decided not to do it. He denies any additional falls. His wife states he had a \"small fall\" without an injury. He does not ambulate with a cane or walker.    He notes he has difficulty thinking of words. He and his wife state it is hard to tell if the escitalopram has helped his mood. He denies feeling upset, sad, down, extra worried, or nervous. His wife states his attitude is \"great.\" She notes he has always been \"easy going\" and had a positive attitude.     He confirms he is sleeping well. His wife confirms his appetite is good. His weight is stable. She adds they are trying to eat healthily.       Previous neuro history and workup:  Mixed Alzheimer's and vascular dementia with symptoms present for several years now. This diagnosis has been confirmed with formal neuropsychological testing at  completed on 01/05/2022.     He dressed and takes showers, but he struggles with fine motor skills, such as buttoning his shirt. She notes that he is able to do it, but it takes him awhile and he becomes frustrated. His wife states that he has not " cooked himself a meal in a while. He states that his appetite is well. He denies any trouble with swallowing. He states that he is making it to the bathroom in time. He states that he sleeps well and is still taking clonazepam. He has to be reminded multiple times to complete simple tasks such as taking his medications, even if they are sitting directly in front of him. He does not drive.    He has a history of dizziness in the past, which has been well managed most recently with ENT.     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 This has been treated by Dr. Hansen with Klonopin.     Neuroimaging in the past has been notable only for an old left cerebellar infarct. Screening bloodwork has been normal in the past as well. He is currently taking donepezil. He has also worked with Anjali Serrano (cognitive rehabilitation) in the past, which was quite beneficial.      Underwent formal Neuro Psych testing at  on 1/5/2022 with Zeke Prater Neuropsychologist for further evaluation of memory loss. Summary and findings reviewed today in clinic with patient and his wife. 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 MOCA then was a 12/30- with moderate to severe impairments in recall verbal and visual memory as well as executive function, psychomotor speech, processing speed and semantics. No falls since January/February 2021-suffered severe fall with [facility] at that time. He completed 3 or so visits with speech therapist in 2021 but he did become frustrated so he quit per his wife. Memory issues have been present many years.     CT of the head completed on 06/02/2022 during ER visit for confusion. This showed no acute intracranial abnormalities with some scattered subcortical and periventricular white matter changes, which are chronic. This is  consistent with the chronic small vessel ischemic changes.       The patient reports the COVID-19 pandemic has affected his mood due to not being able to congregate with people as often as he would like. His wife reports Lexapro is the first medication the patient has tried for his mood.    The eye doctor prescribed the patient AREDS and was advised to discontinue taking vitamin D.      The patient has previously been seen by Dr. Paulino in gastroenterology for diarrhea with reported normal Colonoscopy.     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     He follows with Cardiology.    The following portions of the patient's history were reviewed and updated as appropriate: allergies, current medications, past family history, past medical history, past social history, past surgical history and problem list.    Past Medical History:   Diagnosis Date   • AV block    • Bell palsy    • CAD (coronary artery disease)    • Cognitive impairment, mild, so stated     Assessed By: Chiki Newton (Neurology); Last Assessed: 11 Sep 2014   • Dementia 2015   • Essential tremor 04/06/2022   • Fall 03/2020   • Family history of hypertension    • Gout    • Head injury Fall 04/2021   • Hyperlipidemia    • Hypertension    • Memory loss 2015    Record of meds and therapy [facility]   • Mitral valve prolapse    • TOM (obstructive sleep apnea)    • REM  disorder    • Shingles Teenager   • Skin cancer 10/22/2019    left cheek   • Stroke 11/2014   • TIA (transient ischemic attack)    • Valvular heart disease        Past Surgical History:   Procedure Laterality Date   • HEMORRHOIDECTOMY     • HERNIA REPAIR     • INSERT / REPLACE / REMOVE PACEMAKER     • MITRAL VALVE REPAIR/REPLACEMENT     • OTHER SURGICAL HISTORY  11/2014    Medtronic link loop recorder   • PACEMAKER IMPLANTATION     • SKIN SURGERY  Month ago    Cell removed from head    • SQUAMOUS CELL CARCINOMA EXCISION  10/2020     right ear   • TONSILLECTOMY         Social History     Socioeconomic History   • Marital status:    Tobacco Use   • Smoking status: Never   • Smokeless tobacco: Never   Vaping Use   • Vaping Use: Never used   Substance and Sexual Activity   • Alcohol use: Not Currently     Alcohol/week: 1.0 standard drink     Types: 1 Glasses of wine per week     Comment: week with dinner   • Drug use: Never   • Sexual activity: Not Currently     Partners: Female     Birth control/protection: Vasectomy     Comment: Vasectomy       Family History   Problem Relation Age of Onset   • Ovarian cancer Mother    • Lymphoma Father    • Cancer Father    • Hypertension Other           Current Outpatient Medications:   •  atorvastatin (LIPITOR) 20 MG tablet, TAKE 1 TABLET BY MOUTH DAILY, Disp: 30 tablet, Rfl: 5  •  clonazePAM (KlonoPIN) 1 MG tablet, Take 1 tablet by mouth Every Night., Disp: 90 tablet, Rfl: 0  •  clopidogrel (PLAVIX) 75 MG tablet, TAKE 1 TABLET BY MOUTH DAILY, Disp: 90 tablet, Rfl: 1  •  donepezil (ARICEPT) 10 MG tablet, Take 1 tablet by mouth Every Night., Disp: 90 tablet, Rfl: 3  •  escitalopram (LEXAPRO) 10 MG tablet, Take 1 tablet by mouth Daily., Disp: 90 tablet, Rfl: 3  •  Multiple Vitamins-Minerals (ICAPS AREDS 2 PO), Take 1 tablet by mouth 2 (Two) Times a Day., Disp: , Rfl:   •  memantine (NAMENDA XR) 14 MG capsule sustained-release 24 hr extended release capsule, Take 1 capsule by mouth Daily., Disp: 30 capsule, Rfl: 1  •  memantine (NAMENDA XR) 28 MG capsule sustained-release 24 hr extended release capsule, Take 1 capsule by mouth Daily., Disp: 90 capsule, Rfl: 3  •  Memantine HCl ER 21 MG capsule sustained-release 24 hr, Take 1 capsule by mouth Daily., Disp: 30 capsule, Rfl: 1     Review of Systems   Constitutional: Negative for chills, fatigue, fever and unexpected weight change.   HENT: Negative for ear pain, hearing loss, nosebleeds, rhinorrhea and sore throat.    Eyes: Negative for photophobia, pain,  "discharge, itching and visual disturbance.   Respiratory: Negative for cough, chest tightness, shortness of breath and wheezing.    Cardiovascular: Negative for chest pain, palpitations and leg swelling.   Gastrointestinal: Negative for abdominal pain, blood in stool, constipation, diarrhea, nausea and vomiting.   Genitourinary: Negative for dysuria, frequency, hematuria and urgency.   Musculoskeletal: Negative for arthralgias, back pain, gait problem, joint swelling, myalgias, neck pain and neck stiffness.   Skin: Negative for rash and wound.   Allergic/Immunologic: Negative for environmental allergies and food allergies.   Neurological: Positive for dizziness. Negative for tremors, seizures, syncope, speech difficulty, weakness, light-headedness, numbness and headaches.   Hematological: Negative for adenopathy. Does not bruise/bleed easily.   Psychiatric/Behavioral: Positive for confusion, decreased concentration and sleep disturbance. Negative for agitation, hallucinations and suicidal ideas. The patient is not nervous/anxious.    All other systems reviewed and are negative.       Objective:  /60   Pulse 74   Ht 185.4 cm (73\")   Wt 87.1 kg (192 lb)   SpO2 96%   BMI 25.33 kg/m²     Neurologic Exam     Mental Status   Oriented to person.   Disoriented to place.   Oriented to time.   Attention: decreased. Concentration: decreased.   Speech: speech is normal   Level of consciousness: alert  Knowledge: poor.   Abnormal comprehension.     Cranial Nerves     CN II   Visual fields full to confrontation.     CN III, IV, VI   Extraocular motions are normal.     CN V   Facial sensation intact.     CN VII   Right facial weakness: chronic facial asymmetry unchanged.    CN VIII   CN VIII normal.     CN IX, X   CN IX normal.   CN X normal.     CN XI   CN XI normal.     CN XII   CN XII normal.     Motor Exam   Muscle bulk: normal  Overall muscle tone: normal    Strength   Strength 5/5 throughout.     Gait, " Coordination, and Reflexes     Gait  Gait: normal    Coordination   Finger to nose coordination: normal    Tremor   Resting tremor: absent  Intention tremor: present (minimal bue fine kinetic hand tremor)  Action tremor: absent    Reflexes   Right : 2+  Left : 2+      Physical Exam  Constitutional:       Appearance: Normal appearance.   Eyes:      Extraocular Movements: EOM normal.   Neurological:      Mental Status: He is alert.      Motor: Motor strength is normal.      Coordination: Finger-Nose-Finger Test normal.      Gait: Gait is intact.   Psychiatric:         Mood and Affect: Mood is depressed.         Speech: Speech normal.         Behavior: Behavior is slowed.         Cognition and Memory: Cognition is impaired. Memory is impaired.         Judgment: Judgment is inappropriate.     His Ashvin cognitive assessment score last visit was a 16 out of 30.  With  neuropsychological testing 01/05/2022, it was a 12.   Today, 04/13/2023, it is a 10 out of 30, 0 out of 5 for word recall.     Assessment/Plan:       Diagnoses and all orders for this visit:    1. 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 (Primary)  -     Memantine HCl ER 21 MG capsule sustained-release 24 hr; Take 1 capsule by mouth Daily.  Dispense: 30 capsule; Refill: 1  -     memantine (NAMENDA XR) 14 MG capsule sustained-release 24 hr extended release capsule; Take 1 capsule by mouth Daily.  Dispense: 30 capsule; Refill: 1  -     memantine (NAMENDA XR) 28 MG capsule sustained-release 24 hr extended release capsule; Take 1 capsule by mouth Daily.  Dispense: 90 capsule; Refill: 3  -     escitalopram (LEXAPRO) 10 MG tablet; Take 1 tablet by mouth Daily.  Dispense: 90 tablet; Refill: 3  -     donepezil (ARICEPT) 10 MG tablet; Take 1 tablet by mouth Every Night.  Dispense: 90 tablet; Refill: 3    2. Abnormality of gait due to impairment of balance  Comments:  improved, completed a few visits with PT, stable, no recent  falls, no assistive device    3. History of TIA (transient ischemic attack)  Comments:  continue plavix and statin         His wife states that they actually spoke with the [facility] after Mr. Polanco saw a commercial about studies for other treatment options for dementia and so they have done some screening by phone, per his wife, and she states that they have actually told her that he likely does not fit criteria; however, they did go ahead and schedule him with Dr. Monaco for a consultation. They will be seeing him on 10/17/2023 and then we will schedule him for a follow-up in 12/2023.     In the meantime, we are going to attempt to increase and maximize his cognitive enhancers. He is currently on the donepezil 10 mg. We will continue the escitalopram 10 mg for mood, and we are going to try to slowly titrate his memantine IR to XR. I wrote down the directions for his wife. Namenda XR 14mg daily x 8 weeks then Namenda XR 21mg daily x 8 weeks then Namenda XR 28mg daily and continue. D/C memantine IR. We are going to do a very slow titration to try and avoid any dizziness, which he had previously with the immediate release 10 mg twice per day. His wife is going to call us if there are any issues.     Based on cognitive testing today, he does have a severe cognitive impairment at this point. They will continue current medications for stroke prevention. Continue with cardiology. Continue with sleep medicine for his REM sleep behavior disorder. Continue to increase fluid intake and water intake. Monitor for falls. Recently just completed physical, occupational, and speech therapy with [facility]. He became quite frustrated with the testing and so they ended up discontinuing this back in 10/2022. Fortunately, he has not had any additional falls and does not require an assistive device. He is sleeping well. His appetite has been good, and his mood has been pleasant. He and his wife are going to be  celebrating their 59th wedding anniversary on 12/12/2023 and he did remember this today. It has been a pleasure seeing them and we will see him for a follow-up as scheduled. We will be completing an MMSE at that time since he has had significant decline in cognition. They both verbalize understanding, agree with plan moving forward today.    Reviewed medications, potential side effects and signs and symptoms to report. Discussed risk versus benefits of treatment plan with patient and/or family-including medications, labs and radiology that may be ordered. Addressed questions and concerns during visit. Patient and/or family verbalized understanding and agree with plan.    During this visit the following were done:  Labs Reviewed []    Labs Ordered []    Radiology Reports Reviewed []    Radiology Ordered []    PCP Records Reviewed []    Referring Provider Records Reviewed []    ER Records Reviewed []    Hospital Records Reviewed []    History Obtained From Family [x]  wife  Radiology Images Reviewed []    Other Reviewed []    Records Requested []      Transcribed from ambient dictation for JONATHAN Mcdonald by Yadira Garcia.  04/13/23   17:51 EDT    Patient or patient representative verbalized consent to the visit recording.  I have personally performed the services described in this document as transcribed by the above individual, and it is both accurate and complete.  JONATHAN Mcdonald  4/14/2023  07:24 EDT

## 2023-04-13 NOTE — LETTER
April 14, 2023     Jd Tapia MD  [address] [facility] [address]  [facility]ly [facility] [address]    Patient: Uche Polanco   YOB: 1940   Date of Visit: 4/13/2023       Dear Jd Tapia MD    Uche Polanco was in my office today. Below is a copy of my note.    If you have questions, please do not hesitate to call me. I look forward to following Uche along with you.         Sincerely,        JONATHAN Mcdonald        CC: MD Letty Schultz III, APRN Gregory Alexander Jicha, MD    Subjective:     Patient ID: Uche Polanco is a 82 y.o. male.    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 He has a history of TIA, and has had falls in the past, impairment in mobility, and fine motor. Noted with neuropsychology at  to have advanced cognitive changes. His wife recently called and requested a referral to  [facility], and this was placed. He is currently scheduled with Dr. Chiki Monaco on 10/17/2023. He is currently taking donepezil 10 mg daily. He is currently on memantine 5 mg twice per day (previously had dizziness worsening with higher dosing) and escitalopram 10 mg daily.     He is accompanied by his wife, Hiral, during today's visit. She has noted that he has difficulty with memory, thinking, repeating questions and stories, decision making and judgment, expressing himself, disorientation, and getting lost at times. He is completely dependent on her for medication management and managing finances. He does not drive. She manages all of his care at this time.     Today, he reports he is not doing well. He denies any changes in his health. He denies any hospitalizations or surgeries.     His wife confirms she has been contacted  "regarding his appointment with  in 10/2023. She states they saw a commercial on television of the studies involving Alzheimer's disease, but she is unsure if he is going to be able to participate because of his condition. She reports she was interviewed on the phone and was given an appointment, although she was told he likely does not fit criteria. His wife has noted continued decline in cognition.    He notes he has a problem with dizziness intermittently and this has been long term. His wife states he was dizzy this morning and notes at times, he is \"really off.\" He is not taking any blood pressure medication. 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 His wife states he does not drink much water, but he believes he drinks \"a moderate amount\" of water. He also drinks a lot of tea. His wife has also purchased Pedialyte for him. He follows with Dr. Jd Limon, cardiology. His wife reports they recently sent in a pacemaker reading, which they do regularly. His wife states he will be seeing Dr. Limon soon. Dr. Limon prescribes his Plavix and Lipitor. He reports he has a hard time explaining when his dizziness occurs. He has been diagnosed with postural dizziness.    His wife states he attended physical therapy for a few weeks since last visit. He was completing occupational, speech, and physical therapy together, but he was getting frustrated with it, so they decided not to do it. He denies any additional falls. His wife states he had a \"small fall\" without an injury. He does not ambulate with a cane or walker.    He notes he has difficulty thinking of words. He and his wife state it is hard to tell if the escitalopram has helped his mood. He denies feeling upset, sad, down, extra worried, or nervous. His wife states his attitude is \"great.\" She notes he has always been \"easy going\" and had a positive attitude.     He confirms he is sleeping well. His wife confirms his appetite is good. " His weight is stable. She adds they are trying to eat healthily.       Previous neuro history and workup:  Mixed Alzheimer's and vascular dementia with symptoms present for several years now. This diagnosis has been confirmed with formal neuropsychological testing at  completed on 01/05/2022.     He dressed and takes showers, but he struggles with fine motor skills, such as buttoning his shirt. She notes that he is able to do it, but it takes him awhile and he becomes frustrated. His wife states that he has not cooked himself a meal in a while. He states that his appetite is well. He denies any trouble with swallowing. He states that he is making it to the bathroom in time. He states that he sleeps well and is still taking clonazepam. He has to be reminded multiple times to complete simple tasks such as taking his medications, even if they are sitting directly in front of him. He does not drive.    He has a history of dizziness in the past, which has been well managed most recently with ENT.     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 This has been treated by Dr. Hansen with Klonopin.     Neuroimaging in the past has been notable only for an old left cerebellar infarct. Screening bloodwork has been normal in the past as well. He is currently taking donepezil. He has also worked with Anjali Serrano (cognitive rehabilitation) in the past, which was quite beneficial.      Underwent formal Neuro Psych testing at  on 1/5/2022 with Zeke Prater Neuropsychologist for further evaluation of memory loss. Summary and findings reviewed today in clinic with patient and his wife. 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 MOCA then was a 12/30- with moderate to severe impairments in recall verbal and visual memory as well as executive  function, psychomotor speech, processing speed and semantics. No falls since January/February 2021-suffered severe fall with [facility] at that time. He completed 3 or so visits with speech therapist in 2021 but he did become frustrated so he quit per his wife. Memory issues have been present many years.     CT of the head completed on 06/02/2022 during ER visit for confusion. This showed no acute intracranial abnormalities with some scattered subcortical and periventricular white matter changes, which are chronic. This is consistent with the chronic small vessel ischemic changes.       The patient reports the COVID-19 pandemic has affected his mood due to not being able to congregate with people as often as he would like. His wife reports Lexapro is the first medication the patient has tried for his mood.    The eye doctor prescribed the patient AREDS and was advised to discontinue taking vitamin D.      The patient has previously been seen by Dr. Paulino in gastroenterology for diarrhea with reported normal Colonoscopy.     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     He follows with Cardiology.    The following portions of the patient's history were reviewed and updated as appropriate: allergies, current medications, past family history, past medical history, past social history, past surgical history and problem list.    Past Medical History:   Diagnosis Date   • AV block    • Bell palsy    • CAD (coronary artery disease)    • Cognitive impairment, mild, so stated     Assessed By: Chiki Newton (Neurology); Last Assessed: 11 Sep 2014   • Dementia 2015   • Essential tremor 04/06/2022   • Fall 03/2020   • Family history of hypertension    • Gout    • Head injury Fall 04/2021   • Hyperlipidemia    • Hypertension    • Memory loss 2015    Record of meds and therapy [facility]   • Mitral valve prolapse    • TOM (obstructive sleep apnea)    • REM  disorder    •  Shingles Teenager   • Skin cancer 10/22/2019    left cheek   • Stroke 11/2014   • TIA (transient ischemic attack)    • Valvular heart disease        Past Surgical History:   Procedure Laterality Date   • HEMORRHOIDECTOMY     • HERNIA REPAIR     • INSERT / REPLACE / REMOVE PACEMAKER     • MITRAL VALVE REPAIR/REPLACEMENT     • OTHER SURGICAL HISTORY  11/2014    Medtronic link loop recorder   • PACEMAKER IMPLANTATION     • SKIN SURGERY  Month ago    Cell removed from head    • SQUAMOUS CELL CARCINOMA EXCISION  10/2020    right ear   • TONSILLECTOMY         Social History     Socioeconomic History   • Marital status:    Tobacco Use   • Smoking status: Never   • Smokeless tobacco: Never   Vaping Use   • Vaping Use: Never used   Substance and Sexual Activity   • Alcohol use: Not Currently     Alcohol/week: 1.0 standard drink     Types: 1 Glasses of wine per week     Comment: week with dinner   • Drug use: Never   • Sexual activity: Not Currently     Partners: Female     Birth control/protection: Vasectomy     Comment: Vasectomy       Family History   Problem Relation Age of Onset   • Ovarian cancer Mother    • Lymphoma Father    • Cancer Father    • Hypertension Other           Current Outpatient Medications:   •  atorvastatin (LIPITOR) 20 MG tablet, TAKE 1 TABLET BY MOUTH DAILY, Disp: 30 tablet, Rfl: 5  •  clonazePAM (KlonoPIN) 1 MG tablet, Take 1 tablet by mouth Every Night., Disp: 90 tablet, Rfl: 0  •  clopidogrel (PLAVIX) 75 MG tablet, TAKE 1 TABLET BY MOUTH DAILY, Disp: 90 tablet, Rfl: 1  •  donepezil (ARICEPT) 10 MG tablet, Take 1 tablet by mouth Every Night., Disp: 90 tablet, Rfl: 3  •  escitalopram (LEXAPRO) 10 MG tablet, Take 1 tablet by mouth Daily., Disp: 90 tablet, Rfl: 3  •  Multiple Vitamins-Minerals (ICAPS AREDS 2 PO), Take 1 tablet by mouth 2 (Two) Times a Day., Disp: , Rfl:   •  memantine (NAMENDA XR) 14 MG capsule sustained-release 24 hr extended release capsule, Take 1 capsule by mouth Daily.,  "Disp: 30 capsule, Rfl: 1  •  memantine (NAMENDA XR) 28 MG capsule sustained-release 24 hr extended release capsule, Take 1 capsule by mouth Daily., Disp: 90 capsule, Rfl: 3  •  Memantine HCl ER 21 MG capsule sustained-release 24 hr, Take 1 capsule by mouth Daily., Disp: 30 capsule, Rfl: 1     Review of Systems   Constitutional: Negative for chills, fatigue, fever and unexpected weight change.   HENT: Negative for ear pain, hearing loss, nosebleeds, rhinorrhea and sore throat.    Eyes: Negative for photophobia, pain, discharge, itching and visual disturbance.   Respiratory: Negative for cough, chest tightness, shortness of breath and wheezing.    Cardiovascular: Negative for chest pain, palpitations and leg swelling.   Gastrointestinal: Negative for abdominal pain, blood in stool, constipation, diarrhea, nausea and vomiting.   Genitourinary: Negative for dysuria, frequency, hematuria and urgency.   Musculoskeletal: Negative for arthralgias, back pain, gait problem, joint swelling, myalgias, neck pain and neck stiffness.   Skin: Negative for rash and wound.   Allergic/Immunologic: Negative for environmental allergies and food allergies.   Neurological: Positive for dizziness. Negative for tremors, seizures, syncope, speech difficulty, weakness, light-headedness, numbness and headaches.   Hematological: Negative for adenopathy. Does not bruise/bleed easily.   Psychiatric/Behavioral: Positive for confusion, decreased concentration and sleep disturbance. Negative for agitation, hallucinations and suicidal ideas. The patient is not nervous/anxious.    All other systems reviewed and are negative.       Objective:  /60   Pulse 74   Ht 185.4 cm (73\")   Wt 87.1 kg (192 lb)   SpO2 96%   BMI 25.33 kg/m²     Neurologic Exam     Mental Status   Oriented to person.   Disoriented to place.   Oriented to time.   Attention: decreased. Concentration: decreased.   Speech: speech is normal   Level of consciousness: " alert  Knowledge: poor.   Abnormal comprehension.     Cranial Nerves     CN II   Visual fields full to confrontation.     CN III, IV, VI   Extraocular motions are normal.     CN V   Facial sensation intact.     CN VII   Right facial weakness: chronic facial asymmetry unchanged.    CN VIII   CN VIII normal.     CN IX, X   CN IX normal.   CN X normal.     CN XI   CN XI normal.     CN XII   CN XII normal.     Motor Exam   Muscle bulk: normal  Overall muscle tone: normal    Strength   Strength 5/5 throughout.     Gait, Coordination, and Reflexes     Gait  Gait: normal    Coordination   Finger to nose coordination: normal    Tremor   Resting tremor: absent  Intention tremor: present (minimal bue fine kinetic hand tremor)  Action tremor: absent    Reflexes   Right : 2+  Left : 2+      Physical Exam  Constitutional:       Appearance: Normal appearance.   Eyes:      Extraocular Movements: EOM normal.   Neurological:      Mental Status: He is alert.      Motor: Motor strength is normal.      Coordination: Finger-Nose-Finger Test normal.      Gait: Gait is intact.   Psychiatric:         Mood and Affect: Mood is depressed.         Speech: Speech normal.         Behavior: Behavior is slowed.         Cognition and Memory: Cognition is impaired. Memory is impaired.         Judgment: Judgment is inappropriate.     His Ashvin cognitive assessment score last visit was a 16 out of 30.  With [facility] neuropsychological testing 01/05/2022, it was a 12.   Today, 04/13/2023, it is a 10 out of 30, 0 out of 5 for word recall.     Assessment/Plan:      Diagnoses and all orders for this visit:    1. 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 (Primary)  -     Memantine HCl ER 21 MG capsule sustained-release 24 hr; Take 1 capsule by mouth Daily.  Dispense: 30 capsule; Refill: 1  -     memantine (NAMENDA XR) 14 MG capsule sustained-release 24 hr extended release capsule; Take 1 capsule by mouth Daily.  Dispense: 30  capsule; Refill: 1  -     memantine (NAMENDA XR) 28 MG capsule sustained-release 24 hr extended release capsule; Take 1 capsule by mouth Daily.  Dispense: 90 capsule; Refill: 3  -     escitalopram (LEXAPRO) 10 MG tablet; Take 1 tablet by mouth Daily.  Dispense: 90 tablet; Refill: 3  -     donepezil (ARICEPT) 10 MG tablet; Take 1 tablet by mouth Every Night.  Dispense: 90 tablet; Refill: 3    2. Abnormality of gait due to impairment of balance  Comments:  improved, completed a few visits with PT, stable, no recent falls, no assistive device    3. History of TIA (transient ischemic attack)  Comments:  continue plavix and statin        His wife states that they actually spoke with the [facility] after Mr. Polanco saw a commercial about studies for other treatment options for dementia and so they have done some screening by phone, per his wife, and she states that they have actually told her that he likely does not fit criteria; however, they did go ahead and schedule him with Dr. Monaco for a consultation. They will be seeing him on 10/17/2023 and then we will schedule him for a follow-up in 12/2023.     In the meantime, we are going to attempt to increase and maximize his cognitive enhancers. He is currently on the donepezil 10 mg. We will continue the escitalopram 10 mg for mood, and we are going to try to slowly titrate his memantine IR to XR. I wrote down the directions for his wife. Namenda XR 14mg daily x 8 weeks then Namenda XR 21mg daily x 8 weeks then Namenda XR 28mg daily and continue. D/C memantine IR. We are going to do a very slow titration to try and avoid any dizziness, which he had previously with the immediate release 10 mg twice per day. His wife is going to call us if there are any issues.     Based on cognitive testing today, he does have a severe cognitive impairment at this point. They will continue current medications for stroke prevention. Continue with cardiology. Continue with sleep  medicine for his REM sleep behavior disorder. Continue to increase fluid intake and water intake. Monitor for falls. Recently just completed physical, occupational, and speech therapy with [facility] outpatient. He became quite frustrated with the testing and so they ended up discontinuing this back in 10/2022. Fortunately, he has not had any additional falls and does not require an assistive device. He is sleeping well. His appetite has been good, and his mood has been pleasant. He and his wife are going to be celebrating their 59th wedding anniversary on 12/12/2023 and he did remember this today. It has been a pleasure seeing them and we will see him for a follow-up as scheduled. We will be completing an MMSE at that time since he has had significant decline in cognition. They both verbalize understanding, agree with plan moving forward today.    Reviewed medications, potential side effects and signs and symptoms to report. Discussed risk versus benefits of treatment plan with patient and/or family-including medications, labs and radiology that may be ordered. Addressed questions and concerns during visit. Patient and/or family verbalized understanding and agree with plan.    During this visit the following were done:  Labs Reviewed []    Labs Ordered []    Radiology Reports Reviewed []    Radiology Ordered []    PCP Records Reviewed []    Referring Provider Records Reviewed []    ER Records Reviewed []    Hospital Records Reviewed []    History Obtained From Family [x]  wife  Radiology Images Reviewed []    Other Reviewed []    Records Requested []      Transcribed from ambient dictation for JONATHAN Mcdonald by Yadira Garcia.  04/13/23   17:51 EDT    Patient or patient representative verbalized consent to the visit recording.  I have personally performed the services described in this document as transcribed by the above individual, and it is both accurate and complete.  JONATHAN Mcdonald   4/14/2023  07:24 EDT

## 2023-04-14 ENCOUNTER — TELEPHONE (OUTPATIENT)
Dept: SLEEP MEDICINE | Facility: HOSPITAL | Age: 83
End: 2023-04-14
Payer: MEDICARE

## 2023-04-14 DIAGNOSIS — G47.52 REM SLEEP BEHAVIOR DISORDER: ICD-10-CM

## 2023-04-14 RX ORDER — CLONAZEPAM 1 MG/1
1 TABLET ORAL NIGHTLY
Qty: 90 TABLET | Refills: 0 | Status: SHIPPED | OUTPATIENT
Start: 2023-04-14

## 2023-04-14 NOTE — TELEPHONE ENCOUNTER
clonazePAM (KlonoPIN) 1 MG tablet 1 mg, Oral, Nightly    [id number] - [facility] [address] - [phone]  - [phone]  [phone]

## 2023-05-26 RX ORDER — CLOPIDOGREL BISULFATE 75 MG/1
75 TABLET ORAL DAILY
Qty: 90 TABLET | Refills: 0 | Status: SHIPPED | OUTPATIENT
Start: 2023-05-26

## 2023-05-26 RX ORDER — ATORVASTATIN CALCIUM 20 MG/1
20 TABLET, FILM COATED ORAL DAILY
Qty: 30 TABLET | Refills: 2 | Status: SHIPPED | OUTPATIENT
Start: 2023-05-26

## 2023-06-13 ENCOUNTER — OFFICE VISIT (OUTPATIENT)
Dept: CARDIOLOGY | Facility: CLINIC | Age: 83
End: 2023-06-13
Payer: MEDICARE

## 2023-06-13 VITALS
DIASTOLIC BLOOD PRESSURE: 60 MMHG | BODY MASS INDEX: 24.78 KG/M2 | OXYGEN SATURATION: 98 % | HEART RATE: 84 BPM | HEIGHT: 73 IN | WEIGHT: 187 LBS | SYSTOLIC BLOOD PRESSURE: 142 MMHG

## 2023-06-13 DIAGNOSIS — I35.1 NONRHEUMATIC AORTIC VALVE INSUFFICIENCY: Primary | ICD-10-CM

## 2023-06-13 DIAGNOSIS — I44.30 AV BLOCK: ICD-10-CM

## 2023-06-13 DIAGNOSIS — I50.22 CHRONIC SYSTOLIC CONGESTIVE HEART FAILURE: ICD-10-CM

## 2023-06-13 NOTE — PROGRESS NOTES
[facility]  Office visit  Uche Polanco  1940  [phone]  VISIT DATE:  6/13/2023      PCP: Jd Tapia MD  [address]  [facility] [address]    CC:  Chief Complaint   Patient presents with    Chronic systolic congestive heart failure       PROBLEM LIST:  Valvular heart disease:  History of mitral valve repair at [facility] 2005, records pending.  Echocardiogram, August 2014: EF normal at 50% to 55%, moderate AR, mild MR, left atrium at 3.8 cm.   Echo, January 2017: EF 50%, moderate aortic insufficiency  Recent TIA:  Status post Medtronic link loop recorder implantation, November 2014.  Recent interrogation revealing AV block, pauses, symptomatic with dizziness and lightheadedness.  Obstructive sleep apnea requiring CPAP.   Benign hypertension.   Gout.   Coronary artery disease by report, data insufficient.   REM disorder.   Dyslipidemia.   Family history of hypertension.   AV block, symptomatic with lightheadedness, status post pacemaker implantation, 04/17/2015, Dr. Robert Gerber: Medtronic Advisa  [id number] DR01.   Surgical history:   Hemorrhoidectomy.   Basal cell carcinoma removed.   Valvular repair (mitral valve).      Cardiac studies and procedures:  February 2019 echo  Left ventricular systolic function is moderately decreased.  Estimated EF appears to be in the range of 36 - 40%.  The following left ventricular wall segments are dyskinetic: apical lateral, apical inferior, apical septal and apex. The following left ventricular wall segments are akinetic: apical anterior.  Left ventricular wall thickness is consistent with mild concentric hypertrophy.  Mild-to-moderate mitral valve stenosis is present  Mild to moderate aortic valve regurgitation is present.    March 2019 myocardial perfusion imaging  Left ventricular ejection fraction is normal (Calculated EF = 54%).  Fixed defect consistent with moderate size apical infarct with  extension into the inferior wall, associated moderate hypokinesis.  No ischemia visualized  Impressions are consistent with an intermediate risk study.    December 2019 echo  Left ventricular systolic function is mildly decreased.  Estimated EF appears to be in the range of 41 - 45%.  The following left ventricular wall segments are akinetic: apical anterior, apical inferior, apical septal and apex. Diastolic wall thinning consistent with apical infarction.    June 2020 echo  Estimated EF appears to be in the range of 56 - 60%  The following left ventricular wall segments are hypokinetic: apical septal and apex hypokinetic.  Left atrial cavity size is mild-to-moderately dilated.  Saline test results are negative for right to left atrial level shunt.  Moderate aortic valve regurgitation is present.    ASSESSMENT:   Diagnosis Plan   1. Nonrheumatic aortic valve insufficiency        2. Chronic systolic congestive heart failure        3. AV block          Device interrogation:  Medtronic dual-chamber pacemaker  Normal interrogation    PLAN:  Cardiomyopathy:  I really feel fairly confident that his apical regional wall motion abnormalities are due to either previous small infarct or high-grade distal LAD disease.  Currently asymptomatic.  Improving EF on medical therapy.  Stage III chronic kidney disease.  Continue current medical therapy.  Did not tolerate titration of guideline directed medical therapy due to intermittent symptomatic hypotension.    Mitral valve prolapse status post mitral valve repair: Stable on exam today.  Continue routine clinical follow-up and intermittent echocardiographic surveillance    Aortic insufficiency: Moderate and stable.  Continue clinical follow-up and surveillance echocardiographic assessment.    History of TIA: No evidence of atrial arrhythmia.  Continue Plavix for stroke prophylaxis.  Continue statin.  Afterload controlled, goal less than 130/80 mmHg.    Hypertension: Goal less than  "130/80 mmHg.  Continue current medical therapy.    Symptomatic high-grade AV block: Device dependent.  Currently asymptomatic, continue routine follow-up in device clinic.    Hyperlipidemia: Goal LDL less than 70, continue statin.      Subjective  No change in baseline functional capacity.  Ongoing therapy for developing cognitive impairment.  Still has intermittent gait instability.  Blood pressures running less than 130/80 mmHg.  Tolerating statin without myalgias.  Denies bleeding complications on Plavix.  Denies chest pain, palpitations or dyspnea on exertion.  compliant with medical therapy.      PHYSICAL EXAMINATION:  Vitals:    06/13/23 1518   BP: 142/60   BP Location: Right arm   Patient Position: Sitting   Cuff Size: Adult   Pulse: 84   SpO2: 98%   Weight: 84.8 kg (187 lb)   Height: 185.4 cm (73\")     General Appearance:    Alert, cooperative, no distress, appears stated age   Head:    Normocephalic, without obvious abnormality, atraumatic   Eyes:    conjunctiva/corneas clear   Nose:   Nares normal, septum midline, mucosa normal, no drainage   Throat:   Lips, teeth and gums normal   Neck:   Supple, symmetrical, trachea midline, no carotid    bruit or JVD   Lungs:     Clear to auscultation bilaterally, respirations unlabored   Chest Wall:    No tenderness or deformity    Heart:    Regular rate and rhythm, S1 and S2 normal, no murmur, rub   or gallop, normal carotid impulse bilaterally without bruit.   Abdomen:     Soft, non-tender   Extremities:   Extremities normal, atraumatic, no cyanosis or edema   Pulses:   2+ and symmetric all extremities   Skin:   Skin color, texture, turgor normal, no rashes or lesions       Diagnostic Data:  Procedures  Lab Results   Component Value Date    CHLPL 190 08/22/2014    TRIG 93 07/23/2020    HDL 34 (L) 07/23/2020     Lab Results   Component Value Date    GLUCOSE 103 (H) 06/02/2022    BUN 20 06/02/2022    CREATININE 1.39 (H) 06/02/2022     06/02/2022    K 4.3 " 06/02/2022     06/02/2022    CO2 26.0 06/02/2022     Lab Results   Component Value Date    HGBA1C 5.30 07/22/2020     Lab Results   Component Value Date    WBC 7.43 06/02/2022    HGB 15.0 06/02/2022    HCT 44.8 06/02/2022     06/02/2022       Allergies  Allergies   Allergen Reactions    Sulfa Antibiotics Rash       Current Medications    Current Outpatient Medications:     atorvastatin (LIPITOR) 20 MG tablet, Take 1 tablet by mouth Daily., Disp: 30 tablet, Rfl: 2    clonazePAM (KlonoPIN) 1 MG tablet, Take 1 tablet by mouth Every Night., Disp: 90 tablet, Rfl: 0    clopidogrel (PLAVIX) 75 MG tablet, Take 1 tablet by mouth Daily., Disp: 90 tablet, Rfl: 0    donepezil (ARICEPT) 10 MG tablet, Take 1 tablet by mouth Every Night., Disp: 90 tablet, Rfl: 3    escitalopram (LEXAPRO) 10 MG tablet, Take 1 tablet by mouth Daily., Disp: 90 tablet, Rfl: 3    Memantine HCl ER 21 MG capsule sustained-release 24 hr, Take 1 capsule by mouth Daily., Disp: 30 capsule, Rfl: 1    Multiple Vitamins-Minerals (ICAPS AREDS 2 PO), Take 1 tablet by mouth 2 (Two) Times a Day., Disp: , Rfl:           ROS  Review of Systems   Cardiovascular:  Negative for chest pain, dyspnea on exertion, irregular heartbeat and palpitations.   Respiratory:  Negative for cough, shortness of breath and sleep disturbances due to breathing.    Neurological:  Positive for light-headedness.       SOCIAL HX  Social History     Socioeconomic History    Marital status:    Tobacco Use    Smoking status: Never    Smokeless tobacco: Never   Vaping Use    Vaping Use: Never used   Substance and Sexual Activity    Alcohol use: Not Currently     Alcohol/week: 1.0 standard drink     Types: 1 Glasses of wine per week     Comment: week with dinner    Drug use: Never    Sexual activity: Not Currently     Partners: Female     Birth control/protection: Vasectomy     Comment: Vasectomy       FAMILY HX  Family History   Problem Relation Age of Onset    Ovarian cancer  Mother     Lymphoma Father     Cancer Father     Hypertension Other              Jd Limon III, MD, FACC

## 2023-06-16 ENCOUNTER — TELEPHONE (OUTPATIENT)
Dept: CARDIOLOGY | Facility: CLINIC | Age: 83
End: 2023-06-16
Payer: MEDICARE

## 2023-06-16 NOTE — TELEPHONE ENCOUNTER
Pt wife requests an order for a walking cane. She said we could send the order to [facility] P: [phone]. Advised I will put the order on [facility] desk to sign and fax it to them. Advised her I wouldn't be able to fax it till next week. Spoke with [employer] to confirm fax number and what they need us to send. They need signed order, KELLY, demographics, and insurance. Advised [facility] [employer] I am faxing records now and will fax the order when [facility] signs it.     F: [phone]

## 2023-08-01 ENCOUNTER — TELEPHONE (OUTPATIENT)
Dept: NEUROLOGY | Facility: CLINIC | Age: 83
End: 2023-08-01

## 2023-08-01 DIAGNOSIS — G30.9 MIXED ALZHEIMER'S AND VASCULAR DEMENTIA WITH BEHAVIOR DISTURBANCES: Primary | ICD-10-CM

## 2023-08-01 DIAGNOSIS — F02.818 MIXED ALZHEIMER'S AND VASCULAR DEMENTIA WITH BEHAVIOR DISTURBANCES: Primary | ICD-10-CM

## 2023-08-01 DIAGNOSIS — F01.518 MIXED ALZHEIMER'S AND VASCULAR DEMENTIA WITH BEHAVIOR DISTURBANCES: Primary | ICD-10-CM

## 2023-08-01 DIAGNOSIS — R41.0 CONFUSION: ICD-10-CM

## 2023-08-01 NOTE — TELEPHONE ENCOUNTER
Caller: Hiral Polanco    Relationship: Emergency Contact    Best call back number: [phone]    What medications are you currently taking:   Current Outpatient Medications on File Prior to Visit   Medication Sig Dispense Refill    atorvastatin (LIPITOR) 20 MG tablet Take 1 tablet by mouth Daily. 30 tablet 2    clonazePAM (KlonoPIN) 1 MG tablet Take 1 tablet by mouth Every Night. 90 tablet 0    clopidogrel (PLAVIX) 75 MG tablet Take 1 tablet by mouth Daily. 90 tablet 0    donepezil (ARICEPT) 10 MG tablet Take 1 tablet by mouth Every Night. 90 tablet 3    escitalopram (LEXAPRO) 10 MG tablet Take 1 tablet by mouth Daily. 90 tablet 3    Memantine HCl ER 21 MG capsule sustained-release 24 hr Take 1 capsule by mouth Daily. 30 capsule 1    Multiple Vitamins-Minerals (ICAPS AREDS 2 PO) Take 1 tablet by mouth 2 (Two) Times a Day.       No current facility-administered medications on file prior to visit.          When did you start taking these medications: 7/5/23    Which medication are you concerned about: MEMANTINE    Who prescribed you this medication: JITENDRA KNUTSON    What are your concerns: THE PATIENT IS STARTING TO HALLUCINATE MORE OFTEN DURING THE DAY, VERY OFF BALANCE, AND BECOMING MORE AND MORE CONFUSED.     THE WIFE STATED SHE IS NERVOUS TO LEAVE HIM ALONE TO GRAB SOMETHING OR RUN INTO A STORE.    SHE THINKS IT MAY THE DOSAGE OF THE MEDICATION     How long have you had these concerns: FOR 2 WEEKS

## 2023-08-02 ENCOUNTER — LAB (OUTPATIENT)
Dept: LAB | Facility: HOSPITAL | Age: 83
End: 2023-08-02
Payer: MEDICARE

## 2023-08-02 DIAGNOSIS — R41.0 CONFUSION: ICD-10-CM

## 2023-08-02 LAB
BACTERIA UR QL AUTO: ABNORMAL /HPF
BILIRUB UR QL STRIP: NEGATIVE
CLARITY UR: CLEAR
COLOR UR: ABNORMAL
GLUCOSE UR STRIP-MCNC: NEGATIVE MG/DL
HGB UR QL STRIP.AUTO: NEGATIVE
HYALINE CASTS UR QL AUTO: ABNORMAL /LPF
KETONES UR QL STRIP: ABNORMAL
LEUKOCYTE ESTERASE UR QL STRIP.AUTO: ABNORMAL
MUCOUS THREADS URNS QL MICRO: ABNORMAL /HPF
NITRITE UR QL STRIP: NEGATIVE
PH UR STRIP.AUTO: 5.5 [PH] (ref 5–8)
PROT UR QL STRIP: ABNORMAL
RBC # UR STRIP: ABNORMAL /HPF
REF LAB TEST METHOD: ABNORMAL
SP GR UR STRIP: 1.02 (ref 1–1.03)
SQUAMOUS #/AREA URNS HPF: ABNORMAL /HPF
UROBILINOGEN UR QL STRIP: ABNORMAL
WBC # UR STRIP: ABNORMAL /HPF

## 2023-08-02 PROCEDURE — 81001 URINALYSIS AUTO W/SCOPE: CPT

## 2023-08-02 RX ORDER — MEMANTINE HYDROCHLORIDE 14 MG/1
14 CAPSULE, EXTENDED RELEASE ORAL DAILY
Qty: 90 CAPSULE | Refills: 3 | Status: SHIPPED | OUTPATIENT
Start: 2023-08-02 | End: 2023-09-08

## 2023-08-02 NOTE — TELEPHONE ENCOUNTER
When we saw patient 4/13/2023-we increased the memantine slowly to 14mg then 21mg then 28mg extended release-can you please verify the current memantine extended release dose? I would assume it is 28mg, but I want to make sure. If it is the 28mg and it is something he has just recently increased, then I would recommend we lower to the prior dose-let me know so I can make that change.     Also, if these symptoms continue, this can also be a sign of a UTI so if he is not better, would recommend we check urine to ensure no infection and he could go to any [facility] to have this done, OR they can see PCP. Let me know which his wife prefers for the labs. Thanks, JONATHAN Douglas   negative - no pain, no limited range of motion

## 2023-08-02 NOTE — TELEPHONE ENCOUNTER
Ok. This sounds like a good plan. I will send in the memantine xr 14mg daily to pharmacy and ask them to d/c 21mg. Also, placing orders for urine so she can bring him to [facility] lab to be checked. Thanks, JONATHAN Douglas

## 2023-08-02 NOTE — TELEPHONE ENCOUNTER
I SPOKE WITH PT'S WIFE WILLIS AND SHE SAID PT IS CURRENTLY ON THE 21 MG BUT DID MUCH BETTER ON THE 14 MG AND WOULD LIKE TO GO BACK DOWN TO 14 MG AND NEW SCRIPT TO PHARMACY FOR THE NEW DOSE AND WOULD LIKE TO GO ON AND CHECK PT FOR THE URINE ALSO.  SHE SAID SHE WOULD BRING HIM TO  [facility].

## 2023-08-03 DIAGNOSIS — F02.818 MIXED ALZHEIMER'S AND VASCULAR DEMENTIA WITH BEHAVIOR DISTURBANCES: ICD-10-CM

## 2023-08-03 DIAGNOSIS — N30.00 ACUTE CYSTITIS WITHOUT HEMATURIA: Primary | ICD-10-CM

## 2023-08-03 DIAGNOSIS — F01.518 MIXED ALZHEIMER'S AND VASCULAR DEMENTIA WITH BEHAVIOR DISTURBANCES: ICD-10-CM

## 2023-08-03 DIAGNOSIS — G30.9 MIXED ALZHEIMER'S AND VASCULAR DEMENTIA WITH BEHAVIOR DISTURBANCES: ICD-10-CM

## 2023-08-03 RX ORDER — NITROFURANTOIN 25; 75 MG/1; MG/1
100 CAPSULE ORAL 2 TIMES DAILY
Qty: 14 CAPSULE | Refills: 0 | Status: SHIPPED | OUTPATIENT
Start: 2023-08-03

## 2023-08-03 RX ORDER — MEMANTINE HYDROCHLORIDE 21 MG/1
1 CAPSULE, EXTENDED RELEASE ORAL DAILY
Qty: 30 CAPSULE | Refills: 1 | OUTPATIENT
Start: 2023-08-03

## 2023-08-14 ENCOUNTER — TELEPHONE (OUTPATIENT)
Dept: NEUROLOGY | Facility: CLINIC | Age: 83
End: 2023-08-14
Payer: MEDICARE

## 2023-08-14 NOTE — TELEPHONE ENCOUNTER
PATIENT SPOUSE CALLING TO ADVISE SHE BELIEVES MEDICATION IS NOT MAKING A DIFFERENCE    WOULD LIKE TO DISCUSS WITH SOMEONE ON CLINICAL TEAM    PLEASE CONTACT WILLIS    THANK YOU

## 2023-08-14 NOTE — TELEPHONE ENCOUNTER
Which medication is the wife referring to? The only new medication is the antibiotic for the UTI. If he is not improving in regards to UTI symptoms, they need to see PCP for re-eval.     If it is in regards to memory medications, this takes time and benefit is not always seen for 3-6 months.     It appears he is to see Dr. Monaco at [facility] for further eval on 10/17/2023 at 10am so I would make sure wife is aware of this visit too.    Let me know if there was any other concern. I will not be back in clinic until Wednesday 8/16/2023 after 9am.     Thanks, Veronica

## 2023-08-15 NOTE — TELEPHONE ENCOUNTER
Pt's wife called back and said she was speaking of the memantine, she said the pt is hallucinating every three to four minutes, states he calls her into the room every three to four minutes saying someone is trying to get in the house, at the door or in the room with him and she said he is very agitated at this point.  Is there anything else we could do or what can she do?

## 2023-08-26 PROCEDURE — 93294 REM INTERROG EVL PM/LDLS PM: CPT | Performed by: INTERNAL MEDICINE

## 2023-08-26 PROCEDURE — 93296 REM INTERROG EVL PM/IDS: CPT | Performed by: INTERNAL MEDICINE

## 2023-08-28 NOTE — TELEPHONE ENCOUNTER
I am so sorry to hear that he is worsening. We referred him to [facility] per wife request for other options. Ok to keep off the namenda/memantine.    For now, I would recommend we try low dose seroquel/quetiapine 25mg 1 tablet nightly for 1 week then can give 1 tablet twice a day for the agitation, the restlessness and the hallucinations. Just have to monitor for sedation and falls. See if she is ok with this. I will go ahead and send to local pharmacy to see how he does. It can take 2-4 weeks to see the full benefit and we have lots of room to adjust the medicine.    Thanks, JONATHAN Douglas

## 2023-08-28 NOTE — TELEPHONE ENCOUNTER
Patient's wife called to ask if Veronica has any further recommendations. Patient has been off  Namenda for roughly two weeks (9/17). His PCP Dr. Tapia cut his Clonazepam in half (9/22), but the patient still continues to suffer from hallucinations. During his visit with Dr. Tapia they checked for a UTI and he did not have one. Patient's wife states that the hallucinations are getting worse. She is having a hard time keeping him in the house. He is constantly on the move and wanting to go outside. She is open to any other recommendations to help improve the situation.     Patient's wife stated that she did hire someone to help a few times a week, but she isn't sure that they will be able to handle it. She had mentioned an alternative to clonazepam and spelled it out WKSCH.

## 2023-08-30 NOTE — TELEPHONE ENCOUNTER
After three failed attempts to contact pt's wife by phone I sent her a my chart message with Veronica's response.  Ok for the Hub to relay the message if pt's wife contacts our office again.

## 2023-09-05 ENCOUNTER — TELEPHONE (OUTPATIENT)
Dept: CARDIOLOGY | Facility: CLINIC | Age: 83
End: 2023-09-05
Payer: MEDICARE

## 2023-09-05 NOTE — TELEPHONE ENCOUNTER
Prescribed Seroquel for hallucinations by his Neurologist. Wants to know if its safe to take from a Cardiac standpoint? Please advise.

## 2023-09-08 ENCOUNTER — TELEMEDICINE (OUTPATIENT)
Dept: SLEEP MEDICINE | Facility: CLINIC | Age: 83
End: 2023-09-08
Payer: MEDICARE

## 2023-09-08 VITALS — WEIGHT: 174.5 LBS | HEIGHT: 73 IN | BODY MASS INDEX: 23.13 KG/M2

## 2023-09-08 DIAGNOSIS — G47.33 OSA (OBSTRUCTIVE SLEEP APNEA): Primary | ICD-10-CM

## 2023-09-08 DIAGNOSIS — G47.52 REM SLEEP BEHAVIOR DISORDER: ICD-10-CM

## 2023-09-08 PROCEDURE — 99213 OFFICE O/P EST LOW 20 MIN: CPT | Performed by: NURSE PRACTITIONER

## 2023-09-08 RX ORDER — CLONAZEPAM 1 MG/1
1 TABLET ORAL NIGHTLY
Qty: 90 TABLET | Refills: 0 | Status: SHIPPED | OUTPATIENT
Start: 2023-09-08

## 2023-09-08 NOTE — PROGRESS NOTES
Chief Complaint:   Chief Complaint   Patient presents with    Follow-up       HPI:    Uche Polanco is a 83 y.o. male here for follow-up of sleep apnea.  Patient was last seen 6/29/2023 and continues to do well with sleep therapy with CPAP as well as clonazepam for REM sleep disorder.  Patient is no longer kicking out or screaming during the night.  He has no movements that seem to bother his sleep.  He still gets 10 to 11 hours of sleep nightly.  He has recently been started on Seroquel for hallucinations.  This seems to be working well and also helps with any sleep.  They do put his [place] score at 9/24.  We will not make any changes at this time and he will continue CPAP and clonazepam.        Current medications are:   Current Outpatient Medications:     clonazePAM (KlonoPIN) 1 MG tablet, Take 1 tablet by mouth Every Night., Disp: 90 tablet, Rfl: 0    atorvastatin (LIPITOR) 20 MG tablet, Take 1 tablet by mouth Daily., Disp: 30 tablet, Rfl: 2    clopidogrel (PLAVIX) 75 MG tablet, Take 1 tablet by mouth Daily., Disp: 90 tablet, Rfl: 0    donepezil (ARICEPT) 10 MG tablet, Take 1 tablet by mouth Every Night., Disp: 90 tablet, Rfl: 3    escitalopram (LEXAPRO) 10 MG tablet, Take 1 tablet by mouth Daily., Disp: 90 tablet, Rfl: 3    Multiple Vitamins-Minerals (ICAPS AREDS 2 PO), Take 1 tablet by mouth 2 (Two) Times a Day., Disp: , Rfl:     QUEtiapine (SEROquel) 25 MG tablet, 1 tab qhs for 1 wk then 1 tab bid and continue, Disp: 60 tablet, Rfl: 3.      The patient's relevant past medical, surgical, family and social history were reviewed and updated in Epic as appropriate.       Review of Systems   Eyes:  Positive for visual disturbance.   Respiratory:  Positive for apnea.    Cardiovascular:  Positive for palpitations and leg swelling.   Neurological:  Positive for tremors.        Memory loss   Psychiatric/Behavioral:  Positive for dysphoric mood, hallucinations and sleep disturbance. The patient is nervous/anxious.     All other systems reviewed and are negative.      Objective:    Physical Exam  Constitutional:       Appearance: Normal appearance.   HENT:      Head: Normocephalic and atraumatic.      Mouth/Throat:      Comments: Class 3 airway  Pulmonary:      Effort: Pulmonary effort is normal. No respiratory distress.   Neurological:      Mental Status: He is alert and oriented to person, place, and time. Mental status is at baseline.   Psychiatric:         Mood and Affect: Mood normal.         Behavior: Behavior normal.       CPAP Report  89/90 days of use  Greater than 4-hour use 79%  Settings 12-20  AHI 4.3  95th percentile pressure 18.0    The patient continues to use and benefit from CPAP therapy.    ASSESSMENT/PLAN    Diagnoses and all orders for this visit:    1. TOM (obstructive sleep apnea) (Primary)    2. REM sleep behavior disorder  -     clonazePAM (KlonoPIN) 1 MG tablet; Take 1 tablet by mouth Every Night.  Dispense: 90 tablet; Refill: 0        Counseled patient regarding multimodal approach with healthy nutrition, healthy sleep, regular physical activity, social activities, counseling, and medications. Encouraged to practice lateral sleep position. Avoid alcohol and sedatives close to bedtime.    No changes in patient's medication or CPAP settings, refill clonazepam 1 mg at bedtime #90 no refills I will see patient back in 4 months or sooner if symptoms warrant.  Patient gave consent for video visit.    I have reviewed the results of my evaluation and impression and discussed my recommendations in detail with the patient.      Signed by  JONATHAN Preciado    September 8, 2023      CC: Jd Tapia MD         No ref. provider found

## 2023-09-11 RX ORDER — CLOPIDOGREL BISULFATE 75 MG/1
75 TABLET ORAL DAILY
Qty: 90 TABLET | Refills: 1 | Status: SHIPPED | OUTPATIENT
Start: 2023-09-11

## 2023-09-22 ENCOUNTER — TELEPHONE (OUTPATIENT)
Dept: NEUROLOGY | Facility: CLINIC | Age: 83
End: 2023-09-22
Payer: MEDICARE

## 2023-09-22 NOTE — TELEPHONE ENCOUNTER
I spoke with Martín and she said all they really need is our diagnosis and any meds we currently have him on.  I did explain we had not seen pt since 4/2023 and she verbalized understanding.

## 2023-09-22 NOTE — TELEPHONE ENCOUNTER
I received an admission H&P form from [facility] for this patient which says urgent and should be completed today. I have never completed one of these and I am wondering if this is something PCP should be doing. I have not see this patient since 4/13/2023, he is scheduled to have a consul with  memory Dr. Monaco on 10/17/2023 and has a follow up with me 12/7/2023. I just want to make sure I am the correct provider to complete this. It says the form is due today but I just received it. We have no requests from wife for this. Can you please call mihaela Lopez [phone] and see. I may not be able to complete this today as I am seeing patients but will do my best. I only write 3 medications for this patient. Thanks, JONATHAN Douglas

## 2023-09-22 NOTE — TELEPHONE ENCOUNTER
I called [facility] and had to leave a message with staff requesting a call back

## 2023-10-04 ENCOUNTER — TELEPHONE (OUTPATIENT)
Dept: CARDIOLOGY | Facility: CLINIC | Age: 83
End: 2023-10-04
Payer: MEDICARE

## 2023-10-04 NOTE — TELEPHONE ENCOUNTER
Called patient to remind him that his pacemaker reading is due today. There was no answer and the voicemail hadn't been set up.

## 2023-10-05 ENCOUNTER — APPOINTMENT (OUTPATIENT)
Dept: GENERAL RADIOLOGY | Facility: HOSPITAL | Age: 83
End: 2023-10-05
Payer: MEDICARE

## 2023-10-05 ENCOUNTER — HOSPITAL ENCOUNTER (EMERGENCY)
Facility: HOSPITAL | Age: 83
Discharge: HOME | End: 2023-10-05
Attending: EMERGENCY MEDICINE
Payer: MEDICARE

## 2023-10-05 ENCOUNTER — APPOINTMENT (OUTPATIENT)
Dept: CT IMAGING | Facility: HOSPITAL | Age: 83
End: 2023-10-05
Payer: MEDICARE

## 2023-10-05 VITALS
BODY MASS INDEX: 23.19 KG/M2 | SYSTOLIC BLOOD PRESSURE: 153 MMHG | DIASTOLIC BLOOD PRESSURE: 72 MMHG | RESPIRATION RATE: 14 BRPM | HEART RATE: 66 BPM | WEIGHT: 175 LBS | HEIGHT: 73 IN | TEMPERATURE: 97.9 F | OXYGEN SATURATION: 95 %

## 2023-10-05 DIAGNOSIS — S22.080A COMPRESSION FRACTURE OF T12 VERTEBRA, INITIAL ENCOUNTER: Primary | ICD-10-CM

## 2023-10-05 LAB
ALBUMIN SERPL-MCNC: 3.7 G/DL (ref 3.5–5.2)
ALBUMIN/GLOB SERPL: 1.3 G/DL
ALP SERPL-CCNC: 61 U/L (ref 39–117)
ALT SERPL W P-5'-P-CCNC: 14 U/L (ref 1–41)
ANION GAP SERPL CALCULATED.3IONS-SCNC: 9 MMOL/L (ref 5–15)
AST SERPL-CCNC: 21 U/L (ref 1–40)
BACTERIA UR QL AUTO: NORMAL /HPF
BASOPHILS # BLD AUTO: 0.06 10*3/MM3 (ref 0–0.2)
BASOPHILS NFR BLD AUTO: 0.7 % (ref 0–1.5)
BILIRUB SERPL-MCNC: 0.7 MG/DL (ref 0–1.2)
BILIRUB UR QL STRIP: NEGATIVE
BUN SERPL-MCNC: 20 MG/DL (ref 8–23)
BUN/CREAT SERPL: 16.7 (ref 7–25)
CALCIUM SPEC-SCNC: 9 MG/DL (ref 8.6–10.5)
CHLORIDE SERPL-SCNC: 104 MMOL/L (ref 98–107)
CLARITY UR: CLEAR
CO2 SERPL-SCNC: 28 MMOL/L (ref 22–29)
COLOR UR: YELLOW
CREAT SERPL-MCNC: 1.2 MG/DL (ref 0.76–1.27)
DEPRECATED RDW RBC AUTO: 46 FL (ref 37–54)
EGFRCR SERPLBLD CKD-EPI 2021: 60 ML/MIN/1.73
EOSINOPHIL # BLD AUTO: 0.14 10*3/MM3 (ref 0–0.4)
EOSINOPHIL NFR BLD AUTO: 1.7 % (ref 0.3–6.2)
ERYTHROCYTE [DISTWIDTH] IN BLOOD BY AUTOMATED COUNT: 12.8 % (ref 12.3–15.4)
GLOBULIN UR ELPH-MCNC: 2.9 GM/DL
GLUCOSE SERPL-MCNC: 101 MG/DL (ref 65–99)
GLUCOSE UR STRIP-MCNC: NEGATIVE MG/DL
HCT VFR BLD AUTO: 35.6 % (ref 37.5–51)
HGB BLD-MCNC: 11.5 G/DL (ref 13–17.7)
HGB UR QL STRIP.AUTO: NEGATIVE
HYALINE CASTS UR QL AUTO: NORMAL /LPF
IMM GRANULOCYTES # BLD AUTO: 0.03 10*3/MM3 (ref 0–0.05)
IMM GRANULOCYTES NFR BLD AUTO: 0.4 % (ref 0–0.5)
KETONES UR QL STRIP: ABNORMAL
LEUKOCYTE ESTERASE UR QL STRIP.AUTO: NEGATIVE
LYMPHOCYTES # BLD AUTO: 1.03 10*3/MM3 (ref 0.7–3.1)
LYMPHOCYTES NFR BLD AUTO: 12.7 % (ref 19.6–45.3)
MCH RBC QN AUTO: 31.1 PG (ref 26.6–33)
MCHC RBC AUTO-ENTMCNC: 32.3 G/DL (ref 31.5–35.7)
MCV RBC AUTO: 96.2 FL (ref 79–97)
MONOCYTES # BLD AUTO: 0.78 10*3/MM3 (ref 0.1–0.9)
MONOCYTES NFR BLD AUTO: 9.6 % (ref 5–12)
NEUTROPHILS NFR BLD AUTO: 6.09 10*3/MM3 (ref 1.7–7)
NEUTROPHILS NFR BLD AUTO: 74.9 % (ref 42.7–76)
NITRITE UR QL STRIP: NEGATIVE
NRBC BLD AUTO-RTO: 0 /100 WBC (ref 0–0.2)
PH UR STRIP.AUTO: 5.5 [PH] (ref 5–8)
PLATELET # BLD AUTO: 199 10*3/MM3 (ref 140–450)
PMV BLD AUTO: 10.3 FL (ref 6–12)
POTASSIUM SERPL-SCNC: 3.9 MMOL/L (ref 3.5–5.2)
PROT SERPL-MCNC: 6.6 G/DL (ref 6–8.5)
PROT UR QL STRIP: ABNORMAL
RBC # BLD AUTO: 3.7 10*6/MM3 (ref 4.14–5.8)
RBC # UR STRIP: NORMAL /HPF
REF LAB TEST METHOD: NORMAL
SODIUM SERPL-SCNC: 141 MMOL/L (ref 136–145)
SP GR UR STRIP: 1.03 (ref 1–1.03)
SQUAMOUS #/AREA URNS HPF: NORMAL /HPF
UROBILINOGEN UR QL STRIP: ABNORMAL
WBC # UR STRIP: NORMAL /HPF
WBC NRBC COR # BLD: 8.13 10*3/MM3 (ref 3.4–10.8)

## 2023-10-05 PROCEDURE — 81001 URINALYSIS AUTO W/SCOPE: CPT

## 2023-10-05 PROCEDURE — 85025 COMPLETE CBC W/AUTO DIFF WBC: CPT

## 2023-10-05 PROCEDURE — 99284 EMERGENCY DEPT VISIT MOD MDM: CPT

## 2023-10-05 PROCEDURE — 71045 X-RAY EXAM CHEST 1 VIEW: CPT

## 2023-10-05 PROCEDURE — 72131 CT LUMBAR SPINE W/O DYE: CPT

## 2023-10-05 PROCEDURE — 80053 COMPREHEN METABOLIC PANEL: CPT

## 2023-10-05 RX ORDER — IBUPROFEN 600 MG/1
600 TABLET ORAL ONCE
Status: COMPLETED | OUTPATIENT
Start: 2023-10-05 | End: 2023-10-05

## 2023-10-05 RX ORDER — LIDOCAINE 50 MG/G
1 PATCH TOPICAL EVERY 24 HOURS
Qty: 10 PATCH | Refills: 0 | Status: SHIPPED | OUTPATIENT
Start: 2023-10-05

## 2023-10-05 RX ORDER — SODIUM CHLORIDE 0.9 % (FLUSH) 0.9 %
10 SYRINGE (ML) INJECTION AS NEEDED
Status: DISCONTINUED | OUTPATIENT
Start: 2023-10-05 | End: 2023-10-05 | Stop reason: HOSPADM

## 2023-10-05 RX ORDER — LIDOCAINE 4 G/G
1 PATCH TOPICAL ONCE
Status: DISCONTINUED | OUTPATIENT
Start: 2023-10-05 | End: 2023-10-05 | Stop reason: HOSPADM

## 2023-10-05 RX ORDER — TIZANIDINE 2 MG/1
2 TABLET ORAL EVERY 8 HOURS PRN
Qty: 42 TABLET | Refills: 0 | Status: SHIPPED | OUTPATIENT
Start: 2023-10-05

## 2023-10-05 RX ADMIN — LIDOCAINE 1 PATCH: 4 PATCH TOPICAL at 13:41

## 2023-10-05 RX ADMIN — IBUPROFEN 600 MG: 600 TABLET ORAL at 13:41

## 2023-10-05 NOTE — DISCHARGE INSTRUCTIONS
We prescribed tizanidine, Zanaflex, instead of the other muscle relaxant discussed due to it being on the insurance formulary and covered.  The primary care provider or orthospine specialist can increase the dosage as needed but were starting with the lowest dose.  Also, lets reduce the ibuprofen dose for safety to 600 mg 3 times a day and have repeat renal chemistry labs in a week if the primary care or nursing home can obtain those.

## 2023-10-05 NOTE — ED PROVIDER NOTES
Subjective   History of Present Illness patient is an 83-year-old male presenting to the emergency department accompanied with granddaughters, who report witnessing him fall in a nursing home 1 week ago.  Family members report that he is complained of severe pain with any movement including even last evening when he was laying down in bed, in the nursing home and has given him Tylenol without relief.  Family members report he has Lewy body dementia with baseline confusion.    Review of Systems   Constitutional:  Positive for activity change.   HENT: Negative.     Eyes: Negative.    Respiratory: Negative.     Cardiovascular: Negative.    Gastrointestinal: Negative.    Endocrine: Negative.    Genitourinary: Negative.    Musculoskeletal:  Positive for arthralgias and back pain.   Allergic/Immunologic: Negative.    Neurological: Negative.    Psychiatric/Behavioral: Negative.       Past Medical History:   Diagnosis Date    AV block     Bell palsy     CAD (coronary artery disease)     Cognitive impairment, mild, so stated     Assessed By: Chiki Newton (Neurology); Last Assessed: 11 Sep 2014    Dementia 2015    Essential tremor 04/06/2022    Fall 03/2020    Family history of hypertension     Gout     Head injury Fall 04/2021    Hyperlipidemia     Hypertension     Memory loss 2015    Record of meds and therapy [facility]    Mitral valve prolapse     TOM (obstructive sleep apnea)     REM  disorder     Shingles Teenager    Skin cancer 10/22/2019    left cheek    Stroke 11/2014    TIA (transient ischemic attack)     Valvular heart disease        Allergies   Allergen Reactions    Sulfa Antibiotics Rash       Past Surgical History:   Procedure Laterality Date    HEMORRHOIDECTOMY      HERNIA REPAIR      INSERT / REPLACE / REMOVE PACEMAKER      MITRAL VALVE REPAIR/REPLACEMENT      OTHER SURGICAL HISTORY  11/2014    Medtronic link loop recorder    PACEMAKER IMPLANTATION      SKIN SURGERY  Month ago    Cell removed from  head     SQUAMOUS CELL CARCINOMA EXCISION  10/2020    right ear    TONSILLECTOMY         Family History   Problem Relation Age of Onset    Ovarian cancer Mother     Lymphoma Father     Cancer Father     Hypertension Other        Social History     Socioeconomic History    Marital status:    Tobacco Use    Smoking status: Never    Smokeless tobacco: Never   Vaping Use    Vaping Use: Never used   Substance and Sexual Activity    Alcohol use: Not Currently     Alcohol/week: 1.0 standard drink     Types: 1 Glasses of wine per week     Comment: week with dinner    Drug use: Never    Sexual activity: Not Currently     Partners: Female     Birth control/protection: Vasectomy     Comment: Vasectomy           Objective   Physical Exam  Constitutional:       Appearance: Normal appearance. He is normal weight. He is not ill-appearing.   HENT:      Head: Normocephalic and atraumatic.      Nose: Nose normal.      Mouth/Throat:      Mouth: Mucous membranes are moist. Mucous membranes are dry.   Eyes:      Extraocular Movements: Extraocular movements intact.      Conjunctiva/sclera: Conjunctivae normal.      Pupils: Pupils are equal, round, and reactive to light.   Cardiovascular:      Rate and Rhythm: Normal rate and regular rhythm.   Pulmonary:      Effort: Pulmonary effort is normal.      Breath sounds: Normal air entry. Examination of the right-lower field reveals wheezing. Examination of the left-lower field reveals wheezing. Wheezing present.   Abdominal:      General: Abdomen is flat.      Palpations: Abdomen is soft.   Musculoskeletal:         General: Tenderness and signs of injury present.      Cervical back: Normal and normal range of motion. No swelling, deformity, tenderness or bony tenderness.      Thoracic back: Normal. No swelling, deformity, tenderness or bony tenderness.      Lumbar back: Signs of trauma, tenderness and bony tenderness present. No edema, deformity or lacerations. Negative right straight  leg raise test and negative left straight leg raise test.      Comments: Trauma by history according to family members he felt witnessed while standing forward in his nursing home residence.   Skin:     General: Skin is warm and dry.      Capillary Refill: Capillary refill takes 2 to 3 seconds.      Coloration: Skin is pale.   Neurological:      General: No focal deficit present.      Mental Status: He is alert.   Psychiatric:         Attention and Perception: Attention and perception normal.         Mood and Affect: Mood and affect normal.         Speech: Speech normal.         Behavior: Behavior normal. Behavior is cooperative.         Thought Content: Thought content normal.         Cognition and Memory: Memory is impaired.       Procedures           ED Course      Patient was initially evaluated in emergency department room 3 approximately 10:15 AM.     Follow-up evaluation at approximately 12:45 PM, with results communicated, and disposition plan agreed upon with the patient and daughters.                                Medical Decision Making  The patient presenting symptoms and report of injury witnessed by family members present at bedside, my differential diagnosis includes bony abnormality of the vertebral spine including fracture, subluxation, contusion, musculoskeletal sprain versus strain, chronic back pain, sciatica, osteoarthritis, urinary tract infection, electrolyte derangement, chest wall abnormality including rib fracture, pneumonia.  Patient will have serum screening labs and urinalysis in addition plain film imaging of the chest and CT imaging of the lumbar spine.  Patient is agreeable and the family members are agreeable for the plan as provided.    Problems Addressed:  Compression fracture of T12 vertebra, initial encounter: complicated acute illness or injury    Amount and/or Complexity of Data Reviewed  Labs: ordered.  Radiology: ordered.    Risk  OTC drugs.  Prescription drug  management.        Final diagnoses:   Compression fracture of T12 vertebra, initial encounter       ED Disposition  ED Disposition       ED Disposition   Discharge    Condition   Stable    Comment   --               Jd Tapia MD  [address]  [address]  [place] [address]  [phone]      As needed    Cayetano Mcintosh MD  [address] [facility]  [address]  [place] [id number]  [phone]          Shamir Newman MD  [address] [facility]  [address]  [place] [address]  [phone]      Call the orthopedic group and inquire about a spinal specialist to follow-up in outpatient setting         Medication List        New Prescriptions      lidocaine 5 %  Commonly known as: LIDODERM  Place 1 patch on the skin as directed by provider Daily. Remove & Discard patch within 12 hours or as directed by MD     tiZANidine 2 MG tablet  Commonly known as: ZANAFLEX  Take 1 tablet by mouth Every 8 (Eight) Hours As Needed for Muscle Spasms.               Where to Get Your Medications        These medications were sent to [id number] - [place], [place] - [address] - [phone] [facility] [phone] FX  [address], [facility] [address]      Phone: [phone]   lidocaine 5 %  tiZANidine 2 MG tablet            Figueroa Ramirez, APRN  10/05/23 5893

## 2023-10-12 ENCOUNTER — TELEPHONE (OUTPATIENT)
Dept: SLEEP MEDICINE | Facility: HOSPITAL | Age: 83
End: 2023-10-12
Payer: MEDICARE

## 2023-10-13 NOTE — TELEPHONE ENCOUNTER
Patient's wife wanted to let me know, at this time he is not using his CPAP and she is not going to force the issue for now t patient  has been moved into a memory care facility and they did discontinue the clonazepam

## 2023-10-17 ENCOUNTER — TELEPHONE (OUTPATIENT)
Dept: NEUROLOGY | Facility: CLINIC | Age: 83
End: 2023-10-17
Payer: MEDICARE

## 2023-10-17 NOTE — TELEPHONE ENCOUNTER
Pharmacy called to clarify what dose of the Memantine and seroquel pt should be on?  Pt's wife is confused, I tried but could not figure out the Memantine

## 2023-10-18 ENCOUNTER — TELEPHONE (OUTPATIENT)
Dept: SLEEP MEDICINE | Facility: HOSPITAL | Age: 83
End: 2023-10-18
Payer: MEDICARE

## 2023-10-18 NOTE — TELEPHONE ENCOUNTER
I reviewed ALL of the phone notes from 8/1/2023, 8/14/2023 and my chart message from 8/30/2023-he should be completely OFF memantine. Wife continued to call and say he was worse on it, so he should remain OFF it!    In regards to the seroquel, he should be on seroquel 25mg 1 pill 2 times a day at this point.    Thanks, Veronica

## 2023-10-18 NOTE — TELEPHONE ENCOUNTER
[facility] LONG TERM [facility] CALLED AND SAID THEY NEED THE CLONAZEPAM REFILLED FOR ED. 10/18 LM

## 2023-10-18 NOTE — TELEPHONE ENCOUNTER
I called [facility] pharmacy and gave them the information to d/c the memantin and the dose of seroquel

## 2023-10-19 NOTE — TELEPHONE ENCOUNTER
REFILL NOT APPROPRIATE. KLONOPIN WAS SENT TO PHARMACY 09/08/23 QTY 90 10/19/23 [facility]

## 2023-10-24 ENCOUNTER — TELEPHONE (OUTPATIENT)
Dept: NEUROLOGY | Facility: CLINIC | Age: 83
End: 2023-10-24
Payer: MEDICARE

## 2023-10-24 NOTE — TELEPHONE ENCOUNTER
----- Message from JONATHAN Mcdonald sent at 10/20/2023  1:42 PM EDT -----  This is not neuropsych. This is  [facility] consult with their memory care unit. Patient is now in memory facility and wife requested this consult due to issues with medications and behaviors in the memory facility. Please check with wife and family about their plans for follow up. They have a follow up with us 12/7/2023 but would recommend he only need to see one neurology clinic for memory management. Whichever clinic works best for the family is completely fine. We just need to know their plans moving forward. Thanks, JONATHAN Douglas

## 2023-10-24 NOTE — TELEPHONE ENCOUNTER
I spoke with pt's wife Hiral and she said they will continue with  but appreciate all you have done for pt's care and canceled the 12/7/2023.

## 2023-11-06 RX ORDER — ATORVASTATIN CALCIUM 20 MG/1
20 TABLET, FILM COATED ORAL DAILY
Qty: 30 TABLET | Refills: 2 | Status: SHIPPED | OUTPATIENT
Start: 2023-11-06

## 2023-11-21 NOTE — TELEPHONE ENCOUNTER
3 days.  
Called patient and told him he was cleared for his eyelid surgery and to be off his plavix for 3 days. Verbalized understanding. Clearance letter sent to Dr.David Marie fax # [phone].  
He is cleared.  Do they want him to hold his Plavix?  
Patient called and stated having eyelid surgery with Dr.David Marie next week. He wants to know if he is cleared for surgery and how long he needs to be off plavix. Please advise.  
Yes. He needs to know how long to be off plavix.  
Statement Selected

## 2023-11-27 ENCOUNTER — TELEPHONE (OUTPATIENT)
Dept: CARDIOLOGY | Facility: CLINIC | Age: 83
End: 2023-11-27

## 2023-11-27 NOTE — TELEPHONE ENCOUNTER
REQUEST FOR CARDIAC CLEARANCE    Caller name: [facility]    Phone Number: [phone]    Surgeon's name: DR. MO PARTIDA    Type of planned surgery: MRI/CT    Date of planned surgery: AWAITING CLEARANCE    Type of anesthesia: NONE    Have you been experiencing chest pain or shortness of breath? NONE    Is your doctor requesting for you to stop any of your medications prior to your surgery? NONE    Where should we fax the clearance to? [phone]

## 2023-12-11 ENCOUNTER — TELEPHONE (OUTPATIENT)
Dept: CARDIOLOGY | Facility: CLINIC | Age: 83
End: 2023-12-11

## 2023-12-11 NOTE — TELEPHONE ENCOUNTER
[facility] received a fax that requires your attention. The document has been indexed to the patient’s chart for your review.      Reason for sending: CARDIOLOGY CONSENT    Documents Description: EXT MED RECS_[facility]_12-8-23    Name of Sender: [facility]    Date Indexed: 12-11-23

## 2024-01-09 ENCOUNTER — OFFICE VISIT (OUTPATIENT)
Dept: CARDIOLOGY | Facility: CLINIC | Age: 84
End: 2024-01-09
Payer: MEDICARE

## 2024-01-09 VITALS — HEART RATE: 70 BPM | OXYGEN SATURATION: 98 % | DIASTOLIC BLOOD PRESSURE: 60 MMHG | SYSTOLIC BLOOD PRESSURE: 126 MMHG

## 2024-01-09 DIAGNOSIS — I35.1 NONRHEUMATIC AORTIC VALVE INSUFFICIENCY: ICD-10-CM

## 2024-01-09 DIAGNOSIS — I44.30 AV BLOCK: Primary | ICD-10-CM

## 2024-01-09 DIAGNOSIS — I50.9 CONGESTIVE HEART FAILURE, UNSPECIFIED HF CHRONICITY, UNSPECIFIED HEART FAILURE TYPE: ICD-10-CM

## 2024-01-09 PROCEDURE — 99214 OFFICE O/P EST MOD 30 MIN: CPT | Performed by: INTERNAL MEDICINE

## 2024-01-09 RX ORDER — CHOLECALCIFEROL (VITAMIN D3) 125 MCG
6 CAPSULE ORAL NIGHTLY
COMMUNITY

## 2024-01-09 RX ORDER — MEMANTINE HYDROCHLORIDE 10 MG/1
10 TABLET ORAL 2 TIMES DAILY
COMMUNITY

## 2024-01-09 NOTE — PROGRESS NOTES
[facility] at [facility]  Office visit  Uche Polanco  1940  [phone]  VISIT DATE:  1/9/2024      PCP: Jd Tpaia MD  [address] [address]  [facility] [address]    CC:  Chief Complaint   Patient presents with    Follow-up     6 month       PROBLEM LIST:  Valvular heart disease:  History of mitral valve repair at [facility] 2005, records pending.  Echocardiogram, August 2014: EF normal at 50% to 55%, moderate AR, mild MR, left atrium at 3.8 cm.   Echo, January 2017: EF 50%, moderate aortic insufficiency  Recent TIA:  Status post Medtronic link loop recorder implantation, November 2014.  Recent interrogation revealing AV block, pauses, symptomatic with dizziness and lightheadedness.  Obstructive sleep apnea requiring CPAP.   Benign hypertension.   Gout.   Coronary artery disease by report, data insufficient.   REM disorder.   Dyslipidemia.   Family history of hypertension.   AV block, symptomatic with lightheadedness, status post pacemaker implantation, 04/17/2015, Dr. Robert Gerber: Medtronic Advisa  MRIA2 DR01.   Surgical history:   Hemorrhoidectomy.   Basal cell carcinoma removed.   Valvular repair (mitral valve).      Cardiac studies and procedures:  February 2019 echo  Left ventricular systolic function is moderately decreased.  Estimated EF appears to be in the range of 36 - 40%.  The following left ventricular wall segments are dyskinetic: apical lateral, apical inferior, apical septal and apex. The following left ventricular wall segments are akinetic: apical anterior.  Left ventricular wall thickness is consistent with mild concentric hypertrophy.  Mild-to-moderate mitral valve stenosis is present  Mild to moderate aortic valve regurgitation is present.    March 2019 myocardial perfusion imaging  Left ventricular ejection fraction is normal (Calculated EF = 54%).  Fixed defect consistent with moderate size apical infarct with extension into the inferior  wall, associated moderate hypokinesis.  No ischemia visualized  Impressions are consistent with an intermediate risk study.    December 2019 echo  Left ventricular systolic function is mildly decreased.  Estimated EF appears to be in the range of 41 - 45%.  The following left ventricular wall segments are akinetic: apical anterior, apical inferior, apical septal and apex. Diastolic wall thinning consistent with apical infarction.    June 2020 echo  Estimated EF appears to be in the range of 56 - 60%  The following left ventricular wall segments are hypokinetic: apical septal and apex hypokinetic.  Left atrial cavity size is mild-to-moderately dilated.  Saline test results are negative for right to left atrial level shunt.  Moderate aortic valve regurgitation is present.    ASSESSMENT:   Diagnosis Plan   1. AV block        2. Congestive heart failure, unspecified HF chronicity, unspecified heart failure type        3. Nonrheumatic aortic valve insufficiency            Device interrogation:  Medtronic dual-chamber pacemaker  Normal interrogation    PLAN:  Cardiomyopathy:  I really feel fairly confident that his apical regional wall motion abnormalities are due to either previous small infarct or high-grade distal LAD disease.  Currently asymptomatic.  Improving EF on medical therapy.  Stage III chronic kidney disease.  Continue current medical therapy.  Did not tolerate titration of guideline directed medical therapy due to intermittent symptomatic hypotension.    Mitral valve prolapse status post mitral valve repair: Stable on exam today.  Continue routine clinical follow-up and intermittent echocardiographic surveillance    Aortic insufficiency: Moderate and stable.  Continue clinical follow-up and surveillance echocardiographic assessment.    History of TIA: No evidence of atrial arrhythmia.  Continue Plavix for stroke prophylaxis.  Continue statin.  Afterload controlled, goal less than 130/80  mmHg.    Hypertension: Goal less than 130/80 mmHg.  Continue current medical therapy.    Symptomatic high-grade AV block: Device dependent.  Currently asymptomatic, continue routine follow-up in device clinic.    Hyperlipidemia: Goal LDL less than 70, continue statin.      Subjective  Ongoing progressive dementia, under the care of [facility] at .  Gradual weight loss.  Still has intermittent gait instability.  Blood pressures running less than 130/80 mmHg.  Tolerating statin without myalgias.  Denies bleeding complications on Plavix.  Denies chest pain, palpitations or dyspnea on exertion.  compliant with medical therapy.      PHYSICAL EXAMINATION:  Vitals:    01/09/24 1156   BP: 126/60   BP Location: Right arm   Patient Position: Sitting   Pulse: 70   SpO2: 98%       General Appearance:    Alert, cooperative, no distress, appears stated age   Head:    Normocephalic, without obvious abnormality, atraumatic   Eyes:    conjunctiva/corneas clear   Nose:   Nares normal, septum midline, mucosa normal, no drainage   Throat:   Lips, teeth and gums normal   Neck:   Supple, symmetrical, trachea midline, no carotid    bruit or JVD   Lungs:     Clear to auscultation bilaterally, respirations unlabored   Chest Wall:    No tenderness or deformity    Heart:    Regular rate and rhythm, S1 and S2 normal, no murmur, rub   or gallop, normal carotid impulse bilaterally without bruit.   Abdomen:     Soft, non-tender   Extremities:   Extremities normal, atraumatic, no cyanosis or edema   Pulses:   2+ and symmetric all extremities   Skin:   Skin color, texture, turgor normal, no rashes or lesions       Diagnostic Data:  Procedures  Lab Results   Component Value Date    CHLPL 190 08/22/2014    TRIG 93 07/23/2020    HDL 34 (L) 07/23/2020     Lab Results   Component Value Date    GLUCOSE 101 (H) 10/05/2023    BUN 20 10/05/2023    CREATININE 1.20 10/05/2023     10/05/2023    K 3.9 10/05/2023     10/05/2023    CO2  28.0 10/05/2023     Lab Results   Component Value Date    HGBA1C 5.30 07/22/2020     Lab Results   Component Value Date    WBC 8.13 10/05/2023    HGB 11.5 (L) 10/05/2023    HCT 35.6 (L) 10/05/2023     10/05/2023       Allergies  Allergies   Allergen Reactions    Sulfa Antibiotics Rash       Current Medications    Current Outpatient Medications:     atorvastatin (LIPITOR) 20 MG tablet, Take 1 tablet by mouth Daily., Disp: 30 tablet, Rfl: 2    clopidogrel (PLAVIX) 75 MG tablet, Take 1 tablet by mouth Daily., Disp: 90 tablet, Rfl: 1    donepezil (ARICEPT) 10 MG tablet, Take 1 tablet by mouth Every Night. (Patient taking differently: Take 2 tablets by mouth Every Night.), Disp: 90 tablet, Rfl: 3    escitalopram (LEXAPRO) 10 MG tablet, Take 1 tablet by mouth Daily., Disp: 90 tablet, Rfl: 3    melatonin 5 MG tablet tablet, Take 6 mg by mouth Every Night., Disp: , Rfl:     memantine (NAMENDA) 10 MG tablet, Take 1 tablet by mouth 2 (Two) Times a Day., Disp: , Rfl:     QUEtiapine (SEROquel) 25 MG tablet, 1 tab qhs for 1 wk then 1 tab bid and continue (Patient taking differently: 1 tablet Every Night.), Disp: 60 tablet, Rfl: 3          ROS  Review of Systems   Cardiovascular:  Negative for chest pain, dyspnea on exertion, irregular heartbeat and palpitations.   Respiratory:  Negative for cough, shortness of breath and sleep disturbances due to breathing.    Neurological:  Positive for light-headedness.         SOCIAL HX  Social History     Socioeconomic History    Marital status:    Tobacco Use    Smoking status: Never    Smokeless tobacco: Never   Vaping Use    Vaping Use: Never used   Substance and Sexual Activity    Alcohol use: Not Currently     Alcohol/week: 1.0 standard drink of alcohol     Types: 1 Glasses of wine per week     Comment: week with dinner    Drug use: Never    Sexual activity: Not Currently     Partners: Female     Birth control/protection: Vasectomy     Comment: Vasectomy       FAMILY  HX  Family History   Problem Relation Age of Onset    Ovarian cancer Mother     Lymphoma Father     Cancer Father     Hypertension Other              Jd Limon III, MD, FACC

## 2024-01-15 RX ORDER — ATORVASTATIN CALCIUM 20 MG/1
20 TABLET, FILM COATED ORAL DAILY
Qty: 30 TABLET | Refills: 2 | Status: SHIPPED | OUTPATIENT
Start: 2024-01-15

## 2024-01-15 NOTE — TELEPHONE ENCOUNTER
Lab 8/21/2023    Lipid:  Cholesterol      123  Triglycerides     151  HDL                   40  LDL                     30

## 2024-02-28 ENCOUNTER — TELEPHONE (OUTPATIENT)
Dept: CARDIOLOGY | Facility: CLINIC | Age: 84
End: 2024-02-28
Payer: MEDICARE

## 2024-04-10 RX ORDER — ATORVASTATIN CALCIUM 20 MG/1
20 TABLET, FILM COATED ORAL DAILY
Qty: 90 TABLET | Refills: 1 | Status: SHIPPED | OUTPATIENT
Start: 2024-04-10

## 2024-04-10 RX ORDER — CLOPIDOGREL BISULFATE 75 MG/1
75 TABLET ORAL DAILY
Qty: 90 TABLET | Refills: 1 | Status: SHIPPED | OUTPATIENT
Start: 2024-04-10

## 2024-04-17 ENCOUNTER — TELEPHONE (OUTPATIENT)
Dept: CARDIOLOGY | Facility: CLINIC | Age: 84
End: 2024-04-17
Payer: MEDICARE

## 2024-04-17 NOTE — TELEPHONE ENCOUNTER
SPOKE W/PATIENT REGARDING LOCATION CHANGE TO [address] [place] [address]  4/17/24  HUB OKAY TO SCHEDULE

## 2024-05-29 ENCOUNTER — TELEPHONE (OUTPATIENT)
Dept: CARDIOLOGY | Facility: CLINIC | Age: 84
End: 2024-05-29

## 2024-05-29 NOTE — TELEPHONE ENCOUNTER
“Please be informed that patient has passed. Patient has been marked  in the system. The date of death is: 24    Caller: Hiral Polanco    Relationship: Emergency Contact    Best call back number: [phone]    Did the patient have surgery within 30 days of their passing (Y/N):

## 2024-10-15 NOTE — PROGRESS NOTES
"Subjective:     Patient ID: Uche Polanco is a 82 y.o. male.    CC:   Chief Complaint   Patient presents with   • Tremors   • Memory Loss       HPI:   History of Present Illness   Today 10/10/2022- This is a pleasant 82-year-old male who presents for 4-month neurology follow-up on mixed Alzheimer's and vascular dementia with symptoms present for several years now. This diagnosis has been confirmed with formal neuropsychological testing at  completed on 01/05/2022. At his last visit to the clinic on 06/10/2022, we decreased his memantine to 5 mg twice a day due to reports of diarrhea with increasing to the 10 mg dosing. We continued his donepezil 10 mg, which he has been on long term along with his escitalopram 10 mg daily for depression. He is here for follow-up today accompanied by his wife.    He reports that his memory has worsened over the last 6 months. His wife states that some days are worse than others. She states that the testing at [facility] was long and feels that he became frustrated and gave up. She states that he cannot remember to take his medications. He reports that he struggles with new memories the most and older memories are :\"still there\". He states that he is still taking Lexapro and reports that his mood has been well. His wife states that he does not seem to get frustrated very often, but notes that he states he hates that he cannot remember anything. She states that he gets dressed and takes showers, but he struggles with fine motor skills, such as buttoning his shirt. She notes that he is able to do it, but it takes him awhile and he becomes frustrated. His wife states that he has not cooked himself a meal in a while. He states that his appetite is well. He denies any trouble with swallowing. He states that he is making it to the bathroom in time. He states that he sleeps well and is still taking clonazepam. He has to be reminded multiple times to complete simple tasks such as " taking his medications, even if they are sitting directly in front of him. He does not drive.    He states that he fell approximately 1 week ago. His wife states that he missed a step while walking to the mailbox, but notes that he did not have any injuries. He adds that he did not hit his head or lose consciousness. His wife states he had another fall after coming home from vacation. She states that he decided to take the suitcases down to the basement, but fell retirement down the stairs. She states that he did not hit his head but had a few small bruises afterwards. He states that he has not done any physical therapy or balance therapy recently.    Previous neuro history and workup:  He has a history of dizziness in the past, which has been well managed most recently with ENT.     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 This has been treated by Dr. Hansen with Klonopin.     Neuroimaging in the past has been notable only for an old left cerebellar infarct. Screening bloodwork has been normal in the past as well. He is currently taking donepezil. He has also worked with Anjali Serrano (cognitive rehabilitation) in the past, which was quite beneficial.      Underwent formal Neuro Psych testing at  on 1/5/2022 with Zeke Prater Neuropsychologist for further evaluation of memory loss. Summary and findings reviewed today in clinic with patient and his wife. 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 MOCA then was a 12/30- with moderate to severe impairments in recall verbal and visual memory as well as executive function, psychomotor speech, processing speed and semantics. No falls since January/February 2021-suffered severe fall with [facility] at that time. He completed 3 or so visits with speech therapist in 2021 but he did become frustrated  so he quit per his wife. Memory issues have been present many years.     CT of the head completed on 06/02/2022 during ER visit for confusion. This showed no acute intracranial abnormalities with some scattered subcortical and periventricular white matter changes, which are chronic. This is consistent with the chronic small vessel ischemic changes.       The patient's wife notes the patient continues to have difficulty completing a sentence or conversation. He continues to have to difficulty tracking the phone or a calendar, and she is managing his medications. She adds the patient is having a good cognition day today.     The patient reports the COVID-19 pandemic has affected his mood due to not being able to congregate with people as often as he would like. His wife reports Lexapro is the first medication the patient has tried for his mood.     He continues to take donepezil, Plavix, Lipitor, Lexapro, and clonazepam. The eye doctor prescribed the patient AREDS and was advised to discontinue taking vitamin D.      The patient has previously been seen by Dr. Paulino in gastroenterology for diarrhea with reported normal Colonoscopy.     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     He follows with Cardiology.    The following portions of the patient's history were reviewed and updated as appropriate: allergies, current medications, past family history, past medical history, past social history, past surgical history and problem list.    Past Medical History:   Diagnosis Date   • AV block    • Bell palsy    • CAD (coronary artery disease)    • Cognitive impairment, mild, so stated     Assessed By: Chiki Newton (Neurology); Last Assessed: 11 Sep 2014   • Dementia (HCC) 2015   • Essential tremor 04/06/2022   • Fall 03/2020   • Family history of hypertension    • Gout    • Head injury Fall 04/2021   • Hyperlipidemia    • Hypertension    • Memory loss 2015    Record of  meds and therapy [facility]   • TOM (obstructive sleep apnea)    • REM  disorder    • Shingles Teenager   • Skin cancer 10/22/2019    left cheek   • Stroke (HCC) 11/2014   • TIA (transient ischemic attack)    • Valvular heart disease        Past Surgical History:   Procedure Laterality Date   • HEMORRHOIDECTOMY     • HERNIA REPAIR     • MITRAL VALVE REPAIR/REPLACEMENT     • OTHER SURGICAL HISTORY  11/2014    Medtronic link loop recorder   • PACEMAKER IMPLANTATION     • SKIN SURGERY  Month ago    Cell removed from head    • SQUAMOUS CELL CARCINOMA EXCISION  10/2020    right ear   • TONSILLECTOMY         Social History     Socioeconomic History   • Marital status:    Tobacco Use   • Smoking status: Never   • Smokeless tobacco: Never   Vaping Use   • Vaping Use: Never used   Substance and Sexual Activity   • Alcohol use: Not Currently     Comment: week with dinner   • Drug use: No   • Sexual activity: Not Currently     Partners: Female     Comment: Vasectomy       Family History   Problem Relation Age of Onset   • Ovarian cancer Mother    • Lymphoma Father    • Cancer Father    • Hypertension Other           Current Outpatient Medications:   •  atorvastatin (LIPITOR) 20 MG tablet, TAKE 1 TABLET BY MOUTH DAILY, Disp: 30 tablet, Rfl: 5  •  clonazePAM (KlonoPIN) 1 MG tablet, Take 1 tablet by mouth Every Night., Disp: 90 tablet, Rfl: 0  •  clopidogrel (PLAVIX) 75 MG tablet, Take 1 tablet by mouth Daily., Disp: 90 tablet, Rfl: 1  •  donepezil (ARICEPT) 10 MG tablet, Take 1 tablet by mouth Every Night., Disp: 90 tablet, Rfl: 3  •  escitalopram (LEXAPRO) 10 MG tablet, Take 1 tablet by mouth Daily., Disp: 90 tablet, Rfl: 3  •  memantine (NAMENDA) 5 MG tablet, Take 1 tablet by mouth 2 (Two) Times a Day., Disp: 180 tablet, Rfl: 3  •  Multiple Vitamins-Minerals (ICAPS AREDS 2 PO), Take 1 tablet by mouth 2 (Two) Times a Day., Disp: , Rfl:      Review of Systems   Constitutional: Negative for chills, fatigue, fever  "and unexpected weight change.   HENT: Negative for ear pain, hearing loss, nosebleeds, rhinorrhea and sore throat.    Eyes: Negative for photophobia, pain, discharge, itching and visual disturbance.   Respiratory: Negative for cough, chest tightness, shortness of breath and wheezing.    Cardiovascular: Negative for chest pain, palpitations and leg swelling.   Gastrointestinal: Negative for abdominal pain, blood in stool, constipation, diarrhea, nausea and vomiting.   Genitourinary: Negative for dysuria, frequency, hematuria and urgency.   Musculoskeletal: Negative for arthralgias, back pain, gait problem, joint swelling, myalgias, neck pain and neck stiffness.   Skin: Negative for rash and wound.   Allergic/Immunologic: Negative for environmental allergies and food allergies.   Neurological: Negative for dizziness, tremors, seizures, syncope, speech difficulty, weakness, light-headedness, numbness and headaches.   Hematological: Negative for adenopathy. Does not bruise/bleed easily.   Psychiatric/Behavioral: Positive for decreased concentration. Negative for agitation, confusion, hallucinations, sleep disturbance and suicidal ideas. The patient is not nervous/anxious.    All other systems reviewed and are negative.       Objective:  /70   Pulse 78   Ht 185.4 cm (73\")   Wt 87.1 kg (192 lb)   SpO2 97%   BMI 25.33 kg/m²     Neurologic Exam     Mental Status   Oriented to person.   Disoriented to place.   Disoriented to time.   Registration: recalls 3 of 3 objects. Recall of objects at 5 minutes: 0.   Attention: decreased. Concentration: decreased.   Speech: speech is normal   Level of consciousness: alert  Knowledge: poor.   Abnormal comprehension.     Cranial Nerves     CN II   Visual acuity: decreased    CN III, IV, VI   Pupils are equal, round, and reactive to light.  Extraocular motions are normal.     CN V   Facial sensation intact.     CN VII   Right facial weakness: central (chronic right facial " asymmetry unchanged)    CN VIII   CN VIII normal.     CN IX, X   CN IX normal.   CN X normal.     CN XI   CN XI normal.     CN XII   CN XII normal.     Motor Exam   Muscle bulk: normal  Overall muscle tone: normal    Strength   Strength 5/5 throughout.     Gait, Coordination, and Reflexes     Gait  Gait: (mildly off balance with quick turns and position changes, no shuffling, good arm swing)    Coordination   Finger to nose coordination: normal    Tremor   Resting tremor: absent  Intention tremor: present (mild BUE fine kinetic hand tremor)  Action tremor: absent    Reflexes   Reflexes 2+ except as noted.   Right : 2+  Left : 2+  Normal finger and heel taps bilaterally. Some slow hand/finger coordination       Physical Exam  Constitutional:       Appearance: Normal appearance.   Eyes:      Extraocular Movements: EOM normal.      Pupils: Pupils are equal, round, and reactive to light.   Neurological:      Mental Status: He is alert.      Motor: Motor strength is normal.      Coordination: Finger-Nose-Finger Test normal.   Psychiatric:         Mood and Affect: Affect is blunt.         Speech: Speech normal.         Behavior: Behavior is slowed.         Thought Content: Thought content normal.         Cognition and Memory: Cognition is impaired. Memory is impaired.         Judgment: Judgment is inappropriate.       Ashvin cognitive assessment score today is 16 out of 30, and was 0 out of 5 for word recall.   Prior Ashvin cognitive assessment score with [facility] was a 12 out of 30.   Previous MMSE was a 25 out of 30 with recall 1 out of 3 at last visit in clinic on 06/10/2022.    Assessment/Plan:       Diagnoses and all orders for this visit:    1. 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 (HCC) (Primary)  -     donepezil (ARICEPT) 10 MG tablet; Take 1 tablet by mouth Every Night.  Dispense: 90 tablet; Refill: 3  -     escitalopram (LEXAPRO) 10 MG tablet; Take 1 tablet by mouth Daily.   Dispense: 90 tablet; Refill: 3  -     memantine (NAMENDA) 5 MG tablet; Take 1 tablet by mouth 2 (Two) Times a Day.  Dispense: 180 tablet; Refill: 3  -     Ambulatory Referral to Home Health    2. Essential tremor    3. History of TIA (transient ischemic attack)  -     Ambulatory Referral to Home Health    4. Fall (on) (from) other stairs and steps, initial encounter  Comments:  fell one week ago down stairs, denies injury, LOC or other long term symptoms  Orders:  -     Ambulatory Referral to Home Health    5. Abnormality of gait due to impairment of balance  -     Ambulatory Referral to Home Health    6. Fine motor impairment  -     Ambulatory Referral to Home Health    The total time of visit today was 40 minutes, which included reviewing prior notes, obtaining additional history from the patient and his wife, discussing findings and recommendations in detail. Also discussing pathophysiology and etiology of his dementia including a combination of Alzheimer's and vascular. We also discussed expected progression.     He does have moderate cognitive difficulties on testing and noted with neuropsychology advanced cognitive changes.     We will continue to follow him closely.     He will continue with his current medications.     We will order home health physical and occupational therapy to help with fine motor impairment as well as with gait and balance.     We discussed fall prevention in great detail again today.    He should continue with cardiology as well as his sleep specialist.    He has had no recurrent stroke symptoms.    He does not drive or operate machinery.    They will call us with any additional questions or concerns prior to 6-month follow-up in clinic.        Reviewed medications, potential side effects and signs and symptoms to report. Discussed risk versus benefits of treatment plan with patient and/or family-including medications, labs and radiology that may be ordered. Addressed questions and  concerns during visit. Patient and/or family verbalized understanding and agree with plan.    AS THE PROVIDER, I PERSONALLY WORE PPE DURING ENTIRE FACE TO FACE ENCOUNTER IN CLINIC WITH THE PATIENT. PATIENT ALSO WORE PPE DURING ENTIRE FACE TO FACE ENCOUNTER EXCEPT FOR A MAX OF 30 SECONDS DURING NEUROLOGICAL EVALUATION OF CRANIAL NERVES AND THEN MASK WAS PLACED BACK OVER PATIENT FACE FOR REMAINDER OF VISIT. I WASHED MY HANDS BEFORE AND AFTER VISIT.    During this visit the following were done:  Labs Reviewed []    Labs Ordered []    Radiology Reports Reviewed []    Radiology Ordered []    PCP Records Reviewed []    Referring Provider Records Reviewed []    ER Records Reviewed []    Hospital Records Reviewed []    History Obtained From Family [x]wife    Radiology Images Reviewed []    Other Reviewed [x]  Cardiology and sleep clinic notes reviewed  Records Requested []      Transcribed from ambient dictation for JONATHAN Mcdonald by Iban Hillman.  10/10/22   15:29 EDT    Patient or patient representative verbalized consent to the visit recording.  I have personally performed the services described in this document as transcribed by the above individual, and it is both accurate and complete.  JONATHAN Mcdonald  10/10/2022  17:01 EDT         decreased endurance/impaired balance/decreased flexibility/decreased strength